# Patient Record
Sex: FEMALE | Race: BLACK OR AFRICAN AMERICAN | NOT HISPANIC OR LATINO | Employment: UNEMPLOYED | URBAN - METROPOLITAN AREA
[De-identification: names, ages, dates, MRNs, and addresses within clinical notes are randomized per-mention and may not be internally consistent; named-entity substitution may affect disease eponyms.]

---

## 2022-02-16 ENCOUNTER — TELEPHONE (OUTPATIENT)
Dept: HEMATOLOGY/ONCOLOGY | Facility: CLINIC | Age: 72
End: 2022-02-16
Payer: MEDICARE

## 2022-02-16 NOTE — TELEPHONE ENCOUNTER
Pt daughter called in requesting appt with Dr. Lott for her mother who was diagnosed with Gallbladder cancer at [facility] 2/4/22  I have requested records and will scan to media once received. Together we set appt with dr lott for 2/21/22 at the cancer center  At 1120am. Travel directions and address given. Pt daughter in agreement with appt,. They will call with any further concerns. Your fax has been successfully sent to [phone] at [phone].

## 2022-02-17 ENCOUNTER — TELEPHONE (OUTPATIENT)
Dept: HEMATOLOGY/ONCOLOGY | Facility: CLINIC | Age: 72
End: 2022-02-17
Payer: MEDICARE

## 2022-02-21 ENCOUNTER — TELEPHONE (OUTPATIENT)
Dept: HEMATOLOGY/ONCOLOGY | Facility: CLINIC | Age: 72
End: 2022-02-21

## 2022-02-21 ENCOUNTER — LAB VISIT (OUTPATIENT)
Dept: LAB | Facility: HOSPITAL | Age: 72
End: 2022-02-21
Attending: INTERNAL MEDICINE
Payer: MEDICARE

## 2022-02-21 ENCOUNTER — TELEPHONE (OUTPATIENT)
Dept: HEMATOLOGY/ONCOLOGY | Facility: CLINIC | Age: 72
End: 2022-02-21
Payer: MEDICARE

## 2022-02-21 ENCOUNTER — OFFICE VISIT (OUTPATIENT)
Dept: HEMATOLOGY/ONCOLOGY | Facility: CLINIC | Age: 72
End: 2022-02-21
Payer: MEDICARE

## 2022-02-21 VITALS
OXYGEN SATURATION: 96 % | TEMPERATURE: 98 F | SYSTOLIC BLOOD PRESSURE: 138 MMHG | WEIGHT: 164.88 LBS | HEART RATE: 97 BPM | DIASTOLIC BLOOD PRESSURE: 77 MMHG | BODY MASS INDEX: 25.88 KG/M2 | HEIGHT: 67 IN | RESPIRATION RATE: 20 BRPM

## 2022-02-21 DIAGNOSIS — C23 ADENOCARCINOMA OF GALLBLADDER: Primary | ICD-10-CM

## 2022-02-21 DIAGNOSIS — D53.9 MACROCYTIC ANEMIA: ICD-10-CM

## 2022-02-21 DIAGNOSIS — D72.829 LEUKOCYTOSIS, UNSPECIFIED TYPE: ICD-10-CM

## 2022-02-21 DIAGNOSIS — C23 ADENOCARCINOMA OF GALLBLADDER: ICD-10-CM

## 2022-02-21 LAB
ALBUMIN SERPL BCP-MCNC: 3 G/DL (ref 3.5–5.2)
ALP SERPL-CCNC: 103 U/L (ref 55–135)
ALT SERPL W/O P-5'-P-CCNC: 11 U/L (ref 10–44)
ANION GAP SERPL CALC-SCNC: 11 MMOL/L (ref 8–16)
AST SERPL-CCNC: 12 U/L (ref 10–40)
BASOPHILS # BLD AUTO: 0.04 K/UL (ref 0–0.2)
BASOPHILS NFR BLD: 0.2 % (ref 0–1.9)
BILIRUB SERPL-MCNC: 0.6 MG/DL (ref 0.1–1)
BUN SERPL-MCNC: 8 MG/DL (ref 8–23)
CALCIUM SERPL-MCNC: 8.9 MG/DL (ref 8.7–10.5)
CANCER AG19-9 SERPL-ACNC: 249.3 U/ML (ref 0–40)
CEA SERPL-MCNC: 2.3 NG/ML (ref 0–5)
CHLORIDE SERPL-SCNC: 106 MMOL/L (ref 95–110)
CO2 SERPL-SCNC: 27 MMOL/L (ref 23–29)
CREAT SERPL-MCNC: 0.7 MG/DL (ref 0.5–1.4)
DIFFERENTIAL METHOD: ABNORMAL
EOSINOPHIL # BLD AUTO: 0.1 K/UL (ref 0–0.5)
EOSINOPHIL NFR BLD: 0.4 % (ref 0–8)
ERYTHROCYTE [DISTWIDTH] IN BLOOD BY AUTOMATED COUNT: 13.5 % (ref 11.5–14.5)
EST. GFR  (AFRICAN AMERICAN): >60 ML/MIN/1.73 M^2
EST. GFR  (NON AFRICAN AMERICAN): >60 ML/MIN/1.73 M^2
FERRITIN SERPL-MCNC: 705 NG/ML (ref 20–300)
GLUCOSE SERPL-MCNC: 109 MG/DL (ref 70–110)
HCT VFR BLD AUTO: 35.1 % (ref 37–48.5)
HGB BLD-MCNC: 10.8 G/DL (ref 12–16)
IMM GRANULOCYTES # BLD AUTO: 0.15 K/UL (ref 0–0.04)
IMM GRANULOCYTES NFR BLD AUTO: 0.7 % (ref 0–0.5)
IRON SERPL-MCNC: 10 UG/DL (ref 30–160)
LYMPHOCYTES # BLD AUTO: 1.8 K/UL (ref 1–4.8)
LYMPHOCYTES NFR BLD: 8.6 % (ref 18–48)
MCH RBC QN AUTO: 32 PG (ref 27–31)
MCHC RBC AUTO-ENTMCNC: 30.8 G/DL (ref 32–36)
MCV RBC AUTO: 104 FL (ref 82–98)
MONOCYTES # BLD AUTO: 1.1 K/UL (ref 0.3–1)
MONOCYTES NFR BLD: 5.4 % (ref 4–15)
NEUTROPHILS # BLD AUTO: 17.7 K/UL (ref 1.8–7.7)
NEUTROPHILS NFR BLD: 84.7 % (ref 38–73)
NRBC BLD-RTO: 0 /100 WBC
PLATELET # BLD AUTO: 394 K/UL (ref 150–450)
PMV BLD AUTO: 9.6 FL (ref 9.2–12.9)
POTASSIUM SERPL-SCNC: 3.5 MMOL/L (ref 3.5–5.1)
PROT SERPL-MCNC: 6.5 G/DL (ref 6–8.4)
RBC # BLD AUTO: 3.37 M/UL (ref 4–5.4)
SATURATED IRON: 5 % (ref 20–50)
SODIUM SERPL-SCNC: 144 MMOL/L (ref 136–145)
TOTAL IRON BINDING CAPACITY: 189 UG/DL (ref 250–450)
TRANSFERRIN SERPL-MCNC: 128 MG/DL (ref 200–375)
WBC # BLD AUTO: 20.93 K/UL (ref 3.9–12.7)

## 2022-02-21 PROCEDURE — 36415 COLL VENOUS BLD VENIPUNCTURE: CPT | Performed by: INTERNAL MEDICINE

## 2022-02-21 PROCEDURE — 1125F AMNT PAIN NOTED PAIN PRSNT: CPT | Mod: CPTII,S$GLB,, | Performed by: INTERNAL MEDICINE

## 2022-02-21 PROCEDURE — 99999 PR PBB SHADOW E&M-EST. PATIENT-LVL V: ICD-10-PCS | Mod: PBBFAC,,, | Performed by: INTERNAL MEDICINE

## 2022-02-21 PROCEDURE — 3078F DIAST BP <80 MM HG: CPT | Mod: CPTII,S$GLB,, | Performed by: INTERNAL MEDICINE

## 2022-02-21 PROCEDURE — 99205 PR OFFICE/OUTPT VISIT, NEW, LEVL V, 60-74 MIN: ICD-10-PCS | Mod: S$GLB,,, | Performed by: INTERNAL MEDICINE

## 2022-02-21 PROCEDURE — 84466 ASSAY OF TRANSFERRIN: CPT | Performed by: INTERNAL MEDICINE

## 2022-02-21 PROCEDURE — 82728 ASSAY OF FERRITIN: CPT | Performed by: INTERNAL MEDICINE

## 2022-02-21 PROCEDURE — 1159F MED LIST DOCD IN RCRD: CPT | Mod: CPTII,S$GLB,, | Performed by: INTERNAL MEDICINE

## 2022-02-21 PROCEDURE — 3075F SYST BP GE 130 - 139MM HG: CPT | Mod: CPTII,S$GLB,, | Performed by: INTERNAL MEDICINE

## 2022-02-21 PROCEDURE — 1160F RVW MEDS BY RX/DR IN RCRD: CPT | Mod: CPTII,S$GLB,, | Performed by: INTERNAL MEDICINE

## 2022-02-21 PROCEDURE — 3078F PR MOST RECENT DIASTOLIC BLOOD PRESSURE < 80 MM HG: ICD-10-PCS | Mod: CPTII,S$GLB,, | Performed by: INTERNAL MEDICINE

## 2022-02-21 PROCEDURE — 1160F PR REVIEW ALL MEDS BY PRESCRIBER/CLIN PHARMACIST DOCUMENTED: ICD-10-PCS | Mod: CPTII,S$GLB,, | Performed by: INTERNAL MEDICINE

## 2022-02-21 PROCEDURE — 82378 CARCINOEMBRYONIC ANTIGEN: CPT | Performed by: INTERNAL MEDICINE

## 2022-02-21 PROCEDURE — 85025 COMPLETE CBC W/AUTO DIFF WBC: CPT | Performed by: INTERNAL MEDICINE

## 2022-02-21 PROCEDURE — 3008F BODY MASS INDEX DOCD: CPT | Mod: CPTII,S$GLB,, | Performed by: INTERNAL MEDICINE

## 2022-02-21 PROCEDURE — 1125F PR PAIN SEVERITY QUANTIFIED, PAIN PRESENT: ICD-10-PCS | Mod: CPTII,S$GLB,, | Performed by: INTERNAL MEDICINE

## 2022-02-21 PROCEDURE — 1159F PR MEDICATION LIST DOCUMENTED IN MEDICAL RECORD: ICD-10-PCS | Mod: CPTII,S$GLB,, | Performed by: INTERNAL MEDICINE

## 2022-02-21 PROCEDURE — 99205 OFFICE O/P NEW HI 60 MIN: CPT | Mod: S$GLB,,, | Performed by: INTERNAL MEDICINE

## 2022-02-21 PROCEDURE — 86301 IMMUNOASSAY TUMOR CA 19-9: CPT | Performed by: INTERNAL MEDICINE

## 2022-02-21 PROCEDURE — 80053 COMPREHEN METABOLIC PANEL: CPT | Performed by: INTERNAL MEDICINE

## 2022-02-21 PROCEDURE — 3008F PR BODY MASS INDEX (BMI) DOCUMENTED: ICD-10-PCS | Mod: CPTII,S$GLB,, | Performed by: INTERNAL MEDICINE

## 2022-02-21 PROCEDURE — 99999 PR PBB SHADOW E&M-EST. PATIENT-LVL V: CPT | Mod: PBBFAC,,, | Performed by: INTERNAL MEDICINE

## 2022-02-21 PROCEDURE — 3075F PR MOST RECENT SYSTOLIC BLOOD PRESS GE 130-139MM HG: ICD-10-PCS | Mod: CPTII,S$GLB,, | Performed by: INTERNAL MEDICINE

## 2022-02-21 RX ORDER — LABETALOL 300 MG/1
TABLET, FILM COATED ORAL
COMMUNITY

## 2022-02-21 RX ORDER — LISINOPRIL 20 MG/1
TABLET ORAL
Status: ON HOLD | COMMUNITY
End: 2022-03-06 | Stop reason: HOSPADM

## 2022-02-21 RX ORDER — AMLODIPINE BESYLATE 5 MG/1
TABLET ORAL
COMMUNITY

## 2022-02-21 RX ORDER — HYDROCODONE BITARTRATE AND ACETAMINOPHEN 5; 325 MG/1; MG/1
TABLET ORAL
COMMUNITY
Start: 2022-02-09 | End: 2022-03-03

## 2022-02-21 RX ORDER — CLONAZEPAM 0.5 MG/1
TABLET ORAL
COMMUNITY

## 2022-02-21 RX ORDER — MONTELUKAST SODIUM 10 MG/1
10 TABLET ORAL DAILY
COMMUNITY
Start: 2021-08-27

## 2022-02-21 NOTE — TELEPHONE ENCOUNTER
Nurse called pt per Dr Marcos to ask about ever had colonoscopy screening for colon cancer?     No pt has not.

## 2022-02-21 NOTE — PROGRESS NOTES
Subjective:      DATE OF VISIT: 2/21/22     ?  Patient ID:?Madelyn Serna is a 71 y.o. female.?? MR#: [id number]   ?   REFERRING PROVIDER: Aaareferral Self  No address on file     ? Primary Care Providers:  Primary Doctor No (General)     CHIEF COMPLAINT: ?  Newly diagnosed gallbladder adenocarcinoma???   ?   ONCOLOGIC DIAGNOSIS:  Gallbladder adenocarcinoma, stage IV, pT4 NX pM1  ?   CURRENT TREATMENT: TBD    PAST TREATMENT:  Laparoscopic cholecystectomy, 02/04/2022, [facility]  ?   ONCOLOGIC HISTORY    I the pleasure of meeting I had the pleasure meeting for the 1st time Ms. Serna a pleasant 71-year-old woman accompanied by her 2 daughters today for newly diagnosed gallbladder adenocarcinoma.    Medical history is notable for former tobacco use quit December 2021, COPD, anxiety, cataracts.      She notes 4 days of right lower quadrant abdominal pain acute onset for which she presented to [facility] emergency room on 02/04/2022.  One episode of vomiting per emergency room note.  Labs with leukocytosis WBC 18.7, hemoglobin 13, , renal function intact GFR over 60, mild alkaline phosphatase elevation 130, normal transaminases and bilirubin.  Albumin 4.5, calcium 10.4.  Urinalysis positive for E coli pansensitive.  Blood cultures negative.    Imaging reports a from outside radiology:  CT abdomen and pelvis with contrast distended gallbladder extensive cholelithiasis including 17 mm stone in the region of gallbladder neck.  Pericholecystic stranding and small adjacent fluid.  Lymph nodes noted to be unremarkable.  Emphysema lung bases.    She was taken to emergency surgery with laparoscopic cholecystectomy.  Surgical note mentions during this maneuver would appear to be a peritoneal nodule was discovered.  The peritoneal nodule was dissected from all surrounding structures with the LigaSure device.  The nodule is passed off the table and sent separately as a specimen.    Outside  "pathology:  "  Gallbladder cholecystectomy adenocarcinoma immunohistochemistry positive for CK7 and CDX2 and negative for CK 20, TTF1 and PAX8  Tumor size at least 2.2 cm.  The tumor invades the liver.  Lymphovascular invasion present.  Perineural invasion not identified.  Margins cannot be assessed.  Regional lymph nodes not applicable.    peritoneal nodule excisional biopsy positive for metastatic adenocarcinoma consistent with origin from gallbladder.      HPI    Today she is accompanied by her 2 daughters.  She notes tenderness at insertion site of drainage tube has follow-up tomorrow with her surgeon.  No fevers or chills.  She denies any weight loss prior to event but has lost a couple lb since surgery.  She notes following surgery having an episode of dark stool but denies any other melena, hematochezia, hematuria, hemoptysis, hematemesis.  No chest pain or shortness of breath.  Anxiety related to diagnosis.    Review of Systems    ?   A comprehensive 14-point review of systems was reviewed with patient and was negative other than as specified above.   ?   PAST MEDICAL HISTORY:   History reviewed. No pertinent past medical history. ?     PAST SURGICAL HISTORY:   History reviewed. No pertinent surgical history.   ?   ALLERGIES:   Allergies as of 02/21/2022 - Reviewed 02/21/2022   Allergen Reaction Noted    Aspirin  02/21/2022    Amoxicillin-pot clavulanate  02/21/2022    Ibuprofen  02/21/2022    Sulfa (sulfonamide antibiotics)  02/21/2022      ?   MEDICATIONS:?   No outpatient medications have been marked as taking for the 2/21/22 encounter (Office Visit) with Ivonne Marcos MD.      ?   SOCIAL HISTORY:?   Social History     Tobacco Use    Smoking status: Not on file    Smokeless tobacco: Not on file   Substance Use Topics    Alcohol use: Not on file      ?   Former tobacco use half pack per day times many years" unable to quantify  ?   FAMILY HISTORY:   family history is not on file.   ? "   Brother with colon cancer in 70s  Niece with breast cancer in 40s     Objective:      Physical Exam      ?   Vitals:    02/21/22 1144   BP: 138/77   Pulse: 97   Resp: 20   Temp: 97.7 °F (36.5 °C)      ?   ECOG:?1   General appearance: Generally well appearing, anxious, occasionally tearful, daughters accompanying her  Head, eyes, ears, nose, and throat:  moist mucous membranes.   Respiratory:  Normal work of breathing   Abdomen: nondistended.  Drainage tube without noted discharge  Extremities: Warm, without edema.   Neurologic: Alert and oriented.  Sitting in wheelchair  Skin: No rashes, ecchymoses or petechial lesion.   ?      ?   Laboratory:  ?   Lab Visit on 02/21/2022   Component Date Value Ref Range Status    WBC 02/21/2022 20.93 (A) 3.90 - 12.70 K/uL Final    RBC 02/21/2022 3.37 (A) 4.00 - 5.40 M/uL Final    Hemoglobin 02/21/2022 10.8 (A) 12.0 - 16.0 g/dL Final    Hematocrit 02/21/2022 35.1 (A) 37.0 - 48.5 % Final    MCV 02/21/2022 104 (A) 82 - 98 fL Final    MCH 02/21/2022 32.0 (A) 27.0 - 31.0 pg Final    MCHC 02/21/2022 30.8 (A) 32.0 - 36.0 g/dL Final    RDW 02/21/2022 13.5  11.5 - 14.5 % Final    Platelets 02/21/2022 394  150 - 450 K/uL Final    MPV 02/21/2022 9.6  9.2 - 12.9 fL Final    Immature Granulocytes 02/21/2022 0.7 (A) 0.0 - 0.5 % Final    Gran # (ANC) 02/21/2022 17.7 (A) 1.8 - 7.7 K/uL Final    Immature Grans (Abs) 02/21/2022 0.15 (A) 0.00 - 0.04 K/uL Final    Lymph # 02/21/2022 1.8  1.0 - 4.8 K/uL Final    Mono # 02/21/2022 1.1 (A) 0.3 - 1.0 K/uL Final    Eos # 02/21/2022 0.1  0.0 - 0.5 K/uL Final    Baso # 02/21/2022 0.04  0.00 - 0.20 K/uL Final    nRBC 02/21/2022 0  0 /100 WBC Final    Gran % 02/21/2022 84.7 (A) 38.0 - 73.0 % Final    Lymph % 02/21/2022 8.6 (A) 18.0 - 48.0 % Final    Mono % 02/21/2022 5.4  4.0 - 15.0 % Final    Eosinophil % 02/21/2022 0.4  0.0 - 8.0 % Final    Basophil % 02/21/2022 0.2  0.0 - 1.9 % Final    Differential Method 02/21/2022 Automated    Final    Sodium 02/21/2022 144  136 - 145 mmol/L Final    Potassium 02/21/2022 3.5  3.5 - 5.1 mmol/L Final    Chloride 02/21/2022 106  95 - 110 mmol/L Final    CO2 02/21/2022 27  23 - 29 mmol/L Final    Glucose 02/21/2022 109  70 - 110 mg/dL Final    BUN 02/21/2022 8  8 - 23 mg/dL Final    Creatinine 02/21/2022 0.7  0.5 - 1.4 mg/dL Final    Calcium 02/21/2022 8.9  8.7 - 10.5 mg/dL Final    Total Protein 02/21/2022 6.5  6.0 - 8.4 g/dL Final    Albumin 02/21/2022 3.0 (A) 3.5 - 5.2 g/dL Final    Total Bilirubin 02/21/2022 0.6  0.1 - 1.0 mg/dL Final    Alkaline Phosphatase 02/21/2022 103  55 - 135 U/L Final    AST 02/21/2022 12  10 - 40 U/L Final    ALT 02/21/2022 11  10 - 44 U/L Final    Anion Gap 02/21/2022 11  8 - 16 mmol/L Final    eGFR if African American 02/21/2022 >60  >60 mL/min/1.73 m^2 Final    eGFR if non African American 02/21/2022 >60  >60 mL/min/1.73 m^2 Final    Iron 02/21/2022 10 (A) 30 - 160 ug/dL Final    Transferrin 02/21/2022 128 (A) 200 - 375 mg/dL Final    TIBC 02/21/2022 189 (A) 250 - 450 ug/dL Final    Saturated Iron 02/21/2022 5 (A) 20 - 50 % Final    Ferritin 02/21/2022 705 (A) 20.0 - 300.0 ng/mL Final    CEA 02/21/2022 2.3  0.0 - 5.0 ng/mL Final    CA 19-9 02/21/2022 249.3 (A) 0.0 - 40.0 U/mL Final      ?   Tumor markers   ?  CEA, CA 19 9 pending  ?   Imaging:  ?  Outside see above  No results found for this or any previous visit (from the past 2160 hour(s)).  No results found for this or any previous visit (from the past 2160 hour(s)).  No results found for this or any previous visit (from the past 2160 hour(s)).      Pathology:    Outside see above     ?   Assessment/Plan:   Adenocarcinoma of gallbladder  -     CT/PET SCAN ROUTINE; Future; Expected date: 02/21/2022  -     CBC auto differential; Future; Expected date: 02/21/2022  -     Comprehensive Metabolic Panel; Future; Expected date: 02/21/2022  -     Iron and TIBC; Future; Expected date: 02/21/2022  -      Ferritin; Future; Expected date: 02/21/2022  -     Ambulatory referral/consult to Palliative Care Clinic; Future; Expected date: 02/28/2022  -     CEA; Future; Expected date: 02/21/2022  -     CA19.9; Future; Expected date: 02/21/2022    Leukocytosis, unspecified type    Macrocytic anemia       1. Adenocarcinoma of gallbladder    2. Leukocytosis, unspecified type    3. Macrocytic anemia          Plan:     Problem List Items Addressed This Visit    None     Visit Diagnoses     Adenocarcinoma of gallbladder    -  Primary    Leukocytosis, unspecified type        Macrocytic anemia            Gallbladder adenocarcinoma, stage IV, pT4 NX pM1: acute RUQ pain with emergency laparoscopic cholecystectomy, 02/04/2022, [facility] , pathology positive for T4 adenocarcinoma peritoneal nodule positive for metastatic involvement.  immunohistochemistry positive for CK7 and CDX2 and negative for CK 20, TTF1 and PAX8. Will need full staging imaging including chest via CT or PET as available. Outside emergency room performed CT abdomen pelvis and have requested CD for review.  Note:  Patient denies history of screening colonoscopy in the past for colon cancer.  Will get tumor markers, pathology review and recommend next generation sequencing/particularly to evaluate for MSI status, and tract mutation.  Discussed natural history of advanced gallbladder adenocarcinoma as an aggressive malignancy with palliative only treatment options.  I introduced the role of palliative care to assist in goals of care advance care planning and symptomatic management.    Anemia:  Since discharge developed macrocytic anemia mild.  Will assess iron indices possible iron deficiency with reticulocytosis postoperatively.  She did note postoperatively having darker stool which has cleared none prior to her admission.    Advance Care Planning   Referral to palliative care for advanced malignancy, goals of care advance care planning and symptomatic  management in particular anxiety.  Only pain related to drainage tube will be following up with surgery and recommended discussion with them for short interval pain medication as needed for this indication.       Follow-Up:   Patient Instructions   Obtain/import CT a/p imaging  CT chest or PET routine  Labs today including tumor markers , CEA  Pathology from outside please send for Guardant 360 liquid (will need come back when here for scan) and tissue from outside facility

## 2022-02-21 NOTE — TELEPHONE ENCOUNTER
Received communication from Dr. Marcos to obtain Ct a/P on disc from [facility] which I have done today by fax request  and will hand carry to [facility] for loading once received   Your fax has been successfully sent to [phone] at [phone].    I will also contact [facility] tomorrow with question about how to order just tissue testing on outside pathology as requested per DR. Marcos.   Will inform her of information once received and then obtain.

## 2022-02-21 NOTE — PATIENT INSTRUCTIONS
Obtain/import CT a/p imaging  CT chest or PET routine  Labs today including tumor markers , CEA  Pathology from outside please send for Guardant 360 liquid (will need come back when here for scan) and tissue from outside facility

## 2022-02-22 ENCOUNTER — TELEPHONE (OUTPATIENT)
Dept: HEMATOLOGY/ONCOLOGY | Facility: CLINIC | Age: 72
End: 2022-02-22
Payer: MEDICARE

## 2022-02-22 NOTE — TELEPHONE ENCOUNTER
Per dr Lott's instructions I have spoken with pt daughter Dotty regarding getting the pt back in when she is in the clinic for other appt to have Guardant 360 liquid biopsy kit drawn. Together we set up 3/3/22 at 1030 for pt to come to the cancer center prior to her pet scan to have this test drawn.  I will then send off for liquid biopsy and tissue collection from outside path dept for tissue testing as well. dR lott completing order form .   Pt and daughter good with plan

## 2022-02-23 ENCOUNTER — TELEPHONE (OUTPATIENT)
Dept: HEMATOLOGY/ONCOLOGY | Facility: CLINIC | Age: 72
End: 2022-02-23
Payer: MEDICARE

## 2022-02-24 ENCOUNTER — TELEPHONE (OUTPATIENT)
Dept: HEMATOLOGY/ONCOLOGY | Facility: CLINIC | Age: 72
End: 2022-02-24
Payer: MEDICARE

## 2022-02-24 NOTE — TELEPHONE ENCOUNTER
Received notification from Maulik Delatorre LPN that she has received the Ct scan on disc that was requested and she hand carried to radiology for loading into our system. She also states she scanned the report into media.

## 2022-02-28 ENCOUNTER — TELEPHONE (OUTPATIENT)
Dept: PALLIATIVE MEDICINE | Facility: CLINIC | Age: 72
End: 2022-02-28
Payer: MEDICARE

## 2022-02-28 NOTE — TELEPHONE ENCOUNTER
Nurse reached out to medical oncology for advise on pt needing medication prior to PET Scan, pt is on clonazepam 0.5 mg  prescribed by pcp and was instructed to  take one prior to her PET, pt verbalized understanding and her daughter as well.

## 2022-02-28 NOTE — TELEPHONE ENCOUNTER
"Nurse spoke with pt and daughter regarding her referral for palliative, pt stated, " I am scared and nervous about all this and I prayed to God to let me live" nurse comforted pt and ensure her our department can help with being scared and nervous about her new diagnosis. Her daughter stated she has been dealing with anxiety for a long time, her pcp has treated this in the past. However her daughter stated her  anxiety is worsen and has some pain, I have scheduled Ms Hamilton for a palliative visit after her PET scan on Thursday with TERI Ornelas. ms hamilton and her daughter was grateful for the time I spent with them on the phone explaining what palliative has to offer at a time when support is needed most.   "

## 2022-03-03 ENCOUNTER — OFFICE VISIT (OUTPATIENT)
Dept: PALLIATIVE MEDICINE | Facility: CLINIC | Age: 72
End: 2022-03-03
Payer: COMMERCIAL

## 2022-03-03 ENCOUNTER — HOSPITAL ENCOUNTER (OUTPATIENT)
Dept: RADIOLOGY | Facility: HOSPITAL | Age: 72
Discharge: HOME | End: 2022-03-03
Attending: INTERNAL MEDICINE
Payer: COMMERCIAL

## 2022-03-03 ENCOUNTER — INFUSION (OUTPATIENT)
Dept: INFUSION THERAPY | Facility: HOSPITAL | Age: 72
End: 2022-03-03
Attending: INTERNAL MEDICINE
Payer: MEDICARE

## 2022-03-03 ENCOUNTER — HOSPITAL ENCOUNTER (OUTPATIENT)
Facility: HOSPITAL | Age: 72
Discharge: HOME | End: 2022-03-06
Attending: EMERGENCY MEDICINE | Admitting: INTERNAL MEDICINE
Payer: COMMERCIAL

## 2022-03-03 ENCOUNTER — TELEPHONE (OUTPATIENT)
Dept: HEMATOLOGY/ONCOLOGY | Facility: CLINIC | Age: 72
End: 2022-03-03
Payer: MEDICARE

## 2022-03-03 VITALS
BODY MASS INDEX: 26.23 KG/M2 | WEIGHT: 167.13 LBS | HEART RATE: 115 BPM | SYSTOLIC BLOOD PRESSURE: 164 MMHG | DIASTOLIC BLOOD PRESSURE: 79 MMHG | HEIGHT: 67 IN | RESPIRATION RATE: 18 BRPM | TEMPERATURE: 98 F

## 2022-03-03 DIAGNOSIS — T81.42XA INFECTION FOLLOWING A PROCEDURE, DEEP INCISIONAL SURGICAL SITE, INITIAL ENCOUNTER: ICD-10-CM

## 2022-03-03 DIAGNOSIS — Z51.5 PALLIATIVE CARE ENCOUNTER: ICD-10-CM

## 2022-03-03 DIAGNOSIS — T81.43XA POSTPROCEDURAL INTRAABDOMINAL ABSCESS: Primary | ICD-10-CM

## 2022-03-03 DIAGNOSIS — C23 ADENOCARCINOMA OF GALLBLADDER: ICD-10-CM

## 2022-03-03 PROBLEM — I10 ESSENTIAL (PRIMARY) HYPERTENSION: Status: ACTIVE | Noted: 2022-03-03

## 2022-03-03 LAB
ALBUMIN SERPL BCP-MCNC: 2.5 G/DL (ref 3.5–5.2)
ALP SERPL-CCNC: 142 U/L (ref 55–135)
ALT SERPL W/O P-5'-P-CCNC: 20 U/L (ref 10–44)
ANION GAP SERPL CALC-SCNC: 13 MMOL/L (ref 8–16)
AST SERPL-CCNC: 31 U/L (ref 10–40)
BASOPHILS # BLD AUTO: 0.06 K/UL (ref 0–0.2)
BASOPHILS NFR BLD: 0.3 % (ref 0–1.9)
BILIRUB SERPL-MCNC: 0.5 MG/DL (ref 0.1–1)
BUN SERPL-MCNC: 10 MG/DL (ref 8–23)
CALCIUM SERPL-MCNC: 9 MG/DL (ref 8.7–10.5)
CHLORIDE SERPL-SCNC: 97 MMOL/L (ref 95–110)
CO2 SERPL-SCNC: 34 MMOL/L (ref 23–29)
CREAT SERPL-MCNC: 0.8 MG/DL (ref 0.5–1.4)
DIFFERENTIAL METHOD: ABNORMAL
EOSINOPHIL # BLD AUTO: 0 K/UL (ref 0–0.5)
EOSINOPHIL NFR BLD: 0.1 % (ref 0–8)
ERYTHROCYTE [DISTWIDTH] IN BLOOD BY AUTOMATED COUNT: 13.4 % (ref 11.5–14.5)
EST. GFR  (AFRICAN AMERICAN): >60 ML/MIN/1.73 M^2
EST. GFR  (NON AFRICAN AMERICAN): >60 ML/MIN/1.73 M^2
GLUCOSE SERPL-MCNC: 128 MG/DL (ref 70–110)
HCT VFR BLD AUTO: 32.6 % (ref 37–48.5)
HGB BLD-MCNC: 10.2 G/DL (ref 12–16)
IMM GRANULOCYTES # BLD AUTO: 0.16 K/UL (ref 0–0.04)
IMM GRANULOCYTES NFR BLD AUTO: 0.7 % (ref 0–0.5)
LYMPHOCYTES # BLD AUTO: 1.7 K/UL (ref 1–4.8)
LYMPHOCYTES NFR BLD: 7.1 % (ref 18–48)
MCH RBC QN AUTO: 31.4 PG (ref 27–31)
MCHC RBC AUTO-ENTMCNC: 31.3 G/DL (ref 32–36)
MCV RBC AUTO: 100 FL (ref 82–98)
MONOCYTES # BLD AUTO: 1.2 K/UL (ref 0.3–1)
MONOCYTES NFR BLD: 5 % (ref 4–15)
NEUTROPHILS # BLD AUTO: 20.1 K/UL (ref 1.8–7.7)
NEUTROPHILS NFR BLD: 86.8 % (ref 38–73)
NRBC BLD-RTO: 0 /100 WBC
PLATELET # BLD AUTO: 530 K/UL (ref 150–450)
PMV BLD AUTO: 8.8 FL (ref 9.2–12.9)
POTASSIUM SERPL-SCNC: 3.1 MMOL/L (ref 3.5–5.1)
PROT SERPL-MCNC: 7.5 G/DL (ref 6–8.4)
RBC # BLD AUTO: 3.25 M/UL (ref 4–5.4)
SARS-COV-2 RDRP RESP QL NAA+PROBE: NEGATIVE
SODIUM SERPL-SCNC: 144 MMOL/L (ref 136–145)
WBC # BLD AUTO: 23.1 K/UL (ref 3.9–12.7)

## 2022-03-03 PROCEDURE — 87040 BLOOD CULTURE FOR BACTERIA: CPT | Mod: 59 | Performed by: INTERNAL MEDICINE

## 2022-03-03 PROCEDURE — 3288F PR FALLS RISK ASSESSMENT DOCUMENTED: ICD-10-PCS | Mod: CPTII,S$GLB,, | Performed by: NURSE PRACTITIONER

## 2022-03-03 PROCEDURE — 1125F PR PAIN SEVERITY QUANTIFIED, PAIN PRESENT: ICD-10-PCS | Mod: CPTII,S$GLB,, | Performed by: NURSE PRACTITIONER

## 2022-03-03 PROCEDURE — 80053 COMPREHEN METABOLIC PANEL: CPT | Performed by: NURSE PRACTITIONER

## 2022-03-03 PROCEDURE — 87186 SC STD MICRODIL/AGAR DIL: CPT | Performed by: NURSE PRACTITIONER

## 2022-03-03 PROCEDURE — 96375 TX/PRO/DX INJ NEW DRUG ADDON: CPT

## 2022-03-03 PROCEDURE — 63600175 PHARM REV CODE 636 W HCPCS: Performed by: EMERGENCY MEDICINE

## 2022-03-03 PROCEDURE — 99999 PR PBB SHADOW E&M-EST. PATIENT-LVL IV: CPT | Mod: PBBFAC,,, | Performed by: NURSE PRACTITIONER

## 2022-03-03 PROCEDURE — 78815 PET IMAGE W/CT SKULL-THIGH: CPT | Mod: TC

## 2022-03-03 PROCEDURE — 63600175 PHARM REV CODE 636 W HCPCS: Performed by: INTERNAL MEDICINE

## 2022-03-03 PROCEDURE — 78815 PET IMAGE W/CT SKULL-THIGH: CPT | Mod: 26,PS,, | Performed by: RADIOLOGY

## 2022-03-03 PROCEDURE — 96361 HYDRATE IV INFUSION ADD-ON: CPT

## 2022-03-03 PROCEDURE — 99205 OFFICE O/P NEW HI 60 MIN: CPT | Mod: S$GLB,,, | Performed by: NURSE PRACTITIONER

## 2022-03-03 PROCEDURE — U0002 COVID-19 LAB TEST NON-CDC: HCPCS | Performed by: INTERNAL MEDICINE

## 2022-03-03 PROCEDURE — 78815 NM PET CT ROUTINE: ICD-10-PCS | Mod: 26,PS,, | Performed by: RADIOLOGY

## 2022-03-03 PROCEDURE — 96376 TX/PRO/DX INJ SAME DRUG ADON: CPT

## 2022-03-03 PROCEDURE — 99497 PR ADVNCD CARE PLAN 30 MIN: ICD-10-PCS | Mod: S$GLB,,, | Performed by: PHYSICIAN ASSISTANT

## 2022-03-03 PROCEDURE — 99205 PR OFFICE/OUTPT VISIT, NEW, LEVL V, 60-74 MIN: ICD-10-PCS | Mod: S$GLB,,, | Performed by: PHYSICIAN ASSISTANT

## 2022-03-03 PROCEDURE — 99497 ADVNCD CARE PLAN 30 MIN: CPT | Mod: S$GLB,,, | Performed by: PHYSICIAN ASSISTANT

## 2022-03-03 PROCEDURE — 96367 TX/PROPH/DG ADDL SEQ IV INF: CPT

## 2022-03-03 PROCEDURE — 25500020 PHARM REV CODE 255: Performed by: EMERGENCY MEDICINE

## 2022-03-03 PROCEDURE — 1101F PT FALLS ASSESS-DOCD LE1/YR: CPT | Mod: CPTII,S$GLB,, | Performed by: NURSE PRACTITIONER

## 2022-03-03 PROCEDURE — 3077F SYST BP >= 140 MM HG: CPT | Mod: CPTII,S$GLB,, | Performed by: NURSE PRACTITIONER

## 2022-03-03 PROCEDURE — 96365 THER/PROPH/DIAG IV INF INIT: CPT | Mod: 59

## 2022-03-03 PROCEDURE — 85025 COMPLETE CBC W/AUTO DIFF WBC: CPT | Performed by: NURSE PRACTITIONER

## 2022-03-03 PROCEDURE — 87077 CULTURE AEROBIC IDENTIFY: CPT | Performed by: NURSE PRACTITIONER

## 2022-03-03 PROCEDURE — 87070 CULTURE OTHR SPECIMN AEROBIC: CPT | Performed by: NURSE PRACTITIONER

## 2022-03-03 PROCEDURE — G0378 HOSPITAL OBSERVATION PER HR: HCPCS

## 2022-03-03 PROCEDURE — 3077F PR MOST RECENT SYSTOLIC BLOOD PRESSURE >= 140 MM HG: ICD-10-PCS | Mod: CPTII,S$GLB,, | Performed by: NURSE PRACTITIONER

## 2022-03-03 PROCEDURE — 36415 COLL VENOUS BLD VENIPUNCTURE: CPT

## 2022-03-03 PROCEDURE — 3078F PR MOST RECENT DIASTOLIC BLOOD PRESSURE < 80 MM HG: ICD-10-PCS | Mod: CPTII,S$GLB,, | Performed by: NURSE PRACTITIONER

## 2022-03-03 PROCEDURE — 99999 PR PBB SHADOW E&M-EST. PATIENT-LVL IV: ICD-10-PCS | Mod: PBBFAC,,, | Performed by: NURSE PRACTITIONER

## 2022-03-03 PROCEDURE — 3288F FALL RISK ASSESSMENT DOCD: CPT | Mod: CPTII,S$GLB,, | Performed by: NURSE PRACTITIONER

## 2022-03-03 PROCEDURE — 99285 EMERGENCY DEPT VISIT HI MDM: CPT | Mod: 25

## 2022-03-03 PROCEDURE — 3078F DIAST BP <80 MM HG: CPT | Mod: CPTII,S$GLB,, | Performed by: NURSE PRACTITIONER

## 2022-03-03 PROCEDURE — 99205 OFFICE O/P NEW HI 60 MIN: CPT | Mod: S$GLB,,, | Performed by: PHYSICIAN ASSISTANT

## 2022-03-03 PROCEDURE — 3008F BODY MASS INDEX DOCD: CPT | Mod: CPTII,S$GLB,, | Performed by: NURSE PRACTITIONER

## 2022-03-03 PROCEDURE — 3008F PR BODY MASS INDEX (BMI) DOCUMENTED: ICD-10-PCS | Mod: CPTII,S$GLB,, | Performed by: NURSE PRACTITIONER

## 2022-03-03 PROCEDURE — 1101F PR PT FALLS ASSESS DOC 0-1 FALLS W/OUT INJ PAST YR: ICD-10-PCS | Mod: CPTII,S$GLB,, | Performed by: NURSE PRACTITIONER

## 2022-03-03 PROCEDURE — 25000003 PHARM REV CODE 250: Performed by: INTERNAL MEDICINE

## 2022-03-03 PROCEDURE — 99205 PR OFFICE/OUTPT VISIT, NEW, LEVL V, 60-74 MIN: ICD-10-PCS | Mod: S$GLB,,, | Performed by: NURSE PRACTITIONER

## 2022-03-03 PROCEDURE — 96366 THER/PROPH/DIAG IV INF ADDON: CPT

## 2022-03-03 PROCEDURE — 1125F AMNT PAIN NOTED PAIN PRSNT: CPT | Mod: CPTII,S$GLB,, | Performed by: NURSE PRACTITIONER

## 2022-03-03 RX ORDER — CYPROHEPTADINE HYDROCHLORIDE 4 MG/1
4 TABLET ORAL 2 TIMES DAILY
COMMUNITY
Start: 2022-02-23

## 2022-03-03 RX ORDER — ALBUTEROL SULFATE 90 UG/1
AEROSOL, METERED RESPIRATORY (INHALATION)
COMMUNITY
Start: 2022-02-16

## 2022-03-03 RX ORDER — MONTELUKAST SODIUM 10 MG/1
10 TABLET ORAL DAILY
Status: DISCONTINUED | OUTPATIENT
Start: 2022-03-04 | End: 2022-03-06 | Stop reason: HOSPADM

## 2022-03-03 RX ORDER — IBUPROFEN 200 MG
24 TABLET ORAL
Status: DISCONTINUED | OUTPATIENT
Start: 2022-03-03 | End: 2022-03-06 | Stop reason: HOSPADM

## 2022-03-03 RX ORDER — IPRATROPIUM BROMIDE 21 UG/1
SPRAY, METERED NASAL
COMMUNITY
Start: 2021-10-20

## 2022-03-03 RX ORDER — CLONAZEPAM 0.5 MG/1
0.5 TABLET ORAL 2 TIMES DAILY
Status: DISCONTINUED | OUTPATIENT
Start: 2022-03-03 | End: 2022-03-06 | Stop reason: HOSPADM

## 2022-03-03 RX ORDER — MORPHINE SULFATE 4 MG/ML
2 INJECTION, SOLUTION INTRAMUSCULAR; INTRAVENOUS
Status: COMPLETED | OUTPATIENT
Start: 2022-03-03 | End: 2022-03-03

## 2022-03-03 RX ORDER — METRONIDAZOLE 500 MG/100ML
500 INJECTION, SOLUTION INTRAVENOUS
Status: ACTIVE | OUTPATIENT
Start: 2022-03-03 | End: 2022-03-04

## 2022-03-03 RX ORDER — DICLOFENAC SODIUM 10 MG/G
GEL TOPICAL
COMMUNITY
Start: 2022-02-24

## 2022-03-03 RX ORDER — SIMETHICONE 80 MG
1 TABLET,CHEWABLE ORAL 4 TIMES DAILY PRN
Status: DISCONTINUED | OUTPATIENT
Start: 2022-03-03 | End: 2022-03-06 | Stop reason: HOSPADM

## 2022-03-03 RX ORDER — LISINOPRIL 10 MG/1
10 TABLET ORAL DAILY
Status: DISCONTINUED | OUTPATIENT
Start: 2022-03-04 | End: 2022-03-06 | Stop reason: HOSPADM

## 2022-03-03 RX ORDER — CEFEPIME HYDROCHLORIDE 1 G/50ML
2 INJECTION, SOLUTION INTRAVENOUS
Status: DISCONTINUED | OUTPATIENT
Start: 2022-03-04 | End: 2022-03-06 | Stop reason: HOSPADM

## 2022-03-03 RX ORDER — LABETALOL 100 MG/1
300 TABLET, FILM COATED ORAL EVERY 12 HOURS
Status: DISCONTINUED | OUTPATIENT
Start: 2022-03-03 | End: 2022-03-06 | Stop reason: HOSPADM

## 2022-03-03 RX ORDER — ALBUTEROL SULFATE 0.83 MG/ML
2.5 SOLUTION RESPIRATORY (INHALATION) EVERY 4 HOURS PRN
Status: DISCONTINUED | OUTPATIENT
Start: 2022-03-04 | End: 2022-03-06 | Stop reason: HOSPADM

## 2022-03-03 RX ORDER — ONDANSETRON 8 MG/1
8 TABLET, ORALLY DISINTEGRATING ORAL EVERY 6 HOURS PRN
Status: DISCONTINUED | OUTPATIENT
Start: 2022-03-03 | End: 2022-03-06 | Stop reason: HOSPADM

## 2022-03-03 RX ORDER — MORPHINE SULFATE 4 MG/ML
4 INJECTION, SOLUTION INTRAMUSCULAR; INTRAVENOUS
Status: COMPLETED | OUTPATIENT
Start: 2022-03-03 | End: 2022-03-03

## 2022-03-03 RX ORDER — SODIUM CHLORIDE 0.9 % (FLUSH) 0.9 %
10 SYRINGE (ML) INJECTION EVERY 8 HOURS PRN
Status: DISCONTINUED | OUTPATIENT
Start: 2022-03-03 | End: 2022-03-06 | Stop reason: HOSPADM

## 2022-03-03 RX ORDER — ALBUTEROL SULFATE 90 UG/1
1 AEROSOL, METERED RESPIRATORY (INHALATION) EVERY 4 HOURS PRN
Status: DISCONTINUED | OUTPATIENT
Start: 2022-03-03 | End: 2022-03-03 | Stop reason: CLARIF

## 2022-03-03 RX ORDER — ONDANSETRON 2 MG/ML
4 INJECTION INTRAMUSCULAR; INTRAVENOUS
Status: COMPLETED | OUTPATIENT
Start: 2022-03-03 | End: 2022-03-03

## 2022-03-03 RX ORDER — HYDROCODONE BITARTRATE AND ACETAMINOPHEN 5; 325 MG/1; MG/1
1 TABLET ORAL EVERY 6 HOURS PRN
Status: DISCONTINUED | OUTPATIENT
Start: 2022-03-03 | End: 2022-03-06 | Stop reason: HOSPADM

## 2022-03-03 RX ORDER — GLUCAGON 1 MG
1 KIT INJECTION
Status: DISCONTINUED | OUTPATIENT
Start: 2022-03-03 | End: 2022-03-06 | Stop reason: HOSPADM

## 2022-03-03 RX ORDER — CYPROHEPTADINE HYDROCHLORIDE 4 MG/1
4 TABLET ORAL 2 TIMES DAILY
Status: DISCONTINUED | OUTPATIENT
Start: 2022-03-03 | End: 2022-03-06 | Stop reason: HOSPADM

## 2022-03-03 RX ORDER — TALC
6 POWDER (GRAM) TOPICAL NIGHTLY PRN
Status: DISCONTINUED | OUTPATIENT
Start: 2022-03-03 | End: 2022-03-06 | Stop reason: HOSPADM

## 2022-03-03 RX ORDER — MAG HYDROX/ALUMINUM HYD/SIMETH 200-200-20
30 SUSPENSION, ORAL (FINAL DOSE FORM) ORAL 4 TIMES DAILY PRN
Status: DISCONTINUED | OUTPATIENT
Start: 2022-03-03 | End: 2022-03-06 | Stop reason: HOSPADM

## 2022-03-03 RX ORDER — SODIUM CHLORIDE AND POTASSIUM CHLORIDE 150; 450 MG/100ML; MG/100ML
INJECTION, SOLUTION INTRAVENOUS CONTINUOUS
Status: DISCONTINUED | OUTPATIENT
Start: 2022-03-03 | End: 2022-03-06 | Stop reason: HOSPADM

## 2022-03-03 RX ORDER — NALOXONE HCL 0.4 MG/ML
0.02 VIAL (ML) INJECTION
Status: DISCONTINUED | OUTPATIENT
Start: 2022-03-03 | End: 2022-03-06 | Stop reason: HOSPADM

## 2022-03-03 RX ORDER — IBUPROFEN 200 MG
16 TABLET ORAL
Status: DISCONTINUED | OUTPATIENT
Start: 2022-03-03 | End: 2022-03-06 | Stop reason: HOSPADM

## 2022-03-03 RX ORDER — ONDANSETRON 8 MG/1
8 TABLET, ORALLY DISINTEGRATING ORAL EVERY 12 HOURS PRN
Qty: 30 TABLET | Refills: 1 | Status: SHIPPED | OUTPATIENT
Start: 2022-03-03 | End: 2022-03-11

## 2022-03-03 RX ORDER — POTASSIUM CHLORIDE 20 MEQ/1
20 TABLET, EXTENDED RELEASE ORAL 2 TIMES DAILY
Status: COMPLETED | OUTPATIENT
Start: 2022-03-03 | End: 2022-03-04

## 2022-03-03 RX ORDER — PROMETHAZINE HYDROCHLORIDE 25 MG/1
25 TABLET ORAL EVERY 6 HOURS PRN
Status: DISCONTINUED | OUTPATIENT
Start: 2022-03-03 | End: 2022-03-06 | Stop reason: HOSPADM

## 2022-03-03 RX ORDER — MORPHINE SULFATE 4 MG/ML
4 INJECTION, SOLUTION INTRAMUSCULAR; INTRAVENOUS EVERY 4 HOURS PRN
Status: DISCONTINUED | OUTPATIENT
Start: 2022-03-03 | End: 2022-03-06 | Stop reason: HOSPADM

## 2022-03-03 RX ORDER — CIPROFLOXACIN 500 MG/1
TABLET ORAL
Status: ON HOLD | COMMUNITY
Start: 2022-02-22 | End: 2022-03-06 | Stop reason: HOSPADM

## 2022-03-03 RX ORDER — ACETAMINOPHEN 325 MG/1
650 TABLET ORAL EVERY 4 HOURS PRN
Status: DISCONTINUED | OUTPATIENT
Start: 2022-03-03 | End: 2022-03-06 | Stop reason: HOSPADM

## 2022-03-03 RX ORDER — MIRTAZAPINE 30 MG/1
TABLET, FILM COATED ORAL
COMMUNITY
Start: 2021-11-29 | End: 2022-03-06

## 2022-03-03 RX ORDER — ENOXAPARIN SODIUM 100 MG/ML
40 INJECTION SUBCUTANEOUS EVERY 24 HOURS
Status: DISCONTINUED | OUTPATIENT
Start: 2022-03-04 | End: 2022-03-06 | Stop reason: HOSPADM

## 2022-03-03 RX ORDER — POLYETHYLENE GLYCOL 3350 17 G/17G
17 POWDER, FOR SOLUTION ORAL DAILY
Status: DISCONTINUED | OUTPATIENT
Start: 2022-03-04 | End: 2022-03-06 | Stop reason: HOSPADM

## 2022-03-03 RX ADMIN — CEFTRIAXONE 2 G: 2 INJECTION, SOLUTION INTRAVENOUS at 07:03

## 2022-03-03 RX ADMIN — HYDROCODONE BITARTRATE AND ACETAMINOPHEN 1 TABLET: 5; 325 TABLET ORAL at 10:03

## 2022-03-03 RX ADMIN — POTASSIUM CHLORIDE 20 MEQ: 1500 TABLET, EXTENDED RELEASE ORAL at 10:03

## 2022-03-03 RX ADMIN — ONDANSETRON 4 MG: 2 INJECTION INTRAMUSCULAR; INTRAVENOUS at 05:03

## 2022-03-03 RX ADMIN — IOHEXOL 100 ML: 350 INJECTION, SOLUTION INTRAVENOUS at 05:03

## 2022-03-03 RX ADMIN — CLONAZEPAM 0.5 MG: 0.5 TABLET ORAL at 10:03

## 2022-03-03 RX ADMIN — MORPHINE SULFATE 4 MG: 4 INJECTION INTRAVENOUS at 07:03

## 2022-03-03 RX ADMIN — POTASSIUM CHLORIDE AND SODIUM CHLORIDE: 450; 150 INJECTION, SOLUTION INTRAVENOUS at 10:03

## 2022-03-03 RX ADMIN — LABETALOL HYDROCHLORIDE 300 MG: 200 TABLET, FILM COATED ORAL at 10:03

## 2022-03-03 RX ADMIN — CYPROHEPTADINE HYDROCHLORIDE 4 MG: 4 TABLET ORAL at 10:03

## 2022-03-03 RX ADMIN — VANCOMYCIN HYDROCHLORIDE 2000 MG: 500 INJECTION, POWDER, LYOPHILIZED, FOR SOLUTION INTRAVENOUS at 10:03

## 2022-03-03 RX ADMIN — MORPHINE SULFATE 2 MG: 4 INJECTION INTRAVENOUS at 05:03

## 2022-03-03 NOTE — FIRST PROVIDER EVALUATION
Medical screening exam completed.  I have conducted a focused provider triage encounter, findings are as follows:    71 year old female sent from oncology clinic for evaluation of pus drainage from wound on right upper abdomen.  Denies fever or chills. Reports mild pain at site.

## 2022-03-03 NOTE — ED PROVIDER NOTES
SCRIBE #1 NOTE: I, Adolfo Johnson, am scribing for, and in the presence of, Fadi Valencia MD. I have scribed the entire note.       History     Chief Complaint   Patient presents with    Wound Check     Pt reports pus and pain to incision site x 1 week     Review of patient's allergies indicates:   Allergen Reactions    Aspirin      Makes her throwup    Ibuprofen      Throws up    Sulfa (sulfonamide antibiotics)      Pt can't remember reactions    Amoxicillin-pot clavulanate Itching         History of Present Illness     HPI    3/3/2022, 4:34PM  History obtained from the patient      History of Present Illness: Madelyn Serna is a 71 y.o. female patient with a PMHx of HTN and gallbladder CA who presents to the Emergency Department for evaluation of wound check due to drainage and pain at RUQ incision site. On 2/4, pt reports having a laparoscopic cholecystectomy at [facility] by Dr. Collazo due to having Gallbladder adenocarcinoma, stage IV and had a tube placed at that time; On 2/22, pt reports she had the tube removed and for the last week, the incision site has been draining pus and she's had pain with movement. Today, pt was seen by Dr. Marcos (Hem/Onc) where a PET scan was performed and by MAYANK Solis (pallative care). Pt was advised to go to the ED for wound evaluation. Symptoms are constant and moderate in severity. Associated sxs include N/V (onset yesterday, x1). Patient denies any CP, SOB, fever, chills, blood in stool, and all other sxs at this time. No prior Tx included. Pt is on at-home oxygen. No further complaints or concerns at this time.       Arrival mode: Personal vehicle      PCP: Primary Doctor No        Past Medical History:  Past Medical History:   Diagnosis Date    Cancer of gallbladder     Essential (primary) hypertension        Past Surgical History:  No past surgical history on file.      Family History:  No family history on file.    Social History:  Social  History     Tobacco Use    Smoking status: Not on file    Smokeless tobacco: Not on file   Substance and Sexual Activity    Alcohol use: Not on file    Drug use: Not on file    Sexual activity: Not on file        Review of Systems     Review of Systems   Constitutional: Negative for chills and fever.   HENT: Negative for sore throat.    Respiratory: Negative for shortness of breath.    Cardiovascular: Negative for chest pain.   Gastrointestinal: Positive for nausea and vomiting. Negative for blood in stool.        (+) pain and drainage from RUE incision site   Genitourinary: Negative for dysuria.   Musculoskeletal: Negative for back pain.   Skin: Negative for rash.   Neurological: Negative for weakness.   Hematological: Does not bruise/bleed easily.   All other systems reviewed and are negative.     Physical Exam     Initial Vitals [03/03/22 1403]   BP Pulse Resp Temp SpO2   (!) 150/63 110 20 99.3 °F (37.4 °C) (!) 83 %      MAP       --          Physical Exam  Nursing Notes and Vital Signs Reviewed.  Constitutional: Patient is in no acute distress. Well-developed and well-nourished.  Head: Atraumatic. Normocephalic.  Eyes: PERRL. EOM intact. Conjunctivae are not pale. No scleral icterus.  ENT: Mucous membranes are moist. Oropharynx is clear and symmetric.    Neck: Supple. Full ROM. No lymphadenopathy.  Cardiovascular: Regular rate. Regular rhythm. No murmurs, rubs, or gallops. Distal pulses are 2+ and symmetric.  Pulmonary/Chest: No respiratory distress. Clear to auscultation bilaterally. No wheezing or rales.  Abdominal: Soft and non-distended.  No rebound, guarding, or rigidity. Good bowel sounds. Stoma from prior drain with no surrounding erythema; there is tenderness and purulent drainage on the bandage.   Genitourinary: No CVA tenderness  Musculoskeletal: Moves all extremities. No obvious deformities. No edema. No calf tenderness.  Skin: Warm and dry.  Neurological:  Alert, awake, and appropriate.  Normal  "speech.  No acute focal neurological deficits are appreciated.  Psychiatric: Normal affect. Good eye contact. Appropriate in content.     ED Course   Procedures  ED Vital Signs:  Vitals:    03/03/22 1403 03/03/22 1410 03/03/22 1532 03/03/22 1537   BP: (!) 150/63      Pulse: 110   93   Resp: 20      Temp: 99.3 °F (37.4 °C)      TempSrc: Oral      SpO2: (!) 83% 97%     Weight:   76.5 kg (168 lb 10.4 oz)    Height: 5' 7" (1.702 m)       03/03/22 1631 03/03/22 1712 03/03/22 1731 03/03/22 1819   BP: (!) 152/74  (!) 141/71 135/71   Pulse: 90  89 89   Resp: 20 18 20 20   Temp:       TempSrc:       SpO2: 97%  97% 98%   Weight:       Height:        03/1950   BP:    Pulse:    Resp: 18   Temp:    TempSrc:    SpO2:    Weight:    Height:        Abnormal Lab Results:  Labs Reviewed   CBC W/ AUTO DIFFERENTIAL - Abnormal; Notable for the following components:       Result Value    WBC 23.10 (*)     RBC 3.25 (*)     Hemoglobin 10.2 (*)     Hematocrit 32.6 (*)      (*)     MCH 31.4 (*)     MCHC 31.3 (*)     Platelets 530 (*)     MPV 8.8 (*)     Immature Granulocytes 0.7 (*)     Gran # (ANC) 20.1 (*)     Immature Grans (Abs) 0.16 (*)     Mono # 1.2 (*)     Gran % 86.8 (*)     Lymph % 7.1 (*)     All other components within normal limits   COMPREHENSIVE METABOLIC PANEL - Abnormal; Notable for the following components:    Potassium 3.1 (*)     CO2 34 (*)     Glucose 128 (*)     Albumin 2.5 (*)     Alkaline Phosphatase 142 (*)     All other components within normal limits   CULTURE, AEROBIC  (SPECIFY SOURCE)        All Lab Results:  Results for orders placed or performed during the hospital encounter of 03/03/22   CBC auto differential   Result Value Ref Range    WBC 23.10 (H) 3.90 - 12.70 K/uL    RBC 3.25 (L) 4.00 - 5.40 M/uL    Hemoglobin 10.2 (L) 12.0 - 16.0 g/dL    Hematocrit 32.6 (L) 37.0 - 48.5 %     (H) 82 - 98 fL    MCH 31.4 (H) 27.0 - 31.0 pg    MCHC 31.3 (L) 32.0 - 36.0 g/dL    RDW 13.4 11.5 - 14.5 %    " Platelets 530 (H) 150 - 450 K/uL    MPV 8.8 (L) 9.2 - 12.9 fL    Immature Granulocytes 0.7 (H) 0.0 - 0.5 %    Gran # (ANC) 20.1 (H) 1.8 - 7.7 K/uL    Immature Grans (Abs) 0.16 (H) 0.00 - 0.04 K/uL    Lymph # 1.7 1.0 - 4.8 K/uL    Mono # 1.2 (H) 0.3 - 1.0 K/uL    Eos # 0.0 0.0 - 0.5 K/uL    Baso # 0.06 0.00 - 0.20 K/uL    nRBC 0 0 /100 WBC    Gran % 86.8 (H) 38.0 - 73.0 %    Lymph % 7.1 (L) 18.0 - 48.0 %    Mono % 5.0 4.0 - 15.0 %    Eosinophil % 0.1 0.0 - 8.0 %    Basophil % 0.3 0.0 - 1.9 %    Differential Method Automated    Comprehensive metabolic panel   Result Value Ref Range    Sodium 144 136 - 145 mmol/L    Potassium 3.1 (L) 3.5 - 5.1 mmol/L    Chloride 97 95 - 110 mmol/L    CO2 34 (H) 23 - 29 mmol/L    Glucose 128 (H) 70 - 110 mg/dL    BUN 10 8 - 23 mg/dL    Creatinine 0.8 0.5 - 1.4 mg/dL    Calcium 9.0 8.7 - 10.5 mg/dL    Total Protein 7.5 6.0 - 8.4 g/dL    Albumin 2.5 (L) 3.5 - 5.2 g/dL    Total Bilirubin 0.5 0.1 - 1.0 mg/dL    Alkaline Phosphatase 142 (H) 55 - 135 U/L    AST 31 10 - 40 U/L    ALT 20 10 - 44 U/L    Anion Gap 13 8 - 16 mmol/L    eGFR if African American >60 >60 mL/min/1.73 m^2    eGFR if non African American >60 >60 mL/min/1.73 m^2       Imaging Results:  Imaging Results          CT Abdomen Pelvis With Contrast (Final result)  Result time 03/03/22 18:11:59    Final result by Paras Melara MD (03/03/22 18:11:59)                 Impression:      Inflammatory process involving the right upper quadrant with fat stranding and abdominal wall thickening.  A tract is identified suggestive prior instrumentation.  Fat stranding adjacent to the hepatic flexure.  At least 2 peripherally enhancing fluid collection are identified adjacent to the gallstone 1 near the left lobe of the liver measuring 25 x 26 mm and 1 laterally measuring 26 x 46 mm suggestive of abscess seroma or other.  Biloma not excluded.  Recommend clinical correlation and follow-up.    All CT scans at this facility use dose modulation,  iterative reconstructions, and/or weight base dosing when appropriate to reduce radiation dose to as low as reasonably achievable      Electronically signed by: Kelton Crain  Date:    03/03/2022  Time:    18:11             Narrative:    EXAMINATION:  CT ABDOMEN PELVIS WITH CONTRAST    CLINICAL HISTORY:  Abdominal abscess/infection suspected;purulent drainage from drain removal site after gallbladder surgery;    TECHNIQUE:  Low dose axial images, sagittal and coronal reformations were obtained from the lung bases to the pubic symphysis following the IV administration of 100 mL of Omnipaque 350.    COMPARISON:  None    FINDINGS:  A gallstone is identified.  There is moderate fat stranding adjacent to the gallbladder fossa.  A tract seen in the anterior right lateral abdomen extending to the inflammatory process in the right upper quadrant.  This is suggestive of previous ostomy.  There is a multifocal peripheral enhancing fluid collection in the right upper quadrant 1 abutting the left hepatic lobe measuring 26 by 25 mm.  Another in the anterior right hepatic lobe measuring 46 x 26 mm.  This is suggestive of underlying abscess or biloma.  Fat stranding in the anterior right upper quadrant.  Atherosclerotic changes of the aorta noted.  Mild pancreatic ductal dilatation.  No fluid in the dependent portion the pelvis.  No bowel obstruction. Atherosclerotic changes. Normal corticomedullary enhancement.  Spleen is unremarkable. Emphysematous changes in the lung bases with subsegmental atelectasis.                                            The Emergency Provider reviewed the vital signs and test results, which are outlined above.     ED Discussion       7:11 PM: Discussed pt's case with Dr. Salazar (General Surgery) who recommends IV antibiotics, admission to Medicine, and for IR to place a drain tomorrow. Surgery is not needed at this time.     7:32 PM: Discussed case with Lillian Lima NP (Hospital Medicine).   Nikki agrees with current care and management of pt and accepts admission.   Admitting Service: Hospital Medicine  Admitting Physician: Dr. Jordan  Admit to: Med-surge, Obs    7:37 PM: Re-evaluated pt. I have discussed test results, shared treatment plan, and the need for admission with patient and family at bedside. Pt and family express understanding at this time and agree with all information. All questions answered. Pt and family have no further questions or concerns at this time. Pt is ready for admit.     Medical Decision Making:   Clinical Tests:   Lab Tests: Ordered and Reviewed  Radiological Study: Ordered and Reviewed               ED Medication(s):  Medications   vancomycin - pharmacy to dose (has no administration in time range)   cefTRIAXone (ROCEPHIN) 2 g/50 mL D5W IVPB (2 g Intravenous New Bag 3/1950)   metronidazole IVPB 500 mg (has no administration in time range)   vancomycin 2 g in dextrose 5 % 500 mL IVPB (has no administration in time range)   ondansetron injection 4 mg (4 mg Intravenous Given 3/3/22 1711)   morphine injection 2 mg (2 mg Intravenous Given 3/3/22 1712)   iohexoL (OMNIPAQUE 350) injection 100 mL (100 mLs Intravenous Given 3/3/22 1755)   morphine injection 4 mg (4 mg Intravenous Given 3/1950)       New Prescriptions    No medications on file               Scribe Attestation:   Scribe #1: I performed the above scribed service and the documentation accurately describes the services I performed. I attest to the accuracy of the note.     Attending:   Physician Attestation Statement for Scribe #1: I, Fadi Valencia MD, personally performed the services described in this documentation, as scribed by Adolfo Johnson, in my presence, and it is both accurate and complete.           Clinical Impression       ICD-10-CM ICD-9-CM   1. Postprocedural intraabdominal abscess  T81.43XA 998.59       Disposition:   Disposition: Placed in Observation  Condition: Johana YANES  MD Annie  03/03/22 2018

## 2022-03-03 NOTE — TELEPHONE ENCOUNTER
Met with pt and family in person today to draw Guardant liquid biopsy per process. Per dr Marcos's orders ( liquid and tissue from [facility] as requested)   PET scan tech requested that we use IV to get blood and leave in so that they could use for test and pt would only need to be stuck once.  IV 24 g jelco started to left forearm per Maria A Llanos RN in infusion room, site healthy , pt tolerated well.  Two tubes for Guardant liquid biopsy drawn and inverted per process  Two pt identifiers checked with pt and Maria A Llanos RN and myself.  Liquid biopsy kit prepared as per process and labeled as per process.  Pathology report  from [facility] collected 2/4/22 attached for tissue testing request. Will not do path 20 order or appt since not [facility] pathology for tissue testing  FED EX confirmation [id number]. Package sent today 3/3/22  Will await reports and scan to media once received.   Pt escorted to PET scan appt in wheel chair with IV in tact.

## 2022-03-03 NOTE — PROGRESS NOTES
Palliative Medicine  Consult  Consult Requested By: Dr. Ivonne Marcos  Reason for Consult: GOC/ ACP/ Pain and symptom management      SUBJECTIVE:     History of Present Illness:  Patient is a 71 y.o. year old female presenting with her 3 children Clive, Ranjith, and Dotty to establish care in the PM clinic. 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 At OSH she underwent ex lap on 2/4/22 and intraoperative findings resulted in the malignancy diagnosis. She has established with Dr. Marcos and plans to formulate a treatment plan once she has completed staging. She had just completed her PET scan prior to my visit. Her three children talked to the PM nurse and shared their fears, concerns, and shock over the diagnosis. When I entered the room all were tearful and emotional. I explained why I was consulted to participate in the patient's care. We talked about the role palliative care often plays in helping patients with advanced care planning and symptom management. I particularly expressed how important it was to clarify what type of care the patient would like to receive moving forward given the current status. Everyone is still processing the information and emotional since the initial diagnosis a month ago. Dotty says it is hard for them to see her ill because she has always been so healthy. Clive says that he does not like knowing he is not in control of what will happen. Ranjith sat rigidly in his chair with his fists tightly balled up gripping his pants and streaming tears but never spoke. Ms. Serna says that she wants to live and plans to accept any and all treatment options offered. Her biggest fear is leaving her 4 children. She has a strong dara in God and continued to cry out to him begging for healing and his guidance. It was clear that they still have more processing to do before we can have an in depth conversation about the  journey ahead. We were able to discuss that death is nonnegotiable and that eventually we will have to face that fact. In patients with advanced cancers sometimes this reality is sooner than we would like and would require shifting goals from life prolonging to achieving comfort and dignity at the end of life. All could easily admit that they would hope for a peaceful, comfortable passing with family near and I reassured them that we would be able to assist with that transition when the time comes. They acknowledge the limitation of human medicine but remain hopeful in a miracle. I share this hope and recommended that we follow up in 1 month.      I explained another facet of our care includes pain and symptom management. She endorses nausea and anorexia. She also has RUQ pain that was worse when the drain was still in place but since removal is  to the touch and prevents her from lying on that side. Recently she has noticed that any movement exacerbates the pain and is finding it hard to get comfortable. At the time the drain was removed she was noting increased drainage and was rx a 5 day course of Cipro completed on 2/27/22. Since then the drainage has persisted but is now more copious. Her bandage was soaked with sherif pus and surrounding induration. I initially recommended they go back to see their surgeon for ASAP appointment but Ms. Serna says she will never see him again. In light of the drainage I feel it is best to be evaluated in the ED and might warrant additional imaging. She was reluctant to go but her children agreed it would be best to have a thorough evaluation. They were escorted to the ED by the PM nurse and I alerted the ED MD of her case. They will reach out after the evaluation and whether I need to send something for pain and anorexia.    BOB STEARNS reviewed and summarized:        Past Medical History:   Diagnosis Date    Cancer of gallbladder     Essential (primary) hypertension       History reviewed. No pertinent surgical history.  History reviewed. No pertinent family history.  Review of patient's allergies indicates:   Allergen Reactions    Aspirin      Makes her throwup    Amoxicillin-pot clavulanate     Ibuprofen      Throws up    Sulfa (sulfonamide antibiotics)      Pt can't remember reactions       Medications:    Current Outpatient Medications:     albuterol (PROVENTIL/VENTOLIN HFA) 90 mcg/actuation inhaler, INHALE TWO PUFFS FOUR TIMES DAILY AS NEEDED FOR WHEEZING, Disp: , Rfl:     amLODIPine (NORVASC) 5 MG tablet, amlodipine Take 1 time per day No date recorded tablet 1 time per day No route recorded No set duration recorded No set duration amount recorded active 5 mg, Disp: , Rfl:     ciprofloxacin HCl (CIPRO) 500 MG tablet, TAKE ONE TABLET TWICE DAILY FOR 5 DAYS FOR INFECTION, Disp: , Rfl:     clonazePAM (KLONOPIN) 0.5 MG tablet, clonazepam Take 2 times per day No date recorded tablet 2 times per day No route recorded No set duration recorded No set duration amount recorded active 0.5 mg, Disp: , Rfl:     cyproheptadine (PERIACTIN) 4 mg tablet, Take 4 mg by mouth 2 (two) times daily., Disp: , Rfl:     diclofenac sodium (VOLTAREN) 1 % Gel, APPLY 1 GRAM TO AFFECTED AREA FOUR TIMES DAILY AS NEEDED, Disp: , Rfl:     ipratropium (ATROVENT) 21 mcg (0.03 %) nasal spray, , Disp: , Rfl:     labetaloL (NORMODYNE) 300 MG tablet, labetalol Take 2 times per day No date recorded tablet 2 times per day No route recorded No set duration recorded No set duration amount recorded suspended 300 mg, Disp: , Rfl:     lisinopriL (PRINIVIL,ZESTRIL) 20 MG tablet, lisinopril Take No date recorded No form recorded No frequency recorded No route recorded No set duration recorded No set duration amount recorded suspended No dosage strength recorded No dosage strength units of measure recorded, Disp: , Rfl:     mirtazapine (REMERON) 30 MG tablet, , Disp: , Rfl:     montelukast (SINGULAIR) 10 mg  tablet, Take 10 mg by mouth once daily., Disp: , Rfl:     sertraline HCl (ZOLOFT ORAL), Zoloft Take No date recorded No form recorded No frequency recorded No route recorded No set duration recorded No set duration amount recorded active No dosage strength recorded No dosage strength units of measure recorded, Disp: , Rfl:     ondansetron (ZOFRAN-ODT) 8 MG TbDL, Take 1 tablet (8 mg total) by mouth every 12 (twelve) hours as needed., Disp: 30 tablet, Rfl: 1    OBJECTIVE:     ROS:  Review of Systems   Constitutional: Positive for activity change and appetite change. Negative for fever.   HENT: Negative for sore throat and trouble swallowing.    Respiratory: Negative for cough and shortness of breath.    Cardiovascular: Negative for chest pain and leg swelling.   Gastrointestinal: Positive for abdominal pain and nausea. Negative for abdominal distention and vomiting.   Genitourinary: Negative for difficulty urinating and dysuria.   Musculoskeletal: Positive for back pain (RUQ) and gait problem.   Skin: Positive for wound.   Neurological: Negative for weakness and numbness.   Psychiatric/Behavioral: Positive for dysphoric mood and sleep disturbance. Negative for confusion. The patient is nervous/anxious.        Review of Symptoms    Symptom Assessment (ESAS 0-10 Scale)  Pain:  10  Dyspnea:  0  Anxiety:  10  Nausea:  9  Depression:  10  Anorexia:  0  Fatigue:  0  Insomnia:  0  Restlessness:  0  Agitation:  10     CAM / Delirium:  Negative    Pain Assessment:  Location(s): abdomen    Abdomen       Location: right        Quality: none        Quantity: 10/10 in intensity        Chronicity: Onset 1 week(s) ago, gradually worsening        Aggravating Factors: none        Alleviating Factors: none        Associated Symptoms: none    ECOG Performance Status ndGndrndanddndend:nd nd2nd Advance Care Planning   Advance Directives:   Living Will: No    LaPOST: No    Do Not Resuscitate Status: No    Medical Power of : No      Decision  Making:  Patient answered questions and Family answered questions            Physical Exam:  Vitals: Temp: 98 °F (36.7 °C) (03/03/22 1230)  Pulse: (!) 115 (03/03/22 1230)  Resp: 18 (03/03/22 1230)  BP: (!) 164/79 (03/03/22 1230)  Physical Exam  Vitals and nursing note reviewed.   Constitutional:       General: She is not in acute distress.     Appearance: Normal appearance. She is not ill-appearing.   HENT:      Head: Normocephalic and atraumatic.   Eyes:      Pupils: Pupils are equal, round, and reactive to light.   Cardiovascular:      Rate and Rhythm: Normal rate and regular rhythm.      Pulses: Normal pulses.      Heart sounds: Normal heart sounds. No murmur heard.  Pulmonary:      Effort: Pulmonary effort is normal. No respiratory distress.      Breath sounds: Normal breath sounds.   Abdominal:      General: Bowel sounds are normal. There is no distension.      Palpations: Abdomen is soft.      Tenderness: There is no abdominal tenderness.      Comments: 4x4 gauze soaked with yellow, milky drainage pooling in the occlusive dressing    Induration noted around the incision but difficult to palpate in the sitting position. Tenderness surrounding the wound but no tenderness to any other quadrants besides RUQ   Musculoskeletal:         General: Normal range of motion.      Cervical back: Normal range of motion.      Right lower leg: No edema.      Left lower leg: No edema.   Skin:     General: Skin is warm and dry.   Neurological:      Mental Status: She is alert and oriented to person, place, and time. Mental status is at baseline.      Cranial Nerves: No cranial nerve deficit.   Psychiatric:         Attention and Perception: Attention and perception normal.         Mood and Affect: Affect is tearful.         Speech: Speech normal.         Behavior: Behavior normal.         Thought Content: Thought content normal.         Cognition and Memory: Cognition and memory normal.         Judgment: Judgment normal.          Labs and radiology data reviewed    ASSESSMENT/PLAN:     Problem List Items Addressed This Visit        Oncology    Adenocarcinoma of gallbladder    Current Assessment & Plan     PET scan today and will follow up with Dr. Marcos to discuss treatment options              Other    Palliative care encounter    Overview     She and her 3 children are very emotional over recent cancer diagnosis. Her goal is life prolonging even if it means sacrificing some quality. She is eager to start cancer treatment.    We will assist with HCPOA completion at subsequent visits. Code status conversations at later visits as they seem unable to discuss this topic at today's visit.           Infection following a procedure, deep incisional surgical site, initial encounter    Overview     Romaine pus on bandage with induration to RUQ. She has already completed a 5 day course of Cipro which completed on 2/27. Recommend prompt evaluation with ED and whether or not imaging is warranted.                Understanding of illness: fair    Prognosis: poor    Goals of care: pursue cancer targeted therapy    Follow up: 1 month      86 minutes total visit  70 minutes of total time spent on the encounter, which includes face to face time and non-face to face time preparing to see the patient (eg, review of tests), Obtaining and/or reviewing separately obtained history, Documenting clinical information in the electronic or other health record, Independently interpreting results (not separately reported) and communicating results to the patient/family/caregiver, or Care coordination (not separately reported).  16 minutes spent in discussing ACP    Signature: Chayo Mensah PA-C

## 2022-03-04 ENCOUNTER — TELEPHONE (OUTPATIENT)
Dept: HEMATOLOGY/ONCOLOGY | Facility: CLINIC | Age: 72
End: 2022-03-04
Payer: MEDICARE

## 2022-03-04 LAB
ALBUMIN SERPL BCP-MCNC: 1.9 G/DL (ref 3.5–5.2)
ANION GAP SERPL CALC-SCNC: 9 MMOL/L (ref 8–16)
BASOPHILS # BLD AUTO: 0.05 K/UL (ref 0–0.2)
BASOPHILS NFR BLD: 0.2 % (ref 0–1.9)
BUN SERPL-MCNC: 6 MG/DL (ref 8–23)
CALCIUM SERPL-MCNC: 8.7 MG/DL (ref 8.7–10.5)
CHLORIDE SERPL-SCNC: 96 MMOL/L (ref 95–110)
CO2 SERPL-SCNC: 32 MMOL/L (ref 23–29)
CREAT SERPL-MCNC: 0.7 MG/DL (ref 0.5–1.4)
DIFFERENTIAL METHOD: ABNORMAL
EOSINOPHIL # BLD AUTO: 0.1 K/UL (ref 0–0.5)
EOSINOPHIL NFR BLD: 0.2 % (ref 0–8)
ERYTHROCYTE [DISTWIDTH] IN BLOOD BY AUTOMATED COUNT: 13.7 % (ref 11.5–14.5)
EST. GFR  (AFRICAN AMERICAN): >60 ML/MIN/1.73 M^2
EST. GFR  (NON AFRICAN AMERICAN): >60 ML/MIN/1.73 M^2
GLUCOSE SERPL-MCNC: 107 MG/DL (ref 70–110)
HCT VFR BLD AUTO: 26.8 % (ref 37–48.5)
HGB BLD-MCNC: 8.4 G/DL (ref 12–16)
IMM GRANULOCYTES # BLD AUTO: 0.22 K/UL (ref 0–0.04)
IMM GRANULOCYTES NFR BLD AUTO: 1 % (ref 0–0.5)
LYMPHOCYTES # BLD AUTO: 2.2 K/UL (ref 1–4.8)
LYMPHOCYTES NFR BLD: 10.4 % (ref 18–48)
MCH RBC QN AUTO: 31.5 PG (ref 27–31)
MCHC RBC AUTO-ENTMCNC: 31.3 G/DL (ref 32–36)
MCV RBC AUTO: 100 FL (ref 82–98)
MONOCYTES # BLD AUTO: 1.7 K/UL (ref 0.3–1)
MONOCYTES NFR BLD: 7.8 % (ref 4–15)
NEUTROPHILS # BLD AUTO: 17.2 K/UL (ref 1.8–7.7)
NEUTROPHILS NFR BLD: 80.4 % (ref 38–73)
NRBC BLD-RTO: 0 /100 WBC
PHOSPHATE SERPL-MCNC: 3.7 MG/DL (ref 2.7–4.5)
PLATELET # BLD AUTO: 414 K/UL (ref 150–450)
PMV BLD AUTO: 9.1 FL (ref 9.2–12.9)
POCT GLUCOSE: 115 MG/DL (ref 70–110)
POTASSIUM SERPL-SCNC: 3.1 MMOL/L (ref 3.5–5.1)
RBC # BLD AUTO: 2.67 M/UL (ref 4–5.4)
SODIUM SERPL-SCNC: 137 MMOL/L (ref 136–145)
WBC # BLD AUTO: 21.35 K/UL (ref 3.9–12.7)

## 2022-03-04 PROCEDURE — G0378 HOSPITAL OBSERVATION PER HR: HCPCS

## 2022-03-04 PROCEDURE — 96365 THER/PROPH/DIAG IV INF INIT: CPT | Mod: 59

## 2022-03-04 PROCEDURE — 27000221 HC OXYGEN, UP TO 24 HOURS

## 2022-03-04 PROCEDURE — 63600175 PHARM REV CODE 636 W HCPCS: Performed by: RADIOLOGY

## 2022-03-04 PROCEDURE — 96361 HYDRATE IV INFUSION ADD-ON: CPT | Mod: 59

## 2022-03-04 PROCEDURE — 49406 IMAGE CATH FLUID PERI/RETRO: CPT

## 2022-03-04 PROCEDURE — 96376 TX/PRO/DX INJ SAME DRUG ADON: CPT | Mod: 59

## 2022-03-04 PROCEDURE — 25000003 PHARM REV CODE 250: Performed by: NURSE PRACTITIONER

## 2022-03-04 PROCEDURE — 96372 THER/PROPH/DIAG INJ SC/IM: CPT | Performed by: INTERNAL MEDICINE

## 2022-03-04 PROCEDURE — 80069 RENAL FUNCTION PANEL: CPT | Performed by: INTERNAL MEDICINE

## 2022-03-04 PROCEDURE — 36415 COLL VENOUS BLD VENIPUNCTURE: CPT | Performed by: INTERNAL MEDICINE

## 2022-03-04 PROCEDURE — 87205 SMEAR GRAM STAIN: CPT | Performed by: RADIOLOGY

## 2022-03-04 PROCEDURE — 87070 CULTURE OTHR SPECIMN AEROBIC: CPT | Performed by: RADIOLOGY

## 2022-03-04 PROCEDURE — 94761 N-INVAS EAR/PLS OXIMETRY MLT: CPT

## 2022-03-04 PROCEDURE — 96366 THER/PROPH/DIAG IV INF ADDON: CPT

## 2022-03-04 PROCEDURE — 63600175 PHARM REV CODE 636 W HCPCS: Performed by: EMERGENCY MEDICINE

## 2022-03-04 PROCEDURE — 25000003 PHARM REV CODE 250: Performed by: INTERNAL MEDICINE

## 2022-03-04 PROCEDURE — 87077 CULTURE AEROBIC IDENTIFY: CPT | Performed by: RADIOLOGY

## 2022-03-04 PROCEDURE — 87075 CULTR BACTERIA EXCEPT BLOOD: CPT | Performed by: RADIOLOGY

## 2022-03-04 PROCEDURE — 99900035 HC TECH TIME PER 15 MIN (STAT)

## 2022-03-04 PROCEDURE — 87186 SC STD MICRODIL/AGAR DIL: CPT | Performed by: RADIOLOGY

## 2022-03-04 PROCEDURE — 25000003 PHARM REV CODE 250: Performed by: EMERGENCY MEDICINE

## 2022-03-04 PROCEDURE — 63600175 PHARM REV CODE 636 W HCPCS: Performed by: INTERNAL MEDICINE

## 2022-03-04 PROCEDURE — 85025 COMPLETE CBC W/AUTO DIFF WBC: CPT | Performed by: INTERNAL MEDICINE

## 2022-03-04 RX ORDER — POTASSIUM CHLORIDE 20 MEQ/1
40 TABLET, EXTENDED RELEASE ORAL ONCE
Status: COMPLETED | OUTPATIENT
Start: 2022-03-04 | End: 2022-03-04

## 2022-03-04 RX ORDER — MIDAZOLAM HYDROCHLORIDE 1 MG/ML
INJECTION INTRAMUSCULAR; INTRAVENOUS CODE/TRAUMA/SEDATION MEDICATION
Status: COMPLETED | OUTPATIENT
Start: 2022-03-04 | End: 2022-03-04

## 2022-03-04 RX ORDER — FENTANYL CITRATE 50 UG/ML
INJECTION, SOLUTION INTRAMUSCULAR; INTRAVENOUS CODE/TRAUMA/SEDATION MEDICATION
Status: COMPLETED | OUTPATIENT
Start: 2022-03-04 | End: 2022-03-04

## 2022-03-04 RX ADMIN — MORPHINE SULFATE 4 MG: 4 INJECTION INTRAVENOUS at 07:03

## 2022-03-04 RX ADMIN — CEFEPIME HYDROCHLORIDE 2 G: 2 INJECTION, SOLUTION INTRAVENOUS at 11:03

## 2022-03-04 RX ADMIN — MIDAZOLAM HYDROCHLORIDE 1 MG: 1 INJECTION, SOLUTION INTRAMUSCULAR; INTRAVENOUS at 03:03

## 2022-03-04 RX ADMIN — ENOXAPARIN SODIUM 40 MG: 100 INJECTION SUBCUTANEOUS at 05:03

## 2022-03-04 RX ADMIN — CLONAZEPAM 0.5 MG: 0.5 TABLET ORAL at 08:03

## 2022-03-04 RX ADMIN — FENTANYL CITRATE 50 MCG: 50 INJECTION, SOLUTION INTRAMUSCULAR; INTRAVENOUS at 03:03

## 2022-03-04 RX ADMIN — CYPROHEPTADINE HYDROCHLORIDE 4 MG: 4 TABLET ORAL at 09:03

## 2022-03-04 RX ADMIN — POTASSIUM CHLORIDE 20 MEQ: 1500 TABLET, EXTENDED RELEASE ORAL at 08:03

## 2022-03-04 RX ADMIN — CEFEPIME HYDROCHLORIDE 2 G: 2 INJECTION, SOLUTION INTRAVENOUS at 12:03

## 2022-03-04 RX ADMIN — CLONAZEPAM 0.5 MG: 0.5 TABLET ORAL at 09:03

## 2022-03-04 RX ADMIN — FENTANYL CITRATE 25 MCG: 50 INJECTION, SOLUTION INTRAMUSCULAR; INTRAVENOUS at 04:03

## 2022-03-04 RX ADMIN — HYDROCODONE BITARTRATE AND ACETAMINOPHEN 1 TABLET: 5; 325 TABLET ORAL at 05:03

## 2022-03-04 RX ADMIN — HYDROCODONE BITARTRATE AND ACETAMINOPHEN 1 TABLET: 5; 325 TABLET ORAL at 11:03

## 2022-03-04 RX ADMIN — POTASSIUM CHLORIDE AND SODIUM CHLORIDE: 450; 150 INJECTION, SOLUTION INTRAVENOUS at 04:03

## 2022-03-04 RX ADMIN — CEFEPIME HYDROCHLORIDE 2 G: 2 INJECTION, SOLUTION INTRAVENOUS at 04:03

## 2022-03-04 RX ADMIN — LISINOPRIL 10 MG: 10 TABLET ORAL at 08:03

## 2022-03-04 RX ADMIN — VANCOMYCIN HYDROCHLORIDE 2000 MG: 10 INJECTION, POWDER, LYOPHILIZED, FOR SOLUTION INTRAVENOUS at 10:03

## 2022-03-04 RX ADMIN — MONTELUKAST 10 MG: 10 TABLET, FILM COATED ORAL at 08:03

## 2022-03-04 RX ADMIN — LABETALOL HYDROCHLORIDE 300 MG: 200 TABLET, FILM COATED ORAL at 08:03

## 2022-03-04 RX ADMIN — MIDAZOLAM HYDROCHLORIDE 0.5 MG: 1 INJECTION, SOLUTION INTRAMUSCULAR; INTRAVENOUS at 04:03

## 2022-03-04 RX ADMIN — CYPROHEPTADINE HYDROCHLORIDE 4 MG: 4 TABLET ORAL at 08:03

## 2022-03-04 RX ADMIN — CEFEPIME HYDROCHLORIDE 2 G: 2 INJECTION, SOLUTION INTRAVENOUS at 08:03

## 2022-03-04 RX ADMIN — LABETALOL HYDROCHLORIDE 300 MG: 200 TABLET, FILM COATED ORAL at 09:03

## 2022-03-04 RX ADMIN — POTASSIUM CHLORIDE 40 MEQ: 1500 TABLET, EXTENDED RELEASE ORAL at 10:03

## 2022-03-04 NOTE — PROGRESS NOTES
[facility]– [place]note 2] Medicine  Progress Note    Patient Name: Madelyn Serna  MRN: [id number]  Patient Class: OP- Observation   Admission Date: 3/3/2022  Length of Stay: 0 days  Attending Physician: Michael Howard MD  Primary Care Provider: Primary Doctor No        Subjective:     Principal Problem:Infection following a procedure, deep incisional surgical site, initial encounter        HPI:  Ms. Serna came to the ED for right side abdominal pain and drainage at her surgical site.  She had an attempted cholecystectomy on 04 February.  The gallbladder was found very inflamed and irregular with a palpable mass and could not be safely resected.  The surgeon took biopsies and left a drain.  On the 22nd of February he removed the drain.  The site continued to drain pale purulent fluid and required frequent dressing changes.  She has had variable abdominal pain and loss of appetite.  No nausea or vomiting.  No change in bowel habits.  No fevers or chills.  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  This afternoon she had her first PET scan and established care with Dr. Marcos of Medical Oncology and completed a consultation with Palliative care.  She denies any chest pain or shortness of breath.  No dizziness or disorientation.  No other recent medical problems or new medications.      Overview/Hospital Course:  72 y/o female admitted for wound infection and intraabdominal abscess secondary to recent attempted cholecystectomy. Plan for percutaneous drainage today per IR. Continue vancomycin and cefepime. Follow cultures.           Review of Systems   Constitutional:  Positive for appetite change and fatigue. Negative for chills and fever.   HENT:  Negative for congestion and sore throat.    Eyes:  Negative for visual disturbance.   Respiratory:  Negative for cough, shortness of breath and wheezing.    Cardiovascular:  Negative for chest pain, palpitations and leg swelling.    Gastrointestinal:  Positive for abdominal pain. Negative for blood in stool, constipation, diarrhea and vomiting.   Genitourinary:  Negative for dysuria and hematuria.   Musculoskeletal:  Negative for arthralgias and back pain.   Skin:  Negative for rash and wound.   Neurological:  Negative for dizziness, weakness, light-headedness and numbness.   Hematological:  Negative for adenopathy.   Objective:     Vital Signs (Most Recent):  Temp: 99.4 °F (37.4 °C) (03/04/22 1112)  Pulse: 84 (03/04/22 1112)  Resp: 18 (03/04/22 1112)  BP: (!) 127/58 (03/04/22 1112)  SpO2: 98 % (03/04/22 1112)   Vital Signs (24h Range):  Temp:  [99.1 °F (37.3 °C)-100.2 °F (37.9 °C)] 99.4 °F (37.4 °C)  Pulse:  [] 84  Resp:  [14-22] 18  SpO2:  [94 %-100 %] 98 %  BP: (125-155)/(58-98) 127/58     Weight: 79.5 kg (175 lb 4.3 oz)  Body mass index is 27.45 kg/m².    Intake/Output Summary (Last 24 hours) at 3/4/2022 1507  Last data filed at 3/4/2022 0611  Gross per 24 hour   Intake 569.02 ml   Output --   Net 569.02 ml      Physical Exam  Vitals and nursing note reviewed.   Constitutional:       General: She is not in acute distress.     Appearance: She is well-developed.   HENT:      Head: Normocephalic and atraumatic.   Eyes:      Conjunctiva/sclera: Conjunctivae normal.      Pupils: Pupils are equal, round, and reactive to light.   Neck:      Thyroid: No thyromegaly.      Vascular: No JVD.   Cardiovascular:      Rate and Rhythm: Normal rate and regular rhythm.      Heart sounds: No murmur heard.    No friction rub. No gallop.   Pulmonary:      Effort: Pulmonary effort is normal.      Breath sounds: Normal breath sounds. No wheezing or rales.   Abdominal:      General: Bowel sounds are normal. There is no distension.      Palpations: Abdomen is soft.      Tenderness: There is abdominal tenderness. There is guarding. There is no rebound.      Comments:  Purulent discharge from drain site    Musculoskeletal:         General: No deformity.  Normal range of motion.      Cervical back: Neck supple.   Lymphadenopathy:      Cervical: No cervical adenopathy.   Skin:     General: Skin is warm and dry.      Findings: No rash.   Neurological:      Mental Status: She is alert and oriented to person, place, and time.      Deep Tendon Reflexes: Reflexes are normal and symmetric.   Psychiatric:         Behavior: Behavior normal.         Thought Content: Thought content normal.         Judgment: Judgment normal.       Significant Labs: All pertinent labs within the past 24 hours have been reviewed.  Blood Culture:   Recent Labs   Lab 03/03/22 2155 03/03/22 2206   LABBLOO No Growth to date No Growth to date     BMP:   Recent Labs   Lab 03/04/22  0612         K 3.1*   CL 96   CO2 32*   BUN 6*   CREATININE 0.7   CALCIUM 8.7     CBC:   Recent Labs   Lab 03/03/22 1417 03/04/22 0612   WBC 23.10* 21.35*   HGB 10.2* 8.4*   HCT 32.6* 26.8*   * 414     CMP:   Recent Labs   Lab 03/03/22 1417 03/04/22 0612    137   K 3.1* 3.1*   CL 97 96   CO2 34* 32*   * 107   BUN 10 6*   CREATININE 0.8 0.7   CALCIUM 9.0 8.7   PROT 7.5  --    ALBUMIN 2.5* 1.9*   BILITOT 0.5  --    ALKPHOS 142*  --    AST 31  --    ALT 20  --    ANIONGAP 13 9   EGFRNONAA >60 >60     Lactic Acid: No results for input(s): LACTATE in the last 48 hours.    Significant Imaging:   Imaging Results              CT Abdomen Pelvis With Contrast (Final result)  Result time 03/03/22 18:11:59      Final result by Paras Melara MD (03/03/22 18:11:59)                   Impression:      Inflammatory process involving the right upper quadrant with fat stranding and abdominal wall thickening.  A tract is identified suggestive prior instrumentation.  Fat stranding adjacent to the hepatic flexure.  At least 2 peripherally enhancing fluid collection are identified adjacent to the gallstone 1 near the left lobe of the liver measuring 25 x 26 mm and 1 laterally measuring 26 x 46 mm suggestive  of abscess seroma or other.  Biloma not excluded.  Recommend clinical correlation and follow-up.    All CT scans at this facility use dose modulation, iterative reconstructions, and/or weight base dosing when appropriate to reduce radiation dose to as low as reasonably achievable      Electronically signed by: Kelton Crain  Date:    03/03/2022  Time:    18:11               Narrative:    EXAMINATION:  CT ABDOMEN PELVIS WITH CONTRAST    CLINICAL HISTORY:  Abdominal abscess/infection suspected;purulent drainage from drain removal site after gallbladder surgery;    TECHNIQUE:  Low dose axial images, sagittal and coronal reformations were obtained from the lung bases to the pubic symphysis following the IV administration of 100 mL of Omnipaque 350.    COMPARISON:  None    FINDINGS:  A gallstone is identified.  There is moderate fat stranding adjacent to the gallbladder fossa.  A tract seen in the anterior right lateral abdomen extending to the inflammatory process in the right upper quadrant.  This is suggestive of previous ostomy.  There is a multifocal peripheral enhancing fluid collection in the right upper quadrant 1 abutting the left hepatic lobe measuring 26 by 25 mm.  Another in the anterior right hepatic lobe measuring 46 x 26 mm.  This is suggestive of underlying abscess or biloma.  Fat stranding in the anterior right upper quadrant.  Atherosclerotic changes of the aorta noted.  Mild pancreatic ductal dilatation.  No fluid in the dependent portion the pelvis.  No bowel obstruction. Atherosclerotic changes. Normal corticomedullary enhancement.  Spleen is unremarkable. Emphysematous changes in the lung bases with subsegmental atelectasis.                                         Assessment/Plan:      * Infection following a procedure, deep incisional surgical site, initial encounter  Wound cultures drawn from purulent draining site.  Creamy white pus without blood.  Elevated WBC count with predominance of  Neutrophils.  She received Ceftriaxone, Metronidazole, and Vancomycin in the ED.  Draw blood cultures and continue Vancomycin and substitute Cefepime.  Consultation to Interventional Radiology for percutaneous drainage.    WBCs 21.35K down from 23.10  Plan for percutaneous drainage today by IR   Continue vancomycin and cefepime     Essential (primary) hypertension  Continue Labetalol and Lisinopril.    Monitor     Adenocarcinoma of gallbladder  New diagnosis after attempted cholecystectomy in February.  She is established with Dr. Marcos and had a staging PET on 03 March.  Follow up for ongoing evaluation and treatment after discharge.      VTE Risk Mitigation (From admission, onward)         Ordered     enoxaparin injection 40 mg  Daily         03/03/22 2025     IP VTE HIGH RISK PATIENT  Once         03/03/22 2025     Place sequential compression device  Until discontinued         03/03/22 2025                Discharge Planning   OSVALDO:      Code Status: Full Code   Is the patient medically ready for discharge?:     Reason for patient still in hospital (select all that apply): Laboratory test, Treatment and Imaging                     Abby Aguilar NP  Department of Hospital Medicine   [facility] - Med S[facility]

## 2022-03-04 NOTE — SUBJECTIVE & OBJECTIVE
Review of Systems   Constitutional:  Positive for appetite change and fatigue. Negative for chills and fever.   HENT:  Negative for congestion and sore throat.    Eyes:  Negative for visual disturbance.   Respiratory:  Negative for cough, shortness of breath and wheezing.    Cardiovascular:  Negative for chest pain, palpitations and leg swelling.   Gastrointestinal:  Positive for abdominal pain. Negative for blood in stool, constipation, diarrhea and vomiting.   Genitourinary:  Negative for dysuria and hematuria.   Musculoskeletal:  Negative for arthralgias and back pain.   Skin:  Negative for rash and wound.   Neurological:  Negative for dizziness, weakness, light-headedness and numbness.   Hematological:  Negative for adenopathy.   Objective:     Vital Signs (Most Recent):  Temp: 99.4 °F (37.4 °C) (03/04/22 1112)  Pulse: 84 (03/04/22 1112)  Resp: 18 (03/04/22 1112)  BP: (!) 127/58 (03/04/22 1112)  SpO2: 98 % (03/04/22 1112)   Vital Signs (24h Range):  Temp:  [99.1 °F (37.3 °C)-100.2 °F (37.9 °C)] 99.4 °F (37.4 °C)  Pulse:  [] 84  Resp:  [14-22] 18  SpO2:  [94 %-100 %] 98 %  BP: (125-155)/(58-98) 127/58     Weight: 79.5 kg (175 lb 4.3 oz)  Body mass index is 27.45 kg/m².    Intake/Output Summary (Last 24 hours) at 3/4/2022 1507  Last data filed at 3/4/2022 0611  Gross per 24 hour   Intake 569.02 ml   Output --   Net 569.02 ml      Physical Exam  Vitals and nursing note reviewed.   Constitutional:       General: She is not in acute distress.     Appearance: She is well-developed.   HENT:      Head: Normocephalic and atraumatic.   Eyes:      Conjunctiva/sclera: Conjunctivae normal.      Pupils: Pupils are equal, round, and reactive to light.   Neck:      Thyroid: No thyromegaly.      Vascular: No JVD.   Cardiovascular:      Rate and Rhythm: Normal rate and regular rhythm.      Heart sounds: No murmur heard.    No friction rub. No gallop.   Pulmonary:      Effort: Pulmonary effort is normal.      Breath  sounds: Normal breath sounds. No wheezing or rales.   Abdominal:      General: Bowel sounds are normal. There is no distension.      Palpations: Abdomen is soft.      Tenderness: There is abdominal tenderness. There is guarding. There is no rebound.      Comments:  Purulent discharge from drain site    Musculoskeletal:         General: No deformity. Normal range of motion.      Cervical back: Neck supple.   Lymphadenopathy:      Cervical: No cervical adenopathy.   Skin:     General: Skin is warm and dry.      Findings: No rash.   Neurological:      Mental Status: She is alert and oriented to person, place, and time.      Deep Tendon Reflexes: Reflexes are normal and symmetric.   Psychiatric:         Behavior: Behavior normal.         Thought Content: Thought content normal.         Judgment: Judgment normal.       Significant Labs: All pertinent labs within the past 24 hours have been reviewed.  Blood Culture:   Recent Labs   Lab 03/03/22 2155 03/03/22 2206   LABBLOO No Growth to date No Growth to date     BMP:   Recent Labs   Lab 03/04/22  0612         K 3.1*   CL 96   CO2 32*   BUN 6*   CREATININE 0.7   CALCIUM 8.7     CBC:   Recent Labs   Lab 03/03/22  1417 03/04/22  0612   WBC 23.10* 21.35*   HGB 10.2* 8.4*   HCT 32.6* 26.8*   * 414     CMP:   Recent Labs   Lab 03/03/22  1417 03/04/22  0612    137   K 3.1* 3.1*   CL 97 96   CO2 34* 32*   * 107   BUN 10 6*   CREATININE 0.8 0.7   CALCIUM 9.0 8.7   PROT 7.5  --    ALBUMIN 2.5* 1.9*   BILITOT 0.5  --    ALKPHOS 142*  --    AST 31  --    ALT 20  --    ANIONGAP 13 9   EGFRNONAA >60 >60     Lactic Acid: No results for input(s): LACTATE in the last 48 hours.    Significant Imaging:   Imaging Results              CT Abdomen Pelvis With Contrast (Final result)  Result time 03/03/22 18:11:59      Final result by Paras Melara MD (03/03/22 18:11:59)                   Impression:      Inflammatory process involving the right upper quadrant  with fat stranding and abdominal wall thickening.  A tract is identified suggestive prior instrumentation.  Fat stranding adjacent to the hepatic flexure.  At least 2 peripherally enhancing fluid collection are identified adjacent to the gallstone 1 near the left lobe of the liver measuring 25 x 26 mm and 1 laterally measuring 26 x 46 mm suggestive of abscess seroma or other.  Biloma not excluded.  Recommend clinical correlation and follow-up.    All CT scans at this facility use dose modulation, iterative reconstructions, and/or weight base dosing when appropriate to reduce radiation dose to as low as reasonably achievable      Electronically signed by: Kelton Crain  Date:    03/03/2022  Time:    18:11               Narrative:    EXAMINATION:  CT ABDOMEN PELVIS WITH CONTRAST    CLINICAL HISTORY:  Abdominal abscess/infection suspected;purulent drainage from drain removal site after gallbladder surgery;    TECHNIQUE:  Low dose axial images, sagittal and coronal reformations were obtained from the lung bases to the pubic symphysis following the IV administration of 100 mL of Omnipaque 350.    COMPARISON:  None    FINDINGS:  A gallstone is identified.  There is moderate fat stranding adjacent to the gallbladder fossa.  A tract seen in the anterior right lateral abdomen extending to the inflammatory process in the right upper quadrant.  This is suggestive of previous ostomy.  There is a multifocal peripheral enhancing fluid collection in the right upper quadrant 1 abutting the left hepatic lobe measuring 26 by 25 mm.  Another in the anterior right hepatic lobe measuring 46 x 26 mm.  This is suggestive of underlying abscess or biloma.  Fat stranding in the anterior right upper quadrant.  Atherosclerotic changes of the aorta noted.  Mild pancreatic ductal dilatation.  No fluid in the dependent portion the pelvis.  No bowel obstruction. Atherosclerotic changes. Normal corticomedullary enhancement.  Spleen is unremarkable.  Emphysematous changes in the lung bases with subsegmental atelectasis.

## 2022-03-04 NOTE — PROGRESS NOTES
Pharmacokinetic Initial Assessment: IV Vancomycin    Assessment/Plan:    Initiate intravenous vancomycin with loading dose of 2000 mg once followed by a maintenance dose of vancomycin 2000 mg IV every 24 hours  Desired empiric serum trough concentration is 15 to 20 mcg/mL  Draw vancomycin trough level 60 min prior to third dose on 03/05 at approximately 2100  Pharmacy will continue to follow and monitor vancomycin.      Please contact pharmacy at extension [phone] with any questions regarding this assessment.     Thank you for the consult,   Nora Kip       Patient brief summary:  Madelyn Serna is a 71 y.o. female initiated on antimicrobial therapy with IV Vancomycin for treatment of suspected intra-abdominal infection    Drug Allergies:   Review of patient's allergies indicates:   Allergen Reactions    Aspirin      Makes her throwup    Ibuprofen      Throws up    Sulfa (sulfonamide antibiotics)      Pt can't remember reactions    Amoxicillin-pot clavulanate Itching       Actual Body Weight:   76.5 kg    Renal Function:   Estimated Creatinine Clearance: 66.8 mL/min (based on SCr of 0.8 mg/dL).,     Dialysis Method (if applicable):  N/A    CBC (last 72 hours):  Recent Labs   Lab Result Units 03/03/22  1417   WBC K/uL 23.10*   Hemoglobin g/dL 10.2*   Hematocrit % 32.6*   Platelets K/uL 530*   Gran % % 86.8*   Lymph % % 7.1*   Mono % % 5.0   Eosinophil % % 0.1   Basophil % % 0.3   Differential Method  Automated       Metabolic Panel (last 72 hours):  Recent Labs   Lab Result Units 03/03/22  1417   Sodium mmol/L 144   Potassium mmol/L 3.1*   Chloride mmol/L 97   CO2 mmol/L 34*   Glucose mg/dL 128*   BUN mg/dL 10   Creatinine mg/dL 0.8   Albumin g/dL 2.5*   Total Bilirubin mg/dL 0.5   Alkaline Phosphatase U/L 142*   AST U/L 31   ALT U/L 20       Drug levels (last 3 results):  No results for input(s): VANCOMYCINRA, VANCOMYCINPE, VANCOMYCINTR in the last 72 hours.    Microbiologic Results:  Microbiology Results (last 7  days)       Procedure Component Value Units Date/Time    Blood culture [[id number]] Collected: 03/03/22 2206    Order Status: Sent Specimen: Blood from Peripheral, Hand, Left Updated: 03/03/22 2208    Blood culture [[id number]] Collected: 03/03/22 2155    Order Status: Sent Specimen: Blood from Peripheral, Wrist, Left Updated: 03/03/22 2207    Aerobic culture (Specify Source) **CANNOT BE ORDERED AS STAT** [[id number]] Collected: 03/03/22 1556    Order Status: Sent Specimen: Wound from Abdomen Updated: 03/03/22 2041

## 2022-03-04 NOTE — INTERVAL H&P NOTE
The patient has been examined and the H&P has been reviewed:    I concur with the findings and no changes have occurred since H&P was written.    Plan for image guided abscess aspiration    Active Hospital Problems    Diagnosis  POA    *Infection following a procedure, deep incisional surgical site, initial encounter [T81.42XA]  Yes     Romaine pus on bandage with induration to RUQ. She has already completed a 5 day course of Cipro which completed on 2/27. Recommend prompt evaluation with ED and whether or not imaging is warranted.        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

## 2022-03-04 NOTE — SEDATION DOCUMENTATION
Procedure completed at this time. VSS, NADN, and pt tolerated procedure well. Band aid to right flank puncture sites C/D/I with no bleeding or drainage noted to sites.

## 2022-03-04 NOTE — SEDATION DOCUMENTATION
Pt placed supine on CT table at this time. CM in place, VSS, NADN, and pt verbalizes understanding of procedure.

## 2022-03-04 NOTE — PROGRESS NOTES
Pharmacist Renal Dose Adjustment Note    Madelyn Serna is a 71 y.o. female being treated with the medication cefepime.     Patient Data:    Vital Signs (Most Recent):  Temp: 99.3 °F (37.4 °C) (03/03/22 1403)  Pulse: 89 (03/03/22 1819)  Resp: 18 (03/1950)  BP: 135/71 (03/03/22 1819)  SpO2: 98 % (03/03/22 1819) Vital Signs (72h Range):  Temp:  [98 °F (36.7 °C)-99.3 °F (37.4 °C)]   Pulse:  []   Resp:  [18-20]   BP: (135-164)/(63-79)   SpO2:  [83 %-98 %]      Recent Labs   Lab 03/03/22  1417   CREATININE 0.8     Serum creatinine: 0.8 mg/dL 03/03/22 1417  Estimated creatinine clearance: 66.8 mL/min    Cefepime 1 g q12 h has been changed to 2 g q8 h per [facility]'s renal dose adjustment protocol.    Pharmacist's Name: Quinton Vasquez, PharmD 3/3/2022 8:22 PM    Pharmacist's Extension: [phone]

## 2022-03-04 NOTE — ASSESSMENT & PLAN NOTE
72 y/o female with wound infection and likely intraabdominal abscess secondary to recent attempted cholecystectomy.    - IR to drain intraabdominal fluid collections today.  - Will likely start on diet after this procedure.   - Cont IV abx

## 2022-03-04 NOTE — TELEPHONE ENCOUNTER
Nurse spoke with Dotty. Dotty is asking since pt is on hospital, what needs to be done with appt that is scheduled in clinic today? Nurse states that pt will let us know when they are discharged and we will r/s appt for pt. Dotty verbalizes understanding

## 2022-03-04 NOTE — SUBJECTIVE & OBJECTIVE
Past Medical History:   Diagnosis Date    Cancer of gallbladder     Essential (primary) hypertension        No past surgical history on file.    Review of patient's allergies indicates:   Allergen Reactions    Aspirin      Makes her throwup    Ibuprofen      Throws up    Sulfa (sulfonamide antibiotics)      Pt can't remember reactions    Amoxicillin-pot clavulanate Itching       No current facility-administered medications on file prior to encounter.     Current Outpatient Medications on File Prior to Encounter   Medication Sig    ciprofloxacin HCl (CIPRO) 500 MG tablet TAKE ONE TABLET TWICE DAILY FOR 5 DAYS FOR INFECTION    clonazePAM (KLONOPIN) 0.5 MG tablet clonazepam Take 2 times per day No date recorded tablet 2 times per day No route recorded No set duration recorded No set duration amount recorded active 0.5 mg    diclofenac sodium (VOLTAREN) 1 % Gel APPLY 1 GRAM TO AFFECTED AREA FOUR TIMES DAILY AS NEEDED    labetaloL (NORMODYNE) 300 MG tablet labetalol Take 2 times per day No date recorded tablet 2 times per day No route recorded No set duration recorded No set duration amount recorded suspended 300 mg    lisinopriL (PRINIVIL,ZESTRIL) 20 MG tablet lisinopril Take No date recorded No form recorded No frequency recorded No route recorded No set duration recorded No set duration amount recorded suspended No dosage strength recorded No dosage strength units of measure recorded    albuterol (PROVENTIL/VENTOLIN HFA) 90 mcg/actuation inhaler INHALE TWO PUFFS FOUR TIMES DAILY AS NEEDED FOR WHEEZING    amLODIPine (NORVASC) 5 MG tablet amlodipine Take 1 time per day No date recorded tablet 1 time per day No route recorded No set duration recorded No set duration amount recorded active 5 mg    cyproheptadine (PERIACTIN) 4 mg tablet Take 4 mg by mouth 2 (two) times daily.    ipratropium (ATROVENT) 21 mcg (0.03 %) nasal spray     mirtazapine (REMERON) 30 MG tablet     montelukast (SINGULAIR) 10 mg tablet Take 10 mg by  mouth once daily.    ondansetron (ZOFRAN-ODT) 8 MG TbDL Take 1 tablet (8 mg total) by mouth every 12 (twelve) hours as needed.    sertraline HCl (ZOLOFT ORAL) Zoloft Take No date recorded No form recorded No frequency recorded No route recorded No set duration recorded No set duration amount recorded active No dosage strength recorded No dosage strength units of measure recorded    [DISCONTINUED] HYDROcodone-acetaminophen (NORCO) 5-325 mg per tablet 1 tab PO BID 7 days As Needed for pain     Family History    None       Tobacco Use    Smoking status: Not on file    Smokeless tobacco: Not on file   Substance and Sexual Activity    Alcohol use: Not on file    Drug use: Not on file    Sexual activity: Not on file     Review of Systems   Constitutional:  Positive for appetite change and fatigue. Negative for chills and fever.   HENT:  Negative for congestion and sore throat.    Eyes:  Negative for visual disturbance.   Respiratory:  Negative for cough, shortness of breath and wheezing.    Cardiovascular:  Negative for chest pain, palpitations and leg swelling.   Gastrointestinal:  Positive for abdominal pain. Negative for blood in stool, constipation, diarrhea and vomiting.   Genitourinary:  Negative for dysuria and hematuria.   Musculoskeletal:  Negative for arthralgias and back pain.   Skin:  Negative for rash and wound.   Neurological:  Negative for dizziness, weakness, light-headedness and numbness.   Hematological:  Negative for adenopathy.   Objective:     Vital Signs (Most Recent):  Temp: 99.3 °F (37.4 °C) (03/03/22 1403)  Pulse: 89 (03/03/22 1819)  Resp: 18 (03/1950)  BP: 135/71 (03/03/22 1819)  SpO2: 98 % (03/03/22 1819) Vital Signs (24h Range):  Temp:  [98 °F (36.7 °C)-99.3 °F (37.4 °C)] 99.3 °F (37.4 °C)  Pulse:  [] 89  Resp:  [18-20] 18  SpO2:  [83 %-98 %] 98 %  BP: (135-164)/(63-79) 135/71     Weight: 76.5 kg (168 lb 10.4 oz)  Body mass index is 26.41 kg/m².    Physical Exam  Vitals and nursing  note reviewed.   Constitutional:       General: She is not in acute distress.     Appearance: She is well-developed.   HENT:      Head: Normocephalic and atraumatic.   Eyes:      Extraocular Movements: EOM normal.      Conjunctiva/sclera: Conjunctivae normal.      Pupils: Pupils are equal, round, and reactive to light.   Neck:      Thyroid: No thyromegaly.      Vascular: No JVD.   Cardiovascular:      Rate and Rhythm: Normal rate and regular rhythm.      Heart sounds: No murmur heard.    No friction rub. No gallop.   Pulmonary:      Effort: Pulmonary effort is normal.      Breath sounds: Normal breath sounds. No wheezing or rales.   Abdominal:      General: Bowel sounds are normal. There is no distension.      Palpations: Abdomen is soft.      Tenderness: There is abdominal tenderness. There is guarding. There is no rebound.      Comments: Drain site gauze is fresh and clean.  Dried pus around the edges with tenderness and guarding.   Musculoskeletal:         General: No deformity. Normal range of motion.      Cervical back: Neck supple.   Lymphadenopathy:      Cervical: No cervical adenopathy.   Skin:     General: Skin is warm and dry.      Findings: No rash.   Neurological:      Mental Status: She is alert and oriented to person, place, and time.      Deep Tendon Reflexes: Reflexes are normal and symmetric.   Psychiatric:         Behavior: Behavior normal.         Thought Content: Thought content normal.         Judgment: Judgment normal.         CRANIAL NERVES     CN III, IV, VI   Pupils are equal, round, and reactive to light.  Extraocular motions are normal.      Significant Labs: All pertinent labs within the past 24 hours have been reviewed.    Significant Imaging: I have reviewed all pertinent imaging results/findings within the past 24 hours.

## 2022-03-04 NOTE — H&P
[facility] Medicine  History & Physical    Patient Name: Madelyn Serna  MRN: [id number]  Patient Class: OP- Observation  Admission Date: 3/3/2022  Attending Physician: Kyree Jordan MD   Primary Care Provider: Primary Doctor No         Patient information was obtained from patient, relative(s), past medical records and ER records.     Subjective:     Principal Problem:Infection following a procedure, deep incisional surgical site, initial encounter    Chief Complaint:   Chief Complaint   Patient presents with    Wound Check     Pt reports pus and pain to incision site x 1 week        HPI: Ms. Serna came to the ED for right side abdominal pain and drainage at her surgical site.  She had an attempted cholecystectomy on 04 February.  The gallbladder was found very inflamed and irregular with a palpable mass and could not be safely resected.  The surgeon took biopsies and left a drain.  On the 22nd of February he removed the drain.  The site continued to drain pale purulent fluid and required frequent dressing changes.  She has had variable abdominal pain and loss of appetite.  No nausea or vomiting.  No change in bowel habits.  No fevers or chills.  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  This afternoon she had her first PET scan and established care with Dr. Marcos of Medical Oncology and completed a consultation with Palliative care.  She denies any chest pain or shortness of breath.  No dizziness or disorientation.  No other recent medical problems or new medications.      Past Medical History:   Diagnosis Date    Cancer of gallbladder     Essential (primary) hypertension        No past surgical history on file.    Review of patient's allergies indicates:   Allergen Reactions    Aspirin      Makes her throwup    Ibuprofen      Throws up    Sulfa (sulfonamide antibiotics)      Pt can't remember reactions    Amoxicillin-pot clavulanate Itching       No  current facility-administered medications on file prior to encounter.     Current Outpatient Medications on File Prior to Encounter   Medication Sig    ciprofloxacin HCl (CIPRO) 500 MG tablet TAKE ONE TABLET TWICE DAILY FOR 5 DAYS FOR INFECTION    clonazePAM (KLONOPIN) 0.5 MG tablet clonazepam Take 2 times per day No date recorded tablet 2 times per day No route recorded No set duration recorded No set duration amount recorded active 0.5 mg    diclofenac sodium (VOLTAREN) 1 % Gel APPLY 1 GRAM TO AFFECTED AREA FOUR TIMES DAILY AS NEEDED    labetaloL (NORMODYNE) 300 MG tablet labetalol Take 2 times per day No date recorded tablet 2 times per day No route recorded No set duration recorded No set duration amount recorded suspended 300 mg    lisinopriL (PRINIVIL,ZESTRIL) 20 MG tablet lisinopril Take No date recorded No form recorded No frequency recorded No route recorded No set duration recorded No set duration amount recorded suspended No dosage strength recorded No dosage strength units of measure recorded    albuterol (PROVENTIL/VENTOLIN HFA) 90 mcg/actuation inhaler INHALE TWO PUFFS FOUR TIMES DAILY AS NEEDED FOR WHEEZING    amLODIPine (NORVASC) 5 MG tablet amlodipine Take 1 time per day No date recorded tablet 1 time per day No route recorded No set duration recorded No set duration amount recorded active 5 mg    cyproheptadine (PERIACTIN) 4 mg tablet Take 4 mg by mouth 2 (two) times daily.    ipratropium (ATROVENT) 21 mcg (0.03 %) nasal spray     mirtazapine (REMERON) 30 MG tablet     montelukast (SINGULAIR) 10 mg tablet Take 10 mg by mouth once daily.    ondansetron (ZOFRAN-ODT) 8 MG TbDL Take 1 tablet (8 mg total) by mouth every 12 (twelve) hours as needed.    sertraline HCl (ZOLOFT ORAL) Zoloft Take No date recorded No form recorded No frequency recorded No route recorded No set duration recorded No set duration amount recorded active No dosage strength recorded No dosage strength units of measure  recorded    [DISCONTINUED] HYDROcodone-acetaminophen (NORCO) 5-325 mg per tablet 1 tab PO BID 7 days As Needed for pain     Family History    None       Tobacco Use    Smoking status: Not on file    Smokeless tobacco: Not on file   Substance and Sexual Activity    Alcohol use: Not on file    Drug use: Not on file    Sexual activity: Not on file     Review of Systems   Constitutional:  Positive for appetite change and fatigue. Negative for chills and fever.   HENT:  Negative for congestion and sore throat.    Eyes:  Negative for visual disturbance.   Respiratory:  Negative for cough, shortness of breath and wheezing.    Cardiovascular:  Negative for chest pain, palpitations and leg swelling.   Gastrointestinal:  Positive for abdominal pain. Negative for blood in stool, constipation, diarrhea and vomiting.   Genitourinary:  Negative for dysuria and hematuria.   Musculoskeletal:  Negative for arthralgias and back pain.   Skin:  Negative for rash and wound.   Neurological:  Negative for dizziness, weakness, light-headedness and numbness.   Hematological:  Negative for adenopathy.   Objective:     Vital Signs (Most Recent):  Temp: 99.3 °F (37.4 °C) (03/03/22 1403)  Pulse: 89 (03/03/22 1819)  Resp: 18 (03/1950)  BP: 135/71 (03/03/22 1819)  SpO2: 98 % (03/03/22 1819) Vital Signs (24h Range):  Temp:  [98 °F (36.7 °C)-99.3 °F (37.4 °C)] 99.3 °F (37.4 °C)  Pulse:  [] 89  Resp:  [18-20] 18  SpO2:  [83 %-98 %] 98 %  BP: (135-164)/(63-79) 135/71     Weight: 76.5 kg (168 lb 10.4 oz)  Body mass index is 26.41 kg/m².    Physical Exam  Vitals and nursing note reviewed.   Constitutional:       General: She is not in acute distress.     Appearance: She is well-developed.   HENT:      Head: Normocephalic and atraumatic.   Eyes:      Extraocular Movements: EOM normal.      Conjunctiva/sclera: Conjunctivae normal.      Pupils: Pupils are equal, round, and reactive to light.   Neck:      Thyroid: No thyromegaly.       Vascular: No JVD.   Cardiovascular:      Rate and Rhythm: Normal rate and regular rhythm.      Heart sounds: No murmur heard.    No friction rub. No gallop.   Pulmonary:      Effort: Pulmonary effort is normal.      Breath sounds: Normal breath sounds. No wheezing or rales.   Abdominal:      General: Bowel sounds are normal. There is no distension.      Palpations: Abdomen is soft.      Tenderness: There is abdominal tenderness. There is guarding. There is no rebound.      Comments: Drain site gauze is fresh and clean.  Dried pus around the edges with tenderness and guarding.   Musculoskeletal:         General: No deformity. Normal range of motion.      Cervical back: Neck supple.   Lymphadenopathy:      Cervical: No cervical adenopathy.   Skin:     General: Skin is warm and dry.      Findings: No rash.   Neurological:      Mental Status: She is alert and oriented to person, place, and time.      Deep Tendon Reflexes: Reflexes are normal and symmetric.   Psychiatric:         Behavior: Behavior normal.         Thought Content: Thought content normal.         Judgment: Judgment normal.         CRANIAL NERVES     CN III, IV, VI   Pupils are equal, round, and reactive to light.  Extraocular motions are normal.      Significant Labs: All pertinent labs within the past 24 hours have been reviewed.    Significant Imaging: I have reviewed all pertinent imaging results/findings within the past 24 hours.    Assessment/Plan:     * Infection following a procedure, deep incisional surgical site, initial encounter  Wound cultures drawn from purulent draining site.  Creamy white pus without blood.  Elevated WBC count with predominance of Neutrophils.  She received Ceftriaxone, Metronidazole, and Vancomycin in the ED.  Draw blood cultures and continue Vancomycin and substitute Cefepime.  Consultation to Interventional Radiology for percutaneous drainage.    Adenocarcinoma of gallbladder  New diagnosis after attempted  cholecystectomy in February.  She is established with Dr. Marcos and had a staging PET on 03 March.  Follow up for ongoing evaluation and treatment after discharge.    Essential (primary) hypertension  Continue Labetalol and Lisinopril.  Follow renal function chemistries.      VTE Risk Mitigation (From admission, onward)         Ordered     enoxaparin injection 40 mg  Daily         03/03/22 2025     IP VTE HIGH RISK PATIENT  Once         03/03/22 2025     Place sequential compression device  Until discontinued         03/03/22 2025                   Kyree Jordan MD  Department of Hospital Medicine   [facility] - Med Surg

## 2022-03-04 NOTE — SUBJECTIVE & OBJECTIVE
Interval History: Pain controlled and denies nausea and vomiting. Reports feeling feverish. Having normal bowel movements.     Medications:  Continuous Infusions:   0.45 % NaCl with KCl 20 mEq 75 mL/hr at 03/04/22 0611     Scheduled Meds:   ceFEPime (MAXIPIME) IVPB  2 g Intravenous Q8H    clonazePAM  0.5 mg Oral BID    cyproheptadine  4 mg Oral BID    enoxaparin  40 mg Subcutaneous Daily    labetaloL  300 mg Oral Q12H    lisinopriL  10 mg Oral Daily    montelukast  10 mg Oral Daily    polyethylene glycol  17 g Oral Daily    vancomycin (VANCOCIN) IVPB  2,000 mg Intravenous Q24H     PRN Meds:acetaminophen, albuterol sulfate, aluminum-magnesium hydroxide-simethicone, dextrose 10%, dextrose 10%, glucagon (human recombinant), glucose, glucose, HYDROcodone-acetaminophen, melatonin, morphine, naloxone, ondansetron, promethazine, simethicone, sodium chloride 0.9%, Pharmacy to dose Vancomycin consult **AND** vancomycin - pharmacy to dose     Review of patient's allergies indicates:   Allergen Reactions    Aspirin      Makes her throwup    Ibuprofen      Throws up    Sulfa (sulfonamide antibiotics)      Pt can't remember reactions    Amoxicillin-pot clavulanate Itching     Objective:     Vital Signs (Most Recent):  Temp: 99.4 °F (37.4 °C) (03/04/22 1112)  Pulse: 84 (03/04/22 1112)  Resp: 18 (03/04/22 1112)  BP: (!) 127/58 (03/04/22 1112)  SpO2: 98 % (03/04/22 1112)   Vital Signs (24h Range):  Temp:  [98 °F (36.7 °C)-100.2 °F (37.9 °C)] 99.4 °F (37.4 °C)  Pulse:  [] 84  Resp:  [14-22] 18  SpO2:  [83 %-100 %] 98 %  BP: (125-164)/(58-98) 127/58     Weight: 79.5 kg (175 lb 4.3 oz)  Body mass index is 27.45 kg/m².    Intake/Output - Last 3 Shifts         03/02 0700  03/03 0659 03/03 0700  03/04 0659 03/04 0700 03/05 0659    I.V. (mL/kg)  470.8 (5.9)     IV Piggyback  98.2     Total Intake(mL/kg)  569 (7.2)     Net  +569                    Physical Exam  Vitals and nursing note reviewed.   Constitutional:        Nutrition Care Plan    Nutrition Diagnosis:   Inadequate intake related to poor appetite, general malaise, and respiratory distress with BiPAP dependency secondary to covid-19 infection further impacted by inability to meet increased nutrient needs for wound healing as evidenced by patient reporting minimal oral intake for \"a few days\" prior to admit, estimated nutrient needs, wounds to buttock, and rapid desaturation while off oxygen resulting in minimal intakes since admit.    Intervention:  Modified diet:    Continue Consistent Carbohydrate Moderate, 2 Gram Sodium diet.   --Staff to encourage/assist with selection of nutritionally balanced meals as needed.  Encourage meal intakes of >50%.     Commercial beverage:    Will d/c high protein, low carbohydrate oral supplement (Ensure High Protein) as patient has not been consuming.     Monitoring and Evaluation:  Amount of food:    Goal is for patient to consistently consume >50% of most meals and supplements.   --Patient has only been able to consume applesauce and pudding over the past 3 days as she desaturates rapidly when off oxygen. Now, having issues with liquids spilling out of her mouth resulting in inability to take Ensure. Patient currently on BiPAP and not a good candidate for TF per RN. Goal not met.          General: She is not in acute distress.     Appearance: She is well-developed. She is not ill-appearing.   HENT:      Head: Normocephalic and atraumatic.      Right Ear: External ear normal.      Left Ear: External ear normal.      Mouth/Throat:      Mouth: Mucous membranes are moist.      Pharynx: Oropharynx is clear.   Eyes:      Extraocular Movements: Extraocular movements intact.      Conjunctiva/sclera: Conjunctivae normal.   Cardiovascular:      Rate and Rhythm: Normal rate.   Pulmonary:      Effort: Pulmonary effort is normal. No respiratory distress.   Abdominal:      General: There is no distension.      Palpations: Abdomen is soft.      Tenderness: There is no abdominal tenderness.      Comments: No TTP. Purulent discharge from site of previous drain.    Musculoskeletal:      Cervical back: Neck supple.   Skin:     General: Skin is warm and dry.   Neurological:      Mental Status: She is alert and oriented to person, place, and time.   Psychiatric:         Behavior: Behavior normal.       Significant Labs:  I have reviewed all pertinent lab results within the past 24 hours.  CBC:   Recent Labs   Lab 03/04/22  0612   WBC 21.35*   RBC 2.67*   HGB 8.4*   HCT 26.8*      *   MCH 31.5*   MCHC 31.3*     CMP:   Recent Labs   Lab 03/03/22  1417 03/04/22  0612   * 107   CALCIUM 9.0 8.7   ALBUMIN 2.5* 1.9*   PROT 7.5  --     137   K 3.1* 3.1*   CO2 34* 32*   CL 97 96   BUN 10 6*   CREATININE 0.8 0.7   ALKPHOS 142*  --    ALT 20  --    AST 31  --    BILITOT 0.5  --        Significant Diagnostics:  I have reviewed all pertinent imaging results/findings within the past 24 hours.  CT scan with evidence of intraabdominal abscesses.

## 2022-03-04 NOTE — ASSESSMENT & PLAN NOTE
Wound cultures drawn from purulent draining site.  Creamy white pus without blood.  Elevated WBC count with predominance of Neutrophils.  She received Ceftriaxone, Metronidazole, and Vancomycin in the ED.  Draw blood cultures and continue Vancomycin and substitute Cefepime.  Consultation to Interventional Radiology for percutaneous drainage.

## 2022-03-04 NOTE — HOSPITAL COURSE
72 y/o female admitted for wound infection and intraabdominal abscess secondary to recent attempted cholecystectomy. Plan for percutaneous drainage today per IR. Continue vancomycin and cefepime. Follow cultures. As of 3/5/22 pt feeling better. S/p drainage of gallbladder abscess; Fluid sent for analysis. Blood cx remain negative, WBCs trending down. As of 3/6/22 pt feeling better. Leukocytosis continues to improve. Aerobic cx positive for MRSA. IV abx switched to oral zyvox. Blood cx remain negative. Ok to discharge from surgery standpoint. Home meds reconciled. Patient to follow-up with PCP and general surgery outpatient. Patient seen and examined on the date of discharge and found stable for discharge.

## 2022-03-04 NOTE — HPI
70 y/o female with history of recent attempted cholecystectomy who was found to have a gallbladder mass during the procedure. A drain was left in place, which was subsequently removed. She presented with purulent drainage from this non-healing drain site as well as an elevated WBC count.

## 2022-03-04 NOTE — HPI
History and Physical    Physician requesting consult: Zehra Ulrich MD  Reason for consult: Office Visit (KORIN knee pain. )        HPI:   Elvira Vazquez is a 14 year old female who presents with chief complaint of lateral knee pain, she notes pain over the last 3 years time worse with any running jumping squatting activities, denies any prior injury history surgical or injection history the bilateral knees.  She notes pain overlying the anterior aspect of her bilateral knees, per mom she also notes worsening bowlegged deformity over the past 5-6 years time bilateral legs        PMH:     Past Medical History:   Diagnosis Date   • Chronic tonsillitis      PSH:    No past surgical history on file.  Meds:    Current Outpatient Medications   Medication Sig Dispense Refill   • ferrous sulfate 325 (65 FE) MG tablet Take 1 tablet by mouth daily (with breakfast). 90 tablet 0     No current facility-administered medications for this visit.     All:  ALLERGIES:  No Known Allergies  Fam Hx:     Family History   Problem Relation Age of Onset   • Hyperlipidemia Maternal Uncle    • Migraine Maternal Uncle    • Hypertension Maternal Grandmother    • Cancer Maternal Grandmother         breast   • Hypertension Maternal Grandfather    • Hyperlipidemia Maternal Grandfather    • Kidney disease Paternal Grandmother    • Migraine Mother    • Migraine Sister      Soc Hx:      Social History     Socioeconomic History   • Marital status: Single     Spouse name: Not on file   • Number of children: Not on file   • Years of education: Not on file   • Highest education level: Not on file   Occupational History   • Not on file   Tobacco Use   • Smoking status: Never Smoker   • Smokeless tobacco: Never Used   Substance and Sexual Activity   • Alcohol use: Not on file   • Drug use: Not on file   • Sexual activity: Not on file   Other Topics Concern   • Not on file   Social History Narrative   • Not on file     Social Determinants of Health  Ms. Serna came to the ED for right side abdominal pain and drainage at her surgical site.  She had an attempted cholecystectomy on 04 February.  The gallbladder was found very inflamed and irregular with a palpable mass and could not be safely resected.  The surgeon took biopsies and left a drain.  On the 22nd of February he removed the drain.  The site continued to drain pale purulent fluid and required frequent dressing changes.  She has had variable abdominal pain and loss of appetite.  No nausea or vomiting.  No change in bowel habits.  No fevers or chills.  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  This afternoon she had her first PET scan and established care with Dr. Marcos of Medical Oncology and completed a consultation with Palliative care.  She denies any chest pain or shortness of breath.  No dizziness or disorientation.  No other recent medical problems or new medications.       Financial Resource Strain: Not on file   Food Insecurity: Not on file   Transportation Needs: Not on file   Physical Activity: Not on file   Stress: Not on file   Social Connections: Not on file   Intimate Partner Violence: Not on file       REVIEW OF SYSTEMS  Constitutional: Negative for fever and chills.    Skin: Negative for rash and skin lesions.    HEENT: Negative for nosebleeds and sore throat. Normocephalic, atraumatic.  Sclerae are anicteric.  Conjunctivae are pink without injection, pupils are round and symmetrical, extraocular movements are intact,  Lids and lashes without lesion. Negative for blurred vision and double vision. Ears are without lesions or discharge.  Nares are patent without scarring, flaring, lesions or discharge.  Oropharynx:  Mucous membranes are moist.  Throat is without exudates or cobblestoning. Soft palate is normal appearing.   Neck: No JVD (jugular venous distention).  Trachea is midline.  No cervical adenopathy, no supraclavicular adenopathy.  Cardiovascular: Negative for chest pain and dyspnea on exertion.    Respiratory: Negative for cough.  Is not experiencing shortness of breath. Has normal respiratory effort. No audible adventitious breath sounds were heard.    Gastrointestinal: Negative for nausea and vomiting.    Musculoskeletal: Positive for generalized joint pain bilateral knee.    Neurological: Negative for tingling and headaches.    Psychiatric: Negative for hallucinations.  The patient does not have insomnia.     Lymph/Heme: Negative for bruises/bleeds easily and lymph node swelling.    Endocrine: Negative for hot flashes and sweats.  All other systems have been reviewed and are negative    PHYSICAL EXAMINATION:    Vitals: There were no vitals taken for this visit.   Constitutional:  Well-developed, well-nourished female in no acute distress, looks their stated age, and was cooperative today in the office. Normal  gait and station  Lung: Non-labored  breathing  Skin: Warm, dry, intact without rash or lesion.  Psychiatric:  Alert and oriented x3.    Musculoskeletal:                  Bilateral Knee Exam:    Antalgic gait: no  Normal station    Pain with deep knee bend: no    No skin lesions, erythema, or warmth.  Effusion:  No  Range Of Motion:  -5-140  Quad tone poor    Hyperextension Pain:    negative  Hyperflexion Pain:     negative  Drew:      negative  Medial Joint Line Tenderness:   negative  Lateral Joint Line Tenderness: negative    Anterior Drawer:   negative  Posterior Drawer:   negative  Lachman:   negative  Pivot Shift:  Guarded  Valgus Stress:   negative  Varus Stress:   negative    Patellar Apprehension:  negative  Patellar Compression Test: negative  Quadrants Medial:   1  Quadrants Lateral:   1.5    Bilateral lower extremities are neurovascularly intact with symmetric light touch sensation, normal tendon reflexes, normal gross and fine motor coordination, and distal pulses.     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  The diagnosis and treatment options were discussed with her in detail today.  She demonstrates bilateral genu varum deformity, noted medialized mechanical axis bilaterally along with poor quadriceps strength.  We did discuss potential options including formal therapy however I would like her to be evaluated by Pediatric Orthopedics to further evaluate her bilateral genu varum and discuss the role of any interventions including potential corrective osteotomies, we [facility].  She will be held out a gym class, avoid running jumping activities, school note provided today.  She denies any swelling,  mechanical symptoms, instability of her bilateral knees.  She may utilize ice elevation oral NSAIDs and Tylenol as needed.  She denies any history of vitamin-D deficiency, rickets in her past.      If there are any questions prior to this, the patient was instructed to contact the office.    The patient does not  need new x-rays upon arrival of her next appointment.    Patient voiced understanding and agreed with the plan. All questions were answered.    Thank you Zehra Ulrich MD for kindly referring and allowing me to participate in the care of this patient. We appreciate the opportunity to assist. Please do not hesitate to contact me with any questions or concerns regarding the patient's care.    Reid Yepez MD, MPH  Orthopaedic Surgery, Sports Medicine  [facility]     CC: MD Reid Jackson MD

## 2022-03-04 NOTE — ASSESSMENT & PLAN NOTE
Wound cultures drawn from purulent draining site.  Creamy white pus without blood.  Elevated WBC count with predominance of Neutrophils.  She received Ceftriaxone, Metronidazole, and Vancomycin in the ED.  Draw blood cultures and continue Vancomycin and substitute Cefepime.  Consultation to Interventional Radiology for percutaneous drainage.    WBCs 21.35K down from 23.10  Plan for percutaneous drainage today by IR   Continue vancomycin and cefepime

## 2022-03-04 NOTE — ASSESSMENT & PLAN NOTE
New diagnosis after attempted cholecystectomy in February.  She is established with Dr. Marcos and had a staging PET on 03 March.  Follow up for ongoing evaluation and treatment after discharge.

## 2022-03-04 NOTE — OP NOTE
Pre Op Diagnosis: abdominal abscess     Post Op Diagnosis: same     Procedure:  Image guided abscess aspiration     Procedure performed by: [address], [place]     Written Informed Consent Obtained: Yes     Specimen Removed:  yes     Estimated Blood Loss:  minimal     Findings: Local anesthesia     The patient tolerated the procedure well and there were no complications.      Sterile technique was performed in the RUQ, lidocaine was used as a local anesthetic.  About 10 cc purulent material aspirated from gallbladder fossa.  Patient tolerated the procedure well without immediate complications.  Please see radiologist report for details. F/u with PCP and/or ordering physician.

## 2022-03-04 NOTE — PROGRESS NOTES
[facility] Surg  General Surgery  Progress Note    Subjective:     History of Present Illness:  72 y/o female with history of recent attempted cholecystectomy who was found to have a gallbladder mass during the procedure. A drain was left in place, which was subsequently removed. She presented with purulent drainage from this non-healing drain site as well as an elevated WBC count.       Post-Op Info:  * No surgery found *         Interval History: Pain controlled and denies nausea and vomiting. Reports feeling feverish. Having normal bowel movements.     Medications:  Continuous Infusions:   0.45 % NaCl with KCl 20 mEq 75 mL/hr at 03/04/22 0611     Scheduled Meds:   ceFEPime (MAXIPIME) IVPB  2 g Intravenous Q8H    clonazePAM  0.5 mg Oral BID    cyproheptadine  4 mg Oral BID    enoxaparin  40 mg Subcutaneous Daily    labetaloL  300 mg Oral Q12H    lisinopriL  10 mg Oral Daily    montelukast  10 mg Oral Daily    polyethylene glycol  17 g Oral Daily    vancomycin (VANCOCIN) IVPB  2,000 mg Intravenous Q24H     PRN Meds:acetaminophen, albuterol sulfate, aluminum-magnesium hydroxide-simethicone, dextrose 10%, dextrose 10%, glucagon (human recombinant), glucose, glucose, HYDROcodone-acetaminophen, melatonin, morphine, naloxone, ondansetron, promethazine, simethicone, sodium chloride 0.9%, Pharmacy to dose Vancomycin consult **AND** vancomycin - pharmacy to dose     Review of patient's allergies indicates:   Allergen Reactions    Aspirin      Makes her throwup    Ibuprofen      Throws up    Sulfa (sulfonamide antibiotics)      Pt can't remember reactions    Amoxicillin-pot clavulanate Itching     Objective:     Vital Signs (Most Recent):  Temp: 99.4 °F (37.4 °C) (03/04/22 1112)  Pulse: 84 (03/04/22 1112)  Resp: 18 (03/04/22 1112)  BP: (!) 127/58 (03/04/22 1112)  SpO2: 98 % (03/04/22 1112)   Vital Signs (24h Range):  Temp:  [98 °F (36.7 °C)-100.2 °F (37.9 °C)] 99.4 °F (37.4 °C)  Pulse:  [] 84  Resp:   [14-22] 18  SpO2:  [83 %-100 %] 98 %  BP: (125-164)/(58-98) 127/58     Weight: 79.5 kg (175 lb 4.3 oz)  Body mass index is 27.45 kg/m².    Intake/Output - Last 3 Shifts         03/02 0700  03/03 0659 03/03 0700  03/04 0659 03/04 0700  03/05 0659    I.V. (mL/kg)  470.8 (5.9)     IV Piggyback  98.2     Total Intake(mL/kg)  569 (7.2)     Net  +569                    Physical Exam  Vitals and nursing note reviewed.   Constitutional:       General: She is not in acute distress.     Appearance: She is well-developed. She is not ill-appearing.   HENT:      Head: Normocephalic and atraumatic.      Right Ear: External ear normal.      Left Ear: External ear normal.      Mouth/Throat:      Mouth: Mucous membranes are moist.      Pharynx: Oropharynx is clear.   Eyes:      Extraocular Movements: Extraocular movements intact.      Conjunctiva/sclera: Conjunctivae normal.   Cardiovascular:      Rate and Rhythm: Normal rate.   Pulmonary:      Effort: Pulmonary effort is normal. No respiratory distress.   Abdominal:      General: There is no distension.      Palpations: Abdomen is soft.      Tenderness: There is no abdominal tenderness.      Comments: No TTP. Purulent discharge from site of previous drain.    Musculoskeletal:      Cervical back: Neck supple.   Skin:     General: Skin is warm and dry.   Neurological:      Mental Status: She is alert and oriented to person, place, and time.   Psychiatric:         Behavior: Behavior normal.       Significant Labs:  I have reviewed all pertinent lab results within the past 24 hours.  CBC:   Recent Labs   Lab 03/04/22  0612   WBC 21.35*   RBC 2.67*   HGB 8.4*   HCT 26.8*      *   MCH 31.5*   MCHC 31.3*     CMP:   Recent Labs   Lab 03/03/22  1417 03/04/22  0612   * 107   CALCIUM 9.0 8.7   ALBUMIN 2.5* 1.9*   PROT 7.5  --     137   K 3.1* 3.1*   CO2 34* 32*   CL 97 96   BUN 10 6*   CREATININE 0.8 0.7   ALKPHOS 142*  --    ALT 20  --    AST 31  --    BILITOT 0.5   --        Significant Diagnostics:  I have reviewed all pertinent imaging results/findings within the past 24 hours.  CT scan with evidence of intraabdominal abscesses.    Assessment/Plan:     * Infection following a procedure, deep incisional surgical site, initial encounter  72 y/o female with wound infection and likely intraabdominal abscess secondary to recent attempted cholecystectomy.    - IR to drain intraabdominal fluid collections today.  - Will likely start on diet after this procedure.     Essential (primary) hypertension  Management per primary team.    Adenocarcinoma of gallbladder  Follows with Dr. Marcos. Family would like to know results of recent PET scan.        Susan Vilchis PA-C  General Surgery  [facility]

## 2022-03-05 LAB
ANION GAP SERPL CALC-SCNC: 10 MMOL/L (ref 8–16)
BASOPHILS # BLD AUTO: 0.06 K/UL (ref 0–0.2)
BASOPHILS NFR BLD: 0.3 % (ref 0–1.9)
BUN SERPL-MCNC: 9 MG/DL (ref 8–23)
CALCIUM SERPL-MCNC: 9.3 MG/DL (ref 8.7–10.5)
CHLORIDE SERPL-SCNC: 97 MMOL/L (ref 95–110)
CO2 SERPL-SCNC: 33 MMOL/L (ref 23–29)
CREAT SERPL-MCNC: 0.8 MG/DL (ref 0.5–1.4)
DIFFERENTIAL METHOD: ABNORMAL
EOSINOPHIL # BLD AUTO: 0.1 K/UL (ref 0–0.5)
EOSINOPHIL NFR BLD: 0.3 % (ref 0–8)
ERYTHROCYTE [DISTWIDTH] IN BLOOD BY AUTOMATED COUNT: 13.7 % (ref 11.5–14.5)
EST. GFR  (AFRICAN AMERICAN): >60 ML/MIN/1.73 M^2
EST. GFR  (NON AFRICAN AMERICAN): >60 ML/MIN/1.73 M^2
GLUCOSE SERPL-MCNC: 112 MG/DL (ref 70–110)
GRAM STN SPEC: NORMAL
GRAM STN SPEC: NORMAL
HCT VFR BLD AUTO: 26.8 % (ref 37–48.5)
HGB BLD-MCNC: 8.2 G/DL (ref 12–16)
IMM GRANULOCYTES # BLD AUTO: 0.24 K/UL (ref 0–0.04)
IMM GRANULOCYTES NFR BLD AUTO: 1.3 % (ref 0–0.5)
LYMPHOCYTES # BLD AUTO: 1.7 K/UL (ref 1–4.8)
LYMPHOCYTES NFR BLD: 9.6 % (ref 18–48)
MCH RBC QN AUTO: 30.8 PG (ref 27–31)
MCHC RBC AUTO-ENTMCNC: 30.6 G/DL (ref 32–36)
MCV RBC AUTO: 101 FL (ref 82–98)
MONOCYTES # BLD AUTO: 1.4 K/UL (ref 0.3–1)
MONOCYTES NFR BLD: 7.5 % (ref 4–15)
NEUTROPHILS # BLD AUTO: 14.6 K/UL (ref 1.8–7.7)
NEUTROPHILS NFR BLD: 81 % (ref 38–73)
NRBC BLD-RTO: 0 /100 WBC
PLATELET # BLD AUTO: 449 K/UL (ref 150–450)
PMV BLD AUTO: 9.3 FL (ref 9.2–12.9)
POTASSIUM SERPL-SCNC: 3.9 MMOL/L (ref 3.5–5.1)
RBC # BLD AUTO: 2.66 M/UL (ref 4–5.4)
SODIUM SERPL-SCNC: 140 MMOL/L (ref 136–145)
VANCOMYCIN TROUGH SERPL-MCNC: 11.8 UG/ML (ref 10–22)
WBC # BLD AUTO: 18.07 K/UL (ref 3.9–12.7)

## 2022-03-05 PROCEDURE — 94761 N-INVAS EAR/PLS OXIMETRY MLT: CPT

## 2022-03-05 PROCEDURE — 96376 TX/PRO/DX INJ SAME DRUG ADON: CPT

## 2022-03-05 PROCEDURE — 80048 BASIC METABOLIC PNL TOTAL CA: CPT | Performed by: NURSE PRACTITIONER

## 2022-03-05 PROCEDURE — 63600175 PHARM REV CODE 636 W HCPCS: Performed by: INTERNAL MEDICINE

## 2022-03-05 PROCEDURE — 80202 ASSAY OF VANCOMYCIN: CPT | Performed by: EMERGENCY MEDICINE

## 2022-03-05 PROCEDURE — 96366 THER/PROPH/DIAG IV INF ADDON: CPT

## 2022-03-05 PROCEDURE — 99900035 HC TECH TIME PER 15 MIN (STAT)

## 2022-03-05 PROCEDURE — G0378 HOSPITAL OBSERVATION PER HR: HCPCS

## 2022-03-05 PROCEDURE — 99204 PR OFFICE/OUTPT VISIT, NEW, LEVL IV, 45-59 MIN: ICD-10-PCS | Mod: ,,, | Performed by: COLON & RECTAL SURGERY

## 2022-03-05 PROCEDURE — 63600175 PHARM REV CODE 636 W HCPCS: Performed by: EMERGENCY MEDICINE

## 2022-03-05 PROCEDURE — 25000003 PHARM REV CODE 250: Performed by: INTERNAL MEDICINE

## 2022-03-05 PROCEDURE — 96372 THER/PROPH/DIAG INJ SC/IM: CPT | Performed by: INTERNAL MEDICINE

## 2022-03-05 PROCEDURE — 25000003 PHARM REV CODE 250: Performed by: EMERGENCY MEDICINE

## 2022-03-05 PROCEDURE — 27000221 HC OXYGEN, UP TO 24 HOURS

## 2022-03-05 PROCEDURE — 36415 COLL VENOUS BLD VENIPUNCTURE: CPT | Performed by: EMERGENCY MEDICINE

## 2022-03-05 PROCEDURE — 36415 COLL VENOUS BLD VENIPUNCTURE: CPT | Performed by: NURSE PRACTITIONER

## 2022-03-05 PROCEDURE — 85025 COMPLETE CBC W/AUTO DIFF WBC: CPT | Performed by: NURSE PRACTITIONER

## 2022-03-05 PROCEDURE — 96361 HYDRATE IV INFUSION ADD-ON: CPT

## 2022-03-05 PROCEDURE — 99204 OFFICE O/P NEW MOD 45 MIN: CPT | Mod: ,,, | Performed by: COLON & RECTAL SURGERY

## 2022-03-05 RX ADMIN — MORPHINE SULFATE 4 MG: 4 INJECTION INTRAVENOUS at 01:03

## 2022-03-05 RX ADMIN — ENOXAPARIN SODIUM 40 MG: 100 INJECTION SUBCUTANEOUS at 05:03

## 2022-03-05 RX ADMIN — LABETALOL HYDROCHLORIDE 300 MG: 200 TABLET, FILM COATED ORAL at 08:03

## 2022-03-05 RX ADMIN — CLONAZEPAM 0.5 MG: 0.5 TABLET ORAL at 08:03

## 2022-03-05 RX ADMIN — LISINOPRIL 10 MG: 10 TABLET ORAL at 08:03

## 2022-03-05 RX ADMIN — MORPHINE SULFATE 4 MG: 4 INJECTION INTRAVENOUS at 08:03

## 2022-03-05 RX ADMIN — MORPHINE SULFATE 4 MG: 4 INJECTION INTRAVENOUS at 10:03

## 2022-03-05 RX ADMIN — POTASSIUM CHLORIDE AND SODIUM CHLORIDE: 450; 150 INJECTION, SOLUTION INTRAVENOUS at 02:03

## 2022-03-05 RX ADMIN — MORPHINE SULFATE 4 MG: 4 INJECTION INTRAVENOUS at 06:03

## 2022-03-05 RX ADMIN — HYDROCODONE BITARTRATE AND ACETAMINOPHEN 1 TABLET: 5; 325 TABLET ORAL at 06:03

## 2022-03-05 RX ADMIN — CYPROHEPTADINE HYDROCHLORIDE 4 MG: 4 TABLET ORAL at 08:03

## 2022-03-05 RX ADMIN — HYDROCODONE BITARTRATE AND ACETAMINOPHEN 1 TABLET: 5; 325 TABLET ORAL at 08:03

## 2022-03-05 RX ADMIN — VANCOMYCIN HYDROCHLORIDE 2000 MG: 10 INJECTION, POWDER, LYOPHILIZED, FOR SOLUTION INTRAVENOUS at 09:03

## 2022-03-05 RX ADMIN — CEFEPIME HYDROCHLORIDE 2 G: 2 INJECTION, SOLUTION INTRAVENOUS at 05:03

## 2022-03-05 RX ADMIN — CEFEPIME HYDROCHLORIDE 2 G: 2 INJECTION, SOLUTION INTRAVENOUS at 08:03

## 2022-03-05 RX ADMIN — MONTELUKAST 10 MG: 10 TABLET, FILM COATED ORAL at 08:03

## 2022-03-05 NOTE — PLAN OF CARE
Patient remains free of injury. AAOx4. POC reviewed, patient verbalizes understanding. Pain controlled with oral pain medication. Regular diet tolerated. Bandaids to RLQ remain CDI. IV fluids infusing. Call light and personal items within reach. Chart check complete. Will continue to monitor.

## 2022-03-05 NOTE — PLAN OF CARE
Discussed poc with pt, pt verbalized understanding    Purposeful rounding every 2hours    VS wnl  Fall precautions in place, remains injury free  Pain managed with PRN meds    IVFs  Accurate I&Os  Abx given as prescribed    Bed locked at lowest position  Call light within reach    Chart check complete  Will cont with POC

## 2022-03-05 NOTE — ASSESSMENT & PLAN NOTE
72 y/o female with wound infection and likely intraabdominal abscess secondary to recent attempted cholecystectomy.    - IR drain unsuccessful Friday. Continue treatment with IV abx  - okay for diet from surgical standpoint

## 2022-03-05 NOTE — SUBJECTIVE & OBJECTIVE
Interval History: Pain well controlled. Denies nausea or vomiting. Unsuccessful IR drain attempt yesterday.    Medications:  Continuous Infusions:   0.45 % NaCl with KCl 20 mEq 75 mL/hr at 03/05/22 1403     Scheduled Meds:   ceFEPime (MAXIPIME) IVPB  2 g Intravenous Q8H    clonazePAM  0.5 mg Oral BID    cyproheptadine  4 mg Oral BID    enoxaparin  40 mg Subcutaneous Daily    labetaloL  300 mg Oral Q12H    lisinopriL  10 mg Oral Daily    montelukast  10 mg Oral Daily    polyethylene glycol  17 g Oral Daily    vancomycin (VANCOCIN) IVPB  2,000 mg Intravenous Q24H     PRN Meds:acetaminophen, albuterol sulfate, aluminum-magnesium hydroxide-simethicone, dextrose 10%, dextrose 10%, glucagon (human recombinant), glucose, glucose, HYDROcodone-acetaminophen, melatonin, morphine, naloxone, ondansetron, promethazine, simethicone, sodium chloride 0.9%, Pharmacy to dose Vancomycin consult **AND** vancomycin - pharmacy to dose     Review of patient's allergies indicates:   Allergen Reactions    Aspirin      Makes her throwup    Ibuprofen      Throws up    Sulfa (sulfonamide antibiotics)      Pt can't remember reactions    Amoxicillin-pot clavulanate Itching     Objective:     Vital Signs (Most Recent):  Temp: 99.1 °F (37.3 °C) (03/05/22 1134)  Pulse: 80 (03/05/22 1134)  Resp: 14 (03/05/22 1358)  BP: (!) 116/58 (03/05/22 1134)  SpO2: 95 % (03/05/22 1134)   Vital Signs (24h Range):  Temp:  [98.5 °F (36.9 °C)-99.8 °F (37.7 °C)] 99.1 °F (37.3 °C)  Pulse:  [80-95] 80  Resp:  [12-19] 14  SpO2:  [92 %-100 %] 95 %  BP: (115-145)/(49-71) 116/58     Weight: 79.5 kg (175 lb 4.3 oz)  Body mass index is 27.45 kg/m².    Intake/Output - Last 3 Shifts         03/03 0700  03/04 0659 03/04 0700  03/05 0659 03/05 0700  03/06 0659    P.O.   380    I.V. (mL/kg) 470.8 (5.9) 513.2 (6.5) 915.8 (11.5)    IV Piggyback 98.2 86.8 504    Total Intake(mL/kg) 569 (7.2) 600 (7.5) 1799.8 (22.6)    Net +569 +600 +1799.8           Urine Occurrence   2 x    Stool  Occurrence   1 x            Physical Exam  Vitals and nursing note reviewed.   Constitutional:       General: She is not in acute distress.     Appearance: She is well-developed. She is not ill-appearing.   HENT:      Head: Normocephalic and atraumatic.      Right Ear: External ear normal.      Left Ear: External ear normal.      Mouth/Throat:      Mouth: Mucous membranes are moist.      Pharynx: Oropharynx is clear.   Eyes:      Extraocular Movements: Extraocular movements intact.      Conjunctiva/sclera: Conjunctivae normal.   Cardiovascular:      Rate and Rhythm: Normal rate.   Pulmonary:      Effort: Pulmonary effort is normal. No respiratory distress.   Abdominal:      General: There is no distension.      Palpations: Abdomen is soft.      Tenderness: There is no abdominal tenderness.      Comments: Well-healing sites   Musculoskeletal:      Cervical back: Neck supple.   Skin:     General: Skin is warm and dry.   Neurological:      Mental Status: She is alert and oriented to person, place, and time.   Psychiatric:         Behavior: Behavior normal.       Significant Labs:  I have reviewed all pertinent lab results within the past 24 hours.  CBC:   Recent Labs   Lab 03/05/22  0902   WBC 18.07*   RBC 2.66*   HGB 8.2*   HCT 26.8*      *   MCH 30.8   MCHC 30.6*     BMP:   Recent Labs   Lab 03/05/22  0902   *      K 3.9   CL 97   CO2 33*   BUN 9   CREATININE 0.8   CALCIUM 9.3     CMP:   Recent Labs   Lab 03/03/22  1417 03/04/22  0612 03/05/22  0902   * 107 112*   CALCIUM 9.0 8.7 9.3   ALBUMIN 2.5* 1.9*  --    PROT 7.5  --   --     137 140   K 3.1* 3.1* 3.9   CO2 34* 32* 33*   CL 97 96 97   BUN 10 6* 9   CREATININE 0.8 0.7 0.8   ALKPHOS 142*  --   --    ALT 20  --   --    AST 31  --   --    BILITOT 0.5  --   --      LFTs:   Recent Labs   Lab 03/03/22  1417 03/04/22  0612   ALT 20  --    AST 31  --    ALKPHOS 142*  --    BILITOT 0.5  --    PROT 7.5  --    ALBUMIN 2.5* 1.9*        Significant Diagnostics:  I have reviewed all pertinent imaging results/findings within the past 24 hours.

## 2022-03-05 NOTE — PROGRESS NOTES
[facility] Medicine  Progress Note    Patient Name: Madelyn Serna  MRN: [id number]  Patient Class: OP- Observation   Admission Date: 3/3/2022  Length of Stay: 0 days  Attending Physician: Michael Howard MD  Primary Care Provider: Primary Doctor No        Subjective:     Principal Problem:Infection following a procedure, deep incisional surgical site, initial encounter        HPI:  Ms. Serna came to the ED for right side abdominal pain and drainage at her surgical site.  She had an attempted cholecystectomy on 04 February.  The gallbladder was found very inflamed and irregular with a palpable mass and could not be safely resected.  The surgeon took biopsies and left a drain.  On the 22nd of February he removed the drain.  The site continued to drain pale purulent fluid and required frequent dressing changes.  She has had variable abdominal pain and loss of appetite.  No nausea or vomiting.  No change in bowel habits.  No fevers or chills.  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  This afternoon she had her first PET scan and established care with Dr. Marcos of Medical Oncology and completed a consultation with Palliative care.  She denies any chest pain or shortness of breath.  No dizziness or disorientation.  No other recent medical problems or new medications.      Overview/Hospital Course:  72 y/o female admitted for wound infection and intraabdominal abscess secondary to recent attempted cholecystectomy. Plan for percutaneous drainage today per IR. Continue vancomycin and cefepime. Follow cultures. As of 3/5/22 pt feeling better. S/p drainage of gallbladder abscess; Fluid sent for analysis. Blood cx remain negative, WBCs trending down.              Review of Systems   Constitutional:  Positive for fatigue. Negative for appetite change, chills and fever.   HENT:  Negative for congestion and sore throat.    Eyes:  Negative for visual disturbance.   Respiratory:   Negative for cough, shortness of breath and wheezing.    Cardiovascular:  Negative for chest pain, palpitations and leg swelling.   Gastrointestinal:  Positive for abdominal pain (improved). Negative for blood in stool, constipation, diarrhea and vomiting.   Genitourinary:  Negative for dysuria and hematuria.   Musculoskeletal:  Negative for arthralgias and back pain.   Skin:  Negative for rash and wound.   Neurological:  Negative for dizziness, weakness, light-headedness and numbness.   Hematological:  Negative for adenopathy.   Objective:     Vital Signs (Most Recent):  Temp: 99.1 °F (37.3 °C) (03/05/22 1134)  Pulse: 80 (03/05/22 1134)  Resp: 14 (03/05/22 1358)  BP: (!) 116/58 (03/05/22 1134)  SpO2: 95 % (03/05/22 1134)   Vital Signs (24h Range):  Temp:  [98.5 °F (36.9 °C)-99.8 °F (37.7 °C)] 99.1 °F (37.3 °C)  Pulse:  [80-95] 80  Resp:  [12-19] 14  SpO2:  [92 %-100 %] 95 %  BP: (115-145)/(49-71) 116/58     Weight: 79.5 kg (175 lb 4.3 oz)  Body mass index is 27.45 kg/m².    Intake/Output Summary (Last 24 hours) at 3/5/2022 1434  Last data filed at 3/5/2022 1000  Gross per 24 hour   Intake 2399.82 ml   Output --   Net 2399.82 ml      Physical Exam  Vitals and nursing note reviewed.   Constitutional:       General: She is not in acute distress.     Appearance: She is well-developed.   HENT:      Head: Normocephalic and atraumatic.   Eyes:      Conjunctiva/sclera: Conjunctivae normal.      Pupils: Pupils are equal, round, and reactive to light.   Neck:      Thyroid: No thyromegaly.      Vascular: No JVD.   Cardiovascular:      Rate and Rhythm: Normal rate and regular rhythm.      Heart sounds: No murmur heard.    No friction rub. No gallop.   Pulmonary:      Effort: Pulmonary effort is normal.      Breath sounds: Normal breath sounds. No wheezing or rales.   Abdominal:      General: Bowel sounds are normal. There is no distension.      Palpations: Abdomen is soft.      Tenderness: There is abdominal tenderness (drain  site). There is guarding. There is no rebound.          Comments:     Musculoskeletal:         General: No deformity. Normal range of motion.      Cervical back: Neck supple.   Lymphadenopathy:      Cervical: No cervical adenopathy.   Skin:     General: Skin is warm and dry.      Findings: No rash.   Neurological:      Mental Status: She is alert and oriented to person, place, and time.      Deep Tendon Reflexes: Reflexes are normal and symmetric.   Psychiatric:         Behavior: Behavior normal.         Thought Content: Thought content normal.         Judgment: Judgment normal.       Significant Labs: All pertinent labs within the past 24 hours have been reviewed.  Blood Culture:   Recent Labs   Lab 03/03/22 2155 03/03/22 2206   LABBLOO No Growth to date  No Growth to date No Growth to date  No Growth to date     BMP:   Recent Labs   Lab 03/05/22  0902   *      K 3.9   CL 97   CO2 33*   BUN 9   CREATININE 0.8   CALCIUM 9.3     CBC:   Recent Labs   Lab 03/04/22  0612 03/05/22  0902   WBC 21.35* 18.07*   HGB 8.4* 8.2*   HCT 26.8* 26.8*    449     CMP:   Recent Labs   Lab 03/04/22  0612 03/05/22  0902    140   K 3.1* 3.9   CL 96 97   CO2 32* 33*    112*   BUN 6* 9   CREATININE 0.7 0.8   CALCIUM 8.7 9.3   ALBUMIN 1.9*  --    ANIONGAP 9 10   EGFRNONAA >60 >60       Significant Imaging:   Imaging Results              CT Guided Abscess Drainage With Catheter (Final result)  Result time 03/04/22 16:34:38      Final result by Gilberto Monroy MD (03/04/22 16:34:38)                   Impression:      1.  Percutaneous CT and ultrasound-guided aspiration of a gallbladder fossa abscess, yielding 11 mL of thick pus.  No drainage catheter was left in place due to small size of the collection.    2.  Additional gallbladder fossa abscess that was described on CT abdomen pelvis 03/03/2022, today measures only 1.5 cm on intraprocedural imaging, too small for aspiration. Again noted is the retained  1.5 cm gallstone within the gallbladder fossa.    ______________________________________________________________________      Electronically signed by: Gilberto Monroy  Date:    03/04/2022  Time:    16:34               Narrative:    EXAMINATION:  Fluid collection aspiration    Procedural Personnel    Attending physician(s): Porfirio    Fellow physician(s): None    Resident physician(s): None    Advanced practice provider(s): Jones TALLEY assisted in this procedure.    Pre-procedure diagnosis: Gallbladder fossa abscesses    Post-procedure diagnosis: Same    Indication: Pain associated with fluid collection    Additional clinical history: None    Complications: No immediate complications.    CLINICAL HISTORY:  71-year-old female with recent acute cholecystitis underwent cholecystectomy with postoperative development of gallbladder fossa abscesses    TECHNIQUE:  - Aspiration of fluid collection under CT and ultrasound guidance    COMPARISON:  03/03/2022    FINDINGS:  Pre-procedure    Consent: Informed consent for the procedure was obtained and time-out was performed prior to the procedure.    Preparation: The site was prepared and draped using maximal sterile barrier technique including cutaneous antisepsis.    Antibiotic administered: Scheduled dose more than 1 hour from procedure start time  or more than 2 hours for vancomycin or fluoroquinolones    Anesthesia/sedation    Level of anesthesia/sedation: Moderate sedation (conscious sedation)    Anesthesia/sedation administered by: Independent trained observer under attending supervision with continuous monitoring of the patient's level of consciousness and physiologic status    Total face-to-face intra-service sedation time (minutes): 30    IV Versed 2 mg    IV fentanyl 100 mcg    Fluid collection aspiration    The patient was positioned supine. Initial imaging was performed. Local anesthesia was administered. The fluid collection was accessed using an access needle.  Position within the fluid collection was confirmed, and fluid aspiration was performed. All instruments were then removed.    - Initial imaging findings: 2.5 cm gallbladder fossa perigastric abscess, too small for catheter placement.    - Aspiration needle/catheter: 18 gauge Chiba    - Post-aspiration imaging findings: Complete resolution of the fluid collection    Additional gallbladder fossa abscess that was described on recent CT abdomen pelvis 03/03/2022, today measures only 1.5 cm on intraprocedural CT and ultrasound, too small for aspiration.  Again noted is the retained 1.5 cm gallstone within the gallbladder fossa.    Contrast    Contrast agent: None    Contrast volume (mL): 0    Radiation Dose    All CT scans at this location are performed using dose modulation techniques as appropriate to a performed exam including the following: Automated exposure control; adjustment of the mA and/or kV  according to patient size.    Additional Details    Additional description of procedure: None    Equipment details: None    Specimens removed: Aspirated fluid was sent for analysis.    Estimated blood loss (mL): Less than 10    Standardized report: [email or web address]    Attestation    Signer name:    I attest that I was present for the entire procedure. I reviewed the stored images and agree with the report as written.                                       CT Abdomen Pelvis With Contrast (Final result)  Result time 03/03/22 18:11:59      Final result by Paras Melara MD (03/03/22 18:11:59)                   Impression:      Inflammatory process involving the right upper quadrant with fat stranding and abdominal wall thickening.  A tract is identified suggestive prior instrumentation.  Fat stranding adjacent to the hepatic flexure.  At least 2 peripherally enhancing fluid collection are identified adjacent to the gallstone 1 near the left lobe of the liver measuring 25 x 26 mm and 1 laterally measuring 26 x 46 mm  suggestive of abscess seroma or other.  Biloma not excluded.  Recommend clinical correlation and follow-up.    All CT scans at this facility use dose modulation, iterative reconstructions, and/or weight base dosing when appropriate to reduce radiation dose to as low as reasonably achievable      Electronically signed by: Kelton Crain  Date:    03/03/2022  Time:    18:11               Narrative:    EXAMINATION:  CT ABDOMEN PELVIS WITH CONTRAST    CLINICAL HISTORY:  Abdominal abscess/infection suspected;purulent drainage from drain removal site after gallbladder surgery;    TECHNIQUE:  Low dose axial images, sagittal and coronal reformations were obtained from the lung bases to the pubic symphysis following the IV administration of 100 mL of Omnipaque 350.    COMPARISON:  None    FINDINGS:  A gallstone is identified.  There is moderate fat stranding adjacent to the gallbladder fossa.  A tract seen in the anterior right lateral abdomen extending to the inflammatory process in the right upper quadrant.  This is suggestive of previous ostomy.  There is a multifocal peripheral enhancing fluid collection in the right upper quadrant 1 abutting the left hepatic lobe measuring 26 by 25 mm.  Another in the anterior right hepatic lobe measuring 46 x 26 mm.  This is suggestive of underlying abscess or biloma.  Fat stranding in the anterior right upper quadrant.  Atherosclerotic changes of the aorta noted.  Mild pancreatic ductal dilatation.  No fluid in the dependent portion the pelvis.  No bowel obstruction. Atherosclerotic changes. Normal corticomedullary enhancement.  Spleen is unremarkable. Emphysematous changes in the lung bases with subsegmental atelectasis.                                         Assessment/Plan:      * Infection following a procedure, deep incisional surgical site, initial encounter  Wound cultures drawn from purulent draining site.  Creamy white pus without blood.  Elevated WBC count with predominance  of Neutrophils.  She received Ceftriaxone, Metronidazole, and Vancomycin in the ED.  Draw blood cultures and continue Vancomycin and substitute Cefepime.  Consultation to Interventional Radiology for percutaneous drainage.    WBCs 21.35K down from 23.10  Plan for percutaneous drainage today by IR   Continue vancomycin and cefepime     S/p drainage of gallbladder abscess   WBCs trending down   Blood cx remain negative  Continue IV abx         Essential (primary) hypertension  Bp stable   Continue Labetalol and Lisinopril.    Monitor     Adenocarcinoma of gallbladder  New diagnosis after attempted cholecystectomy in February.  She is established with Dr. Marcos and had a staging PET on 03 March.  Follow up for ongoing evaluation and treatment after discharge.      VTE Risk Mitigation (From admission, onward)         Ordered     enoxaparin injection 40 mg  Daily         03/03/22 2025     IP VTE HIGH RISK PATIENT  Once         03/03/22 2025     Place sequential compression device  Until discontinued         03/03/22 2025                Discharge Planning   OSVALDO:      Code Status: Full Code   Is the patient medically ready for discharge?:     Reason for patient still in hospital (select all that apply): Laboratory test and Treatment                     Abby Aguilar NP  Department of Hospital Medicine   [facility]

## 2022-03-05 NOTE — PLAN OF CARE
Problem: Adult Inpatient Plan of Care  Goal: Plan of Care Review  Outcome: Ongoing, Progressing     Problem: Adult Inpatient Plan of Care  Goal: Absence of Hospital-Acquired Illness or Injury  Outcome: Ongoing, Progressing     Problem: Adult Inpatient Plan of Care  Goal: Optimal Comfort and Wellbeing  Outcome: Ongoing, Progressing        Pt remained free of injury. Call bell and personal items within reach. VSS. Pain controlled with PRN meds. Family to remain at bedside. Wound to RLQ draining scant amount of purulent drainage. Wound cleaned with NS and dressed with a foam dressing. IV fluids/abx infusing. Chart check complete. Will continue to monitor.

## 2022-03-05 NOTE — ASSESSMENT & PLAN NOTE
Wound cultures drawn from purulent draining site.  Creamy white pus without blood.  Elevated WBC count with predominance of Neutrophils.  She received Ceftriaxone, Metronidazole, and Vancomycin in the ED.  Draw blood cultures and continue Vancomycin and substitute Cefepime.  Consultation to Interventional Radiology for percutaneous drainage.    WBCs 21.35K down from 23.10  Plan for percutaneous drainage today by IR   Continue vancomycin and cefepime     S/p drainage of gallbladder abscess   WBCs trending down   Blood cx remain negative  Continue IV abx

## 2022-03-05 NOTE — SUBJECTIVE & OBJECTIVE
Review of Systems   Constitutional:  Positive for fatigue. Negative for appetite change, chills and fever.   HENT:  Negative for congestion and sore throat.    Eyes:  Negative for visual disturbance.   Respiratory:  Negative for cough, shortness of breath and wheezing.    Cardiovascular:  Negative for chest pain, palpitations and leg swelling.   Gastrointestinal:  Positive for abdominal pain (improved). Negative for blood in stool, constipation, diarrhea and vomiting.   Genitourinary:  Negative for dysuria and hematuria.   Musculoskeletal:  Negative for arthralgias and back pain.   Skin:  Negative for rash and wound.   Neurological:  Negative for dizziness, weakness, light-headedness and numbness.   Hematological:  Negative for adenopathy.   Objective:     Vital Signs (Most Recent):  Temp: 99.1 °F (37.3 °C) (03/05/22 1134)  Pulse: 80 (03/05/22 1134)  Resp: 14 (03/05/22 1358)  BP: (!) 116/58 (03/05/22 1134)  SpO2: 95 % (03/05/22 1134)   Vital Signs (24h Range):  Temp:  [98.5 °F (36.9 °C)-99.8 °F (37.7 °C)] 99.1 °F (37.3 °C)  Pulse:  [80-95] 80  Resp:  [12-19] 14  SpO2:  [92 %-100 %] 95 %  BP: (115-145)/(49-71) 116/58     Weight: 79.5 kg (175 lb 4.3 oz)  Body mass index is 27.45 kg/m².    Intake/Output Summary (Last 24 hours) at 3/5/2022 1434  Last data filed at 3/5/2022 1000  Gross per 24 hour   Intake 2399.82 ml   Output --   Net 2399.82 ml      Physical Exam  Vitals and nursing note reviewed.   Constitutional:       General: She is not in acute distress.     Appearance: She is well-developed.   HENT:      Head: Normocephalic and atraumatic.   Eyes:      Conjunctiva/sclera: Conjunctivae normal.      Pupils: Pupils are equal, round, and reactive to light.   Neck:      Thyroid: No thyromegaly.      Vascular: No JVD.   Cardiovascular:      Rate and Rhythm: Normal rate and regular rhythm.      Heart sounds: No murmur heard.    No friction rub. No gallop.   Pulmonary:      Effort: Pulmonary effort is normal.       Breath sounds: Normal breath sounds. No wheezing or rales.   Abdominal:      General: Bowel sounds are normal. There is no distension.      Palpations: Abdomen is soft.      Tenderness: There is abdominal tenderness (drain site). There is guarding. There is no rebound.          Comments:     Musculoskeletal:         General: No deformity. Normal range of motion.      Cervical back: Neck supple.   Lymphadenopathy:      Cervical: No cervical adenopathy.   Skin:     General: Skin is warm and dry.      Findings: No rash.   Neurological:      Mental Status: She is alert and oriented to person, place, and time.      Deep Tendon Reflexes: Reflexes are normal and symmetric.   Psychiatric:         Behavior: Behavior normal.         Thought Content: Thought content normal.         Judgment: Judgment normal.       Significant Labs: All pertinent labs within the past 24 hours have been reviewed.  Blood Culture:   Recent Labs   Lab 03/03/22 2155 03/03/22 2206   LABBLOO No Growth to date  No Growth to date No Growth to date  No Growth to date     BMP:   Recent Labs   Lab 03/05/22  0902   *      K 3.9   CL 97   CO2 33*   BUN 9   CREATININE 0.8   CALCIUM 9.3     CBC:   Recent Labs   Lab 03/04/22  0612 03/05/22  0902   WBC 21.35* 18.07*   HGB 8.4* 8.2*   HCT 26.8* 26.8*    449     CMP:   Recent Labs   Lab 03/04/22  0612 03/05/22  0902    140   K 3.1* 3.9   CL 96 97   CO2 32* 33*    112*   BUN 6* 9   CREATININE 0.7 0.8   CALCIUM 8.7 9.3   ALBUMIN 1.9*  --    ANIONGAP 9 10   EGFRNONAA >60 >60       Significant Imaging:   Imaging Results              CT Guided Abscess Drainage With Catheter (Final result)  Result time 03/04/22 16:34:38      Final result by Gilberto Monroy MD (03/04/22 16:34:38)                   Impression:      1.  Percutaneous CT and ultrasound-guided aspiration of a gallbladder fossa abscess, yielding 11 mL of thick pus.  No drainage catheter was left in place due to small size  of the collection.    2.  Additional gallbladder fossa abscess that was described on CT abdomen pelvis 03/03/2022, today measures only 1.5 cm on intraprocedural imaging, too small for aspiration. Again noted is the retained 1.5 cm gallstone within the gallbladder fossa.    ______________________________________________________________________      Electronically signed by: Gilberto Monroy  Date:    03/04/2022  Time:    16:34               Narrative:    EXAMINATION:  Fluid collection aspiration    Procedural Personnel    Attending physician(s): Porfirio    Fellow physician(s): None    Resident physician(s): None    Advanced practice provider(s): Jones TALLEY assisted in this procedure.    Pre-procedure diagnosis: Gallbladder fossa abscesses    Post-procedure diagnosis: Same    Indication: Pain associated with fluid collection    Additional clinical history: None    Complications: No immediate complications.    CLINICAL HISTORY:  71-year-old female with recent acute cholecystitis underwent cholecystectomy with postoperative development of gallbladder fossa abscesses    TECHNIQUE:  - Aspiration of fluid collection under CT and ultrasound guidance    COMPARISON:  03/03/2022    FINDINGS:  Pre-procedure    Consent: Informed consent for the procedure was obtained and time-out was performed prior to the procedure.    Preparation: The site was prepared and draped using maximal sterile barrier technique including cutaneous antisepsis.    Antibiotic administered: Scheduled dose more than 1 hour from procedure start time  or more than 2 hours for vancomycin or fluoroquinolones    Anesthesia/sedation    Level of anesthesia/sedation: Moderate sedation (conscious sedation)    Anesthesia/sedation administered by: Independent trained observer under attending supervision with continuous monitoring of the patient's level of consciousness and physiologic status    Total face-to-face intra-service sedation time (minutes): 30    IV  Versed 2 mg    IV fentanyl 100 mcg    Fluid collection aspiration    The patient was positioned supine. Initial imaging was performed. Local anesthesia was administered. The fluid collection was accessed using an access needle. Position within the fluid collection was confirmed, and fluid aspiration was performed. All instruments were then removed.    - Initial imaging findings: 2.5 cm gallbladder fossa perigastric abscess, too small for catheter placement.    - Aspiration needle/catheter: 18 gauge Chiba    - Post-aspiration imaging findings: Complete resolution of the fluid collection    Additional gallbladder fossa abscess that was described on recent CT abdomen pelvis 03/03/2022, today measures only 1.5 cm on intraprocedural CT and ultrasound, too small for aspiration.  Again noted is the retained 1.5 cm gallstone within the gallbladder fossa.    Contrast    Contrast agent: None    Contrast volume (mL): 0    Radiation Dose    All CT scans at this location are performed using dose modulation techniques as appropriate to a performed exam including the following: Automated exposure control; adjustment of the mA and/or kV  according to patient size.    Additional Details    Additional description of procedure: None    Equipment details: None    Specimens removed: Aspirated fluid was sent for analysis.    Estimated blood loss (mL): Less than 10    Standardized report: [email or web address]    Attestation    Signer name:    I attest that I was present for the entire procedure. I reviewed the stored images and agree with the report as written.                                       CT Abdomen Pelvis With Contrast (Final result)  Result time 03/03/22 18:11:59      Final result by Paras Melara MD (03/03/22 18:11:59)                   Impression:      Inflammatory process involving the right upper quadrant with fat stranding and abdominal wall thickening.  A tract is identified suggestive prior instrumentation.  Fat  stranding adjacent to the hepatic flexure.  At least 2 peripherally enhancing fluid collection are identified adjacent to the gallstone 1 near the left lobe of the liver measuring 25 x 26 mm and 1 laterally measuring 26 x 46 mm suggestive of abscess seroma or other.  Biloma not excluded.  Recommend clinical correlation and follow-up.    All CT scans at this facility use dose modulation, iterative reconstructions, and/or weight base dosing when appropriate to reduce radiation dose to as low as reasonably achievable      Electronically signed by: Kelton Crain  Date:    03/03/2022  Time:    18:11               Narrative:    EXAMINATION:  CT ABDOMEN PELVIS WITH CONTRAST    CLINICAL HISTORY:  Abdominal abscess/infection suspected;purulent drainage from drain removal site after gallbladder surgery;    TECHNIQUE:  Low dose axial images, sagittal and coronal reformations were obtained from the lung bases to the pubic symphysis following the IV administration of 100 mL of Omnipaque 350.    COMPARISON:  None    FINDINGS:  A gallstone is identified.  There is moderate fat stranding adjacent to the gallbladder fossa.  A tract seen in the anterior right lateral abdomen extending to the inflammatory process in the right upper quadrant.  This is suggestive of previous ostomy.  There is a multifocal peripheral enhancing fluid collection in the right upper quadrant 1 abutting the left hepatic lobe measuring 26 by 25 mm.  Another in the anterior right hepatic lobe measuring 46 x 26 mm.  This is suggestive of underlying abscess or biloma.  Fat stranding in the anterior right upper quadrant.  Atherosclerotic changes of the aorta noted.  Mild pancreatic ductal dilatation.  No fluid in the dependent portion the pelvis.  No bowel obstruction. Atherosclerotic changes. Normal corticomedullary enhancement.  Spleen is unremarkable. Emphysematous changes in the lung bases with subsegmental atelectasis.

## 2022-03-05 NOTE — PROGRESS NOTES
[facility]  General Surgery  Progress Note    Subjective:     History of Present Illness:  70 y/o female with history of recent attempted cholecystectomy who was found to have a gallbladder mass during the procedure. A drain was left in place, which was subsequently removed. She presented with purulent drainage from this non-healing drain site as well as an elevated WBC count.       Post-Op Info:  * No surgery found *         Interval History: Pain well controlled. Denies nausea or vomiting. Unsuccessful IR drain attempt yesterday.    Medications:  Continuous Infusions:   0.45 % NaCl with KCl 20 mEq 75 mL/hr at 03/05/22 1403     Scheduled Meds:   ceFEPime (MAXIPIME) IVPB  2 g Intravenous Q8H    clonazePAM  0.5 mg Oral BID    cyproheptadine  4 mg Oral BID    enoxaparin  40 mg Subcutaneous Daily    labetaloL  300 mg Oral Q12H    lisinopriL  10 mg Oral Daily    montelukast  10 mg Oral Daily    polyethylene glycol  17 g Oral Daily    vancomycin (VANCOCIN) IVPB  2,000 mg Intravenous Q24H     PRN Meds:acetaminophen, albuterol sulfate, aluminum-magnesium hydroxide-simethicone, dextrose 10%, dextrose 10%, glucagon (human recombinant), glucose, glucose, HYDROcodone-acetaminophen, melatonin, morphine, naloxone, ondansetron, promethazine, simethicone, sodium chloride 0.9%, Pharmacy to dose Vancomycin consult **AND** vancomycin - pharmacy to dose     Review of patient's allergies indicates:   Allergen Reactions    Aspirin      Makes her throwup    Ibuprofen      Throws up    Sulfa (sulfonamide antibiotics)      Pt can't remember reactions    Amoxicillin-pot clavulanate Itching     Objective:     Vital Signs (Most Recent):  Temp: 99.1 °F (37.3 °C) (03/05/22 1134)  Pulse: 80 (03/05/22 1134)  Resp: 14 (03/05/22 1358)  BP: (!) 116/58 (03/05/22 1134)  SpO2: 95 % (03/05/22 1134)   Vital Signs (24h Range):  Temp:  [98.5 °F (36.9 °C)-99.8 °F (37.7 °C)] 99.1 °F (37.3 °C)  Pulse:  [80-95] 80  Resp:  [12-19] 14  SpO2:   [92 %-100 %] 95 %  BP: (115-145)/(49-71) 116/58     Weight: 79.5 kg (175 lb 4.3 oz)  Body mass index is 27.45 kg/m².    Intake/Output - Last 3 Shifts         03/03 0700  03/04 0659 03/04 0700 03/05 0659 03/05 0700  03/06 0659    P.O.   380    I.V. (mL/kg) 470.8 (5.9) 513.2 (6.5) 915.8 (11.5)    IV Piggyback 98.2 86.8 504    Total Intake(mL/kg) 569 (7.2) 600 (7.5) 1799.8 (22.6)    Net +569 +600 +1799.8           Urine Occurrence   2 x    Stool Occurrence   1 x            Physical Exam  Vitals and nursing note reviewed.   Constitutional:       General: She is not in acute distress.     Appearance: She is well-developed. She is not ill-appearing.   HENT:      Head: Normocephalic and atraumatic.      Right Ear: External ear normal.      Left Ear: External ear normal.      Mouth/Throat:      Mouth: Mucous membranes are moist.      Pharynx: Oropharynx is clear.   Eyes:      Extraocular Movements: Extraocular movements intact.      Conjunctiva/sclera: Conjunctivae normal.   Cardiovascular:      Rate and Rhythm: Normal rate.   Pulmonary:      Effort: Pulmonary effort is normal. No respiratory distress.   Abdominal:      General: There is no distension.      Palpations: Abdomen is soft.      Tenderness: There is no abdominal tenderness.      Comments: Well-healing sites   Musculoskeletal:      Cervical back: Neck supple.   Skin:     General: Skin is warm and dry.   Neurological:      Mental Status: She is alert and oriented to person, place, and time.   Psychiatric:         Behavior: Behavior normal.       Significant Labs:  I have reviewed all pertinent lab results within the past 24 hours.  CBC:   Recent Labs   Lab 03/05/22  0902   WBC 18.07*   RBC 2.66*   HGB 8.2*   HCT 26.8*      *   MCH 30.8   MCHC 30.6*     BMP:   Recent Labs   Lab 03/05/22  0902   *      K 3.9   CL 97   CO2 33*   BUN 9   CREATININE 0.8   CALCIUM 9.3     CMP:   Recent Labs   Lab 03/03/22  1417 03/04/22  0612 03/05/22  0902    * 107 112*   CALCIUM 9.0 8.7 9.3   ALBUMIN 2.5* 1.9*  --    PROT 7.5  --   --     137 140   K 3.1* 3.1* 3.9   CO2 34* 32* 33*   CL 97 96 97   BUN 10 6* 9   CREATININE 0.8 0.7 0.8   ALKPHOS 142*  --   --    ALT 20  --   --    AST 31  --   --    BILITOT 0.5  --   --      LFTs:   Recent Labs   Lab 03/03/22  1417 03/04/22  0612   ALT 20  --    AST 31  --    ALKPHOS 142*  --    BILITOT 0.5  --    PROT 7.5  --    ALBUMIN 2.5* 1.9*       Significant Diagnostics:  I have reviewed all pertinent imaging results/findings within the past 24 hours.    Assessment/Plan:     * Infection following a procedure, deep incisional surgical site, initial encounter  72 y/o female with wound infection and likely intraabdominal abscess secondary to recent attempted cholecystectomy.    - IR drain unsuccessful Friday. Continue treatment with IV abx  - okay for diet from surgical standpoint    Essential (primary) hypertension  Management per primary team.    Adenocarcinoma of gallbladder  Care per HemeOnc and will possibly need SurgOnc referral        Jones Mcdaniels MD  General Surgery  [facility] - [facility]

## 2022-03-06 VITALS
WEIGHT: 175.25 LBS | RESPIRATION RATE: 14 BRPM | DIASTOLIC BLOOD PRESSURE: 60 MMHG | OXYGEN SATURATION: 96 % | HEART RATE: 81 BPM | BODY MASS INDEX: 27.51 KG/M2 | TEMPERATURE: 99 F | SYSTOLIC BLOOD PRESSURE: 128 MMHG | HEIGHT: 67 IN

## 2022-03-06 LAB
BASOPHILS # BLD AUTO: 0.07 K/UL (ref 0–0.2)
BASOPHILS NFR BLD: 0.4 % (ref 0–1.9)
DIFFERENTIAL METHOD: ABNORMAL
EOSINOPHIL # BLD AUTO: 0.1 K/UL (ref 0–0.5)
EOSINOPHIL NFR BLD: 0.9 % (ref 0–8)
ERYTHROCYTE [DISTWIDTH] IN BLOOD BY AUTOMATED COUNT: 13.6 % (ref 11.5–14.5)
HCT VFR BLD AUTO: 26.4 % (ref 37–48.5)
HGB BLD-MCNC: 7.9 G/DL (ref 12–16)
IMM GRANULOCYTES # BLD AUTO: 0.19 K/UL (ref 0–0.04)
IMM GRANULOCYTES NFR BLD AUTO: 1.2 % (ref 0–0.5)
LYMPHOCYTES # BLD AUTO: 2.1 K/UL (ref 1–4.8)
LYMPHOCYTES NFR BLD: 13.5 % (ref 18–48)
MCH RBC QN AUTO: 31 PG (ref 27–31)
MCHC RBC AUTO-ENTMCNC: 29.9 G/DL (ref 32–36)
MCV RBC AUTO: 104 FL (ref 82–98)
MONOCYTES # BLD AUTO: 1.1 K/UL (ref 0.3–1)
MONOCYTES NFR BLD: 6.7 % (ref 4–15)
NEUTROPHILS # BLD AUTO: 12 K/UL (ref 1.8–7.7)
NEUTROPHILS NFR BLD: 77.3 % (ref 38–73)
NRBC BLD-RTO: 0 /100 WBC
PLATELET # BLD AUTO: 455 K/UL (ref 150–450)
PMV BLD AUTO: 9.5 FL (ref 9.2–12.9)
RBC # BLD AUTO: 2.55 M/UL (ref 4–5.4)
WBC # BLD AUTO: 15.59 K/UL (ref 3.9–12.7)

## 2022-03-06 PROCEDURE — 25000003 PHARM REV CODE 250: Performed by: INTERNAL MEDICINE

## 2022-03-06 PROCEDURE — 27000221 HC OXYGEN, UP TO 24 HOURS

## 2022-03-06 PROCEDURE — G0378 HOSPITAL OBSERVATION PER HR: HCPCS

## 2022-03-06 PROCEDURE — 36415 COLL VENOUS BLD VENIPUNCTURE: CPT | Performed by: NURSE PRACTITIONER

## 2022-03-06 PROCEDURE — 85025 COMPLETE CBC W/AUTO DIFF WBC: CPT | Performed by: NURSE PRACTITIONER

## 2022-03-06 PROCEDURE — 99214 PR OFFICE/OUTPT VISIT, EST, LEVL IV, 30-39 MIN: ICD-10-PCS | Mod: ,,, | Performed by: COLON & RECTAL SURGERY

## 2022-03-06 PROCEDURE — 96361 HYDRATE IV INFUSION ADD-ON: CPT

## 2022-03-06 PROCEDURE — 96365 THER/PROPH/DIAG IV INF INIT: CPT | Mod: 59

## 2022-03-06 PROCEDURE — 99214 OFFICE O/P EST MOD 30 MIN: CPT | Mod: ,,, | Performed by: COLON & RECTAL SURGERY

## 2022-03-06 PROCEDURE — 63600175 PHARM REV CODE 636 W HCPCS: Performed by: INTERNAL MEDICINE

## 2022-03-06 PROCEDURE — 94761 N-INVAS EAR/PLS OXIMETRY MLT: CPT

## 2022-03-06 PROCEDURE — 96376 TX/PRO/DX INJ SAME DRUG ADON: CPT

## 2022-03-06 RX ORDER — LISINOPRIL 10 MG/1
10 TABLET ORAL DAILY
Qty: 90 TABLET | Refills: 0 | Status: SHIPPED | OUTPATIENT
Start: 2022-03-07 | End: 2023-03-07

## 2022-03-06 RX ORDER — LINEZOLID 600 MG/1
600 TABLET, FILM COATED ORAL EVERY 12 HOURS
Qty: 20 TABLET | Refills: 0 | Status: CANCELLED | OUTPATIENT
Start: 2022-03-06 | End: 2022-03-16

## 2022-03-06 RX ORDER — OXYCODONE AND ACETAMINOPHEN 10; 325 MG/1; MG/1
1 TABLET ORAL EVERY 4 HOURS PRN
Qty: 15 TABLET | Refills: 0 | Status: SHIPPED | OUTPATIENT
Start: 2022-03-06 | End: 2022-03-08 | Stop reason: SDUPTHER

## 2022-03-06 RX ORDER — LINEZOLID 600 MG/1
600 TABLET, FILM COATED ORAL EVERY 12 HOURS
Qty: 20 TABLET | Refills: 0 | Status: SHIPPED | OUTPATIENT
Start: 2022-03-06 | End: 2022-03-16

## 2022-03-06 RX ADMIN — CEFEPIME HYDROCHLORIDE 2 G: 2 INJECTION, SOLUTION INTRAVENOUS at 12:03

## 2022-03-06 RX ADMIN — POLYETHYLENE GLYCOL 3350 17 G: 17 POWDER, FOR SOLUTION ORAL at 08:03

## 2022-03-06 RX ADMIN — CEFEPIME HYDROCHLORIDE 2 G: 2 INJECTION, SOLUTION INTRAVENOUS at 08:03

## 2022-03-06 RX ADMIN — CLONAZEPAM 0.5 MG: 0.5 TABLET ORAL at 08:03

## 2022-03-06 RX ADMIN — SIMETHICONE 80 MG: 80 TABLET, CHEWABLE ORAL at 08:03

## 2022-03-06 RX ADMIN — MORPHINE SULFATE 4 MG: 4 INJECTION INTRAVENOUS at 10:03

## 2022-03-06 RX ADMIN — POTASSIUM CHLORIDE AND SODIUM CHLORIDE: 450; 150 INJECTION, SOLUTION INTRAVENOUS at 05:03

## 2022-03-06 RX ADMIN — MORPHINE SULFATE 4 MG: 4 INJECTION INTRAVENOUS at 04:03

## 2022-03-06 RX ADMIN — LABETALOL HYDROCHLORIDE 300 MG: 200 TABLET, FILM COATED ORAL at 08:03

## 2022-03-06 RX ADMIN — MORPHINE SULFATE 4 MG: 4 INJECTION INTRAVENOUS at 03:03

## 2022-03-06 RX ADMIN — CYPROHEPTADINE HYDROCHLORIDE 4 MG: 4 TABLET ORAL at 08:03

## 2022-03-06 RX ADMIN — LISINOPRIL 10 MG: 10 TABLET ORAL at 08:03

## 2022-03-06 RX ADMIN — MONTELUKAST 10 MG: 10 TABLET, FILM COATED ORAL at 08:03

## 2022-03-06 NOTE — SUBJECTIVE & OBJECTIVE
Interval History:  Leukocytosis continues to improve.  Tolerating a diet.  Having bowel function.    Medications:  Continuous Infusions:   0.45 % NaCl with KCl 20 mEq 75 mL/hr at 03/06/22 0626     Scheduled Meds:   ceFEPime (MAXIPIME) IVPB  2 g Intravenous Q8H    clonazePAM  0.5 mg Oral BID    cyproheptadine  4 mg Oral BID    enoxaparin  40 mg Subcutaneous Daily    labetaloL  300 mg Oral Q12H    lisinopriL  10 mg Oral Daily    montelukast  10 mg Oral Daily    polyethylene glycol  17 g Oral Daily    vancomycin (VANCOCIN) IVPB  2,000 mg Intravenous Q24H     PRN Meds:acetaminophen, albuterol sulfate, aluminum-magnesium hydroxide-simethicone, dextrose 10%, dextrose 10%, glucagon (human recombinant), glucose, glucose, HYDROcodone-acetaminophen, melatonin, morphine, naloxone, ondansetron, promethazine, simethicone, sodium chloride 0.9%, Pharmacy to dose Vancomycin consult **AND** vancomycin - pharmacy to dose     Review of patient's allergies indicates:   Allergen Reactions    Aspirin      Makes her throwup    Ibuprofen      Throws up    Sulfa (sulfonamide antibiotics)      Pt can't remember reactions    Amoxicillin-pot clavulanate Itching     Objective:     Vital Signs (Most Recent):  Temp: 98.3 °F (36.8 °C) (03/06/22 0715)  Pulse: 83 (03/06/22 0715)  Resp: 15 (03/06/22 1031)  BP: (!) 116/59 (03/06/22 0715)  SpO2: 95 % (03/06/22 0816)   Vital Signs (24h Range):  Temp:  [98 °F (36.7 °C)-99.1 °F (37.3 °C)] 98.3 °F (36.8 °C)  Pulse:  [79-83] 83  Resp:  [14-18] 15  SpO2:  [95 %-97 %] 95 %  BP: (110-126)/(53-62) 116/59     Weight: 79.5 kg (175 lb 4.3 oz)  Body mass index is 27.45 kg/m².    Intake/Output - Last 3 Shifts         03/04 0700  03/05 0659 03/05 0700 03/06 0659 03/06 0700  03/07 0659    P.O.  1120     I.V. (mL/kg) 513.2 (6.5) 2219 (27.9)     IV Piggyback 86.8 1151     Total Intake(mL/kg) 600 (7.5) 4490 (56.5)     Net +600 +4490            Urine Occurrence  5 x     Stool Occurrence  1 x             Physical  Exam  Vitals and nursing note reviewed.   Constitutional:       General: She is not in acute distress.     Appearance: She is well-developed. She is not ill-appearing.   HENT:      Head: Normocephalic and atraumatic.      Right Ear: External ear normal.      Left Ear: External ear normal.      Mouth/Throat:      Mouth: Mucous membranes are moist.      Pharynx: Oropharynx is clear.   Eyes:      Extraocular Movements: Extraocular movements intact.      Conjunctiva/sclera: Conjunctivae normal.   Cardiovascular:      Rate and Rhythm: Normal rate.   Pulmonary:      Effort: Pulmonary effort is normal. No respiratory distress.   Abdominal:      General: There is no distension.      Palpations: Abdomen is soft.      Tenderness: There is no abdominal tenderness.      Comments: Well-healing port sites   Musculoskeletal:      Cervical back: Neck supple.   Skin:     General: Skin is warm and dry.   Neurological:      Mental Status: She is alert and oriented to person, place, and time.   Psychiatric:         Behavior: Behavior normal.       Significant Labs:  I have reviewed all pertinent lab results within the past 24 hours.  CBC:   Recent Labs   Lab 03/06/22  0911   WBC 15.59*   RBC 2.55*   HGB 7.9*   HCT 26.4*   *   *   MCH 31.0   MCHC 29.9*     BMP:   Recent Labs   Lab 03/05/22  0902   *      K 3.9   CL 97   CO2 33*   BUN 9   CREATININE 0.8   CALCIUM 9.3     CMP:   Recent Labs   Lab 03/03/22  1417 03/04/22  0612 03/05/22  0902   * 107 112*   CALCIUM 9.0 8.7 9.3   ALBUMIN 2.5* 1.9*  --    PROT 7.5  --   --     137 140   K 3.1* 3.1* 3.9   CO2 34* 32* 33*   CL 97 96 97   BUN 10 6* 9   CREATININE 0.8 0.7 0.8   ALKPHOS 142*  --   --    ALT 20  --   --    AST 31  --   --    BILITOT 0.5  --   --      LFTs:   Recent Labs   Lab 03/03/22  1417 03/04/22  0612   ALT 20  --    AST 31  --    ALKPHOS 142*  --    BILITOT 0.5  --    PROT 7.5  --    ALBUMIN 2.5* 1.9*       Significant Diagnostics:  I  have reviewed all pertinent imaging results/findings within the past 24 hours.

## 2022-03-06 NOTE — DISCHARGE SUMMARY
[facility] Medicine  Discharge Summary      Patient Name: Madelyn Serna  MRN: [id number]  Patient Class: OP- Observation  Admission Date: 3/3/2022  Hospital Length of Stay: 0 days  Discharge Date and Time:  03/06/2022 3:36 PM  Attending Physician: Michael Howard MD   Discharging Provider: Abby Aguilar NP  Primary Care Provider: Primary Doctor No      HPI:   Ms. Serna came to the ED for right side abdominal pain and drainage at her surgical site.  She had an attempted cholecystectomy on 04 February.  The gallbladder was found very inflamed and irregular with a palpable mass and could not be safely resected.  The surgeon took biopsies and left a drain.  On the 22nd of February he removed the drain.  The site continued to drain pale purulent fluid and required frequent dressing changes.  She has had variable abdominal pain and loss of appetite.  No nausea or vomiting.  No change in bowel habits.  No fevers or chills.  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  This afternoon she had her first PET scan and established care with Dr. Marcos of Medical Oncology and completed a consultation with Palliative care.  She denies any chest pain or shortness of breath.  No dizziness or disorientation.  No other recent medical problems or new medications.      * No surgery found *      Hospital Course:   72 y/o female admitted for wound infection and intraabdominal abscess secondary to recent attempted cholecystectomy. Plan for percutaneous drainage today per IR. Continue vancomycin and cefepime. Follow cultures. As of 3/5/22 pt feeling better. S/p drainage of gallbladder abscess; Fluid sent for analysis. Blood cx remain negative, WBCs trending down. As of 3/6/22 pt feeling better. Leukocytosis continues to improve. Aerobic cx positive for MRSA. IV abx switched to oral zyvox. Blood cx remain negative. Ok to discharge from surgery standpoint. Home meds reconciled. Patient to  "follow-up with PCP and general surgery outpatient. Patient seen and examined on the date of discharge and found stable for discharge.          Goals of Care Treatment Preferences:  Code Status: Full Code      Consults:   Consults (From admission, onward)        Status Ordering Provider     Pharmacy to dose Vancomycin consult  Once        Provider:  (Not yet assigned)   "And" Linked Group Details    Acknowledged HO MCKEON          No new Assessment & Plan notes have been filed under this hospital service since the last note was generated.  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           Jones Mcdaniels MD Follow up in 1 week(s).    Specialties: Colon and Rectal Surgery, General Surgery  Why: for hospital follow-up  Contact information:  [id number] [place] [address] [address] [place] [place] [address] [phone]             Ivonne Marcos MD Follow up on 3/10/2022.    Specialty: Hematology and Oncology  Why: for hospital follow-up  Contact information:  [address]  [place] [address] [phone]                       Patient Instructions:      Notify your health care provider if you experience any of the following:  temperature >100.4     Notify your health care provider if you experience any of the following:  severe uncontrolled pain     Notify your health care provider if you experience any of the following:  redness, tenderness, or signs of infection (pain, swelling, redness, odor or green/yellow discharge around incision site) "     Notify your health care provider if you experience any of the following:  difficulty breathing or increased cough     Notify your health care provider if you experience any of the following:  persistent dizziness, light-headedness, or visual disturbances     Notify your health care provider if you experience any of the following:  increased confusion or weakness     Activity as tolerated       Significant Diagnostic Studies: Labs:   BMP:   Recent Labs   Lab 03/05/22 0902   *      K 3.9   CL 97   CO2 33*   BUN 9   CREATININE 0.8   CALCIUM 9.3   , CMP   Recent Labs   Lab 03/05/22 0902      K 3.9   CL 97   CO2 33*   *   BUN 9   CREATININE 0.8   CALCIUM 9.3   ANIONGAP 10   ESTGFRAFRICA >60   EGFRNONAA >60    and CBC   Recent Labs   Lab 03/05/22 0902 03/06/22  0911   WBC 18.07* 15.59*   HGB 8.2* 7.9*   HCT 26.8* 26.4*    455*     Microbiology:   Blood Culture   Lab Results   Component Value Date    LABBLOO No Growth to date 03/03/2022    LABBLOO No Growth to date 03/03/2022    LABBLOO No Growth to date 03/03/2022    and Wound Culture: positive for MRSA     Pending Diagnostic Studies:     None         Medications:  Reconciled Home Medications:      Medication List      START taking these medications    linezolid 600 mg Tab  Commonly known as: ZYVOX  Take 1 tablet (600 mg total) by mouth every 12 (twelve) hours. for 10 days     oxyCODONE-acetaminophen  mg per tablet  Commonly known as: PERCOCET  Take 1 tablet by mouth every 4 (four) hours as needed for Pain.        CHANGE how you take these medications    lisinopriL 10 MG tablet  Take 1 tablet (10 mg total) by mouth once daily.  Start taking on: March 7, 2022  What changed:   · medication strength  · See the new instructions.        CONTINUE taking these medications    albuterol 90 mcg/actuation inhaler  Commonly known as: PROVENTIL/VENTOLIN HFA  INHALE TWO PUFFS FOUR TIMES DAILY AS NEEDED FOR WHEEZING     amLODIPine 5 MG  tablet  Commonly known as: NORVASC  amlodipine Take 1 time per day No date recorded tablet 1 time per day No route recorded No set duration recorded No set duration amount recorded active 5 mg     clonazePAM 0.5 MG tablet  Commonly known as: KlonoPIN  clonazepam Take 2 times per day No date recorded tablet 2 times per day No route recorded No set duration recorded No set duration amount recorded active 0.5 mg     cyproheptadine 4 mg tablet  Commonly known as: PERIACTIN  Take 4 mg by mouth 2 (two) times daily.     diclofenac sodium 1 % Gel  Commonly known as: VOLTAREN  APPLY 1 GRAM TO AFFECTED AREA FOUR TIMES DAILY AS NEEDED     ipratropium 21 mcg (0.03 %) nasal spray  Commonly known as: ATROVENT     labetaloL 300 MG tablet  Commonly known as: NORMODYNE  labetalol Take 2 times per day No date recorded tablet 2 times per day No route recorded No set duration recorded No set duration amount recorded suspended 300 mg     montelukast 10 mg tablet  Commonly known as: SINGULAIR  Take 10 mg by mouth once daily.     ondansetron 8 MG Tbdl  Commonly known as: ZOFRAN-ODT  Take 1 tablet (8 mg total) by mouth every 12 (twelve) hours as needed.        STOP taking these medications    ciprofloxacin HCl 500 MG tablet  Commonly known as: CIPRO     HYDROcodone-acetaminophen 5-325 mg per tablet  Commonly known as: NORCO     mirtazapine 30 MG tablet  Commonly known as: REMERON     ZOLOFT ORAL            Indwelling Lines/Drains at time of discharge:   Lines/Drains/Airways     None                 Time spent on the discharge of patient: 35 minutes         Abby Aguilar NP  Department of Hospital Medicine  [facility]

## 2022-03-06 NOTE — ASSESSMENT & PLAN NOTE
72 y/o female with wound infection and likely intraabdominal abscess secondary to recent subtotal cholecystectomy at OSH.    - IR aspiration Friday,no drain able to be left in place  - cont abx upon discharge  - okay for diet from surgical standpoint

## 2022-03-06 NOTE — PROGRESS NOTES
[facility] Surg  General Surgery  Progress Note    Subjective:     History of Present Illness:  72 y/o female with history of recent attempted cholecystectomy who was found to have a gallbladder mass during the procedure. A drain was left in place, which was subsequently removed. She presented with purulent drainage from this non-healing drain site as well as an elevated WBC count.       Post-Op Info:  * No surgery found *         Interval History:  Leukocytosis continues to improve.  Tolerating a diet.  Having bowel function.    Medications:  Continuous Infusions:   0.45 % NaCl with KCl 20 mEq 75 mL/hr at 03/06/22 0626     Scheduled Meds:   ceFEPime (MAXIPIME) IVPB  2 g Intravenous Q8H    clonazePAM  0.5 mg Oral BID    cyproheptadine  4 mg Oral BID    enoxaparin  40 mg Subcutaneous Daily    labetaloL  300 mg Oral Q12H    lisinopriL  10 mg Oral Daily    montelukast  10 mg Oral Daily    polyethylene glycol  17 g Oral Daily    vancomycin (VANCOCIN) IVPB  2,000 mg Intravenous Q24H     PRN Meds:acetaminophen, albuterol sulfate, aluminum-magnesium hydroxide-simethicone, dextrose 10%, dextrose 10%, glucagon (human recombinant), glucose, glucose, HYDROcodone-acetaminophen, melatonin, morphine, naloxone, ondansetron, promethazine, simethicone, sodium chloride 0.9%, Pharmacy to dose Vancomycin consult **AND** vancomycin - pharmacy to dose     Review of patient's allergies indicates:   Allergen Reactions    Aspirin      Makes her throwup    Ibuprofen      Throws up    Sulfa (sulfonamide antibiotics)      Pt can't remember reactions    Amoxicillin-pot clavulanate Itching     Objective:     Vital Signs (Most Recent):  Temp: 98.3 °F (36.8 °C) (03/06/22 0715)  Pulse: 83 (03/06/22 0715)  Resp: 15 (03/06/22 1031)  BP: (!) 116/59 (03/06/22 0715)  SpO2: 95 % (03/06/22 0816)   Vital Signs (24h Range):  Temp:  [98 °F (36.7 °C)-99.1 °F (37.3 °C)] 98.3 °F (36.8 °C)  Pulse:  [79-83] 83  Resp:  [14-18] 15  SpO2:  [95 %-97  %] 95 %  BP: (110-126)/(53-62) 116/59     Weight: 79.5 kg (175 lb 4.3 oz)  Body mass index is 27.45 kg/m².    Intake/Output - Last 3 Shifts         03/04 0700  03/05 0659 03/05 0700  03/06 0659 03/06 0700  03/07 0659    P.O.  1120     I.V. (mL/kg) 513.2 (6.5) 2219 (27.9)     IV Piggyback 86.8 1151     Total Intake(mL/kg) 600 (7.5) 4490 (56.5)     Net +600 +4490            Urine Occurrence  5 x     Stool Occurrence  1 x             Physical Exam  Vitals and nursing note reviewed.   Constitutional:       General: She is not in acute distress.     Appearance: She is well-developed. She is not ill-appearing.   HENT:      Head: Normocephalic and atraumatic.      Right Ear: External ear normal.      Left Ear: External ear normal.      Mouth/Throat:      Mouth: Mucous membranes are moist.      Pharynx: Oropharynx is clear.   Eyes:      Extraocular Movements: Extraocular movements intact.      Conjunctiva/sclera: Conjunctivae normal.   Cardiovascular:      Rate and Rhythm: Normal rate.   Pulmonary:      Effort: Pulmonary effort is normal. No respiratory distress.   Abdominal:      General: There is no distension.      Palpations: Abdomen is soft.      Tenderness: There is no abdominal tenderness.      Comments: Well-healing port sites   Musculoskeletal:      Cervical back: Neck supple.   Skin:     General: Skin is warm and dry.   Neurological:      Mental Status: She is alert and oriented to person, place, and time.   Psychiatric:         Behavior: Behavior normal.       Significant Labs:  I have reviewed all pertinent lab results within the past 24 hours.  CBC:   Recent Labs   Lab 03/06/22  0911   WBC 15.59*   RBC 2.55*   HGB 7.9*   HCT 26.4*   *   *   MCH 31.0   MCHC 29.9*     BMP:   Recent Labs   Lab 03/05/22  0902   *      K 3.9   CL 97   CO2 33*   BUN 9   CREATININE 0.8   CALCIUM 9.3     CMP:   Recent Labs   Lab 03/03/22  1417 03/04/22  0612 03/05/22  0902   * 107 112*   CALCIUM 9.0  8.7 9.3   ALBUMIN 2.5* 1.9*  --    PROT 7.5  --   --     137 140   K 3.1* 3.1* 3.9   CO2 34* 32* 33*   CL 97 96 97   BUN 10 6* 9   CREATININE 0.8 0.7 0.8   ALKPHOS 142*  --   --    ALT 20  --   --    AST 31  --   --    BILITOT 0.5  --   --      LFTs:   Recent Labs   Lab 03/03/22  1417 03/04/22  0612   ALT 20  --    AST 31  --    ALKPHOS 142*  --    BILITOT 0.5  --    PROT 7.5  --    ALBUMIN 2.5* 1.9*       Significant Diagnostics:  I have reviewed all pertinent imaging results/findings within the past 24 hours.    Assessment/Plan:     * Infection following a procedure, deep incisional surgical site, initial encounter  70 y/o female with wound infection and likely intraabdominal abscess secondary to recent subtotal cholecystectomy at OSH.    - IR aspiration Friday,no drain able to be left in place  - cont abx upon discharge  - okay for diet from surgical standpoint    Essential (primary) hypertension  Management per primary team.    Adenocarcinoma of gallbladder  Care per HemeOnc and will possibly need SurgOnc referral        Jones Mcdaniels MD  General Surgery  [facility]

## 2022-03-06 NOTE — PROGRESS NOTES
Pharmacokinetic Assessment Follow Up: IV Vancomycin    Vancomycin serum concentration assessment(s):    The trough level was drawn correctly and can be used to guide therapy at this time. The measurement is within the desired definitive target range of 10 to 20 mcg/mL. Because this level was obtain prior to the 3rd dose, patient will likely continue to accumulate.     Vancomycin Regimen Plan:    Continue regimen to Vancomycin 2000 mg IV every 24 hours with next serum trough concentration measured at 2100 prior to 5th total dose on 03/07    Drug levels (last 3 results):  Recent Labs   Lab Result Units 03/05/22  2112   Vancomycin-Trough ug/mL 11.8       Pharmacy will continue to follow and monitor vancomycin.    Please contact pharmacy at extension [phone] for questions regarding this assessment.    Thank you for the consult,   Nora Shin       Patient brief summary:  Madelyn Serna is a 71 y.o. female initiated on antimicrobial therapy with IV Vancomycin for treatment of  deep incisional surgical site infection s/p drainage of gallbladder abscess    The patient's current regimen is vancomycin 2000 mg IV every 24 hours.     Drug Allergies:   Review of patient's allergies indicates:   Allergen Reactions    Aspirin      Makes her throwup    Ibuprofen      Throws up    Sulfa (sulfonamide antibiotics)      Pt can't remember reactions    Amoxicillin-pot clavulanate Itching       Actual Body Weight:   79.5 kg    Renal Function:   Estimated Creatinine Clearance: 68 mL/min (based on SCr of 0.8 mg/dL).,     Dialysis Method (if applicable):  N/A    CBC (last 72 hours):  Recent Labs   Lab Result Units 03/03/22  1417 03/04/22  0612 03/05/22  0902   WBC K/uL 23.10* 21.35* 18.07*   Hemoglobin g/dL 10.2* 8.4* 8.2*   Hematocrit % 32.6* 26.8* 26.8*   Platelets K/uL 530* 414 449   Gran % % 86.8* 80.4* 81.0*   Lymph % % 7.1* 10.4* 9.6*   Mono % % 5.0 7.8 7.5   Eosinophil % % 0.1 0.2 0.3   Basophil % % 0.3 0.2 0.3   Differential Method   Automated Automated Automated       Metabolic Panel (last 72 hours):  Recent Labs   Lab Result Units 03/03/22  1417 03/04/22  0612 03/05/22  0902   Sodium mmol/L 144 137 140   Potassium mmol/L 3.1* 3.1* 3.9   Chloride mmol/L 97 96 97   CO2 mmol/L 34* 32* 33*   Glucose mg/dL 128* 107 112*   BUN mg/dL 10 6* 9   Creatinine mg/dL 0.8 0.7 0.8   Albumin g/dL 2.5* 1.9*  --    Total Bilirubin mg/dL 0.5  --   --    Alkaline Phosphatase U/L 142*  --   --    AST U/L 31  --   --    ALT U/L 20  --   --    Phosphorus mg/dL  --  3.7  --        Vancomycin Administrations:  vancomycin given in the last 96 hours                     vancomycin 2 g in dextrose 5 % 500 mL IVPB (mg) 2,000 mg New Bag 03/05/22 2122      Restarted 03/04/22 2317      Restarted  2234     2,000 mg New Bag  2230    vancomycin 2 g in dextrose 5 % 500 mL IVPB ()  Restarted 03/03/22 2218     2,000 mg New Bag  2204                    Microbiologic Results:  Microbiology Results (last 7 days)       Procedure Component Value Units Date/Time    Aerobic culture (Specify Source) **CANNOT BE ORDERED AS STAT** [[id number]]  (Abnormal) Collected: 03/03/22 1556    Order Status: Completed Specimen: Wound from Abdomen Updated: 03/05/22 1018     Aerobic Bacterial Culture STAPHYLOCOCCUS AUREUS  Few  Susceptibility pending      Blood culture [[id number]] Collected: 03/03/22 2155    Order Status: Completed Specimen: Blood from Peripheral, Wrist, Left Updated: 03/05/22 0613     Blood Culture, Routine No Growth to date      No Growth to date    Blood culture [[id number]] Collected: 03/03/22 2206    Order Status: Completed Specimen: Blood from Peripheral, Hand, Left Updated: 03/05/22 0613     Blood Culture, Routine No Growth to date      No Growth to date    Gram stain [[id number]] Collected: 03/04/22 1626    Order Status: Completed Specimen: Abscess from Gallbladder Updated: 03/05/22 0116     Gram Stain Result Few WBC's      Rare Gram positive cocci    Narrative:      Gallbladder fossa     Aerobic culture [[id number]] Collected: 03/04/22 1626    Order Status: Sent Specimen: Abscess from Gallbladder Updated: 03/04/22 2217    Culture, Anaerobe [[id number]] Collected: 03/04/22 1626    Order Status: Sent Specimen: Abscess from Gallbladder Updated: 03/04/22 2217

## 2022-03-06 NOTE — NURSING
Pt being discharged home in stable condition. IV removed, catheter intact. Discharge instructions given to pt, pt verbalized understanding. Follow up appointments set and printed prescriptions given to pt daughter at bedside.

## 2022-03-07 ENCOUNTER — TELEPHONE (OUTPATIENT)
Dept: SURGERY | Facility: CLINIC | Age: 72
End: 2022-03-07
Payer: MEDICARE

## 2022-03-07 LAB
BACTERIA SPEC AEROBE CULT: ABNORMAL
BACTERIA SPEC AEROBE CULT: ABNORMAL

## 2022-03-07 NOTE — PLAN OF CARE
[facility]  Discharge Final Note    Primary Care Provider: Primary Doctor No    Expected Discharge Date: 3/6/2022    Final Discharge Note (most recent)     Final Note - 03/07/22 0918        Final Note    Assessment Type Final Discharge Note     Anticipated Discharge Disposition Home or Self Care     Hospital Resources/Appts/Education Provided Provided patient/caregiver with written discharge plan information;Appointments scheduled and added to AVS        Post-Acute Status    Discharge Delays None known at this time                 Important Message from Medicare             Contact Info     No, Primary Doctor   Relationship: PCP - General        Next Steps: Schedule an appointment as soon as possible for a visit in 3 day(s)    Instructions: for hospital follow-up    Jones Mcdaniels MD   Specialty: Colon and Rectal Surgery, General Surgery    [address] [place] [address] [address] [place] [address]   Phone: [phone]       Next Steps: Follow up in 1 week(s)    Instructions: for hospital follow-up    Ivonne Marcos MD   Specialty: Hematology and Oncology    [address] [place] [address]  [place] [address]   Phone: [phone]       Next Steps: Follow up on 3/10/2022    Instructions: for hospital follow-up

## 2022-03-07 NOTE — TELEPHONE ENCOUNTER
Left voicemail message advising pt per Dr Mcdaniels she is to make a Post Op appt with her surgeon - Left my name and call back number [phone]      ----- Message from Salvatore Parra RN sent at 3/6/2022  3:18 PM CST -----  Patient needs a post op 1 week. Please call patient with appt       Taltz Counseling: I discussed with the patient the risks of ixekizumab including but not limited to immunosuppression, serious infections, worsening of inflammatory bowel disease and drug reactions.  The patient understands that monitoring is required including a PPD at baseline and must alert us or the primary physician if symptoms of infection or other concerning signs are noted.

## 2022-03-07 NOTE — TELEPHONE ENCOUNTER
Left voicemail message with my name and call back number [phone] -     Per Dr Mcdaniels, pt does not need to be seen by him but by her original surgeon       ----- Message from Staci Glass sent at 3/7/2022 12:53 PM CST -----  Contact: Nati/Daughter  Daughter would like a call back at  in regards to t he patient's missed call.    Thanks

## 2022-03-07 NOTE — PLAN OF CARE
[facility]  Discharge Assessment    Primary Care Provider: Primary Doctor No     Discharge Assessment (most recent)     BRIEF DISCHARGE ASSESSMENT - 03/07/22 0919        Discharge Planning    Resource/Environmental Concerns none     Support Systems Family members     Equipment Currently Used at Home none     Current Living Arrangements home/apartment/condo     Patient/Family Anticipates Transition to home     Patient/Family Anticipated Services at Transition none     DME Needed Upon Discharge  none     Discharge Plan A Home     Discharge Plan B Home

## 2022-03-08 ENCOUNTER — PATIENT MESSAGE (OUTPATIENT)
Dept: PALLIATIVE MEDICINE | Facility: CLINIC | Age: 72
End: 2022-03-08
Payer: MEDICARE

## 2022-03-08 DIAGNOSIS — G89.3 NEOPLASM RELATED PAIN: Primary | ICD-10-CM

## 2022-03-08 RX ORDER — NALOXONE HYDROCHLORIDE 4 MG/.1ML
1 SPRAY NASAL ONCE
Qty: 1 EACH | Refills: 0 | Status: SHIPPED | OUTPATIENT
Start: 2022-03-08 | End: 2022-03-08

## 2022-03-08 RX ORDER — OXYCODONE AND ACETAMINOPHEN 10; 325 MG/1; MG/1
TABLET ORAL
Qty: 30 TABLET | Refills: 0 | Status: SHIPPED | OUTPATIENT
Start: 2022-03-08 | End: 2022-03-23 | Stop reason: ALTCHOICE

## 2022-03-08 NOTE — PROGRESS NOTES
Spoke to daughter Dotty by phone. I erroneously sent Zofran for q12hr dosing. In light of her worsening nausea, anxiety and pain I recommended the following: Increase Klonopin to 1mg BID (previously taking 0.5mg TID), schedule Zofran 8mg q6hr, and start oxycodone 5mg to take 5mg for moderate pain and 10mg for severe pain q4hr as needed (max 4 doses/ day). Also provided Narcan rx and educated on use.   They have been unable to fill the Percocet 10mg that was rx at discharge as the [facility] claims this medication can not be given every 4hrs. In light of this misinformation, we will use another pharmacy for opioids going forward.    They would like a new referral for our surgery department as they do not want to return to the previous surgeon. I will reach out to our surgeons then let her know the answer.

## 2022-03-09 ENCOUNTER — TELEPHONE (OUTPATIENT)
Dept: HEMATOLOGY/ONCOLOGY | Facility: CLINIC | Age: 72
End: 2022-03-09
Payer: MEDICARE

## 2022-03-09 LAB
BACTERIA BLD CULT: NORMAL
BACTERIA BLD CULT: NORMAL
BACTERIA SPEC ANAEROBE CULT: NORMAL

## 2022-03-09 NOTE — TELEPHONE ENCOUNTER
Received communication from  to reschedule tomorrow's follow up appt to Friday following tumor Board presentation.   appt reset to 3/11/22 at 840 at the cancer center location  Spoke with pt daughter over the phone and confirmed the appt schedule change. Pt and daughter agreeable to appt. And will attend.

## 2022-03-11 ENCOUNTER — PATIENT MESSAGE (OUTPATIENT)
Dept: PALLIATIVE MEDICINE | Facility: CLINIC | Age: 72
End: 2022-03-11
Payer: MEDICARE

## 2022-03-11 ENCOUNTER — OFFICE VISIT (OUTPATIENT)
Dept: HEMATOLOGY/ONCOLOGY | Facility: CLINIC | Age: 72
End: 2022-03-11
Payer: MEDICARE

## 2022-03-11 ENCOUNTER — TUMOR BOARD CONFERENCE (OUTPATIENT)
Dept: HEMATOLOGY/ONCOLOGY | Facility: CLINIC | Age: 72
End: 2022-03-11
Payer: MEDICARE

## 2022-03-11 VITALS
BODY MASS INDEX: 25.96 KG/M2 | SYSTOLIC BLOOD PRESSURE: 184 MMHG | TEMPERATURE: 98 F | OXYGEN SATURATION: 96 % | RESPIRATION RATE: 18 BRPM | DIASTOLIC BLOOD PRESSURE: 79 MMHG | HEART RATE: 92 BPM | WEIGHT: 165.38 LBS | HEIGHT: 67 IN

## 2022-03-11 DIAGNOSIS — C23 ADENOCARCINOMA OF GALLBLADDER: ICD-10-CM

## 2022-03-11 DIAGNOSIS — R11.0 NAUSEA: Primary | ICD-10-CM

## 2022-03-11 PROCEDURE — 1126F AMNT PAIN NOTED NONE PRSNT: CPT | Mod: CPTII,S$GLB,, | Performed by: INTERNAL MEDICINE

## 2022-03-11 PROCEDURE — 1126F PR PAIN SEVERITY QUANTIFIED, NO PAIN PRESENT: ICD-10-PCS | Mod: CPTII,S$GLB,, | Performed by: INTERNAL MEDICINE

## 2022-03-11 PROCEDURE — 1159F MED LIST DOCD IN RCRD: CPT | Mod: CPTII,S$GLB,, | Performed by: INTERNAL MEDICINE

## 2022-03-11 PROCEDURE — 99215 PR OFFICE/OUTPT VISIT, EST, LEVL V, 40-54 MIN: ICD-10-PCS | Mod: S$GLB,,, | Performed by: INTERNAL MEDICINE

## 2022-03-11 PROCEDURE — 1160F RVW MEDS BY RX/DR IN RCRD: CPT | Mod: CPTII,S$GLB,, | Performed by: INTERNAL MEDICINE

## 2022-03-11 PROCEDURE — 3078F PR MOST RECENT DIASTOLIC BLOOD PRESSURE < 80 MM HG: ICD-10-PCS | Mod: CPTII,S$GLB,, | Performed by: INTERNAL MEDICINE

## 2022-03-11 PROCEDURE — 99215 OFFICE O/P EST HI 40 MIN: CPT | Mod: S$GLB,,, | Performed by: INTERNAL MEDICINE

## 2022-03-11 PROCEDURE — 3077F PR MOST RECENT SYSTOLIC BLOOD PRESSURE >= 140 MM HG: ICD-10-PCS | Mod: CPTII,S$GLB,, | Performed by: INTERNAL MEDICINE

## 2022-03-11 PROCEDURE — 99999 PR PBB SHADOW E&M-EST. PATIENT-LVL IV: CPT | Mod: PBBFAC,,, | Performed by: INTERNAL MEDICINE

## 2022-03-11 PROCEDURE — 3008F BODY MASS INDEX DOCD: CPT | Mod: CPTII,S$GLB,, | Performed by: INTERNAL MEDICINE

## 2022-03-11 PROCEDURE — 3078F DIAST BP <80 MM HG: CPT | Mod: CPTII,S$GLB,, | Performed by: INTERNAL MEDICINE

## 2022-03-11 PROCEDURE — 3077F SYST BP >= 140 MM HG: CPT | Mod: CPTII,S$GLB,, | Performed by: INTERNAL MEDICINE

## 2022-03-11 PROCEDURE — 99999 PR PBB SHADOW E&M-EST. PATIENT-LVL IV: ICD-10-PCS | Mod: PBBFAC,,, | Performed by: INTERNAL MEDICINE

## 2022-03-11 PROCEDURE — 4010F ACE/ARB THERAPY RXD/TAKEN: CPT | Mod: CPTII,S$GLB,, | Performed by: INTERNAL MEDICINE

## 2022-03-11 PROCEDURE — 3008F PR BODY MASS INDEX (BMI) DOCUMENTED: ICD-10-PCS | Mod: CPTII,S$GLB,, | Performed by: INTERNAL MEDICINE

## 2022-03-11 PROCEDURE — 1159F PR MEDICATION LIST DOCUMENTED IN MEDICAL RECORD: ICD-10-PCS | Mod: CPTII,S$GLB,, | Performed by: INTERNAL MEDICINE

## 2022-03-11 PROCEDURE — 4010F PR ACE/ARB THEARPY RXD/TAKEN: ICD-10-PCS | Mod: CPTII,S$GLB,, | Performed by: INTERNAL MEDICINE

## 2022-03-11 PROCEDURE — 1160F PR REVIEW ALL MEDS BY PRESCRIBER/CLIN PHARMACIST DOCUMENTED: ICD-10-PCS | Mod: CPTII,S$GLB,, | Performed by: INTERNAL MEDICINE

## 2022-03-11 RX ORDER — ONDANSETRON 8 MG/1
8 TABLET, ORALLY DISINTEGRATING ORAL EVERY 8 HOURS PRN
Qty: 60 TABLET | Refills: 5 | Status: CANCELLED | OUTPATIENT
Start: 2022-03-24

## 2022-03-11 RX ORDER — ONDANSETRON 8 MG/1
8 TABLET, ORALLY DISINTEGRATING ORAL EVERY 6 HOURS PRN
Qty: 30 TABLET | Refills: 1
Start: 2022-03-11 | End: 2023-03-11

## 2022-03-11 RX ORDER — PROCHLORPERAZINE MALEATE 5 MG
5 TABLET ORAL EVERY 6 HOURS PRN
Qty: 60 TABLET | Refills: 0 | Status: SHIPPED | OUTPATIENT
Start: 2022-03-11

## 2022-03-11 RX ORDER — DEXAMETHASONE 4 MG/1
8 TABLET ORAL DAILY
Qty: 24 TABLET | Refills: 3 | Status: CANCELLED | OUTPATIENT
Start: 2022-03-25

## 2022-03-11 NOTE — PROGRESS NOTES
Tumor Board Documentation      Madelyn Serna was presented by Ivonne Marcos MD at our Tumor Board on 3/11/2022, which included representatives from (P) Medical Oncology, Navigation, Hematology, Research, Radiation Oncology, Surgical Oncology, Pathology, Plastic Surgery, Genetics, Gastrointestinal, Pulmonology, Urology, Radiology.    Madelyn currently presents as (P) a current patient with (P) Other, with history of the following treatments: (P) None.    Additionally, we reviewed previous medical and familial history, history of present illness, and recent lab results along with all available histopathologic and imaging studies. The tumor board considered available treatment options and made the following recommendations:      No surgical therapy, wait for clearance of infection, and follow-up with Oncology to discuss palliative chemo options.    The following procedures/referrals were also placed: No orders of the defined types were placed in this encounter.      Clinical Trial Status: (P) Not discussed     National site-specific guidelines were discussed with respect to the case.    Tumor board is a meeting of clinicians from various specialty areas who evaluate and discuss patients for whom a multidisciplinary approach is being considered. Final determinations in the plan of care are those of the provider(s). The responsibility for follow up of recommendations given during tumor board is that of the provider.     Ivonne Marcos MD

## 2022-03-11 NOTE — PATIENT INSTRUCTIONS
RV 2 wks with cbc, cmp, reassessment for possible chemotherapy  Prior to this audiogram and teaching/consent for cisplatin and gemcitabine

## 2022-03-11 NOTE — PLAN OF CARE
START OFF PATHWAY REGIMEN - Other            Gemcitabine (Gemzar)       Cisplatin (Platinol)     **Always confirm dose/schedule in your pharmacy ordering system**    Patient Characteristics:  Intent of Therapy:  Non-Curative / Palliative Intent, Discussed with Patient

## 2022-03-11 NOTE — PROGRESS NOTES
Subjective:      DATE OF VISIT: 3/11/22     ?  Patient ID:?Madelyn Serna is a 71 y.o. female.?? MR#: [id number]   ?   PRIMARY ONCOLOGIST: Dr. Marcos    ? Primary Care Providers:  Primary Doctor No (General)     CHIEF COMPLAINT:  Follow-up after hospitalization for intra-abdominal abscess  ?   ONCOLOGIC DIAGNOSIS:  Gallbladder adenocarcinoma, stage IV, pT4 NX pM1  ?   CURRENT TREATMENT: TBD    PAST TREATMENT:  Laparoscopic cholecystectomy, 02/04/2022, [facility]  ?   ONCOLOGIC HISTORY    I the pleasure of meeting I had the pleasure meeting for the 1st time Ms. Serna a pleasant 71-year-old woman accompanied by her 2 daughters today for newly diagnosed gallbladder adenocarcinoma.    Medical history is notable for former tobacco use quit December 2021, COPD, anxiety, cataracts.      She notes 4 days of right lower quadrant abdominal pain acute onset for which she presented to [facility] emergency room on 02/04/2022.  One episode of vomiting per emergency room note.  Labs with leukocytosis WBC 18.7, hemoglobin 13, , renal function intact GFR over 60, mild alkaline phosphatase elevation 130, normal transaminases and bilirubin.  Albumin 4.5, calcium 10.4.  Urinalysis positive for E coli pansensitive.  Blood cultures negative.    Imaging reports a from outside radiology:  CT abdomen and pelvis with contrast distended gallbladder extensive cholelithiasis including 17 mm stone in the region of gallbladder neck.  Pericholecystic stranding and small adjacent fluid.  Lymph nodes noted to be unremarkable.  Emphysema lung bases.    She was taken to emergency surgery with laparoscopic cholecystectomy.  Surgical note mentions during this maneuver would appear to be a peritoneal nodule was discovered.  The peritoneal nodule was dissected from all surrounding structures with the LigaSure device.  The nodule is passed off the table and sent separately as a specimen.    Outside  "pathology:  "  Gallbladder cholecystectomy adenocarcinoma immunohistochemistry positive for CK7 and CDX2 and negative for CK 20, TTF1 and PAX8  Tumor size at least 2.2 cm.  The tumor invades the liver.  Lymphovascular invasion present.  Perineural invasion not identified.  Margins cannot be assessed.  Regional lymph nodes not applicable.    peritoneal nodule excisional biopsy positive for metastatic adenocarcinoma consistent with origin from gallbladder.      HPI    Today she is accompanied by her 2 daughters.  Improvement in right-sided tenderness, denies erythema exudate fever chills.  She continues to be quite anxious.  She is accompanied by her daughters today again.    Review of Systems    ?   A comprehensive 14-point review of systems was reviewed with patient and was negative other than as specified above.   ?   PAST MEDICAL HISTORY:   Past Medical History:   Diagnosis Date    Cancer of gallbladder     Essential (primary) hypertension     ?     PAST SURGICAL HISTORY:   History reviewed. No pertinent surgical history.   ?   ALLERGIES:   Allergies as of 03/11/2022 - Reviewed 03/11/2022   Allergen Reaction Noted    Aspirin  02/21/2022    Ibuprofen  02/21/2022    Sulfa (sulfonamide antibiotics)  02/21/2022    Amoxicillin-pot clavulanate Itching 02/21/2022      ?   MEDICATIONS:?   Outpatient Medications Marked as Taking for the 3/11/22 encounter (Office Visit) with vIonne Marcos MD   Medication Sig Dispense Refill    albuterol (PROVENTIL/VENTOLIN HFA) 90 mcg/actuation inhaler INHALE TWO PUFFS FOUR TIMES DAILY AS NEEDED FOR WHEEZING      amLODIPine (NORVASC) 5 MG tablet amlodipine Take 1 time per day No date recorded tablet 1 time per day No route recorded No set duration recorded No set duration amount recorded active 5 mg      clonazePAM (KLONOPIN) 0.5 MG tablet clonazepam Take 2 times per day No date recorded tablet 2 times per day No route recorded No set duration recorded No set duration amount " "recorded active 0.5 mg      cyproheptadine (PERIACTIN) 4 mg tablet Take 4 mg by mouth 2 (two) times daily.      diclofenac sodium (VOLTAREN) 1 % Gel APPLY 1 GRAM TO AFFECTED AREA FOUR TIMES DAILY AS NEEDED      ipratropium (ATROVENT) 21 mcg (0.03 %) nasal spray       labetaloL (NORMODYNE) 300 MG tablet labetalol Take 2 times per day No date recorded tablet 2 times per day No route recorded No set duration recorded No set duration amount recorded suspended 300 mg      linezolid (ZYVOX) 600 mg Tab Take 1 tablet (600 mg total) by mouth every 12 (twelve) hours. for 10 days 20 tablet 0    lisinopriL 10 MG tablet Take 1 tablet (10 mg total) by mouth once daily. 90 tablet 0    montelukast (SINGULAIR) 10 mg tablet Take 10 mg by mouth once daily.      oxyCODONE-acetaminophen (PERCOCET)  mg per tablet Take 5mg for moderate pain and 10mg for severe pain every 4hr as needed (max 4 doses/ day) 30 tablet 0    [DISCONTINUED] ondansetron (ZOFRAN-ODT) 8 MG TbDL Take 1 tablet (8 mg total) by mouth every 12 (twelve) hours as needed. 30 tablet 1      ?   SOCIAL HISTORY:?   Social History     Tobacco Use    Smoking status: Former Smoker    Smokeless tobacco: Former User   Substance Use Topics    Alcohol use: Not on file      ?   Former tobacco use half pack per day times many years" unable to quantify  ?   FAMILY HISTORY:   family history is not on file.   ?   Brother with colon cancer in 70s  Niece with breast cancer in 40s     Objective:      Physical Exam      ?   Vitals:    03/11/22 0912   BP: (!) 184/79   Pulse: 92   Resp: 18   Temp: 97.6 °F (36.4 °C)      ?   ECOG:?1   General appearance: Generally well appearing, anxious, occasionally tearful, daughters accompanying her  Head, eyes, ears, nose, and throat:  moist mucous membranes.   Respiratory:  Normal work of breathing   Abdomen: nondistended.  Drainage tube without noted discharge  Extremities: Warm, without edema.   Neurologic: Alert and oriented.  Sitting " in wheelchair  Skin: No rashes, ecchymoses or petechial lesion.   ?      ?   Laboratory:  ?   No visits with results within 1 Day(s) from this visit.   Latest known visit with results is:   Admission on 03/03/2022, Discharged on 03/06/2022   Component Date Value Ref Range Status    WBC 03/03/2022 23.10 (A) 3.90 - 12.70 K/uL Final    RBC 03/03/2022 3.25 (A) 4.00 - 5.40 M/uL Final    Hemoglobin 03/03/2022 10.2 (A) 12.0 - 16.0 g/dL Final    Hematocrit 03/03/2022 32.6 (A) 37.0 - 48.5 % Final    MCV 03/03/2022 100 (A) 82 - 98 fL Final    MCH 03/03/2022 31.4 (A) 27.0 - 31.0 pg Final    MCHC 03/03/2022 31.3 (A) 32.0 - 36.0 g/dL Final    RDW 03/03/2022 13.4  11.5 - 14.5 % Final    Platelets 03/03/2022 530 (A) 150 - 450 K/uL Final    MPV 03/03/2022 8.8 (A) 9.2 - 12.9 fL Final    Immature Granulocytes 03/03/2022 0.7 (A) 0.0 - 0.5 % Final    Gran # (ANC) 03/03/2022 20.1 (A) 1.8 - 7.7 K/uL Final    Immature Grans (Abs) 03/03/2022 0.16 (A) 0.00 - 0.04 K/uL Final    Lymph # 03/03/2022 1.7  1.0 - 4.8 K/uL Final    Mono # 03/03/2022 1.2 (A) 0.3 - 1.0 K/uL Final    Eos # 03/03/2022 0.0  0.0 - 0.5 K/uL Final    Baso # 03/03/2022 0.06  0.00 - 0.20 K/uL Final    nRBC 03/03/2022 0  0 /100 WBC Final    Gran % 03/03/2022 86.8 (A) 38.0 - 73.0 % Final    Lymph % 03/03/2022 7.1 (A) 18.0 - 48.0 % Final    Mono % 03/03/2022 5.0  4.0 - 15.0 % Final    Eosinophil % 03/03/2022 0.1  0.0 - 8.0 % Final    Basophil % 03/03/2022 0.3  0.0 - 1.9 % Final    Differential Method 03/03/2022 Automated   Final    Sodium 03/03/2022 144  136 - 145 mmol/L Final    Potassium 03/03/2022 3.1 (A) 3.5 - 5.1 mmol/L Final    Chloride 03/03/2022 97  95 - 110 mmol/L Final    CO2 03/03/2022 34 (A) 23 - 29 mmol/L Final    Glucose 03/03/2022 128 (A) 70 - 110 mg/dL Final    BUN 03/03/2022 10  8 - 23 mg/dL Final    Creatinine 03/03/2022 0.8  0.5 - 1.4 mg/dL Final    Calcium 03/03/2022 9.0  8.7 - 10.5 mg/dL Final    Total Protein 03/03/2022 7.5   6.0 - 8.4 g/dL Final    Albumin 03/03/2022 2.5 (A) 3.5 - 5.2 g/dL Final    Total Bilirubin 03/03/2022 0.5  0.1 - 1.0 mg/dL Final    Alkaline Phosphatase 03/03/2022 142 (A) 55 - 135 U/L Final    AST 03/03/2022 31  10 - 40 U/L Final    ALT 03/03/2022 20  10 - 44 U/L Final    Anion Gap 03/03/2022 13  8 - 16 mmol/L Final    eGFR if African American 03/03/2022 >60  >60 mL/min/1.73 m^2 Final    eGFR if non African American 03/03/2022 >60  >60 mL/min/1.73 m^2 Final    Aerobic Bacterial Culture 03/03/2022  (A)  Final                    Value:METHICILLIN RESISTANT STAPHYLOCOCCUS AUREUS  Few      SARS-CoV-2 RNA, Amplification, Qual 03/03/2022 Negative  Negative Final    Blood Culture, Routine 03/03/2022 No growth after 5 days.   Final    Blood Culture, Routine 03/03/2022 No growth after 5 days.   Final    Glucose 03/04/2022 107  70 - 110 mg/dL Final    Sodium 03/04/2022 137  136 - 145 mmol/L Final    Potassium 03/04/2022 3.1 (A) 3.5 - 5.1 mmol/L Final    Chloride 03/04/2022 96  95 - 110 mmol/L Final    CO2 03/04/2022 32 (A) 23 - 29 mmol/L Final    BUN 03/04/2022 6 (A) 8 - 23 mg/dL Final    Calcium 03/04/2022 8.7  8.7 - 10.5 mg/dL Final    Creatinine 03/04/2022 0.7  0.5 - 1.4 mg/dL Final    Albumin 03/04/2022 1.9 (A) 3.5 - 5.2 g/dL Final    Phosphorus 03/04/2022 3.7  2.7 - 4.5 mg/dL Final    eGFR if African American 03/04/2022 >60  >60 mL/min/1.73 m^2 Final    eGFR if non African American 03/04/2022 >60  >60 mL/min/1.73 m^2 Final    Anion Gap 03/04/2022 9  8 - 16 mmol/L Final    WBC 03/04/2022 21.35 (A) 3.90 - 12.70 K/uL Final    RBC 03/04/2022 2.67 (A) 4.00 - 5.40 M/uL Final    Hemoglobin 03/04/2022 8.4 (A) 12.0 - 16.0 g/dL Final    Hematocrit 03/04/2022 26.8 (A) 37.0 - 48.5 % Final    MCV 03/04/2022 100 (A) 82 - 98 fL Final    MCH 03/04/2022 31.5 (A) 27.0 - 31.0 pg Final    MCHC 03/04/2022 31.3 (A) 32.0 - 36.0 g/dL Final    RDW 03/04/2022 13.7  11.5 - 14.5 % Final    Platelets 03/04/2022  414  150 - 450 K/uL Final    MPV 03/04/2022 9.1 (A) 9.2 - 12.9 fL Final    Immature Granulocytes 03/04/2022 1.0 (A) 0.0 - 0.5 % Final    Gran # (ANC) 03/04/2022 17.2 (A) 1.8 - 7.7 K/uL Final    Immature Grans (Abs) 03/04/2022 0.22 (A) 0.00 - 0.04 K/uL Final    Lymph # 03/04/2022 2.2  1.0 - 4.8 K/uL Final    Mono # 03/04/2022 1.7 (A) 0.3 - 1.0 K/uL Final    Eos # 03/04/2022 0.1  0.0 - 0.5 K/uL Final    Baso # 03/04/2022 0.05  0.00 - 0.20 K/uL Final    nRBC 03/04/2022 0  0 /100 WBC Final    Gran % 03/04/2022 80.4 (A) 38.0 - 73.0 % Final    Lymph % 03/04/2022 10.4 (A) 18.0 - 48.0 % Final    Mono % 03/04/2022 7.8  4.0 - 15.0 % Final    Eosinophil % 03/04/2022 0.2  0.0 - 8.0 % Final    Basophil % 03/04/2022 0.2  0.0 - 1.9 % Final    Differential Method 03/04/2022 Automated   Final    POCT Glucose 03/04/2022 115 (A) 70 - 110 mg/dL Final    Aerobic Bacterial Culture 03/04/2022  (A)  Final                    Value:METHICILLIN RESISTANT STAPHYLOCOCCUS AUREUS  Many      Anaerobic Culture 03/04/2022 No anaerobes isolated   Final    Gram Stain Result 03/04/2022 Few WBC's   Final    Gram Stain Result 03/04/2022 Rare Gram positive cocci   Final    WBC 03/05/2022 18.07 (A) 3.90 - 12.70 K/uL Final    RBC 03/05/2022 2.66 (A) 4.00 - 5.40 M/uL Final    Hemoglobin 03/05/2022 8.2 (A) 12.0 - 16.0 g/dL Final    Hematocrit 03/05/2022 26.8 (A) 37.0 - 48.5 % Final    MCV 03/05/2022 101 (A) 82 - 98 fL Final    MCH 03/05/2022 30.8  27.0 - 31.0 pg Final    MCHC 03/05/2022 30.6 (A) 32.0 - 36.0 g/dL Final    RDW 03/05/2022 13.7  11.5 - 14.5 % Final    Platelets 03/05/2022 449  150 - 450 K/uL Final    MPV 03/05/2022 9.3  9.2 - 12.9 fL Final    Immature Granulocytes 03/05/2022 1.3 (A) 0.0 - 0.5 % Final    Gran # (ANC) 03/05/2022 14.6 (A) 1.8 - 7.7 K/uL Final    Immature Grans (Abs) 03/05/2022 0.24 (A) 0.00 - 0.04 K/uL Final    Lymph # 03/05/2022 1.7  1.0 - 4.8 K/uL Final    Mono # 03/05/2022 1.4 (A) 0.3 - 1.0 K/uL  Final    Eos # 03/05/2022 0.1  0.0 - 0.5 K/uL Final    Baso # 03/05/2022 0.06  0.00 - 0.20 K/uL Final    nRBC 03/05/2022 0  0 /100 WBC Final    Gran % 03/05/2022 81.0 (A) 38.0 - 73.0 % Final    Lymph % 03/05/2022 9.6 (A) 18.0 - 48.0 % Final    Mono % 03/05/2022 7.5  4.0 - 15.0 % Final    Eosinophil % 03/05/2022 0.3  0.0 - 8.0 % Final    Basophil % 03/05/2022 0.3  0.0 - 1.9 % Final    Differential Method 03/05/2022 Automated   Final    Sodium 03/05/2022 140  136 - 145 mmol/L Final    Potassium 03/05/2022 3.9  3.5 - 5.1 mmol/L Final    Chloride 03/05/2022 97  95 - 110 mmol/L Final    CO2 03/05/2022 33 (A) 23 - 29 mmol/L Final    Glucose 03/05/2022 112 (A) 70 - 110 mg/dL Final    BUN 03/05/2022 9  8 - 23 mg/dL Final    Creatinine 03/05/2022 0.8  0.5 - 1.4 mg/dL Final    Calcium 03/05/2022 9.3  8.7 - 10.5 mg/dL Final    Anion Gap 03/05/2022 10  8 - 16 mmol/L Final    eGFR if African American 03/05/2022 >60  >60 mL/min/1.73 m^2 Final    eGFR if non African American 03/05/2022 >60  >60 mL/min/1.73 m^2 Final    Vancomycin-Trough 03/05/2022 11.8  10.0 - 22.0 ug/mL Final    WBC 03/06/2022 15.59 (A) 3.90 - 12.70 K/uL Final    RBC 03/06/2022 2.55 (A) 4.00 - 5.40 M/uL Final    Hemoglobin 03/06/2022 7.9 (A) 12.0 - 16.0 g/dL Final    Hematocrit 03/06/2022 26.4 (A) 37.0 - 48.5 % Final    MCV 03/06/2022 104 (A) 82 - 98 fL Final    MCH 03/06/2022 31.0  27.0 - 31.0 pg Final    MCHC 03/06/2022 29.9 (A) 32.0 - 36.0 g/dL Final    RDW 03/06/2022 13.6  11.5 - 14.5 % Final    Platelets 03/06/2022 455 (A) 150 - 450 K/uL Final    MPV 03/06/2022 9.5  9.2 - 12.9 fL Final    Immature Granulocytes 03/06/2022 1.2 (A) 0.0 - 0.5 % Final    Gran # (ANC) 03/06/2022 12.0 (A) 1.8 - 7.7 K/uL Final    Immature Grans (Abs) 03/06/2022 0.19 (A) 0.00 - 0.04 K/uL Final    Lymph # 03/06/2022 2.1  1.0 - 4.8 K/uL Final    Mono # 03/06/2022 1.1 (A) 0.3 - 1.0 K/uL Final    Eos # 03/06/2022 0.1  0.0 - 0.5 K/uL Final    Baso #  03/06/2022 0.07  0.00 - 0.20 K/uL Final    nRBC 03/06/2022 0  0 /100 WBC Final    Gran % 03/06/2022 77.3 (A) 38.0 - 73.0 % Final    Lymph % 03/06/2022 13.5 (A) 18.0 - 48.0 % Final    Mono % 03/06/2022 6.7  4.0 - 15.0 % Final    Eosinophil % 03/06/2022 0.9  0.0 - 8.0 % Final    Basophil % 03/06/2022 0.4  0.0 - 1.9 % Final    Differential Method 03/06/2022 Automated   Final      ?   Tumor markers   ?  CEA, CA 19 9 pending  ?   Imaging:  ?  Outside see above  Results for orders placed or performed during the hospital encounter of 03/03/22 (from the past 2160 hour(s))   CT Abdomen Pelvis With Contrast    Impression    Inflammatory process involving the right upper quadrant with fat stranding and abdominal wall thickening.  A tract is identified suggestive prior instrumentation.  Fat stranding adjacent to the hepatic flexure.  At least 2 peripherally enhancing fluid collection are identified adjacent to the gallstone 1 near the left lobe of the liver measuring 25 x 26 mm and 1 laterally measuring 26 x 46 mm suggestive of abscess seroma or other.  Biloma not excluded.  Recommend clinical correlation and follow-up.    All CT scans at this facility use dose modulation, iterative reconstructions, and/or weight base dosing when appropriate to reduce radiation dose to as low as reasonably achievable      Electronically signed by: Kelton Crain  Date:    03/03/2022  Time:    18:11   CT Guided Abscess Drainage With Catheter    Impression    1.  Percutaneous CT and ultrasound-guided aspiration of a gallbladder fossa abscess, yielding 11 mL of thick pus.  No drainage catheter was left in place due to small size of the collection.    2.  Additional gallbladder fossa abscess that was described on CT abdomen pelvis 03/03/2022, today measures only 1.5 cm on intraprocedural imaging, too small for aspiration. Again noted is the retained 1.5 cm gallstone within the gallbladder  fossa.    ______________________________________________________________________      Electronically signed by: Gilberto Monroy  Date:    03/04/2022  Time:    16:34     No results found for this or any previous visit (from the past 2160 hour(s)).  Results for orders placed or performed during the hospital encounter of 03/03/22 (from the past 2160 hour(s))   CT/PET SCAN ROUTINE    Impression    1. Extensive uptake seen in the region of the right upper quadrant along the inferior margin of the liver at the site of prior cholecystectomy.  Postoperative fluid collection noted in this region.  There is also uptake seen along the anterolateral right abdominal wall along the site of a presumed recently removed drainage catheter.  Findings are likely a representation postoperative changes and tumor deposits.  Peritoneal implants in the region the eleanor hepatis not excluded.  2. Findings suspicious for metastatic lymph nodes along the right internal mammary chain.  There is also a single enlarged right cardiophrenic lymph node.  Single right axillary lymph node demonstrating low level uptake as well.  This is nonspecific.  3. Remaining findings as discussed above.      Electronically signed by: Roger Marquez DO  Date:    03/03/2022  Time:    15:22             Pathology:    Outside see above     ?   Assessment/Plan:   Adenocarcinoma of gallbladder       1. Adenocarcinoma of gallbladder          Plan:     Problem List Items Addressed This Visit        Oncology    Adenocarcinoma of gallbladder        Gallbladder adenocarcinoma, stage IV, pT4 NX pM1: acute RUQ pain with emergency laparoscopic cholecystectomy, 02/04/2022, [facility] , pathology positive for T4 adenocarcinoma peritoneal nodule positive for metastatic involvement.  immunohistochemistry positive for CK7 and CDX2 and negative for CK 20, TTF1 and PAX8.     03/03/2022 PET scan notable for uptake at right upper quadrant along liver margin site of prior  cholecystectomy with postoperative fluid and peritoneal implants eleanor hepatis region potential/likely postoperative changes, right internal mammary chain and cardiophrenic lymph nodes.  Case reviewed at multidisciplinary tumor board including PET scan and pathology felt consistent with metastatic gallbladder adenocarcinoma; no surgical option and palliative chemotherapy recommended.  Today we discussed again her diagnosis, incurable nature of her disease however option for palliative therapy.  I did discuss concern for recent intra-abdominal abscess with ongoing antibiotics which they will plan to complete next week with surgical follow-up.  We can re-evaluate in a couple weeks and if stable without active infectious concerns can consider repeat labs and initiation of chemotherapy palliative.  We did discuss options of his chemotherapy regimens if lab work and functional status amenable would prefer cisplatin and gemcitabine regimen.  Recommend audiogram and repeat lab work and a couple weeks after completion of antibiotics for final determination however tentative treatment plan for cisplatin gemcitabine placed.  Can get chemotherapy teaching/consent in interim.  Recommend follow-up with palliative care.    NSG liquid with PTEN reflex to tissue:  Guardant PDL1 CPS 2 NATY TP53 [id number]    Follow-Up:   Patient Instructions   RV 2 wks with cbc, cmp, reassessment for possible chemotherapy  Prior to this audiogram and teaching/consent for cisplatin and gemcitabine      60 minutes of total time spent on the encounter, which includes face to face time and non-face to face time preparing to see the patient (eg, review of tests), Obtaining and/or reviewing separately obtained history, Documenting clinical information in the electronic or other health record, Independently interpreting results (not separately reported) and communicating results to the patient/family/caregiver, or Care coordination (not separately reported).

## 2022-03-15 ENCOUNTER — TELEPHONE (OUTPATIENT)
Dept: HEMATOLOGY/ONCOLOGY | Facility: CLINIC | Age: 72
End: 2022-03-15
Payer: MEDICARE

## 2022-03-15 ENCOUNTER — TELEPHONE (OUTPATIENT)
Dept: PALLIATIVE MEDICINE | Facility: CLINIC | Age: 72
End: 2022-03-15
Payer: MEDICARE

## 2022-03-15 DIAGNOSIS — C23 ADENOCARCINOMA OF GALLBLADDER: Primary | ICD-10-CM

## 2022-03-15 NOTE — TELEPHONE ENCOUNTER
Spoke with pt daughter over the phone today. Pt was doing ok , just having a little nausea today.  We reviewed my role as nurse navigator and she has my direct contact information.  We agreed to talk again tomorrow after pt surgery post of visit to establish infection status.  Daughter asked if it would be ok to wait until after Easter to begin chemo pending infection clearance. Message sent to dr Marcos . Awaiting response.     Will set up chemo education and audiogram during the week next week as daughter stated this will be good timing for her to bring pt .

## 2022-03-16 ENCOUNTER — OFFICE VISIT (OUTPATIENT)
Dept: SURGERY | Facility: CLINIC | Age: 72
End: 2022-03-16
Payer: COMMERCIAL

## 2022-03-16 ENCOUNTER — TELEPHONE (OUTPATIENT)
Dept: HEMATOLOGY/ONCOLOGY | Facility: CLINIC | Age: 72
End: 2022-03-16
Payer: MEDICARE

## 2022-03-16 VITALS
DIASTOLIC BLOOD PRESSURE: 88 MMHG | WEIGHT: 165.38 LBS | BODY MASS INDEX: 25.9 KG/M2 | TEMPERATURE: 97 F | HEART RATE: 88 BPM | SYSTOLIC BLOOD PRESSURE: 165 MMHG

## 2022-03-16 DIAGNOSIS — T81.43XD INFECTION FOLLOWING A PROCEDURE, ORGAN AND SPACE SURGICAL SITE, SUBSEQUENT ENCOUNTER: Primary | ICD-10-CM

## 2022-03-16 PROBLEM — T81.42XA INFECTION FOLLOWING A PROCEDURE, DEEP INCISIONAL SURGICAL SITE, INITIAL ENCOUNTER: Status: RESOLVED | Noted: 2022-03-03 | Resolved: 2022-03-16

## 2022-03-16 PROCEDURE — 99024 PR POST-OP FOLLOW-UP VISIT: ICD-10-PCS | Mod: S$GLB,,,

## 2022-03-16 PROCEDURE — 4010F ACE/ARB THERAPY RXD/TAKEN: CPT | Mod: CPTII,S$GLB,,

## 2022-03-16 PROCEDURE — 3008F BODY MASS INDEX DOCD: CPT | Mod: CPTII,S$GLB,,

## 2022-03-16 PROCEDURE — 1126F PR PAIN SEVERITY QUANTIFIED, NO PAIN PRESENT: ICD-10-PCS | Mod: CPTII,S$GLB,,

## 2022-03-16 PROCEDURE — 1159F PR MEDICATION LIST DOCUMENTED IN MEDICAL RECORD: ICD-10-PCS | Mod: CPTII,S$GLB,,

## 2022-03-16 PROCEDURE — 3077F PR MOST RECENT SYSTOLIC BLOOD PRESSURE >= 140 MM HG: ICD-10-PCS | Mod: CPTII,S$GLB,,

## 2022-03-16 PROCEDURE — 99024 POSTOP FOLLOW-UP VISIT: CPT | Mod: S$GLB,,,

## 2022-03-16 PROCEDURE — 99999 PR PBB SHADOW E&M-EST. PATIENT-LVL III: CPT | Mod: PBBFAC,,,

## 2022-03-16 PROCEDURE — 99999 PR PBB SHADOW E&M-EST. PATIENT-LVL III: ICD-10-PCS | Mod: PBBFAC,,,

## 2022-03-16 PROCEDURE — 3077F SYST BP >= 140 MM HG: CPT | Mod: CPTII,S$GLB,,

## 2022-03-16 PROCEDURE — 3008F PR BODY MASS INDEX (BMI) DOCUMENTED: ICD-10-PCS | Mod: CPTII,S$GLB,,

## 2022-03-16 PROCEDURE — 1126F AMNT PAIN NOTED NONE PRSNT: CPT | Mod: CPTII,S$GLB,,

## 2022-03-16 PROCEDURE — 3079F PR MOST RECENT DIASTOLIC BLOOD PRESSURE 80-89 MM HG: ICD-10-PCS | Mod: CPTII,S$GLB,,

## 2022-03-16 PROCEDURE — 1160F RVW MEDS BY RX/DR IN RCRD: CPT | Mod: CPTII,S$GLB,,

## 2022-03-16 PROCEDURE — 4010F PR ACE/ARB THEARPY RXD/TAKEN: ICD-10-PCS | Mod: CPTII,S$GLB,,

## 2022-03-16 PROCEDURE — 3079F DIAST BP 80-89 MM HG: CPT | Mod: CPTII,S$GLB,,

## 2022-03-16 PROCEDURE — 1160F PR REVIEW ALL MEDS BY PRESCRIBER/CLIN PHARMACIST DOCUMENTED: ICD-10-PCS | Mod: CPTII,S$GLB,,

## 2022-03-16 PROCEDURE — 1159F MED LIST DOCD IN RCRD: CPT | Mod: CPTII,S$GLB,,

## 2022-03-16 NOTE — PROGRESS NOTES
Hospital Follow-up    SUBJECTIVE:     History of Present Illness:  Patient is a 71 y.o. female presents for hospital follow-up. She was recently hospitalized and treated for intraabdominal abscesses secondary to a recent attempted cholecystectomy at [facility] in [place]. 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 A PRADIP drain was left in place and subsequently pulled, and when she presented to [facility] this site seemed to be the source of her infection. She is doing well today. She has occasional nausea but no emesis. She denies any abdominal pain and fevers. She is no longer having drainage from the site of the previous PRADIP drain.  She completed her course of antibiotics this morning.     Review of patient's allergies indicates:   Allergen Reactions    Aspirin      Makes her throwup    Ibuprofen      Throws up    Sulfa (sulfonamide antibiotics)      Pt can't remember reactions    Amoxicillin-pot clavulanate Itching       Current Outpatient Medications   Medication Sig Dispense Refill    albuterol (PROVENTIL/VENTOLIN HFA) 90 mcg/actuation inhaler INHALE TWO PUFFS FOUR TIMES DAILY AS NEEDED FOR WHEEZING      amLODIPine (NORVASC) 5 MG tablet amlodipine Take 1 time per day No date recorded tablet 1 time per day No route recorded No set duration recorded No set duration amount recorded active 5 mg      clonazePAM (KLONOPIN) 0.5 MG tablet clonazepam Take 2 times per day No date recorded tablet 2 times per day No route recorded No set duration recorded No set duration amount recorded active 0.5 mg      diclofenac sodium (VOLTAREN) 1 % Gel APPLY 1 GRAM TO AFFECTED AREA FOUR TIMES DAILY AS NEEDED      labetaloL (NORMODYNE) 300 MG tablet labetalol Take 2 times per day No date recorded tablet 2 times per day No route recorded No set duration recorded No set duration amount recorded suspended 300 mg       lisinopriL 10 MG tablet Take 1 tablet (10 mg total) by mouth once daily. 90 tablet 0    montelukast (SINGULAIR) 10 mg tablet Take 10 mg by mouth once daily.      ondansetron (ZOFRAN-ODT) 8 MG TbDL Take 1 tablet (8 mg total) by mouth every 6 (six) hours as needed. 30 tablet 1    oxyCODONE-acetaminophen (PERCOCET)  mg per tablet Take 5mg for moderate pain and 10mg for severe pain every 4hr as needed (max 4 doses/ day) 30 tablet 0    prochlorperazine (COMPAZINE) 5 MG tablet Take 1 tablet (5 mg total) by mouth every 6 (six) hours as needed for Nausea. May take every 6 hours scheduled for prevention of nausea. 60 tablet 0    cyproheptadine (PERIACTIN) 4 mg tablet Take 4 mg by mouth 2 (two) times daily.      ipratropium (ATROVENT) 21 mcg (0.03 %) nasal spray       linezolid (ZYVOX) 600 mg Tab Take 1 tablet (600 mg total) by mouth every 12 (twelve) hours. for 10 days (Patient not taking: Reported on 3/16/2022) 20 tablet 0     No current facility-administered medications for this visit.       Past Medical History:   Diagnosis Date    Cancer of gallbladder     Essential (primary) hypertension      No past surgical history on file.  No family history on file.  Social History     Tobacco Use    Smoking status: Former Smoker    Smokeless tobacco: Former User        Review of Systems:  Review of Systems   Constitutional: Negative for chills, fatigue, fever and unexpected weight change.   Respiratory: Negative for cough, shortness of breath, wheezing and stridor.    Cardiovascular: Negative for chest pain, palpitations and leg swelling.   Gastrointestinal: Positive for nausea. Negative for abdominal distention, abdominal pain, constipation, diarrhea and vomiting.   Genitourinary: Negative for difficulty urinating, dysuria, frequency, hematuria and urgency.   Skin: Negative for color change, pallor, rash and wound.   Hematological: Does not bruise/bleed easily.       OBJECTIVE:     Vital Signs (Most Recent)  Temp:  97.3 °F (36.3 °C) (03/16/22 1308)  Pulse: 88 (03/16/22 1308)  BP: (!) 165/88 (03/16/22 1308)     75 kg (165 lb 5.5 oz)     Physical Exam:  Physical Exam  Vitals reviewed.   Constitutional:       General: She is not in acute distress.     Appearance: Normal appearance. She is well-developed. She is not ill-appearing.      Comments: Presents in wheelchair but able to ambulate   HENT:      Head: Normocephalic and atraumatic.      Right Ear: External ear normal.      Left Ear: External ear normal.   Eyes:      Extraocular Movements: Extraocular movements intact.      Conjunctiva/sclera: Conjunctivae normal.   Cardiovascular:      Rate and Rhythm: Normal rate.   Pulmonary:      Effort: Pulmonary effort is normal. No respiratory distress.   Abdominal:      General: There is no distension.      Palpations: Abdomen is soft.      Tenderness: There is no abdominal tenderness.      Comments: No TTP, including in RUQ. Previous surgical sites and PRADIP drain site clean and dry without signs of infection. No drainage visualized on exam.    Musculoskeletal:      Cervical back: Neck supple.   Skin:     General: Skin is warm and dry.   Neurological:      Mental Status: She is alert and oriented to person, place, and time.   Psychiatric:         Behavior: Behavior normal.         ASSESSMENT/PLAN:     72 y/o female with gallbladder adenocarcinoma who presented for hospital follow-up of intraabdominal abscesses. She is doing well.     - Keep follow-up with Dr. Marcos.  - Advised that should she become a surgical candidate for resection of this malignancy, this would be handled by surgical oncology in [place].   - Okay to bathe and shower at this point.  - RTC braxton Vilchis PA-C  [facility]

## 2022-03-16 NOTE — TELEPHONE ENCOUNTER
LM for pt/daughter to call me back regarding audiogram and chemo education appts set for 3/24/22 at the [place] location. Also left message that Dr. Marcos has agreed that pt could wait until after Easter to start chemo. Direct number left in message.

## 2022-03-18 ENCOUNTER — TELEPHONE (OUTPATIENT)
Dept: HEMATOLOGY/ONCOLOGY | Facility: CLINIC | Age: 72
End: 2022-03-18
Payer: MEDICARE

## 2022-03-21 ENCOUNTER — TELEPHONE (OUTPATIENT)
Dept: HEMATOLOGY/ONCOLOGY | Facility: CLINIC | Age: 72
End: 2022-03-21
Payer: MEDICARE

## 2022-03-21 ENCOUNTER — DOCUMENTATION ONLY (OUTPATIENT)
Dept: HEMATOLOGY/ONCOLOGY | Facility: CLINIC | Age: 72
End: 2022-03-21
Payer: MEDICARE

## 2022-03-21 DIAGNOSIS — C23 ADENOCARCINOMA OF GALLBLADDER: Primary | ICD-10-CM

## 2022-03-21 NOTE — NURSING
Spoke with pt daughter over the phone today regarding setting up lab, md and first chemo infusion.  Dr. Marcos ok with pt starting after [place] as requested.   Pt will have chemo education and audiogram 3/24/22 and they are aware of those appts and will attend  Together with pt daughter we set up lab and MD for 22 at the cancer center and then 22 at the cancer center for first infusion. Per their requests for dates and location.  They know to contact me with any further needs meanwhile. Insurance approval received.   Pt will not need covid screening as she is vaccinated. I will inform social work team and  of new pt start. Pt and daughter aware.   I will follow pt   Oncology Navigation   Intake  Cancer Type: GI (Gallbladder cancer)  Initial Nurse Navigator Contact: 2022  Date Worked: 3/21/2022  Multiple appointments: Yes  Start of Treatment: 2022 (pt request to start treatment after  ok with dr Marcos)  Reason for Treatment Delay: Other     Treatment  Current Status: Active  Date Presented to Tumor Board: 3/11/2022       Medical Oncologist: Hemant  Chemotherapy: Planned  Chemotherapy Regimen: Cisplatin Gemzar       Procedures: Other  Other Schedule Date: 3/24/2022 (audiogram)    General Referrals: Social work  Social Work Referral Date: 3/21/2022          Support Systems: Children  Concerns: pt wanted to wait until afte Easter to start treatment , ok with Dr. Marcos     Acuity  Systemic Treatment - predicted or initiated: Chemotherapy regimen with multiple drugs (+1)  Treatment Tolerability: Has not started treatment yet/treatment fully completed and side effects resolved  ECO-3 (+1)  Comorbidities in Medical History: 0-2 (+0)   Needed: No  Support: Patient reports adequate support system  Transportation: Adequate transportation for treatment  Verbalizes the need for more education: Yes  Navigation Acuity: 4     Follow Up  No follow-ups on file.

## 2022-03-23 ENCOUNTER — PATIENT MESSAGE (OUTPATIENT)
Dept: PALLIATIVE MEDICINE | Facility: CLINIC | Age: 72
End: 2022-03-23

## 2022-03-23 ENCOUNTER — OFFICE VISIT (OUTPATIENT)
Dept: PALLIATIVE MEDICINE | Facility: CLINIC | Age: 72
End: 2022-03-23
Payer: COMMERCIAL

## 2022-03-23 VITALS
SYSTOLIC BLOOD PRESSURE: 147 MMHG | BODY MASS INDEX: 25.67 KG/M2 | DIASTOLIC BLOOD PRESSURE: 84 MMHG | WEIGHT: 163.56 LBS | HEART RATE: 98 BPM | OXYGEN SATURATION: 96 % | TEMPERATURE: 98 F | RESPIRATION RATE: 16 BRPM | HEIGHT: 67 IN

## 2022-03-23 DIAGNOSIS — F32.9 REACTIVE DEPRESSION: ICD-10-CM

## 2022-03-23 DIAGNOSIS — C23 ADENOCARCINOMA OF GALLBLADDER: ICD-10-CM

## 2022-03-23 DIAGNOSIS — G89.3 NEOPLASM RELATED PAIN: Primary | ICD-10-CM

## 2022-03-23 DIAGNOSIS — Z51.5 PALLIATIVE CARE ENCOUNTER: ICD-10-CM

## 2022-03-23 PROCEDURE — 99215 OFFICE O/P EST HI 40 MIN: CPT | Mod: S$GLB,,, | Performed by: PHYSICIAN ASSISTANT

## 2022-03-23 PROCEDURE — 1159F PR MEDICATION LIST DOCUMENTED IN MEDICAL RECORD: ICD-10-PCS | Mod: CPTII,S$GLB,, | Performed by: PHYSICIAN ASSISTANT

## 2022-03-23 PROCEDURE — 1125F AMNT PAIN NOTED PAIN PRSNT: CPT | Mod: CPTII,S$GLB,, | Performed by: PHYSICIAN ASSISTANT

## 2022-03-23 PROCEDURE — 1123F ACP DISCUSS/DSCN MKR DOCD: CPT | Mod: CPTII,S$GLB,, | Performed by: PHYSICIAN ASSISTANT

## 2022-03-23 PROCEDURE — 1125F PR PAIN SEVERITY QUANTIFIED, PAIN PRESENT: ICD-10-PCS | Mod: CPTII,S$GLB,, | Performed by: PHYSICIAN ASSISTANT

## 2022-03-23 PROCEDURE — 99497 ADVNCD CARE PLAN 30 MIN: CPT | Mod: S$GLB,,, | Performed by: PHYSICIAN ASSISTANT

## 2022-03-23 PROCEDURE — 1160F PR REVIEW ALL MEDS BY PRESCRIBER/CLIN PHARMACIST DOCUMENTED: ICD-10-PCS | Mod: CPTII,S$GLB,, | Performed by: PHYSICIAN ASSISTANT

## 2022-03-23 PROCEDURE — 1160F RVW MEDS BY RX/DR IN RCRD: CPT | Mod: CPTII,S$GLB,, | Performed by: PHYSICIAN ASSISTANT

## 2022-03-23 PROCEDURE — 3008F BODY MASS INDEX DOCD: CPT | Mod: CPTII,S$GLB,, | Performed by: PHYSICIAN ASSISTANT

## 2022-03-23 PROCEDURE — 1101F PT FALLS ASSESS-DOCD LE1/YR: CPT | Mod: CPTII,S$GLB,, | Performed by: PHYSICIAN ASSISTANT

## 2022-03-23 PROCEDURE — 3008F PR BODY MASS INDEX (BMI) DOCUMENTED: ICD-10-PCS | Mod: CPTII,S$GLB,, | Performed by: PHYSICIAN ASSISTANT

## 2022-03-23 PROCEDURE — 3077F PR MOST RECENT SYSTOLIC BLOOD PRESSURE >= 140 MM HG: ICD-10-PCS | Mod: CPTII,S$GLB,, | Performed by: PHYSICIAN ASSISTANT

## 2022-03-23 PROCEDURE — 99417 PROLNG OP E/M EACH 15 MIN: CPT | Mod: S$GLB,,, | Performed by: PHYSICIAN ASSISTANT

## 2022-03-23 PROCEDURE — 4010F PR ACE/ARB THEARPY RXD/TAKEN: ICD-10-PCS | Mod: CPTII,S$GLB,, | Performed by: PHYSICIAN ASSISTANT

## 2022-03-23 PROCEDURE — 1123F PR ADV CARE PLAN DISCUSSED, PLAN OR SURROGATE DOCUMENTED: ICD-10-PCS | Mod: CPTII,S$GLB,, | Performed by: PHYSICIAN ASSISTANT

## 2022-03-23 PROCEDURE — 99417 PR PROLONGED SVC, OUTPT, W/WO DIRECT PT CONTACT,  EA ADDTL 15 MIN: ICD-10-PCS | Mod: S$GLB,,, | Performed by: PHYSICIAN ASSISTANT

## 2022-03-23 PROCEDURE — 3288F FALL RISK ASSESSMENT DOCD: CPT | Mod: CPTII,S$GLB,, | Performed by: PHYSICIAN ASSISTANT

## 2022-03-23 PROCEDURE — 4010F ACE/ARB THERAPY RXD/TAKEN: CPT | Mod: CPTII,S$GLB,, | Performed by: PHYSICIAN ASSISTANT

## 2022-03-23 PROCEDURE — 3077F SYST BP >= 140 MM HG: CPT | Mod: CPTII,S$GLB,, | Performed by: PHYSICIAN ASSISTANT

## 2022-03-23 PROCEDURE — 99215 PR OFFICE/OUTPT VISIT, EST, LEVL V, 40-54 MIN: ICD-10-PCS | Mod: S$GLB,,, | Performed by: PHYSICIAN ASSISTANT

## 2022-03-23 PROCEDURE — 3079F DIAST BP 80-89 MM HG: CPT | Mod: CPTII,S$GLB,, | Performed by: PHYSICIAN ASSISTANT

## 2022-03-23 PROCEDURE — 99497 PR ADVNCD CARE PLAN 30 MIN: ICD-10-PCS | Mod: S$GLB,,, | Performed by: PHYSICIAN ASSISTANT

## 2022-03-23 PROCEDURE — 1159F MED LIST DOCD IN RCRD: CPT | Mod: CPTII,S$GLB,, | Performed by: PHYSICIAN ASSISTANT

## 2022-03-23 PROCEDURE — 3079F PR MOST RECENT DIASTOLIC BLOOD PRESSURE 80-89 MM HG: ICD-10-PCS | Mod: CPTII,S$GLB,, | Performed by: PHYSICIAN ASSISTANT

## 2022-03-23 PROCEDURE — 3288F PR FALLS RISK ASSESSMENT DOCUMENTED: ICD-10-PCS | Mod: CPTII,S$GLB,, | Performed by: PHYSICIAN ASSISTANT

## 2022-03-23 PROCEDURE — 99999 PR PBB SHADOW E&M-EST. PATIENT-LVL V: ICD-10-PCS | Mod: PBBFAC,,, | Performed by: PHYSICIAN ASSISTANT

## 2022-03-23 PROCEDURE — 99999 PR PBB SHADOW E&M-EST. PATIENT-LVL V: CPT | Mod: PBBFAC,,, | Performed by: PHYSICIAN ASSISTANT

## 2022-03-23 PROCEDURE — 1101F PR PT FALLS ASSESS DOC 0-1 FALLS W/OUT INJ PAST YR: ICD-10-PCS | Mod: CPTII,S$GLB,, | Performed by: PHYSICIAN ASSISTANT

## 2022-03-23 RX ORDER — VENLAFAXINE HYDROCHLORIDE 75 MG/1
75 CAPSULE, EXTENDED RELEASE ORAL DAILY
Qty: 30 CAPSULE | Refills: 11 | Status: SHIPPED | OUTPATIENT
Start: 2022-03-23 | End: 2023-03-23

## 2022-03-23 RX ORDER — NALOXONE HYDROCHLORIDE 4 MG/.1ML
SPRAY NASAL
COMMUNITY
Start: 2022-03-08

## 2022-03-23 RX ORDER — OXYCODONE HYDROCHLORIDE 5 MG/1
5 TABLET ORAL EVERY 4 HOURS PRN
Qty: 90 TABLET | Refills: 0 | Status: SHIPPED | OUTPATIENT
Start: 2022-03-23 | End: 2022-04-19 | Stop reason: SDUPTHER

## 2022-03-23 NOTE — PATIENT INSTRUCTIONS
Follow up in 6 weeks.    Start taking venlafaxine 75mg once daily for depression.    Call me with mirtazapine dose so we can determine how much to take for improving appetite.

## 2022-03-23 NOTE — ASSESSMENT & PLAN NOTE
Well controlled on oxycodone/ APAP 5mg TID PRN    Rotate to oxycodone monotherapy in light of upcoming treatment and wish to minimize APAP

## 2022-03-23 NOTE — PROGRESS NOTES
Palliative Medicine         Follow up    SUBJECTIVE:   Chief complaint: pain follow up    03/23/2022   She is accompanied to clinic with her daughter Dotty and son Clive. Since our last visit she was admitted for intraabdominal abscesses and is convalescing well. She was tearful during our entire meeting and withdrawn. She admits to excessive sleeping, poor appetite, depression, and anxiety. She asks for something to calm her nerves in addition to the Klonopin she is taking. She says that she lies in bed worrying about her children and . Her pain is better controlled with the antibiotics and is taking oxycodone 5mg once sometimes twice daily with great response to RUQ and flank pain. She wants to eat but says still has no appetite despite good control of nausea with PRN Compazine. We discussed appetite stimulants and would recommend Remeron but they think she has some at home so will call when she gets home with the dosage. She is planning to start chemotherapy after Easter as Dotty is going on a vacation and they all want her here when she starts treatment. Her children were a bit perplexed with her constant crying today as they say that she does not cry at home however they also say that she does not leave her bedroom. I ask if it is that in clinic she has to face the cancer diagnosis which is distressing but at home she is able to internalize her fears but also avoids by sleeping most of the day. She admits that coming in for visits is emotionally taxing on her as she has to talk about her fears and acknowledge the malignancy. We discussed her symptoms and I feel that most are attributed to severe depression related to her diagnosis which should improve with improved mood. She denies SI and is open to starting an antidepressant. She completed HCPOA today but given her mental state I elected to postpone code status conversation for another visit.     Past visits:  3/3/22: Patient is a 71 y.o. year old  female presenting with her 3 children Clive, Ranjith, and Dotty to establish care in the PM clinic. 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 At OSH she underwent ex lap on 2/4/22 and intraoperative findings resulted in the malignancy diagnosis. She has established with Dr. Marcso and plans to formulate a treatment plan once she has completed staging. She had just completed her PET scan prior to my visit. Her three children talked to the PM nurse and shared their fears, concerns, and shock over the diagnosis. When I entered the room all were tearful and emotional. I explained why I was consulted to participate in the patient's care. We talked about the role palliative care often plays in helping patients with advanced care planning and symptom management. I particularly expressed how important it was to clarify what type of care the patient would like to receive moving forward given the current status. Everyone is still processing the information and emotional since the initial diagnosis a month ago. Dotty says it is hard for them to see her ill because she has always been so healthy. Clive says that he does not like knowing he is not in control of what will happen. Ranjith sat rigidly in his chair with his fists tightly balled up gripping his pants and streaming tears but never spoke. Ms. Serna says that she wants to live and plans to accept any and all treatment options offered. Her biggest fear is leaving her 4 children. She has a strong dara in God and continued to cry out to him begging for healing and his guidance. It was clear that they still have more processing to do before we can have an in depth conversation about the journey ahead. We were able to discuss that death is nonnegotiable and that eventually we will have to face that fact. In patients with advanced cancers sometimes this reality is sooner than we would like and would  require shifting goals from life prolonging to achieving comfort and dignity at the end of life. All could easily admit that they would hope for a peaceful, comfortable passing with family near and I reassured them that we would be able to assist with that transition when the time comes. They acknowledge the limitation of human medicine but remain hopeful in a miracle. I share this hope and recommended that we follow up in 1 month.       I explained another facet of our care includes pain and symptom management. She endorses nausea and anorexia. She also has RUQ pain that was worse when the drain was still in place but since removal is  to the touch and prevents her from lying on that side. Recently she has noticed that any movement exacerbates the pain and is finding it hard to get comfortable. At the time the drain was removed she was noting increased drainage and was rx a 5 day course of Cipro completed on 2/27/22. Since then the drainage has persisted but is now more copious. Her bandage was soaked with sherif pus and surrounding induration. I initially recommended they go back to see their surgeon for ASAP appointment but Ms. Serna says she will never see him again. In light of the drainage I feel it is best to be evaluated in the ED and might warrant additional imaging. She was reluctant to go but her children agreed it would be best to have a thorough evaluation. They were escorted to the ED by the PM nurse and I alerted the ED MD of her case. They will reach out after the evaluation and whether I need to send something for pain and anorexia.    BOB STEARNS reviewed and summarized:        Past Medical History:   Diagnosis Date    Cancer of gallbladder     Essential (primary) hypertension     Infection following a procedure, deep incisional surgical site, initial encounter 3/3/2022    Sherif pus on bandage with induration to RUQ. She has already completed a 5 day course of Cipro which completed on  2/27. Recommend prompt evaluation with ED and whether or not imaging is warranted.     Review of patient's allergies indicates:   Allergen Reactions    Aspirin      Makes her throwup    Ibuprofen      Throws up    Sulfa (sulfonamide antibiotics)      Pt can't remember reactions    Amoxicillin-pot clavulanate Itching       Medications:    Current Outpatient Medications:     albuterol (PROVENTIL/VENTOLIN HFA) 90 mcg/actuation inhaler, INHALE TWO PUFFS FOUR TIMES DAILY AS NEEDED FOR WHEEZING, Disp: , Rfl:     amLODIPine (NORVASC) 5 MG tablet, amlodipine Take 1 time per day No date recorded tablet 1 time per day No route recorded No set duration recorded No set duration amount recorded active 5 mg, Disp: , Rfl:     clonazePAM (KLONOPIN) 0.5 MG tablet, clonazepam Take 2 times per day No date recorded tablet 2 times per day No route recorded No set duration recorded No set duration amount recorded active 0.5 mg, Disp: , Rfl:     cyproheptadine (PERIACTIN) 4 mg tablet, Take 4 mg by mouth 2 (two) times daily., Disp: , Rfl:     diclofenac sodium (VOLTAREN) 1 % Gel, APPLY 1 GRAM TO AFFECTED AREA FOUR TIMES DAILY AS NEEDED, Disp: , Rfl:     ipratropium (ATROVENT) 21 mcg (0.03 %) nasal spray, , Disp: , Rfl:     labetaloL (NORMODYNE) 300 MG tablet, labetalol Take 2 times per day No date recorded tablet 2 times per day No route recorded No set duration recorded No set duration amount recorded suspended 300 mg, Disp: , Rfl:     lisinopriL 10 MG tablet, Take 1 tablet (10 mg total) by mouth once daily., Disp: 90 tablet, Rfl: 0    montelukast (SINGULAIR) 10 mg tablet, Take 10 mg by mouth once daily., Disp: , Rfl:     naloxone (NARCAN) 4 mg/actuation Spry, USE ONE SPRAY IN ONE NOSTRIL AS DIRECTED UPON SIGNS OF OPIOID OVERDOSE CALL 911, Disp: , Rfl:     ondansetron (ZOFRAN-ODT) 8 MG TbDL, Take 1 tablet (8 mg total) by mouth every 6 (six) hours as needed., Disp: 30 tablet, Rfl: 1    prochlorperazine (COMPAZINE) 5 MG  tablet, Take 1 tablet (5 mg total) by mouth every 6 (six) hours as needed for Nausea. May take every 6 hours scheduled for prevention of nausea., Disp: 60 tablet, Rfl: 0    oxyCODONE (ROXICODONE) 5 MG immediate release tablet, Take 1 tablet (5 mg total) by mouth every 4 (four) hours as needed for Pain. Max 4 doses/ day, Disp: 90 tablet, Rfl: 0    venlafaxine (EFFEXOR-XR) 75 MG 24 hr capsule, Take 1 capsule (75 mg total) by mouth once daily., Disp: 30 capsule, Rfl: 11    OBJECTIVE:     ROS:  Review of Systems   Constitutional: Positive for activity change, appetite change and fatigue. Negative for fever.   HENT: Negative for sinus pressure and trouble swallowing.    Eyes: Negative for visual disturbance.   Respiratory: Negative for cough and shortness of breath.    Cardiovascular: Negative for chest pain and leg swelling.   Gastrointestinal: Positive for abdominal pain. Negative for diarrhea, nausea and vomiting.   Endocrine: Negative for polydipsia, polyphagia and polyuria.   Genitourinary: Negative for difficulty urinating and dysuria.   Musculoskeletal: Positive for back pain. Negative for gait problem.   Skin: Negative for wound.   Neurological: Positive for numbness. Negative for weakness.   Psychiatric/Behavioral: Positive for dysphoric mood and sleep disturbance. Negative for confusion, self-injury and suicidal ideas. The patient is nervous/anxious. The patient is not hyperactive.        Review of Symptoms    Symptom Assessment (ESAS 0-10 Scale)  Pain:  10  Dyspnea:  0  Anxiety:  9  Nausea:  0  Depression:  9  Anorexia:  5  Fatigue:  0  Insomnia:  10  Restlessness:  0  Agitation:  0     CAM / Delirium:  Negative  Constipation:  Negative  Diarrhea:  Negative    Pain Assessment:  Location(s): abdomen    Abdomen       Location: right        Quantity: 10/10 in intensity, stable        Aggravating Factors: none        Alleviating Factors: opiates        Associated Symptoms: nausea    ECOG Performance Status  ndGndrndanddndend:nd nd2nd Living Arrangements:  Lives with spouse      Advance Care Planning   Advance Directives:   [place]: No    Do Not Resuscitate Status: No    Medical Power of : Yes    Agent's Name:  1: daughter Dotty Serna [phone], 2: son Ranjith Serna [phone], 3: son Clive Zimmerman [phone]    Decision Making:  Patient answered questions and Family answered questions          Physical Exam:  Vitals: Temp: 97.5 °F (36.4 °C) (03/23/22 1059)  Pulse: 98 (03/23/22 1059)  Resp: 16 (03/23/22 1059)  BP: (!) 147/84 (03/23/22 1059)  SpO2: 96 % (03/23/22 1059)  Physical Exam  Vitals and nursing note reviewed.   Constitutional:       General: She is not in acute distress.     Appearance: Normal appearance. She is not ill-appearing.   HENT:      Head: Normocephalic and atraumatic.   Eyes:      Pupils: Pupils are equal, round, and reactive to light.   Cardiovascular:      Rate and Rhythm: Normal rate and regular rhythm.      Pulses: Normal pulses.      Heart sounds: Normal heart sounds. No murmur heard.  Pulmonary:      Effort: Pulmonary effort is normal. No respiratory distress.      Breath sounds: Normal breath sounds.   Abdominal:      General: Bowel sounds are normal. There is no distension.      Palpations: Abdomen is soft.      Tenderness: There is no abdominal tenderness.   Musculoskeletal:         General: Normal range of motion.      Cervical back: Normal range of motion.      Right lower leg: No edema.      Left lower leg: No edema.   Skin:     General: Skin is warm and dry.   Neurological:      Mental Status: She is alert and oriented to person, place, and time. Mental status is at baseline.      Cranial Nerves: No cranial nerve deficit.   Psychiatric:         Attention and Perception: Attention and perception normal.         Mood and Affect: Mood is depressed. Affect is tearful.         Speech: Speech normal.         Behavior: Behavior is withdrawn.         Thought Content: Thought content normal.          Cognition and Memory: Cognition and memory normal.         Judgment: Judgment normal.         Radiology and laboratory data reviewed    ASSESSMENT/PLAN:     Problem List Items Addressed This Visit        Psychiatric    Reactive depression    Overview     Start Effexor 75mg daily    Referral to oncology psychologist              Oncology    Adenocarcinoma of gallbladder    Current Assessment & Plan     Plans to start treatment after Easter holiday    Under the care of Dr. Marcos           Neoplasm related pain - Primary    Current Assessment & Plan     Well controlled on oxycodone/ APAP 5mg TID PRN    Rotate to oxycodone monotherapy in light of upcoming treatment and wish to minimize APAP              Other    Palliative care encounter    Overview     HCPOA completed today    HCPOA: 1: daughter Dotty Serna [phone], 2: son Ranjith Serna [phone], 3: janeth Zimmerman [phone]    Defer code status conversations for subsequent visits                     Follow up: 6 weeks    80 minutes total visit time    60 minutes of total time spent on the encounter, which includes face to face time and non-face to face time preparing to see the patient (eg, review of tests), Obtaining and/or reviewing separately obtained history, Documenting clinical information in the electronic or other health record, Independently interpreting results (not separately reported) and communicating results to the patient/family/caregiver, or Care coordination (not separately reported).    20 minutes spent in discussing ACP    Signature: Chayo Mensah PA-C

## 2022-03-24 ENCOUNTER — CLINICAL SUPPORT (OUTPATIENT)
Dept: AUDIOLOGY | Facility: CLINIC | Age: 72
End: 2022-03-24
Payer: COMMERCIAL

## 2022-03-24 ENCOUNTER — CLINICAL SUPPORT (OUTPATIENT)
Dept: HEMATOLOGY/ONCOLOGY | Facility: CLINIC | Age: 72
End: 2022-03-24
Payer: MEDICARE

## 2022-03-24 DIAGNOSIS — C23 ADENOCARCINOMA OF GALLBLADDER: ICD-10-CM

## 2022-03-24 DIAGNOSIS — C23 ADENOCARCINOMA OF GALLBLADDER: Primary | ICD-10-CM

## 2022-03-24 DIAGNOSIS — H90.3 SENSORINEURAL HEARING LOSS, BILATERAL: Primary | ICD-10-CM

## 2022-03-24 PROCEDURE — 92567 PR TYMPA2METRY: ICD-10-PCS | Mod: S$GLB,,, | Performed by: AUDIOLOGIST-HEARING AID FITTER

## 2022-03-24 PROCEDURE — 92557 PR COMPREHENSIVE HEARING TEST: ICD-10-PCS | Mod: S$GLB,,, | Performed by: AUDIOLOGIST-HEARING AID FITTER

## 2022-03-24 PROCEDURE — 92557 COMPREHENSIVE HEARING TEST: CPT | Mod: S$GLB,,, | Performed by: AUDIOLOGIST-HEARING AID FITTER

## 2022-03-24 PROCEDURE — 92567 TYMPANOMETRY: CPT | Mod: S$GLB,,, | Performed by: AUDIOLOGIST-HEARING AID FITTER

## 2022-03-24 RX ORDER — DEXAMETHASONE 4 MG/1
TABLET ORAL
Qty: 24 TABLET | Refills: 3 | Status: SHIPPED | OUTPATIENT
Start: 2022-03-24

## 2022-03-24 NOTE — PROGRESS NOTES
Met with patient and family in person____) to discuss upcoming systemic therapy initiation. Discussed patient diagnosis including staging information. Also discussed that therapy regimen prescribed involves the following drugs: __Cisplatin and Gemzar__, and timeline of therapy administration. Went over what to expect on first day of treatment, including what to bring with you, visitor policy, and infusion suite guidelines. Also discussed supportive and shared services available to patient, including social work, financial counseling, oncology nutrition, and oncology psychology.      Covered with patient potential side effects and symptom management. Reviewed supportive medications that will be given before, during, and after treatment. Also stressed that other side effects are possible outside of those listed. Spent additional time on signs of infection, infection prevention, and proper nutrition/hydration.    Education provided to patient via  chemotherapy education binder___). Also provided contact information for clinic and information related to [facility] communication. Discussed communication process for after-hours needs and some common scenarios in which patient should call provider for guidance vs. immediately report to the emergency room.     Finally, patient was given my contact information. Encouraged patient to call with any questions, concerns, or needs. Patient verbalized understanding of all above information.

## 2022-03-30 NOTE — PROGRESS NOTES
Referring provider: Dr. Hemant Serna was seen 03/24/22 for an audiological evaluation.  Patient is here for baseline audiogram prior to Cisplatin therapy; diagnosis of Adenocarcinoma of gallbladder.  She denies hearing loss, tinnitus or dizziness.  No family history of hearing loss; no history of excessive noise exposure.       Results reveal a mild-to-moderate sensorineural hearing loss 250-8000 Hz for the right ear, and  mild-to-moderate sensorineural hearing loss 250-8000 Hz for the left ear.   Speech Reception Thresholds were  25 dBHL for the right ear and 25 dBHL for the left ear.   Word recognition scores were excellent for the right ear and excellent for the left ear.    Tympanograms were Type Ad, hypermobile for the right ear and Type A, normal for the left ear.     Patient was counseled on the above findings and recommendations.  Advised to notify her healthcare providers of any perceived changes in hearing.      Recommendations:  1. Audiologic monitoring per ototoxicity protocol, during and following completion of therapy.    2. Loud noise precaution, including avoidance when possible and/or hearing protection during chemotherapy administration and following; discussed with patient loud noise exposure can exacerbate the ototoxicity of Cisplatin.

## 2022-04-13 ENCOUNTER — TELEPHONE (OUTPATIENT)
Dept: PALLIATIVE MEDICINE | Facility: CLINIC | Age: 72
End: 2022-04-13
Payer: MEDICARE

## 2022-04-13 NOTE — TELEPHONE ENCOUNTER
Nurse spoke with pt's daughter who stated this apt should be r/s because they were seen by tatum on 3-23-22 for an urgent visit, there after Ms Joshi will follow up in 6wks the apt was r/s for May 18 at 10:30

## 2022-04-19 ENCOUNTER — LAB VISIT (OUTPATIENT)
Dept: LAB | Facility: HOSPITAL | Age: 72
End: 2022-04-19
Attending: INTERNAL MEDICINE
Payer: MEDICARE

## 2022-04-19 ENCOUNTER — OFFICE VISIT (OUTPATIENT)
Dept: HEMATOLOGY/ONCOLOGY | Facility: CLINIC | Age: 72
End: 2022-04-19
Payer: MEDICARE

## 2022-04-19 ENCOUNTER — TELEPHONE (OUTPATIENT)
Dept: HEMATOLOGY/ONCOLOGY | Facility: CLINIC | Age: 72
End: 2022-04-19

## 2022-04-19 VITALS
RESPIRATION RATE: 20 BRPM | BODY MASS INDEX: 24.7 KG/M2 | TEMPERATURE: 98 F | HEART RATE: 110 BPM | OXYGEN SATURATION: 97 % | HEIGHT: 68 IN | SYSTOLIC BLOOD PRESSURE: 143 MMHG | WEIGHT: 162.94 LBS | DIASTOLIC BLOOD PRESSURE: 79 MMHG

## 2022-04-19 DIAGNOSIS — Z79.899 IMMUNODEFICIENCY DUE TO CHEMOTHERAPY: ICD-10-CM

## 2022-04-19 DIAGNOSIS — D84.821 IMMUNODEFICIENCY DUE TO CHEMOTHERAPY: ICD-10-CM

## 2022-04-19 DIAGNOSIS — C23 ADENOCARCINOMA OF GALLBLADDER: ICD-10-CM

## 2022-04-19 DIAGNOSIS — G89.3 NEOPLASM RELATED PAIN: ICD-10-CM

## 2022-04-19 DIAGNOSIS — C23 ADENOCARCINOMA OF GALLBLADDER: Primary | ICD-10-CM

## 2022-04-19 DIAGNOSIS — T45.1X5A IMMUNODEFICIENCY DUE TO CHEMOTHERAPY: ICD-10-CM

## 2022-04-19 LAB
ALBUMIN SERPL BCP-MCNC: 3.3 G/DL (ref 3.5–5.2)
ALP SERPL-CCNC: 123 U/L (ref 55–135)
ALT SERPL W/O P-5'-P-CCNC: 11 U/L (ref 10–44)
ANION GAP SERPL CALC-SCNC: 11 MMOL/L (ref 8–16)
AST SERPL-CCNC: 13 U/L (ref 10–40)
BASOPHILS # BLD AUTO: 0.09 K/UL (ref 0–0.2)
BASOPHILS NFR BLD: 0.9 % (ref 0–1.9)
BILIRUB SERPL-MCNC: 0.3 MG/DL (ref 0.1–1)
BUN SERPL-MCNC: 11 MG/DL (ref 8–23)
CALCIUM SERPL-MCNC: 9.4 MG/DL (ref 8.7–10.5)
CHLORIDE SERPL-SCNC: 103 MMOL/L (ref 95–110)
CO2 SERPL-SCNC: 29 MMOL/L (ref 23–29)
CREAT SERPL-MCNC: 0.8 MG/DL (ref 0.5–1.4)
DIFFERENTIAL METHOD: ABNORMAL
EOSINOPHIL # BLD AUTO: 0.2 K/UL (ref 0–0.5)
EOSINOPHIL NFR BLD: 1.8 % (ref 0–8)
ERYTHROCYTE [DISTWIDTH] IN BLOOD BY AUTOMATED COUNT: 15.1 % (ref 11.5–14.5)
EST. GFR  (AFRICAN AMERICAN): >60 ML/MIN/1.73 M^2
EST. GFR  (NON AFRICAN AMERICAN): >60 ML/MIN/1.73 M^2
GLUCOSE SERPL-MCNC: 124 MG/DL (ref 70–110)
HCT VFR BLD AUTO: 30 % (ref 37–48.5)
HGB BLD-MCNC: 9.2 G/DL (ref 12–16)
IMM GRANULOCYTES # BLD AUTO: 0.03 K/UL (ref 0–0.04)
IMM GRANULOCYTES NFR BLD AUTO: 0.3 % (ref 0–0.5)
LYMPHOCYTES # BLD AUTO: 2.7 K/UL (ref 1–4.8)
LYMPHOCYTES NFR BLD: 27.2 % (ref 18–48)
MCH RBC QN AUTO: 31.2 PG (ref 27–31)
MCHC RBC AUTO-ENTMCNC: 30.7 G/DL (ref 32–36)
MCV RBC AUTO: 102 FL (ref 82–98)
MONOCYTES # BLD AUTO: 0.5 K/UL (ref 0.3–1)
MONOCYTES NFR BLD: 4.8 % (ref 4–15)
NEUTROPHILS # BLD AUTO: 6.5 K/UL (ref 1.8–7.7)
NEUTROPHILS NFR BLD: 65 % (ref 38–73)
NRBC BLD-RTO: 0 /100 WBC
PLATELET # BLD AUTO: 384 K/UL (ref 150–450)
PMV BLD AUTO: 8.7 FL (ref 9.2–12.9)
POTASSIUM SERPL-SCNC: 4.1 MMOL/L (ref 3.5–5.1)
PROT SERPL-MCNC: 7.5 G/DL (ref 6–8.4)
RBC # BLD AUTO: 2.95 M/UL (ref 4–5.4)
SODIUM SERPL-SCNC: 143 MMOL/L (ref 136–145)
WBC # BLD AUTO: 9.99 K/UL (ref 3.9–12.7)

## 2022-04-19 PROCEDURE — 3288F PR FALLS RISK ASSESSMENT DOCUMENTED: ICD-10-PCS | Mod: CPTII,S$GLB,, | Performed by: INTERNAL MEDICINE

## 2022-04-19 PROCEDURE — 1101F PR PT FALLS ASSESS DOC 0-1 FALLS W/OUT INJ PAST YR: ICD-10-PCS | Mod: CPTII,S$GLB,, | Performed by: INTERNAL MEDICINE

## 2022-04-19 PROCEDURE — 1159F PR MEDICATION LIST DOCUMENTED IN MEDICAL RECORD: ICD-10-PCS | Mod: CPTII,S$GLB,, | Performed by: INTERNAL MEDICINE

## 2022-04-19 PROCEDURE — 3077F PR MOST RECENT SYSTOLIC BLOOD PRESSURE >= 140 MM HG: ICD-10-PCS | Mod: CPTII,S$GLB,, | Performed by: INTERNAL MEDICINE

## 2022-04-19 PROCEDURE — 4010F ACE/ARB THERAPY RXD/TAKEN: CPT | Mod: CPTII,S$GLB,, | Performed by: INTERNAL MEDICINE

## 2022-04-19 PROCEDURE — 3008F PR BODY MASS INDEX (BMI) DOCUMENTED: ICD-10-PCS | Mod: CPTII,S$GLB,, | Performed by: INTERNAL MEDICINE

## 2022-04-19 PROCEDURE — 85025 COMPLETE CBC W/AUTO DIFF WBC: CPT | Performed by: INTERNAL MEDICINE

## 2022-04-19 PROCEDURE — 1157F PR ADVANCE CARE PLAN OR EQUIV PRESENT IN MEDICAL RECORD: ICD-10-PCS | Mod: CPTII,S$GLB,, | Performed by: INTERNAL MEDICINE

## 2022-04-19 PROCEDURE — 80053 COMPREHEN METABOLIC PANEL: CPT | Performed by: INTERNAL MEDICINE

## 2022-04-19 PROCEDURE — 3077F SYST BP >= 140 MM HG: CPT | Mod: CPTII,S$GLB,, | Performed by: INTERNAL MEDICINE

## 2022-04-19 PROCEDURE — 1160F RVW MEDS BY RX/DR IN RCRD: CPT | Mod: CPTII,S$GLB,, | Performed by: INTERNAL MEDICINE

## 2022-04-19 PROCEDURE — 36415 COLL VENOUS BLD VENIPUNCTURE: CPT | Performed by: INTERNAL MEDICINE

## 2022-04-19 PROCEDURE — 1126F AMNT PAIN NOTED NONE PRSNT: CPT | Mod: CPTII,S$GLB,, | Performed by: INTERNAL MEDICINE

## 2022-04-19 PROCEDURE — 3078F PR MOST RECENT DIASTOLIC BLOOD PRESSURE < 80 MM HG: ICD-10-PCS | Mod: CPTII,S$GLB,, | Performed by: INTERNAL MEDICINE

## 2022-04-19 PROCEDURE — 4010F PR ACE/ARB THEARPY RXD/TAKEN: ICD-10-PCS | Mod: CPTII,S$GLB,, | Performed by: INTERNAL MEDICINE

## 2022-04-19 PROCEDURE — 3288F FALL RISK ASSESSMENT DOCD: CPT | Mod: CPTII,S$GLB,, | Performed by: INTERNAL MEDICINE

## 2022-04-19 PROCEDURE — 3008F BODY MASS INDEX DOCD: CPT | Mod: CPTII,S$GLB,, | Performed by: INTERNAL MEDICINE

## 2022-04-19 PROCEDURE — 1126F PR PAIN SEVERITY QUANTIFIED, NO PAIN PRESENT: ICD-10-PCS | Mod: CPTII,S$GLB,, | Performed by: INTERNAL MEDICINE

## 2022-04-19 PROCEDURE — 99214 OFFICE O/P EST MOD 30 MIN: CPT | Mod: S$GLB,,, | Performed by: INTERNAL MEDICINE

## 2022-04-19 PROCEDURE — 1159F MED LIST DOCD IN RCRD: CPT | Mod: CPTII,S$GLB,, | Performed by: INTERNAL MEDICINE

## 2022-04-19 PROCEDURE — 3078F DIAST BP <80 MM HG: CPT | Mod: CPTII,S$GLB,, | Performed by: INTERNAL MEDICINE

## 2022-04-19 PROCEDURE — 1157F ADVNC CARE PLAN IN RCRD: CPT | Mod: CPTII,S$GLB,, | Performed by: INTERNAL MEDICINE

## 2022-04-19 PROCEDURE — 99999 PR PBB SHADOW E&M-EST. PATIENT-LVL V: CPT | Mod: PBBFAC,,, | Performed by: INTERNAL MEDICINE

## 2022-04-19 PROCEDURE — 1160F PR REVIEW ALL MEDS BY PRESCRIBER/CLIN PHARMACIST DOCUMENTED: ICD-10-PCS | Mod: CPTII,S$GLB,, | Performed by: INTERNAL MEDICINE

## 2022-04-19 PROCEDURE — 1101F PT FALLS ASSESS-DOCD LE1/YR: CPT | Mod: CPTII,S$GLB,, | Performed by: INTERNAL MEDICINE

## 2022-04-19 PROCEDURE — 99214 PR OFFICE/OUTPT VISIT, EST, LEVL IV, 30-39 MIN: ICD-10-PCS | Mod: S$GLB,,, | Performed by: INTERNAL MEDICINE

## 2022-04-19 PROCEDURE — 99999 PR PBB SHADOW E&M-EST. PATIENT-LVL V: ICD-10-PCS | Mod: PBBFAC,,, | Performed by: INTERNAL MEDICINE

## 2022-04-19 RX ORDER — OXYCODONE HYDROCHLORIDE 5 MG/1
5 TABLET ORAL EVERY 4 HOURS PRN
Qty: 90 TABLET | Refills: 0 | Status: SHIPPED | OUTPATIENT
Start: 2022-04-19 | End: 2022-05-09

## 2022-04-19 RX ORDER — ONDANSETRON 8 MG/1
8 TABLET, ORALLY DISINTEGRATING ORAL EVERY 8 HOURS PRN
Qty: 60 TABLET | Refills: 5 | Status: SHIPPED | OUTPATIENT
Start: 2022-04-19

## 2022-04-19 RX ORDER — SODIUM CHLORIDE 0.9 % (FLUSH) 0.9 %
10 SYRINGE (ML) INJECTION
Status: CANCELLED | OUTPATIENT
Start: 2022-04-25

## 2022-04-19 RX ORDER — HEPARIN 100 UNIT/ML
500 SYRINGE INTRAVENOUS
Status: CANCELLED | OUTPATIENT
Start: 2022-05-02

## 2022-04-19 RX ORDER — DEXAMETHASONE 4 MG/1
8 TABLET ORAL DAILY
Qty: 24 TABLET | Refills: 3 | Status: SHIPPED | OUTPATIENT
Start: 2022-04-20

## 2022-04-19 RX ORDER — HEPARIN 100 UNIT/ML
500 SYRINGE INTRAVENOUS
Status: CANCELLED | OUTPATIENT
Start: 2022-04-25

## 2022-04-19 RX ORDER — SODIUM CHLORIDE 0.9 % (FLUSH) 0.9 %
10 SYRINGE (ML) INJECTION
Status: CANCELLED | OUTPATIENT
Start: 2022-05-02

## 2022-04-19 NOTE — PROGRESS NOTES
Subjective:       Patient ID: Madelyn Serna is a 71 y.o. female.    Chief Complaint: Results, Cancer, and Chemotherapy    HPI 71-year-old female history of metastatic adenocarcinoma the gallbladder.  The patient had intra hepatic abscess with recent drainage.  Patient's last imaging was 03/03/2022.  Patient presents now for cycle 1 day 1 of cisplatin gemcitabine for metastatic disease ECOG status 1    Past Medical History:   Diagnosis Date    Cancer of gallbladder     Essential (primary) hypertension     Infection following a procedure, deep incisional surgical site, initial encounter 3/3/2022    Romaine pus on bandage with induration to RUQ. She has already completed a 5 day course of Cipro which completed on 2/27. Recommend prompt evaluation with ED and whether or not imaging is warranted.     History reviewed. No pertinent family history.  Social History     Socioeconomic History    Marital status:    Tobacco Use    Smoking status: Former Smoker    Smokeless tobacco: Former User     History reviewed. No pertinent surgical history.    Labs:  Lab Results   Component Value Date    WBC 9.99 04/19/2022    HGB 9.2 (L) 04/19/2022    HCT 30.0 (L) 04/19/2022     (H) 04/19/2022     04/19/2022     BMP  Lab Results   Component Value Date     04/19/2022    K 4.1 04/19/2022     04/19/2022    CO2 29 04/19/2022    BUN 11 04/19/2022    CREATININE 0.8 04/19/2022    CALCIUM 9.4 04/19/2022    ANIONGAP 11 04/19/2022    ESTGFRAFRICA >60 04/19/2022    EGFRNONAA >60 04/19/2022     Lab Results   Component Value Date    ALT 11 04/19/2022    AST 13 04/19/2022    ALKPHOS 123 04/19/2022    BILITOT 0.3 04/19/2022       Lab Results   Component Value Date    IRON 10 (L) 02/21/2022    TIBC 189 (L) 02/21/2022    FERRITIN 705 (H) 02/21/2022     No results found for: [id number]  No results found for: FOLATE  No results found for: TSH      Review of Systems   Constitutional: Positive for fatigue. Negative for  activity change, appetite change, chills, diaphoresis, fever and unexpected weight change.   HENT: Negative for congestion, dental problem, drooling, ear discharge, ear pain, facial swelling, hearing loss, mouth sores, nosebleeds, postnasal drip, rhinorrhea, sinus pressure, sneezing, sore throat, tinnitus, trouble swallowing and voice change.    Eyes: Negative for photophobia, pain, discharge, redness, itching and visual disturbance.   Respiratory: Negative for cough, choking, chest tightness, shortness of breath, wheezing and stridor.    Cardiovascular: Negative for chest pain, palpitations and leg swelling.   Gastrointestinal: Negative for abdominal distention, abdominal pain, anal bleeding, blood in stool, constipation, diarrhea, nausea, rectal pain and vomiting.   Endocrine: Negative for cold intolerance, heat intolerance, polydipsia, polyphagia and polyuria.   Genitourinary: Negative for decreased urine volume, difficulty urinating, dyspareunia, dysuria, enuresis, flank pain, frequency, genital sores, hematuria, menstrual problem, pelvic pain, urgency, vaginal bleeding, vaginal discharge and vaginal pain.   Musculoskeletal: Negative for arthralgias, back pain, gait problem, joint swelling, myalgias, neck pain and neck stiffness.   Skin: Negative for color change, pallor and rash.   Allergic/Immunologic: Negative for environmental allergies, food allergies and immunocompromised state.   Neurological: Positive for weakness. Negative for dizziness, tremors, seizures, syncope, facial asymmetry, speech difficulty, light-headedness, numbness and headaches.   Hematological: Negative for adenopathy. Does not bruise/bleed easily.   Psychiatric/Behavioral: Positive for dysphoric mood. Negative for agitation, behavioral problems, confusion, decreased concentration, hallucinations, self-injury, sleep disturbance and suicidal ideas. The patient is nervous/anxious. The patient is not hyperactive.        Objective:       Physical Exam  Vitals reviewed.   Constitutional:       General: She is not in acute distress.     Appearance: She is well-developed. She is ill-appearing. She is not diaphoretic.   HENT:      Head: Normocephalic and atraumatic.      Right Ear: External ear normal.      Left Ear: External ear normal.      Nose: Nose normal.      Right Sinus: No maxillary sinus tenderness or frontal sinus tenderness.      Left Sinus: No maxillary sinus tenderness or frontal sinus tenderness.      Mouth/Throat:      Pharynx: No oropharyngeal exudate.   Eyes:      General: Lids are normal. No scleral icterus.        Right eye: No discharge.         Left eye: No discharge.      Conjunctiva/sclera: Conjunctivae normal.      Right eye: Right conjunctiva is not injected. No hemorrhage.     Left eye: Left conjunctiva is not injected. No hemorrhage.     Pupils: Pupils are equal, round, and reactive to light.   Neck:      Thyroid: No thyromegaly.      Vascular: No JVD.      Trachea: No tracheal deviation.   Cardiovascular:      Rate and Rhythm: Normal rate.   Pulmonary:      Effort: Pulmonary effort is normal. No respiratory distress.      Breath sounds: No stridor.   Chest:      Chest wall: No tenderness.   Breasts:      Right: No supraclavicular adenopathy.      Left: No supraclavicular adenopathy.       Abdominal:      General: Bowel sounds are normal. There is no distension.      Palpations: Abdomen is soft. There is no hepatomegaly, splenomegaly or mass.      Tenderness: There is no abdominal tenderness. There is no rebound.   Musculoskeletal:         General: No tenderness. Normal range of motion.      Cervical back: Normal range of motion and neck supple.   Lymphadenopathy:      Cervical: No cervical adenopathy.      Upper Body:      Right upper body: No supraclavicular adenopathy.      Left upper body: No supraclavicular adenopathy.   Skin:     General: Skin is dry.      Findings: No erythema or rash.   Neurological:      Mental  Status: She is alert and oriented to person, place, and time.      Cranial Nerves: No cranial nerve deficit.      Motor: Weakness present.      Coordination: Coordination normal.   Psychiatric:         Behavior: Behavior normal.         Thought Content: Thought content normal.         Judgment: Judgment normal.             Assessment:      1. Adenocarcinoma of gallbladder    2. Neoplasm related pain    3. Immunodeficiency due to chemotherapy           Plan:   History of presumptive diagnosis metastatic adenocarcinoma the gallbladder.  Last imaging prior to drainage procedures would like to proceed with CT chest abdomen pelvis.  Return for review before initiation of treatment with recent abscess drainage I have placed an order for PICC line as opposed to a MediPort to prevent recurrent episodes of infection nurse navigation aware I explained this to the patient and her family present consent for cisplatin gemcitabine has been signed          Bo Davies Jr, MD FACP

## 2022-04-19 NOTE — TELEPHONE ENCOUNTER
Per dr Davies request and instructions, I have met with pt and her family in person today during her office visit with dR Davies  The appt for chemotherapy C1D1 has been cancelled for tomorrow and together with pt we have set up CT scan for 4/22/22 at [facility] radiology dept.   She will then have follow up with dr Davies 4/25/22 at 1000am at the [place] location to review Ct and discuss treatment plan going forward  I have messaged IR via secure chat to set up PICC line insertion.  Pt is aware of above plan and in agreement  Once treatment plan is decided upon, I will make sure PICC line is inserted and set up chemo infusion appts. Pt and family are in agreement with plan and know to contact me directly with any further concerns.

## 2022-04-20 ENCOUNTER — TELEPHONE (OUTPATIENT)
Dept: HEMATOLOGY/ONCOLOGY | Facility: CLINIC | Age: 72
End: 2022-04-20
Payer: MEDICARE

## 2022-04-20 NOTE — TELEPHONE ENCOUNTER
SW intern placed call to pt on today to discuss distress score of 10 from appt on 4/19/2022. Sw intern did not receive an answer. Sw intern left vm and will remain available to assist with pt's needs.

## 2022-04-22 ENCOUNTER — PATIENT MESSAGE (OUTPATIENT)
Dept: HEMATOLOGY/ONCOLOGY | Facility: CLINIC | Age: 72
End: 2022-04-22
Payer: MEDICARE

## 2022-04-22 ENCOUNTER — HOSPITAL ENCOUNTER (OUTPATIENT)
Dept: RADIOLOGY | Facility: HOSPITAL | Age: 72
Discharge: HOME | End: 2022-04-22
Attending: INTERNAL MEDICINE
Payer: COMMERCIAL

## 2022-04-22 DIAGNOSIS — C23 ADENOCARCINOMA OF GALLBLADDER: ICD-10-CM

## 2022-04-22 PROCEDURE — 25500020 PHARM REV CODE 255: Performed by: INTERNAL MEDICINE

## 2022-04-22 PROCEDURE — 71260 CT THORAX DX C+: CPT | Mod: TC

## 2022-04-22 PROCEDURE — A9698 NON-RAD CONTRAST MATERIALNOC: HCPCS | Performed by: INTERNAL MEDICINE

## 2022-04-22 PROCEDURE — 74177 CT ABD & PELVIS W/CONTRAST: CPT | Mod: TC

## 2022-04-22 RX ADMIN — IOHEXOL 100 ML: 350 INJECTION, SOLUTION INTRAVENOUS at 01:04

## 2022-04-22 RX ADMIN — IOHEXOL 1000 ML: 9 SOLUTION ORAL at 01:04

## 2022-04-25 ENCOUNTER — HOSPITAL ENCOUNTER (OUTPATIENT)
Dept: RADIOLOGY | Facility: HOSPITAL | Age: 72
Discharge: HOME | End: 2022-04-25
Attending: INTERNAL MEDICINE
Payer: MEDICARE

## 2022-04-25 ENCOUNTER — OFFICE VISIT (OUTPATIENT)
Dept: HEMATOLOGY/ONCOLOGY | Facility: CLINIC | Age: 72
End: 2022-04-25
Payer: COMMERCIAL

## 2022-04-25 ENCOUNTER — TELEPHONE (OUTPATIENT)
Dept: INFUSION THERAPY | Facility: HOSPITAL | Age: 72
End: 2022-04-25
Payer: MEDICARE

## 2022-04-25 ENCOUNTER — PATIENT MESSAGE (OUTPATIENT)
Dept: ADMINISTRATIVE | Facility: OTHER | Age: 72
End: 2022-04-25
Payer: MEDICARE

## 2022-04-25 VITALS
DIASTOLIC BLOOD PRESSURE: 93 MMHG | WEIGHT: 159.63 LBS | HEART RATE: 94 BPM | HEIGHT: 67 IN | BODY MASS INDEX: 25.06 KG/M2 | OXYGEN SATURATION: 96 % | RESPIRATION RATE: 16 BRPM | SYSTOLIC BLOOD PRESSURE: 166 MMHG | TEMPERATURE: 97 F

## 2022-04-25 DIAGNOSIS — Z79.899 IMMUNODEFICIENCY DUE TO CHEMOTHERAPY: ICD-10-CM

## 2022-04-25 DIAGNOSIS — C23 ADENOCARCINOMA OF GALLBLADDER: ICD-10-CM

## 2022-04-25 DIAGNOSIS — C23 ADENOCARCINOMA OF GALLBLADDER: Primary | ICD-10-CM

## 2022-04-25 DIAGNOSIS — G89.3 NEOPLASM RELATED PAIN: ICD-10-CM

## 2022-04-25 DIAGNOSIS — D84.821 IMMUNODEFICIENCY DUE TO CHEMOTHERAPY: ICD-10-CM

## 2022-04-25 DIAGNOSIS — I10 ESSENTIAL (PRIMARY) HYPERTENSION: ICD-10-CM

## 2022-04-25 DIAGNOSIS — Z51.5 PALLIATIVE CARE ENCOUNTER: ICD-10-CM

## 2022-04-25 DIAGNOSIS — T45.1X5A IMMUNODEFICIENCY DUE TO CHEMOTHERAPY: ICD-10-CM

## 2022-04-25 PROCEDURE — 4010F PR ACE/ARB THEARPY RXD/TAKEN: ICD-10-PCS | Mod: CPTII,S$GLB,, | Performed by: INTERNAL MEDICINE

## 2022-04-25 PROCEDURE — 4010F ACE/ARB THERAPY RXD/TAKEN: CPT | Mod: CPTII,S$GLB,, | Performed by: INTERNAL MEDICINE

## 2022-04-25 PROCEDURE — 99999 PR PBB SHADOW E&M-EST. PATIENT-LVL V: ICD-10-PCS | Mod: PBBFAC,,, | Performed by: INTERNAL MEDICINE

## 2022-04-25 PROCEDURE — 1159F PR MEDICATION LIST DOCUMENTED IN MEDICAL RECORD: ICD-10-PCS | Mod: CPTII,S$GLB,, | Performed by: INTERNAL MEDICINE

## 2022-04-25 PROCEDURE — 1159F MED LIST DOCD IN RCRD: CPT | Mod: CPTII,S$GLB,, | Performed by: INTERNAL MEDICINE

## 2022-04-25 PROCEDURE — 1157F ADVNC CARE PLAN IN RCRD: CPT | Mod: CPTII,S$GLB,, | Performed by: INTERNAL MEDICINE

## 2022-04-25 PROCEDURE — 1101F PR PT FALLS ASSESS DOC 0-1 FALLS W/OUT INJ PAST YR: ICD-10-PCS | Mod: CPTII,S$GLB,, | Performed by: INTERNAL MEDICINE

## 2022-04-25 PROCEDURE — 99999 PR PBB SHADOW E&M-EST. PATIENT-LVL V: CPT | Mod: PBBFAC,,, | Performed by: INTERNAL MEDICINE

## 2022-04-25 PROCEDURE — 1126F AMNT PAIN NOTED NONE PRSNT: CPT | Mod: CPTII,S$GLB,, | Performed by: INTERNAL MEDICINE

## 2022-04-25 PROCEDURE — 1101F PT FALLS ASSESS-DOCD LE1/YR: CPT | Mod: CPTII,S$GLB,, | Performed by: INTERNAL MEDICINE

## 2022-04-25 PROCEDURE — 1157F PR ADVANCE CARE PLAN OR EQUIV PRESENT IN MEDICAL RECORD: ICD-10-PCS | Mod: CPTII,S$GLB,, | Performed by: INTERNAL MEDICINE

## 2022-04-25 PROCEDURE — 3288F FALL RISK ASSESSMENT DOCD: CPT | Mod: CPTII,S$GLB,, | Performed by: INTERNAL MEDICINE

## 2022-04-25 PROCEDURE — 99215 PR OFFICE/OUTPT VISIT, EST, LEVL V, 40-54 MIN: ICD-10-PCS | Mod: 25,S$GLB,, | Performed by: INTERNAL MEDICINE

## 2022-04-25 PROCEDURE — 3077F PR MOST RECENT SYSTOLIC BLOOD PRESSURE >= 140 MM HG: ICD-10-PCS | Mod: CPTII,S$GLB,, | Performed by: INTERNAL MEDICINE

## 2022-04-25 PROCEDURE — 1126F PR PAIN SEVERITY QUANTIFIED, NO PAIN PRESENT: ICD-10-PCS | Mod: CPTII,S$GLB,, | Performed by: INTERNAL MEDICINE

## 2022-04-25 PROCEDURE — 3008F BODY MASS INDEX DOCD: CPT | Mod: CPTII,S$GLB,, | Performed by: INTERNAL MEDICINE

## 2022-04-25 PROCEDURE — 3077F SYST BP >= 140 MM HG: CPT | Mod: CPTII,S$GLB,, | Performed by: INTERNAL MEDICINE

## 2022-04-25 PROCEDURE — 3008F PR BODY MASS INDEX (BMI) DOCUMENTED: ICD-10-PCS | Mod: CPTII,S$GLB,, | Performed by: INTERNAL MEDICINE

## 2022-04-25 PROCEDURE — 3080F PR MOST RECENT DIASTOLIC BLOOD PRESSURE >= 90 MM HG: ICD-10-PCS | Mod: CPTII,S$GLB,, | Performed by: INTERNAL MEDICINE

## 2022-04-25 PROCEDURE — 3288F PR FALLS RISK ASSESSMENT DOCUMENTED: ICD-10-PCS | Mod: CPTII,S$GLB,, | Performed by: INTERNAL MEDICINE

## 2022-04-25 PROCEDURE — 1160F RVW MEDS BY RX/DR IN RCRD: CPT | Mod: CPTII,S$GLB,, | Performed by: INTERNAL MEDICINE

## 2022-04-25 PROCEDURE — 36572 INSJ PICC RS&I <5 YR: CPT

## 2022-04-25 PROCEDURE — 99215 OFFICE O/P EST HI 40 MIN: CPT | Mod: 25,S$GLB,, | Performed by: INTERNAL MEDICINE

## 2022-04-25 PROCEDURE — 3080F DIAST BP >= 90 MM HG: CPT | Mod: CPTII,S$GLB,, | Performed by: INTERNAL MEDICINE

## 2022-04-25 PROCEDURE — 1160F PR REVIEW ALL MEDS BY PRESCRIBER/CLIN PHARMACIST DOCUMENTED: ICD-10-PCS | Mod: CPTII,S$GLB,, | Performed by: INTERNAL MEDICINE

## 2022-04-25 NOTE — DISCHARGE SUMMARY
Pre Op Diagnosis: vascular access     Post Op Diagnosis: same     Procedure:  RUE PICC     Procedure performed by: Umer STRE[address], Viridiana CLARK     Written Informed Consent Obtained: Yes     Specimen Removed:  no     Estimated Blood Loss:  minimal     Findings: Local anesthesia.     The patient tolerated the procedure well and there were no complications.      The RUE was sterilely prepped and draped.  Lidocaine was used as a local anesthetic.  Using u/s guidance a 5 Czech 39 cm picc was placed into the basilic vein into the SVC/right atrium junction.  Placement was verified using X Ray.  PICC was secured in place with attachment device and is ready to use.  Pt tolerated proc well without immediate complications.

## 2022-04-25 NOTE — PROGRESS NOTES
Subjective:       Patient ID: Madelyn Serna is a 71 y.o. female.    Chief Complaint: Results, Chemotherapy, and Cancer    HPI    71-year-old female history of metastatic gallbladder cancer patient initiating treatment with cycle 1 day 1 of cisplatin gemcitabine patient had delay because of intra abdominal abscess.  Recent CT chest abdomen pelvis demonstrated no clear evidence of recurrent abscess patient is scheduled to have PICC line inserted this afternoon and a for initiation of treatment ECOG status 1  Past Medical History:   Diagnosis Date    Cancer of gallbladder     Essential (primary) hypertension     Infection following a procedure, deep incisional surgical site, initial encounter 3/3/2022    Romaine pus on bandage with induration to RUQ. She has already completed a 5 day course of Cipro which completed on 2/27. Recommend prompt evaluation with ED and whether or not imaging is warranted.     History reviewed. No pertinent family history.  Social History     Socioeconomic History    Marital status:    Tobacco Use    Smoking status: Former Smoker    Smokeless tobacco: Former User     History reviewed. No pertinent surgical history.    Labs:  Lab Results   Component Value Date    WBC 9.99 04/19/2022    HGB 9.2 (L) 04/19/2022    HCT 30.0 (L) 04/19/2022     (H) 04/19/2022     04/19/2022     BMP  Lab Results   Component Value Date     04/19/2022    K 4.1 04/19/2022     04/19/2022    CO2 29 04/19/2022    BUN 11 04/19/2022    CREATININE 0.8 04/19/2022    CALCIUM 9.4 04/19/2022    ANIONGAP 11 04/19/2022    ESTGFRAFRICA >60 04/19/2022    EGFRNONAA >60 04/19/2022     Lab Results   Component Value Date    ALT 11 04/19/2022    AST 13 04/19/2022    ALKPHOS 123 04/19/2022    BILITOT 0.3 04/19/2022       Lab Results   Component Value Date    IRON 10 (L) 02/21/2022    TIBC 189 (L) 02/21/2022    FERRITIN 705 (H) 02/21/2022     No results found for: [id number]  No results found for:  FOLATE  No results found for: TSH      Review of Systems   Constitutional: Positive for fatigue. Negative for activity change, appetite change, chills, diaphoresis, fever and unexpected weight change.   HENT: Negative for congestion, dental problem, drooling, ear discharge, ear pain, facial swelling, hearing loss, mouth sores, nosebleeds, postnasal drip, rhinorrhea, sinus pressure, sneezing, sore throat, tinnitus, trouble swallowing and voice change.    Eyes: Negative for photophobia, pain, discharge, redness, itching and visual disturbance.   Respiratory: Negative for cough, choking, chest tightness, shortness of breath, wheezing and stridor.    Cardiovascular: Negative for chest pain, palpitations and leg swelling.   Gastrointestinal: Negative for abdominal distention, abdominal pain, anal bleeding, blood in stool, constipation, diarrhea, nausea, rectal pain and vomiting.   Endocrine: Negative for cold intolerance, heat intolerance, polydipsia, polyphagia and polyuria.   Genitourinary: Negative for decreased urine volume, difficulty urinating, dyspareunia, dysuria, enuresis, flank pain, frequency, genital sores, hematuria, menstrual problem, pelvic pain, urgency, vaginal bleeding, vaginal discharge and vaginal pain.   Musculoskeletal: Negative for arthralgias, back pain, gait problem, joint swelling, myalgias, neck pain and neck stiffness.   Skin: Negative for color change, pallor and rash.   Allergic/Immunologic: Negative for environmental allergies, food allergies and immunocompromised state.   Neurological: Positive for weakness. Negative for dizziness, tremors, seizures, syncope, facial asymmetry, speech difficulty, light-headedness, numbness and headaches.   Hematological: Negative for adenopathy. Does not bruise/bleed easily.   Psychiatric/Behavioral: Positive for dysphoric mood. Negative for agitation, behavioral problems, confusion, decreased concentration, hallucinations, self-injury, sleep disturbance  and suicidal ideas. The patient is nervous/anxious. The patient is not hyperactive.        Objective:      Physical Exam  Vitals reviewed.   Constitutional:       General: She is not in acute distress.     Appearance: She is well-developed. She is not diaphoretic.   HENT:      Head: Normocephalic and atraumatic.      Right Ear: External ear normal.      Left Ear: External ear normal.      Nose: Nose normal.      Right Sinus: No maxillary sinus tenderness or frontal sinus tenderness.      Left Sinus: No maxillary sinus tenderness or frontal sinus tenderness.      Mouth/Throat:      Pharynx: No oropharyngeal exudate.   Eyes:      General: Lids are normal. No scleral icterus.        Right eye: No discharge.         Left eye: No discharge.      Conjunctiva/sclera: Conjunctivae normal.      Right eye: Right conjunctiva is not injected. No hemorrhage.     Left eye: Left conjunctiva is not injected. No hemorrhage.     Pupils: Pupils are equal, round, and reactive to light.   Neck:      Thyroid: No thyromegaly.      Vascular: No JVD.      Trachea: No tracheal deviation.   Cardiovascular:      Rate and Rhythm: Normal rate.   Pulmonary:      Effort: Pulmonary effort is normal. No respiratory distress.      Breath sounds: No stridor.   Chest:      Chest wall: No tenderness.   Breasts:      Right: No supraclavicular adenopathy.      Left: No supraclavicular adenopathy.       Abdominal:      General: Bowel sounds are normal. There is no distension.      Palpations: Abdomen is soft. There is no hepatomegaly, splenomegaly or mass.      Tenderness: There is no abdominal tenderness. There is no rebound.   Musculoskeletal:         General: No tenderness. Normal range of motion.      Cervical back: Normal range of motion and neck supple.   Lymphadenopathy:      Cervical: No cervical adenopathy.      Upper Body:      Right upper body: No supraclavicular adenopathy.      Left upper body: No supraclavicular adenopathy.   Skin:      General: Skin is dry.      Findings: No erythema or rash.   Neurological:      Mental Status: She is alert and oriented to person, place, and time.      Cranial Nerves: No cranial nerve deficit.      Motor: Weakness present.      Coordination: Coordination normal.   Psychiatric:         Behavior: Behavior normal.         Thought Content: Thought content normal.         Judgment: Judgment normal.             Assessment:      1. Adenocarcinoma of gallbladder    2. Neoplasm related pain    3. Immunodeficiency due to chemotherapy    4. Essential (primary) hypertension    5. Palliative care encounter           Plan:      PICC line insertion today will have home health consult.  Orders written consent signed patient to initiate cycle 1 day 1 of cisplatin gemcitabine standing orders placed patient can be seen in nurse practitioner for day 8 in for myself cycle 2 day 1 of therapy.  Baseline CT chest abdomen pelvis completed orders written and reviewed        Bo Davies Jr, MD FACP

## 2022-04-25 NOTE — TELEPHONE ENCOUNTER
Left message on mobile and home number that [facility] [address] tomorrow at 8am.

## 2022-04-25 NOTE — H&P (VIEW-ONLY)
Subjective:       Patient ID: Madelyn Serna is a 71 y.o. female.    Chief Complaint: Results, Chemotherapy, and Cancer    HPI    71-year-old female history of metastatic gallbladder cancer patient initiating treatment with cycle 1 day 1 of cisplatin gemcitabine patient had delay because of intra abdominal abscess.  Recent CT chest abdomen pelvis demonstrated no clear evidence of recurrent abscess patient is scheduled to have PICC line inserted this afternoon and a for initiation of treatment ECOG status 1  Past Medical History:   Diagnosis Date    Cancer of gallbladder     Essential (primary) hypertension     Infection following a procedure, deep incisional surgical site, initial encounter 3/3/2022    Romaine pus on bandage with induration to RUQ. She has already completed a 5 day course of Cipro which completed on 2/27. Recommend prompt evaluation with ED and whether or not imaging is warranted.     History reviewed. No pertinent family history.  Social History     Socioeconomic History    Marital status:    Tobacco Use    Smoking status: Former Smoker    Smokeless tobacco: Former User     History reviewed. No pertinent surgical history.    Labs:  Lab Results   Component Value Date    WBC 9.99 04/19/2022    HGB 9.2 (L) 04/19/2022    HCT 30.0 (L) 04/19/2022     (H) 04/19/2022     04/19/2022     BMP  Lab Results   Component Value Date     04/19/2022    K 4.1 04/19/2022     04/19/2022    CO2 29 04/19/2022    BUN 11 04/19/2022    CREATININE 0.8 04/19/2022    CALCIUM 9.4 04/19/2022    ANIONGAP 11 04/19/2022    ESTGFRAFRICA >60 04/19/2022    EGFRNONAA >60 04/19/2022     Lab Results   Component Value Date    ALT 11 04/19/2022    AST 13 04/19/2022    ALKPHOS 123 04/19/2022    BILITOT 0.3 04/19/2022       Lab Results   Component Value Date    IRON 10 (L) 02/21/2022    TIBC 189 (L) 02/21/2022    FERRITIN 705 (H) 02/21/2022     No results found for: [id number]  No results found for:  FOLATE  No results found for: TSH      Review of Systems   Constitutional: Positive for fatigue. Negative for activity change, appetite change, chills, diaphoresis, fever and unexpected weight change.   HENT: Negative for congestion, dental problem, drooling, ear discharge, ear pain, facial swelling, hearing loss, mouth sores, nosebleeds, postnasal drip, rhinorrhea, sinus pressure, sneezing, sore throat, tinnitus, trouble swallowing and voice change.    Eyes: Negative for photophobia, pain, discharge, redness, itching and visual disturbance.   Respiratory: Negative for cough, choking, chest tightness, shortness of breath, wheezing and stridor.    Cardiovascular: Negative for chest pain, palpitations and leg swelling.   Gastrointestinal: Negative for abdominal distention, abdominal pain, anal bleeding, blood in stool, constipation, diarrhea, nausea, rectal pain and vomiting.   Endocrine: Negative for cold intolerance, heat intolerance, polydipsia, polyphagia and polyuria.   Genitourinary: Negative for decreased urine volume, difficulty urinating, dyspareunia, dysuria, enuresis, flank pain, frequency, genital sores, hematuria, menstrual problem, pelvic pain, urgency, vaginal bleeding, vaginal discharge and vaginal pain.   Musculoskeletal: Negative for arthralgias, back pain, gait problem, joint swelling, myalgias, neck pain and neck stiffness.   Skin: Negative for color change, pallor and rash.   Allergic/Immunologic: Negative for environmental allergies, food allergies and immunocompromised state.   Neurological: Positive for weakness. Negative for dizziness, tremors, seizures, syncope, facial asymmetry, speech difficulty, light-headedness, numbness and headaches.   Hematological: Negative for adenopathy. Does not bruise/bleed easily.   Psychiatric/Behavioral: Positive for dysphoric mood. Negative for agitation, behavioral problems, confusion, decreased concentration, hallucinations, self-injury, sleep disturbance  and suicidal ideas. The patient is nervous/anxious. The patient is not hyperactive.        Objective:      Physical Exam  Vitals reviewed.   Constitutional:       General: She is not in acute distress.     Appearance: She is well-developed. She is not diaphoretic.   HENT:      Head: Normocephalic and atraumatic.      Right Ear: External ear normal.      Left Ear: External ear normal.      Nose: Nose normal.      Right Sinus: No maxillary sinus tenderness or frontal sinus tenderness.      Left Sinus: No maxillary sinus tenderness or frontal sinus tenderness.      Mouth/Throat:      Pharynx: No oropharyngeal exudate.   Eyes:      General: Lids are normal. No scleral icterus.        Right eye: No discharge.         Left eye: No discharge.      Conjunctiva/sclera: Conjunctivae normal.      Right eye: Right conjunctiva is not injected. No hemorrhage.     Left eye: Left conjunctiva is not injected. No hemorrhage.     Pupils: Pupils are equal, round, and reactive to light.   Neck:      Thyroid: No thyromegaly.      Vascular: No JVD.      Trachea: No tracheal deviation.   Cardiovascular:      Rate and Rhythm: Normal rate.   Pulmonary:      Effort: Pulmonary effort is normal. No respiratory distress.      Breath sounds: No stridor.   Chest:      Chest wall: No tenderness.   Breasts:      Right: No supraclavicular adenopathy.      Left: No supraclavicular adenopathy.       Abdominal:      General: Bowel sounds are normal. There is no distension.      Palpations: Abdomen is soft. There is no hepatomegaly, splenomegaly or mass.      Tenderness: There is no abdominal tenderness. There is no rebound.   Musculoskeletal:         General: No tenderness. Normal range of motion.      Cervical back: Normal range of motion and neck supple.   Lymphadenopathy:      Cervical: No cervical adenopathy.      Upper Body:      Right upper body: No supraclavicular adenopathy.      Left upper body: No supraclavicular adenopathy.   Skin:      General: Skin is dry.      Findings: No erythema or rash.   Neurological:      Mental Status: She is alert and oriented to person, place, and time.      Cranial Nerves: No cranial nerve deficit.      Motor: Weakness present.      Coordination: Coordination normal.   Psychiatric:         Behavior: Behavior normal.         Thought Content: Thought content normal.         Judgment: Judgment normal.             Assessment:      1. Adenocarcinoma of gallbladder    2. Neoplasm related pain    3. Immunodeficiency due to chemotherapy    4. Essential (primary) hypertension    5. Palliative care encounter           Plan:      PICC line insertion today will have home health consult.  Orders written consent signed patient to initiate cycle 1 day 1 of cisplatin gemcitabine standing orders placed patient can be seen in nurse practitioner for day 8 in for myself cycle 2 day 1 of therapy.  Baseline CT chest abdomen pelvis completed orders written and reviewed        Bo Davies Jr, MD FACP

## 2022-04-26 ENCOUNTER — INFUSION (OUTPATIENT)
Dept: INFUSION THERAPY | Facility: HOSPITAL | Age: 72
End: 2022-04-26
Attending: INTERNAL MEDICINE
Payer: MEDICARE

## 2022-04-26 VITALS
DIASTOLIC BLOOD PRESSURE: 74 MMHG | SYSTOLIC BLOOD PRESSURE: 129 MMHG | HEART RATE: 89 BPM | OXYGEN SATURATION: 94 % | TEMPERATURE: 98 F | RESPIRATION RATE: 18 BRPM

## 2022-04-26 DIAGNOSIS — C23 ADENOCARCINOMA OF GALLBLADDER: Primary | ICD-10-CM

## 2022-04-26 PROCEDURE — 96413 CHEMO IV INFUSION 1 HR: CPT

## 2022-04-26 PROCEDURE — 96366 THER/PROPH/DIAG IV INF ADDON: CPT

## 2022-04-26 PROCEDURE — 96417 CHEMO IV INFUS EACH ADDL SEQ: CPT

## 2022-04-26 PROCEDURE — 63600175 PHARM REV CODE 636 W HCPCS: Performed by: INTERNAL MEDICINE

## 2022-04-26 PROCEDURE — 96367 TX/PROPH/DG ADDL SEQ IV INF: CPT

## 2022-04-26 PROCEDURE — 96361 HYDRATE IV INFUSION ADD-ON: CPT

## 2022-04-26 PROCEDURE — 25000003 PHARM REV CODE 250: Performed by: INTERNAL MEDICINE

## 2022-04-26 PROCEDURE — 96375 TX/PRO/DX INJ NEW DRUG ADDON: CPT

## 2022-04-26 RX ORDER — SODIUM CHLORIDE 9 MG/ML
500 INJECTION, SOLUTION INTRAVENOUS
Status: COMPLETED | OUTPATIENT
Start: 2022-04-26 | End: 2022-04-26

## 2022-04-26 RX ORDER — HEPARIN 100 UNIT/ML
500 SYRINGE INTRAVENOUS
Status: DISCONTINUED | OUTPATIENT
Start: 2022-04-26 | End: 2022-04-26 | Stop reason: HOSPADM

## 2022-04-26 RX ADMIN — GEMCITABINE 1880 MG: 38 INJECTION, SOLUTION INTRAVENOUS at 11:04

## 2022-04-26 RX ADMIN — Medication 500 UNITS: at 02:04

## 2022-04-26 RX ADMIN — CISPLATIN 47 MG: 1 INJECTION INTRAVENOUS at 11:04

## 2022-04-26 RX ADMIN — MAGNESIUM SULFATE HEPTAHYDRATE 500 ML/HR: 500 INJECTION, SOLUTION INTRAMUSCULAR; INTRAVENOUS at 08:04

## 2022-04-26 RX ADMIN — SODIUM CHLORIDE 500 ML: 0.9 INJECTION, SOLUTION INTRAVENOUS at 12:04

## 2022-04-26 RX ADMIN — APREPITANT 130 MG: 130 INJECTION, EMULSION INTRAVENOUS at 10:04

## 2022-04-26 RX ADMIN — PALONOSETRON HYDROCHLORIDE: 0.25 INJECTION, SOLUTION INTRAVENOUS at 10:04

## 2022-04-26 NOTE — NURSING
Per interventional radiology, patient is to have PICC line dressing change 24 hours after line placement. Adequate blood return noted. Red lumen flushed with 10ml NS and 5ml Heparin solution after chemotherapy infusion.  Dressing changed via sterile technique.  Site without redness, swelling or drainage noted.

## 2022-04-26 NOTE — PLAN OF CARE
Problem: Adult Inpatient Plan of Care  Goal: Plan of Care Review  Outcome: Ongoing, Progressing  Flowsheets (Taken 4/26/2022 0856)  Plan of Care Reviewed With:   patient   family  Goal: Patient-Specific Goal (Individualized)  Outcome: Ongoing, Progressing  Flowsheets (Taken 4/26/2022 0856)  Anxieties, Fears or Concerns: Patient is nervous this morning about first chemo infusion  Individualized Care Needs: Legs elevated and reclined, warm blankets, juice, family at chairside  Patient-Specific Goals (Include Timeframe): Tolerate first chemtherapy today  Goal: Optimal Comfort and Wellbeing  Outcome: Ongoing, Progressing  Intervention: Provide Person-Centered Care  Flowsheets (Taken 4/26/2022 0856)  Trust Relationship/Rapport:   reassurance provided   care explained   choices provided   thoughts/feelings acknowledged   emotional support provided   questions encouraged   empathic listening provided   questions answered     Problem: Neutropenia (Chemotherapy Effects)  Goal: Absence of Infection  Outcome: Ongoing, Progressing  Intervention: Prevent Infection and Maximize Resistance  Flowsheets (Taken 4/26/2022 0856)  Infection Prevention:   hand hygiene promoted   equipment surfaces disinfected   personal protective equipment utilized   rest/sleep promoted

## 2022-04-27 PROCEDURE — G0180 MD CERTIFICATION HHA PATIENT: HCPCS | Mod: ,,, | Performed by: INTERNAL MEDICINE

## 2022-04-27 PROCEDURE — G0180 PR HOME HEALTH MD CERTIFICATION: ICD-10-PCS | Mod: ,,, | Performed by: INTERNAL MEDICINE

## 2022-05-02 ENCOUNTER — OFFICE VISIT (OUTPATIENT)
Dept: HEMATOLOGY/ONCOLOGY | Facility: CLINIC | Age: 72
End: 2022-05-02
Payer: MEDICARE

## 2022-05-02 ENCOUNTER — LAB VISIT (OUTPATIENT)
Dept: LAB | Facility: HOSPITAL | Age: 72
End: 2022-05-02
Attending: INTERNAL MEDICINE
Payer: MEDICARE

## 2022-05-02 ENCOUNTER — INFUSION (OUTPATIENT)
Dept: INFUSION THERAPY | Facility: HOSPITAL | Age: 72
End: 2022-05-02
Attending: INTERNAL MEDICINE
Payer: MEDICARE

## 2022-05-02 VITALS
TEMPERATURE: 98 F | HEART RATE: 95 BPM | DIASTOLIC BLOOD PRESSURE: 73 MMHG | SYSTOLIC BLOOD PRESSURE: 116 MMHG | RESPIRATION RATE: 18 BRPM | OXYGEN SATURATION: 96 %

## 2022-05-02 VITALS
BODY MASS INDEX: 25.08 KG/M2 | SYSTOLIC BLOOD PRESSURE: 104 MMHG | WEIGHT: 159.81 LBS | HEART RATE: 100 BPM | DIASTOLIC BLOOD PRESSURE: 66 MMHG | HEIGHT: 67 IN | OXYGEN SATURATION: 95 % | TEMPERATURE: 97 F

## 2022-05-02 DIAGNOSIS — C23 ADENOCARCINOMA OF GALLBLADDER: Primary | ICD-10-CM

## 2022-05-02 DIAGNOSIS — C23 ADENOCARCINOMA OF GALLBLADDER: ICD-10-CM

## 2022-05-02 LAB
ALBUMIN SERPL BCP-MCNC: 3 G/DL (ref 3.5–5.2)
ALP SERPL-CCNC: 104 U/L (ref 55–135)
ALT SERPL W/O P-5'-P-CCNC: 14 U/L (ref 10–44)
ANION GAP SERPL CALC-SCNC: 14 MMOL/L (ref 8–16)
AST SERPL-CCNC: 12 U/L (ref 10–40)
BASOPHILS # BLD AUTO: 0.02 K/UL (ref 0–0.2)
BASOPHILS NFR BLD: 0.3 % (ref 0–1.9)
BILIRUB SERPL-MCNC: 0.3 MG/DL (ref 0.1–1)
BUN SERPL-MCNC: 14 MG/DL (ref 8–23)
CALCIUM SERPL-MCNC: 9.2 MG/DL (ref 8.7–10.5)
CHLORIDE SERPL-SCNC: 99 MMOL/L (ref 95–110)
CO2 SERPL-SCNC: 29 MMOL/L (ref 23–29)
CREAT SERPL-MCNC: 0.7 MG/DL (ref 0.5–1.4)
DIFFERENTIAL METHOD: ABNORMAL
EOSINOPHIL # BLD AUTO: 0.1 K/UL (ref 0–0.5)
EOSINOPHIL NFR BLD: 1.2 % (ref 0–8)
ERYTHROCYTE [DISTWIDTH] IN BLOOD BY AUTOMATED COUNT: 14.3 % (ref 11.5–14.5)
EST. GFR  (AFRICAN AMERICAN): >60 ML/MIN/1.73 M^2
EST. GFR  (NON AFRICAN AMERICAN): >60 ML/MIN/1.73 M^2
GLUCOSE SERPL-MCNC: 99 MG/DL (ref 70–110)
HCT VFR BLD AUTO: 30.5 % (ref 37–48.5)
HGB BLD-MCNC: 9.7 G/DL (ref 12–16)
IMM GRANULOCYTES # BLD AUTO: 0.02 K/UL (ref 0–0.04)
IMM GRANULOCYTES NFR BLD AUTO: 0.3 % (ref 0–0.5)
LYMPHOCYTES # BLD AUTO: 2.4 K/UL (ref 1–4.8)
LYMPHOCYTES NFR BLD: 35.9 % (ref 18–48)
MAGNESIUM SERPL-MCNC: 1.9 MG/DL (ref 1.6–2.6)
MCH RBC QN AUTO: 31.2 PG (ref 27–31)
MCHC RBC AUTO-ENTMCNC: 31.8 G/DL (ref 32–36)
MCV RBC AUTO: 98 FL (ref 82–98)
MONOCYTES # BLD AUTO: 0.1 K/UL (ref 0.3–1)
MONOCYTES NFR BLD: 2.1 % (ref 4–15)
NEUTROPHILS # BLD AUTO: 4.1 K/UL (ref 1.8–7.7)
NEUTROPHILS NFR BLD: 60.2 % (ref 38–73)
NRBC BLD-RTO: 0 /100 WBC
PLATELET # BLD AUTO: 201 K/UL (ref 150–450)
PMV BLD AUTO: 8.3 FL (ref 9.2–12.9)
POTASSIUM SERPL-SCNC: 3.3 MMOL/L (ref 3.5–5.1)
PROT SERPL-MCNC: 6.8 G/DL (ref 6–8.4)
RBC # BLD AUTO: 3.11 M/UL (ref 4–5.4)
SODIUM SERPL-SCNC: 142 MMOL/L (ref 136–145)
WBC # BLD AUTO: 6.74 K/UL (ref 3.9–12.7)

## 2022-05-02 PROCEDURE — 1160F RVW MEDS BY RX/DR IN RCRD: CPT | Mod: CPTII,S$GLB,, | Performed by: NURSE PRACTITIONER

## 2022-05-02 PROCEDURE — 1157F PR ADVANCE CARE PLAN OR EQUIV PRESENT IN MEDICAL RECORD: ICD-10-PCS | Mod: CPTII,S$GLB,, | Performed by: NURSE PRACTITIONER

## 2022-05-02 PROCEDURE — 3288F FALL RISK ASSESSMENT DOCD: CPT | Mod: CPTII,S$GLB,, | Performed by: NURSE PRACTITIONER

## 2022-05-02 PROCEDURE — 1159F MED LIST DOCD IN RCRD: CPT | Mod: CPTII,S$GLB,, | Performed by: NURSE PRACTITIONER

## 2022-05-02 PROCEDURE — 3074F PR MOST RECENT SYSTOLIC BLOOD PRESSURE < 130 MM HG: ICD-10-PCS | Mod: CPTII,S$GLB,, | Performed by: NURSE PRACTITIONER

## 2022-05-02 PROCEDURE — 3008F BODY MASS INDEX DOCD: CPT | Mod: CPTII,S$GLB,, | Performed by: NURSE PRACTITIONER

## 2022-05-02 PROCEDURE — 1101F PR PT FALLS ASSESS DOC 0-1 FALLS W/OUT INJ PAST YR: ICD-10-PCS | Mod: CPTII,S$GLB,, | Performed by: NURSE PRACTITIONER

## 2022-05-02 PROCEDURE — 99214 OFFICE O/P EST MOD 30 MIN: CPT | Mod: PBBFAC | Performed by: NURSE PRACTITIONER

## 2022-05-02 PROCEDURE — 1126F PR PAIN SEVERITY QUANTIFIED, NO PAIN PRESENT: ICD-10-PCS | Mod: CPTII,S$GLB,, | Performed by: NURSE PRACTITIONER

## 2022-05-02 PROCEDURE — 3074F SYST BP LT 130 MM HG: CPT | Mod: CPTII,S$GLB,, | Performed by: NURSE PRACTITIONER

## 2022-05-02 PROCEDURE — 99999 PR PBB SHADOW E&M-EST. PATIENT-LVL IV: ICD-10-PCS | Mod: PBBFAC,,, | Performed by: NURSE PRACTITIONER

## 2022-05-02 PROCEDURE — 1157F ADVNC CARE PLAN IN RCRD: CPT | Mod: CPTII,S$GLB,, | Performed by: NURSE PRACTITIONER

## 2022-05-02 PROCEDURE — 1126F AMNT PAIN NOTED NONE PRSNT: CPT | Mod: CPTII,S$GLB,, | Performed by: NURSE PRACTITIONER

## 2022-05-02 PROCEDURE — 83735 ASSAY OF MAGNESIUM: CPT | Performed by: INTERNAL MEDICINE

## 2022-05-02 PROCEDURE — 99214 PR OFFICE/OUTPT VISIT, EST, LEVL IV, 30-39 MIN: ICD-10-PCS | Mod: S$GLB,,, | Performed by: NURSE PRACTITIONER

## 2022-05-02 PROCEDURE — 1101F PT FALLS ASSESS-DOCD LE1/YR: CPT | Mod: CPTII,S$GLB,, | Performed by: NURSE PRACTITIONER

## 2022-05-02 PROCEDURE — 99214 OFFICE O/P EST MOD 30 MIN: CPT | Mod: S$GLB,,, | Performed by: NURSE PRACTITIONER

## 2022-05-02 PROCEDURE — 1160F PR REVIEW ALL MEDS BY PRESCRIBER/CLIN PHARMACIST DOCUMENTED: ICD-10-PCS | Mod: CPTII,S$GLB,, | Performed by: NURSE PRACTITIONER

## 2022-05-02 PROCEDURE — 4010F ACE/ARB THERAPY RXD/TAKEN: CPT | Mod: CPTII,S$GLB,, | Performed by: NURSE PRACTITIONER

## 2022-05-02 PROCEDURE — 4010F PR ACE/ARB THEARPY RXD/TAKEN: ICD-10-PCS | Mod: CPTII,S$GLB,, | Performed by: NURSE PRACTITIONER

## 2022-05-02 PROCEDURE — 3008F PR BODY MASS INDEX (BMI) DOCUMENTED: ICD-10-PCS | Mod: CPTII,S$GLB,, | Performed by: NURSE PRACTITIONER

## 2022-05-02 PROCEDURE — 80053 COMPREHEN METABOLIC PANEL: CPT | Performed by: INTERNAL MEDICINE

## 2022-05-02 PROCEDURE — 3078F DIAST BP <80 MM HG: CPT | Mod: CPTII,S$GLB,, | Performed by: NURSE PRACTITIONER

## 2022-05-02 PROCEDURE — 63600175 PHARM REV CODE 636 W HCPCS: Performed by: NURSE PRACTITIONER

## 2022-05-02 PROCEDURE — 3078F PR MOST RECENT DIASTOLIC BLOOD PRESSURE < 80 MM HG: ICD-10-PCS | Mod: CPTII,S$GLB,, | Performed by: NURSE PRACTITIONER

## 2022-05-02 PROCEDURE — 36415 COLL VENOUS BLD VENIPUNCTURE: CPT | Performed by: INTERNAL MEDICINE

## 2022-05-02 PROCEDURE — 1159F PR MEDICATION LIST DOCUMENTED IN MEDICAL RECORD: ICD-10-PCS | Mod: CPTII,S$GLB,, | Performed by: NURSE PRACTITIONER

## 2022-05-02 PROCEDURE — 3288F PR FALLS RISK ASSESSMENT DOCUMENTED: ICD-10-PCS | Mod: CPTII,S$GLB,, | Performed by: NURSE PRACTITIONER

## 2022-05-02 PROCEDURE — 85025 COMPLETE CBC W/AUTO DIFF WBC: CPT | Performed by: INTERNAL MEDICINE

## 2022-05-02 PROCEDURE — 99999 PR PBB SHADOW E&M-EST. PATIENT-LVL IV: CPT | Mod: PBBFAC,,, | Performed by: NURSE PRACTITIONER

## 2022-05-02 PROCEDURE — 36592 COLLECT BLOOD FROM PICC: CPT

## 2022-05-02 RX ORDER — SODIUM CHLORIDE 0.9 % (FLUSH) 0.9 %
10 SYRINGE (ML) INJECTION
Status: CANCELLED | OUTPATIENT
Start: 2022-05-02

## 2022-05-02 RX ORDER — HEPARIN 100 UNIT/ML
500 SYRINGE INTRAVENOUS
Status: COMPLETED | OUTPATIENT
Start: 2022-05-02 | End: 2022-05-02

## 2022-05-02 RX ORDER — SODIUM CHLORIDE 0.9 % (FLUSH) 0.9 %
10 SYRINGE (ML) INJECTION
Status: DISCONTINUED | OUTPATIENT
Start: 2022-05-02 | End: 2022-05-02 | Stop reason: HOSPADM

## 2022-05-02 RX ORDER — HEPARIN 100 UNIT/ML
500 SYRINGE INTRAVENOUS
Status: CANCELLED | OUTPATIENT
Start: 2022-05-02

## 2022-05-02 RX ADMIN — Medication 500 UNITS: at 11:05

## 2022-05-02 NOTE — NURSING
Lab obtained from Right upper arm PICC line, 2 patient identifiers obtained, labeled and sent to lab.  Adequate blood return noted.  2 lumen(s) flushed with 10ml NS and 5ml Heparin solution.  Dressing changed via sterile technique.  Site without redness, swelling or drainage noted.

## 2022-05-02 NOTE — DISCHARGE INSTRUCTIONS
.[facility]  [address] [address]  [address]  [phone] phone     [phone] fax  Hours of Operation: Monday- Friday 8:00am- 5:00pm  After hours phone  [phone]  Hematology / Oncology Physicians on call    Dr. Samson Hutchins      Nurse Practitioners:    Gracie Spangler, TERI Song, TERI Solomon, TERI Arellano, TERI Andrea, PA      Please don't hesitate to call if you have any concerns.    .FALL PREVENTION   Falls often occur due to slipping, tripping or losing your balance. Here are ways to reduce your risk of falling again.   Was there anything that caused your fall that can be fixed, removed or replaced?   Make your home safe by keeping walkways clear of objects you may trip over.   Use non-slip pads under rugs.   Do not walk in poorly lit areas.   Do not stand on chairs or wobbly ladders.   Use caution when reaching overhead or looking upward. This position can cause a loss of balance.   Be sure your shoes fit properly, have non-slip bottoms and are in good condition.   Be cautious when going up and down stairs, curbs, and when walking on uneven sidewalks.   If your balance is poor, consider using a cane or walker.   If your fall was related to alcohol use, stop or limit alcohol intake.   If your fall was related to use of sleeping medicines, talk to your doctor about this. You may need to reduce your dosage at bedtime if you awaken during the night to go to the bathroom.   To reduce the need for nighttime bathroom trips:   Avoid drinking fluids for several hours before going to bed   Empty your bladder before going to bed   Men can keep a urinal at the bedside   © 8785-8820 [facility], [address] [place] [address], [place] [address]. All rights reserved. This information is not intended as a substitute for professional medical care. Always follow your healthcare professional's instructions.  .WAYS  TO HELP PREVENT INFECTION        WASH YOUR HANDS OFTEN DURING THE DAY, ESPECIALLY BEFORE YOU EAT, AFTER USING THE BATHROOM, AND AFTER TOUCHING ANIMALS    STAY AWAY FROM PEOPLE WHO HAVE ILLNESSES YOU CAN CATCH; SUCH AS COLDS, FLU, CHICKEN POX    TRY TO AVOID CROWDS    STAY AWAY FROM CHILDREN WHO RECENTLY HAVE RECEIVED LIVE VIRUS VACCINES    MAINTAIN GOOD MOUTH CARE    DO NOT SQUEEZE OR SCRATCH PIMPLES    CLEAN CUTS & SCRAPES RIGHT AWAY AND DAILY UNTIL HEALED WITH WARM WATER, SOAP & AN ANTISEPTIC    AVOID CONTACT WITH LITTER BOXES, BIRD CAGES, & FISH TANKS    AVOID STANDING WATER, IE., BIRD BATHS, FLOWER POTS/VASES, OR HUMIDIFIERS    WEAR GLOVES WHEN GARDENING OR CLEANING UP AFTER OTHERS, ESPECIALLY BABIES & SMALL CHILDREN    DO NOT EAT RAW FISH, SEAFOOD, MEAT, OR EGGS

## 2022-05-03 ENCOUNTER — INFUSION (OUTPATIENT)
Dept: INFUSION THERAPY | Facility: HOSPITAL | Age: 72
End: 2022-05-03
Attending: INTERNAL MEDICINE
Payer: MEDICARE

## 2022-05-03 VITALS
SYSTOLIC BLOOD PRESSURE: 126 MMHG | TEMPERATURE: 97 F | OXYGEN SATURATION: 94 % | DIASTOLIC BLOOD PRESSURE: 78 MMHG | RESPIRATION RATE: 18 BRPM | HEART RATE: 85 BPM

## 2022-05-03 DIAGNOSIS — C23 ADENOCARCINOMA OF GALLBLADDER: Primary | ICD-10-CM

## 2022-05-03 PROCEDURE — 96361 HYDRATE IV INFUSION ADD-ON: CPT

## 2022-05-03 PROCEDURE — 96360 HYDRATION IV INFUSION INIT: CPT

## 2022-05-03 PROCEDURE — 63600175 PHARM REV CODE 636 W HCPCS: Performed by: INTERNAL MEDICINE

## 2022-05-03 PROCEDURE — 96375 TX/PRO/DX INJ NEW DRUG ADDON: CPT

## 2022-05-03 PROCEDURE — 96368 THER/DIAG CONCURRENT INF: CPT

## 2022-05-03 PROCEDURE — 96417 CHEMO IV INFUS EACH ADDL SEQ: CPT

## 2022-05-03 PROCEDURE — 25000003 PHARM REV CODE 250: Performed by: INTERNAL MEDICINE

## 2022-05-03 PROCEDURE — 96413 CHEMO IV INFUSION 1 HR: CPT

## 2022-05-03 PROCEDURE — 96367 TX/PROPH/DG ADDL SEQ IV INF: CPT

## 2022-05-03 RX ORDER — SODIUM CHLORIDE 9 MG/ML
500 INJECTION, SOLUTION INTRAVENOUS
Status: COMPLETED | OUTPATIENT
Start: 2022-05-03 | End: 2022-05-03

## 2022-05-03 RX ORDER — SODIUM CHLORIDE 0.9 % (FLUSH) 0.9 %
10 SYRINGE (ML) INJECTION
Status: DISCONTINUED | OUTPATIENT
Start: 2022-05-03 | End: 2022-05-03 | Stop reason: HOSPADM

## 2022-05-03 RX ORDER — HEPARIN 100 UNIT/ML
500 SYRINGE INTRAVENOUS
Status: DISCONTINUED | OUTPATIENT
Start: 2022-05-03 | End: 2022-05-03 | Stop reason: HOSPADM

## 2022-05-03 RX ADMIN — MAGNESIUM SULFATE HEPTAHYDRATE 500 ML/HR: 500 INJECTION, SOLUTION INTRAMUSCULAR; INTRAVENOUS at 09:05

## 2022-05-03 RX ADMIN — PALONOSETRON HYDROCHLORIDE: 0.25 INJECTION, SOLUTION INTRAVENOUS at 09:05

## 2022-05-03 RX ADMIN — HEPARIN 500 UNITS: 100 SYRINGE at 03:05

## 2022-05-03 RX ADMIN — HEPARIN 500 UNITS: 100 SYRINGE at 01:05

## 2022-05-03 RX ADMIN — GEMCITABINE 1880 MG: 38 INJECTION, SOLUTION INTRAVENOUS at 10:05

## 2022-05-03 RX ADMIN — CISPLATIN 47 MG: 1 INJECTION INTRAVENOUS at 11:05

## 2022-05-03 RX ADMIN — SODIUM CHLORIDE 500 ML: 0.9 INJECTION, SOLUTION INTRAVENOUS at 01:05

## 2022-05-03 RX ADMIN — APREPITANT 130 MG: 130 INJECTION, EMULSION INTRAVENOUS at 09:05

## 2022-05-03 RX ADMIN — SODIUM CHLORIDE: 0.9 INJECTION, SOLUTION INTRAVENOUS at 09:05

## 2022-05-03 NOTE — PLAN OF CARE
Problem: Adult Inpatient Plan of Care  Goal: Plan of Care Review  Outcome: Ongoing, Progressing  Flowsheets (Taken 5/3/2022 1029)  Plan of Care Reviewed With:   patient   daughter  Goal: Optimal Comfort and Wellbeing  Outcome: Ongoing, Progressing  Intervention: Provide Person-Centered Care  Flowsheets (Taken 5/3/2022 1029)  Trust Relationship/Rapport:   care explained   questions encouraged   choices provided   reassurance provided   emotional support provided   thoughts/feelings acknowledged   empathic listening provided   questions answered     Problem: Neutropenia (Chemotherapy Effects)  Goal: Absence of Infection  Outcome: Ongoing, Progressing  Intervention: Prevent Infection and Maximize Resistance  Flowsheets (Taken 5/3/2022 1029)  Infection Prevention:   environmental surveillance performed   equipment surfaces disinfected   hand hygiene promoted   personal protective equipment utilized     Problem: Fall Injury Risk  Goal: Absence of Fall and Fall-Related Injury  Outcome: Ongoing, Progressing  Intervention: Identify and Manage Contributors  Flowsheets (Taken 5/3/2022 1029)  Self-Care Promotion:   independence encouraged   BADL personal objects within reach   BADL personal routines maintained   safe use of adaptive equipment encouraged  Medication Review/Management: medications reviewed  Intervention: Promote Injury-Free Environment  Flowsheets (Taken 5/3/2022 1029)  Safety Promotion/Fall Prevention:   assistive device/personal item within reach   Fall Risk reviewed with patient/family   in recliner, wheels locked   medications reviewed   family to remain at bedside   instructed to call staff for mobility

## 2022-05-13 NOTE — PROGRESS NOTES
"Subjective:       Patient ID: Madelyn Serna is a 71 y.o. female.    Chief Complaint:   1. Adenocarcinoma of gallbladder  Stage IV, pT4 NX pM1     Current Treatment:  OP GEMCITABINE CISPLATIN Q3W    Treatment History:  S/p Laparoscopic cholecystectomy, 02/04/2022, [facility]    HPI: This is a 71 year old woman currently on treatment for Stage IV adenocarcinoma of the gallbladder. She presented to [facility] on 2/4/2022 after 4 days of right lower quadrant abdominal pain; she experienced an episode of vomiting per the ER notes. Labs revealed leukocytosis WBC 18.7, hemoglobin 13, , renal function intact GFR over 60, mild alkaline phosphatase elevation 130, normal transaminases and bilirubin. Albumin 4.5, calcium 10.4.  Urinalysis positive for E coli pansensitive.  Blood cultures negative.    CT A/P  with contrast showed distended gallbladder extensive cholelithiasis including 17 mm stone in the region of gallbladder neck.  Pericholecystic stranding and small adjacent fluid.  Lymph nodes noted to be unremarkable.  Emphysema lung bases. She underwent emergency surgery with laparoscopic cholecystectomy.  Surgical note mentions during this maneuver would appear to be a peritoneal nodule was discovered.  The peritoneal nodule was dissected from all surrounding structures with the LigaSure device.  The nodule is passed off the table and sent separately as a specimen.    Pathology demonstrated "gallbladder cholecystectomy adenocarcinoma IHC positive for CK7 & CDX2 and negative for CK 20, TTF1 and PAX8. Tumor size at least 2.2 cm. The tumor invades the liver.  Lymphovascular invasion present. Perineural invasion not identified.  Margins cannot be assessed. Regional lymph nodes not applicable. Peritoneal nodule excisional biopsy positive for metastatic adenocarcinoma consistent with origin from gallbladder.     Staging PET performed on 3/2/2022 indicated uptake at right upper quadrant along " liver margin site of prior cholecystectomy with postoperative fluid and peritoneal implants eleanor hepatis region potential/likely postoperative changes, right internal mammary chain and cardiophrenic lymph nodes. Her case was discussed at Tumor Board where it was decided to offer her palliative chemotherapy. She was started on Cisplatin/Gemzar every 3 weeks on 3/24/2022.    Her primary oncologist is Dr. Marcos.    Interval History: Patient presents for follow up on Cisplatin/Gemzar; she is scheduled to receive C2D1 tomorrow. She presents with her daughter who states she has a good appetite. She has no complaints today. H&H 8.5 & 27.2; plts elevated at 539k. WBC & ANC WNL. Mg low at 1.5; K low at 3.4. Remaining CMP stable. Discussed increasing dietary intake of foods rich in potassium and Mg.     Social History     Socioeconomic History    Marital status:    Tobacco Use    Smoking status: Former Smoker    Smokeless tobacco: Former User       Past Medical History:   Diagnosis Date    Cancer of gallbladder     Essential (primary) hypertension     Infection following a procedure, deep incisional surgical site, initial encounter 3/3/2022    Romaine pus on bandage with induration to RUQ. She has already completed a 5 day course of Cipro which completed on 2/27. Recommend prompt evaluation with ED and whether or not imaging is warranted.       History reviewed. No pertinent family history.    History reviewed. No pertinent surgical history.    Review of Systems   Constitutional: Negative.  Negative for appetite change and fatigue.   HENT: Negative.    Eyes: Negative.    Respiratory: Negative.    Cardiovascular: Negative.    Gastrointestinal: Negative for abdominal pain, constipation, diarrhea, nausea and vomiting.   Endocrine: Negative.    Genitourinary: Negative.    Musculoskeletal: Negative.    Skin: Negative.    Allergic/Immunologic: Negative.    Neurological: Negative.  Negative for weakness.    Hematological: Negative.    Psychiatric/Behavioral: Negative.          Medication List with Changes/Refills   Current Medications    ALBUTEROL (PROVENTIL/VENTOLIN HFA) 90 MCG/ACTUATION INHALER    INHALE TWO PUFFS FOUR TIMES DAILY AS NEEDED FOR WHEEZING    AMLODIPINE (NORVASC) 5 MG TABLET    amlodipine Take 1 time per day No date recorded tablet 1 time per day No route recorded No set duration recorded No set duration amount recorded active 5 mg    CLONAZEPAM (KLONOPIN) 0.5 MG TABLET    clonazepam Take 2 times per day No date recorded tablet 2 times per day No route recorded No set duration recorded No set duration amount recorded active 0.5 mg    CYPROHEPTADINE (PERIACTIN) 4 MG TABLET    Take 4 mg by mouth 2 (two) times daily.    DEXAMETHASONE (DECADRON) 4 MG TAB    Take 2 tablets (8mg total) by mouth once daily. Take as directed on days 2, 3, 4 and days 9, 10, 11 of your chemotherapy cycle.    DEXAMETHASONE (DECADRON) 4 MG TAB    Take 2 tablets (8 mg total) by mouth once daily. Take as directed on days 2, 3, 4 and days 9, 10, 11 of your chemotherapy cycle.    DICLOFENAC SODIUM (VOLTAREN) 1 % GEL    APPLY 1 GRAM TO AFFECTED AREA FOUR TIMES DAILY AS NEEDED    IPRATROPIUM (ATROVENT) 21 MCG (0.03 %) NASAL SPRAY        LABETALOL (NORMODYNE) 300 MG TABLET    labetalol Take 2 times per day No date recorded tablet 2 times per day No route recorded No set duration recorded No set duration amount recorded suspended 300 mg    LISINOPRIL 10 MG TABLET    Take 1 tablet (10 mg total) by mouth once daily.    MONTELUKAST (SINGULAIR) 10 MG TABLET    Take 10 mg by mouth once daily.    NALOXONE (NARCAN) 4 MG/ACTUATION SPRY    USE ONE SPRAY IN ONE NOSTRIL AS DIRECTED UPON SIGNS OF OPIOID OVERDOSE CALL 911    ONDANSETRON (ZOFRAN-ODT) 8 MG TBDL    Take 1 tablet (8 mg total) by mouth every 6 (six) hours as needed.    ONDANSETRON (ZOFRAN-ODT) 8 MG TBDL    Take 1 tablet (8 mg total) by mouth every 8 (eight) hours as needed  (nausea/vomiting).    OXYCODONE (ROXICODONE) 5 MG IMMEDIATE RELEASE TABLET    TAKE ONE TABLET EVERY 4 HOURS AS NEEDED FOR PAIN - MAX FOUR DOSES A DAY    PROCHLORPERAZINE (COMPAZINE) 5 MG TABLET    Take 1 tablet (5 mg total) by mouth every 6 (six) hours as needed for Nausea. May take every 6 hours scheduled for prevention of nausea.    VENLAFAXINE (EFFEXOR-XR) 75 MG 24 HR CAPSULE    Take 1 capsule (75 mg total) by mouth once daily.     Objective:     Vitals:    05/16/22 1317   BP: 128/75   Pulse: 100   Temp: 97.3 °F (36.3 °C)     Lab Results   Component Value Date    WBC 5.78 05/16/2022    HGB 8.5 (L) 05/16/2022    HCT 27.2 (L) 05/16/2022    MCV 97 05/16/2022     (H) 05/16/2022     CMP  Sodium   Date Value Ref Range Status   05/16/2022 141 136 - 145 mmol/L Final     Potassium   Date Value Ref Range Status   05/16/2022 3.4 (L) 3.5 - 5.1 mmol/L Final     Chloride   Date Value Ref Range Status   05/16/2022 100 95 - 110 mmol/L Final     CO2   Date Value Ref Range Status   05/16/2022 31 (H) 23 - 29 mmol/L Final     Glucose   Date Value Ref Range Status   05/16/2022 128 (H) 70 - 110 mg/dL Final     BUN   Date Value Ref Range Status   05/16/2022 7 (L) 8 - 23 mg/dL Final     Creatinine   Date Value Ref Range Status   05/16/2022 0.7 0.5 - 1.4 mg/dL Final     Calcium   Date Value Ref Range Status   05/16/2022 9.0 8.7 - 10.5 mg/dL Final     Total Protein   Date Value Ref Range Status   05/16/2022 6.5 6.0 - 8.4 g/dL Final     Albumin   Date Value Ref Range Status   05/16/2022 2.7 (L) 3.5 - 5.2 g/dL Final     Total Bilirubin   Date Value Ref Range Status   05/16/2022 0.2 0.1 - 1.0 mg/dL Final     Comment:     For infants and newborns, interpretation of results should be based  on gestational age, weight and in agreement with clinical  observations.    Premature Infant recommended reference ranges:  Up to 24 hours.............<8.0 mg/dL  Up to 48 hours............<12.0 mg/dL  3-5 days..................<15.0 mg/dL  6-29  days.................<15.0 mg/dL       Alkaline Phosphatase   Date Value Ref Range Status   05/16/2022 115 55 - 135 U/L Final     AST   Date Value Ref Range Status   05/16/2022 13 10 - 40 U/L Final     ALT   Date Value Ref Range Status   05/16/2022 17 10 - 44 U/L Final     Anion Gap   Date Value Ref Range Status   05/16/2022 10 8 - 16 mmol/L Final     eGFR if    Date Value Ref Range Status   05/16/2022 >60 >60 mL/min/1.73 m^2 Final     eGFR if non    Date Value Ref Range Status   05/16/2022 >60 >60 mL/min/1.73 m^2 Final     Comment:     Calculation used to obtain the estimated glomerular filtration  rate (eGFR) is the CKD-EPI equation.        Physical Exam  Vitals reviewed.   Constitutional:       Appearance: Normal appearance.   HENT:      Head: Normocephalic.   Eyes:      Pupils: Pupils are equal, round, and reactive to light.   Cardiovascular:      Rate and Rhythm: Normal rate and regular rhythm.      Heart sounds: Normal heart sounds.   Pulmonary:      Effort: Pulmonary effort is normal.      Breath sounds: Normal breath sounds.   Abdominal:      General: Bowel sounds are normal.      Palpations: Abdomen is soft.   Genitourinary:     Comments: deferred  Musculoskeletal:      Cervical back: Normal range of motion.      Comments: Presents in wheelchair   Skin:     General: Skin is warm and dry.   Neurological:      Mental Status: She is alert and oriented to person, place, and time.   Psychiatric:         Behavior: Behavior normal.         Thought Content: Thought content normal.          (2) Ambulatory and capable of self care, unable to carry out work activity, up and about > 50% or waking hours  Assessment:     Problem List Items Addressed This Visit        Oncology    Adenocarcinoma of gallbladder - Primary        Plan:     Adenocarcinoma of gallbladder    Labs reviewed.  Ok to proceed with C2D1 of Cisplatin/Gemzar tomorrow.   Follow up in 1 week with Mg, CBC and Comprehensive  Metabolic Panel prior to C2D8.    I will review assessment/plan with collaborating physician Dr. Martinez.    MARIA LUISA Brown

## 2022-05-16 ENCOUNTER — INFUSION (OUTPATIENT)
Dept: INFUSION THERAPY | Facility: HOSPITAL | Age: 72
End: 2022-05-16
Attending: INTERNAL MEDICINE
Payer: MEDICARE

## 2022-05-16 ENCOUNTER — OFFICE VISIT (OUTPATIENT)
Dept: HEMATOLOGY/ONCOLOGY | Facility: CLINIC | Age: 72
End: 2022-05-16
Payer: MEDICARE

## 2022-05-16 ENCOUNTER — PATIENT MESSAGE (OUTPATIENT)
Dept: PALLIATIVE MEDICINE | Facility: CLINIC | Age: 72
End: 2022-05-16
Payer: MEDICARE

## 2022-05-16 VITALS
DIASTOLIC BLOOD PRESSURE: 74 MMHG | OXYGEN SATURATION: 97 % | TEMPERATURE: 98 F | RESPIRATION RATE: 18 BRPM | HEART RATE: 94 BPM | SYSTOLIC BLOOD PRESSURE: 125 MMHG

## 2022-05-16 VITALS
HEART RATE: 100 BPM | DIASTOLIC BLOOD PRESSURE: 75 MMHG | WEIGHT: 161.81 LBS | OXYGEN SATURATION: 95 % | TEMPERATURE: 97 F | BODY MASS INDEX: 25.4 KG/M2 | SYSTOLIC BLOOD PRESSURE: 128 MMHG | HEIGHT: 67 IN

## 2022-05-16 DIAGNOSIS — C23 ADENOCARCINOMA OF GALLBLADDER: Primary | ICD-10-CM

## 2022-05-16 PROCEDURE — 25000003 PHARM REV CODE 250: Performed by: NURSE PRACTITIONER

## 2022-05-16 PROCEDURE — 63600175 PHARM REV CODE 636 W HCPCS: Performed by: NURSE PRACTITIONER

## 2022-05-16 PROCEDURE — 1101F PR PT FALLS ASSESS DOC 0-1 FALLS W/OUT INJ PAST YR: ICD-10-PCS | Mod: CPTII,S$GLB,, | Performed by: NURSE PRACTITIONER

## 2022-05-16 PROCEDURE — 36592 COLLECT BLOOD FROM PICC: CPT

## 2022-05-16 PROCEDURE — 1157F ADVNC CARE PLAN IN RCRD: CPT | Mod: CPTII,S$GLB,, | Performed by: NURSE PRACTITIONER

## 2022-05-16 PROCEDURE — A4216 STERILE WATER/SALINE, 10 ML: HCPCS | Performed by: NURSE PRACTITIONER

## 2022-05-16 PROCEDURE — 3078F PR MOST RECENT DIASTOLIC BLOOD PRESSURE < 80 MM HG: ICD-10-PCS | Mod: CPTII,S$GLB,, | Performed by: NURSE PRACTITIONER

## 2022-05-16 PROCEDURE — 3078F DIAST BP <80 MM HG: CPT | Mod: CPTII,S$GLB,, | Performed by: NURSE PRACTITIONER

## 2022-05-16 PROCEDURE — 4010F PR ACE/ARB THEARPY RXD/TAKEN: ICD-10-PCS | Mod: CPTII,S$GLB,, | Performed by: NURSE PRACTITIONER

## 2022-05-16 PROCEDURE — 99214 OFFICE O/P EST MOD 30 MIN: CPT | Mod: S$GLB,,, | Performed by: NURSE PRACTITIONER

## 2022-05-16 PROCEDURE — 3008F PR BODY MASS INDEX (BMI) DOCUMENTED: ICD-10-PCS | Mod: CPTII,S$GLB,, | Performed by: NURSE PRACTITIONER

## 2022-05-16 PROCEDURE — 1157F PR ADVANCE CARE PLAN OR EQUIV PRESENT IN MEDICAL RECORD: ICD-10-PCS | Mod: CPTII,S$GLB,, | Performed by: NURSE PRACTITIONER

## 2022-05-16 PROCEDURE — 1126F PR PAIN SEVERITY QUANTIFIED, NO PAIN PRESENT: ICD-10-PCS | Mod: CPTII,S$GLB,, | Performed by: NURSE PRACTITIONER

## 2022-05-16 PROCEDURE — 3288F PR FALLS RISK ASSESSMENT DOCUMENTED: ICD-10-PCS | Mod: CPTII,S$GLB,, | Performed by: NURSE PRACTITIONER

## 2022-05-16 PROCEDURE — 4010F ACE/ARB THERAPY RXD/TAKEN: CPT | Mod: CPTII,S$GLB,, | Performed by: NURSE PRACTITIONER

## 2022-05-16 PROCEDURE — 99214 PR OFFICE/OUTPT VISIT, EST, LEVL IV, 30-39 MIN: ICD-10-PCS | Mod: S$GLB,,, | Performed by: NURSE PRACTITIONER

## 2022-05-16 PROCEDURE — 1126F AMNT PAIN NOTED NONE PRSNT: CPT | Mod: CPTII,S$GLB,, | Performed by: NURSE PRACTITIONER

## 2022-05-16 PROCEDURE — 99999 PR PBB SHADOW E&M-EST. PATIENT-LVL IV: ICD-10-PCS | Mod: PBBFAC,,, | Performed by: NURSE PRACTITIONER

## 2022-05-16 PROCEDURE — 3074F SYST BP LT 130 MM HG: CPT | Mod: CPTII,S$GLB,, | Performed by: NURSE PRACTITIONER

## 2022-05-16 PROCEDURE — 1101F PT FALLS ASSESS-DOCD LE1/YR: CPT | Mod: CPTII,S$GLB,, | Performed by: NURSE PRACTITIONER

## 2022-05-16 PROCEDURE — 3288F FALL RISK ASSESSMENT DOCD: CPT | Mod: CPTII,S$GLB,, | Performed by: NURSE PRACTITIONER

## 2022-05-16 PROCEDURE — 3008F BODY MASS INDEX DOCD: CPT | Mod: CPTII,S$GLB,, | Performed by: NURSE PRACTITIONER

## 2022-05-16 PROCEDURE — 1159F PR MEDICATION LIST DOCUMENTED IN MEDICAL RECORD: ICD-10-PCS | Mod: CPTII,S$GLB,, | Performed by: NURSE PRACTITIONER

## 2022-05-16 PROCEDURE — 3074F PR MOST RECENT SYSTOLIC BLOOD PRESSURE < 130 MM HG: ICD-10-PCS | Mod: CPTII,S$GLB,, | Performed by: NURSE PRACTITIONER

## 2022-05-16 PROCEDURE — 99214 OFFICE O/P EST MOD 30 MIN: CPT | Mod: PBBFAC | Performed by: NURSE PRACTITIONER

## 2022-05-16 PROCEDURE — 1159F MED LIST DOCD IN RCRD: CPT | Mod: CPTII,S$GLB,, | Performed by: NURSE PRACTITIONER

## 2022-05-16 PROCEDURE — 99999 PR PBB SHADOW E&M-EST. PATIENT-LVL IV: CPT | Mod: PBBFAC,,, | Performed by: NURSE PRACTITIONER

## 2022-05-16 RX ORDER — SODIUM CHLORIDE 0.9 % (FLUSH) 0.9 %
10 SYRINGE (ML) INJECTION
Status: CANCELLED | OUTPATIENT
Start: 2022-05-16

## 2022-05-16 RX ORDER — SODIUM CHLORIDE 0.9 % (FLUSH) 0.9 %
10 SYRINGE (ML) INJECTION
Status: COMPLETED | OUTPATIENT
Start: 2022-05-16 | End: 2022-05-16

## 2022-05-16 RX ORDER — HEPARIN 100 UNIT/ML
500 SYRINGE INTRAVENOUS
Status: CANCELLED | OUTPATIENT
Start: 2022-05-16

## 2022-05-16 RX ORDER — HEPARIN 100 UNIT/ML
500 SYRINGE INTRAVENOUS
Status: COMPLETED | OUTPATIENT
Start: 2022-05-16 | End: 2022-05-16

## 2022-05-16 RX ORDER — HEPARIN 100 UNIT/ML
500 SYRINGE INTRAVENOUS
Status: CANCELLED | OUTPATIENT
Start: 2022-05-23

## 2022-05-16 RX ORDER — SODIUM CHLORIDE 0.9 % (FLUSH) 0.9 %
10 SYRINGE (ML) INJECTION
Status: CANCELLED | OUTPATIENT
Start: 2022-05-23

## 2022-05-16 RX ADMIN — Medication 10 ML: at 01:05

## 2022-05-16 RX ADMIN — HEPARIN 500 UNITS: 100 SYRINGE at 01:05

## 2022-05-16 NOTE — NURSING
Patient here to have labs drawn from PICC line. Positive blood return noted from both lumens. Labs drawn and sent to laboratory for processing. Both lumens flushed with saline and heparin as documented in MAR.

## 2022-05-17 ENCOUNTER — OFFICE VISIT (OUTPATIENT)
Dept: PALLIATIVE MEDICINE | Facility: CLINIC | Age: 72
End: 2022-05-17
Payer: MEDICARE

## 2022-05-17 ENCOUNTER — INFUSION (OUTPATIENT)
Dept: INFUSION THERAPY | Facility: HOSPITAL | Age: 72
End: 2022-05-17
Attending: INTERNAL MEDICINE
Payer: MEDICARE

## 2022-05-17 VITALS
WEIGHT: 161.81 LBS | HEIGHT: 67 IN | DIASTOLIC BLOOD PRESSURE: 87 MMHG | SYSTOLIC BLOOD PRESSURE: 150 MMHG | OXYGEN SATURATION: 98 % | TEMPERATURE: 98 F | HEART RATE: 95 BPM | RESPIRATION RATE: 18 BRPM | BODY MASS INDEX: 25.4 KG/M2

## 2022-05-17 VITALS
SYSTOLIC BLOOD PRESSURE: 153 MMHG | RESPIRATION RATE: 18 BRPM | TEMPERATURE: 98 F | HEIGHT: 67 IN | HEART RATE: 88 BPM | WEIGHT: 161.81 LBS | OXYGEN SATURATION: 97 % | DIASTOLIC BLOOD PRESSURE: 84 MMHG | BODY MASS INDEX: 25.4 KG/M2

## 2022-05-17 DIAGNOSIS — C23 ADENOCARCINOMA OF GALLBLADDER: Primary | ICD-10-CM

## 2022-05-17 DIAGNOSIS — F32.9 REACTIVE DEPRESSION: Primary | ICD-10-CM

## 2022-05-17 DIAGNOSIS — G89.3 NEOPLASM RELATED PAIN: ICD-10-CM

## 2022-05-17 DIAGNOSIS — C23 ADENOCARCINOMA OF GALLBLADDER: ICD-10-CM

## 2022-05-17 PROCEDURE — 4010F PR ACE/ARB THEARPY RXD/TAKEN: ICD-10-PCS | Mod: CPTII,S$GLB,, | Performed by: PHYSICIAN ASSISTANT

## 2022-05-17 PROCEDURE — 99215 OFFICE O/P EST HI 40 MIN: CPT | Mod: PBBFAC | Performed by: PHYSICIAN ASSISTANT

## 2022-05-17 PROCEDURE — 96368 THER/DIAG CONCURRENT INF: CPT

## 2022-05-17 PROCEDURE — 1126F AMNT PAIN NOTED NONE PRSNT: CPT | Mod: CPTII,S$GLB,, | Performed by: PHYSICIAN ASSISTANT

## 2022-05-17 PROCEDURE — 1159F PR MEDICATION LIST DOCUMENTED IN MEDICAL RECORD: ICD-10-PCS | Mod: CPTII,S$GLB,, | Performed by: PHYSICIAN ASSISTANT

## 2022-05-17 PROCEDURE — 1126F PR PAIN SEVERITY QUANTIFIED, NO PAIN PRESENT: ICD-10-PCS | Mod: CPTII,S$GLB,, | Performed by: PHYSICIAN ASSISTANT

## 2022-05-17 PROCEDURE — 96417 CHEMO IV INFUS EACH ADDL SEQ: CPT

## 2022-05-17 PROCEDURE — 99214 PR OFFICE/OUTPT VISIT, EST, LEVL IV, 30-39 MIN: ICD-10-PCS | Mod: S$GLB,,, | Performed by: PHYSICIAN ASSISTANT

## 2022-05-17 PROCEDURE — 3008F PR BODY MASS INDEX (BMI) DOCUMENTED: ICD-10-PCS | Mod: CPTII,S$GLB,, | Performed by: PHYSICIAN ASSISTANT

## 2022-05-17 PROCEDURE — 3077F SYST BP >= 140 MM HG: CPT | Mod: CPTII,S$GLB,, | Performed by: PHYSICIAN ASSISTANT

## 2022-05-17 PROCEDURE — 63600175 PHARM REV CODE 636 W HCPCS: Performed by: INTERNAL MEDICINE

## 2022-05-17 PROCEDURE — 96366 THER/PROPH/DIAG IV INF ADDON: CPT

## 2022-05-17 PROCEDURE — 99999 PR PBB SHADOW E&M-EST. PATIENT-LVL V: ICD-10-PCS | Mod: PBBFAC,,, | Performed by: PHYSICIAN ASSISTANT

## 2022-05-17 PROCEDURE — 3077F PR MOST RECENT SYSTOLIC BLOOD PRESSURE >= 140 MM HG: ICD-10-PCS | Mod: CPTII,S$GLB,, | Performed by: PHYSICIAN ASSISTANT

## 2022-05-17 PROCEDURE — 4010F ACE/ARB THERAPY RXD/TAKEN: CPT | Mod: CPTII,S$GLB,, | Performed by: PHYSICIAN ASSISTANT

## 2022-05-17 PROCEDURE — 96375 TX/PRO/DX INJ NEW DRUG ADDON: CPT

## 2022-05-17 PROCEDURE — 99999 PR PBB SHADOW E&M-EST. PATIENT-LVL V: CPT | Mod: PBBFAC,,, | Performed by: PHYSICIAN ASSISTANT

## 2022-05-17 PROCEDURE — 96367 TX/PROPH/DG ADDL SEQ IV INF: CPT

## 2022-05-17 PROCEDURE — 1160F RVW MEDS BY RX/DR IN RCRD: CPT | Mod: CPTII,S$GLB,, | Performed by: PHYSICIAN ASSISTANT

## 2022-05-17 PROCEDURE — 96413 CHEMO IV INFUSION 1 HR: CPT

## 2022-05-17 PROCEDURE — 1123F ACP DISCUSS/DSCN MKR DOCD: CPT | Mod: CPTII,S$GLB,, | Performed by: PHYSICIAN ASSISTANT

## 2022-05-17 PROCEDURE — 96361 HYDRATE IV INFUSION ADD-ON: CPT

## 2022-05-17 PROCEDURE — 3008F BODY MASS INDEX DOCD: CPT | Mod: CPTII,S$GLB,, | Performed by: PHYSICIAN ASSISTANT

## 2022-05-17 PROCEDURE — 25000003 PHARM REV CODE 250: Performed by: INTERNAL MEDICINE

## 2022-05-17 PROCEDURE — 3079F DIAST BP 80-89 MM HG: CPT | Mod: CPTII,S$GLB,, | Performed by: PHYSICIAN ASSISTANT

## 2022-05-17 PROCEDURE — 3079F PR MOST RECENT DIASTOLIC BLOOD PRESSURE 80-89 MM HG: ICD-10-PCS | Mod: CPTII,S$GLB,, | Performed by: PHYSICIAN ASSISTANT

## 2022-05-17 PROCEDURE — 1101F PT FALLS ASSESS-DOCD LE1/YR: CPT | Mod: CPTII,S$GLB,, | Performed by: PHYSICIAN ASSISTANT

## 2022-05-17 PROCEDURE — 1101F PR PT FALLS ASSESS DOC 0-1 FALLS W/OUT INJ PAST YR: ICD-10-PCS | Mod: CPTII,S$GLB,, | Performed by: PHYSICIAN ASSISTANT

## 2022-05-17 PROCEDURE — 3288F PR FALLS RISK ASSESSMENT DOCUMENTED: ICD-10-PCS | Mod: CPTII,S$GLB,, | Performed by: PHYSICIAN ASSISTANT

## 2022-05-17 PROCEDURE — 1123F PR ADV CARE PLAN DISCUSSED, PLAN OR SURROGATE DOCUMENTED: ICD-10-PCS | Mod: CPTII,S$GLB,, | Performed by: PHYSICIAN ASSISTANT

## 2022-05-17 PROCEDURE — 99214 OFFICE O/P EST MOD 30 MIN: CPT | Mod: S$GLB,,, | Performed by: PHYSICIAN ASSISTANT

## 2022-05-17 PROCEDURE — 3288F FALL RISK ASSESSMENT DOCD: CPT | Mod: CPTII,S$GLB,, | Performed by: PHYSICIAN ASSISTANT

## 2022-05-17 PROCEDURE — 1159F MED LIST DOCD IN RCRD: CPT | Mod: CPTII,S$GLB,, | Performed by: PHYSICIAN ASSISTANT

## 2022-05-17 PROCEDURE — 1160F PR REVIEW ALL MEDS BY PRESCRIBER/CLIN PHARMACIST DOCUMENTED: ICD-10-PCS | Mod: CPTII,S$GLB,, | Performed by: PHYSICIAN ASSISTANT

## 2022-05-17 RX ORDER — HEPARIN 100 UNIT/ML
500 SYRINGE INTRAVENOUS
Status: DISCONTINUED | OUTPATIENT
Start: 2022-05-17 | End: 2022-05-17 | Stop reason: HOSPADM

## 2022-05-17 RX ORDER — SODIUM CHLORIDE 9 MG/ML
500 INJECTION, SOLUTION INTRAVENOUS
Status: COMPLETED | OUTPATIENT
Start: 2022-05-17 | End: 2022-05-17

## 2022-05-17 RX ADMIN — SODIUM CHLORIDE: 0.9 INJECTION, SOLUTION INTRAVENOUS at 11:05

## 2022-05-17 RX ADMIN — SODIUM CHLORIDE 500 ML: 0.9 INJECTION, SOLUTION INTRAVENOUS at 02:05

## 2022-05-17 RX ADMIN — PALONOSETRON HYDROCHLORIDE: 0.25 INJECTION, SOLUTION INTRAVENOUS at 11:05

## 2022-05-17 RX ADMIN — HEPARIN 500 UNITS: 100 SYRINGE at 04:05

## 2022-05-17 RX ADMIN — APREPITANT 130 MG: 130 INJECTION, EMULSION INTRAVENOUS at 11:05

## 2022-05-17 RX ADMIN — GEMCITABINE 1880 MG: 38 INJECTION, POWDER, LYOPHILIZED, FOR SOLUTION INTRAVENOUS at 12:05

## 2022-05-17 RX ADMIN — CISPLATIN 47 MG: 1 INJECTION INTRAVENOUS at 12:05

## 2022-05-17 RX ADMIN — MAGNESIUM SULFATE HEPTAHYDRATE 500 ML/HR: 500 INJECTION, SOLUTION INTRAMUSCULAR; INTRAVENOUS at 09:05

## 2022-05-17 NOTE — PLAN OF CARE
Pt voiced she was doing great this morning.   Problem: Fall Injury Risk  Goal: Absence of Fall and Fall-Related Injury  Outcome: Ongoing, Progressing     Problem: Adult Inpatient Plan of Care  Goal: Plan of Care Review  Outcome: Ongoing, Progressing  Goal: Patient-Specific Goal (Individualized)  Outcome: Ongoing, Progressing  Flowsheets (Taken 5/17/2022 1038)  Anxieties, Fears or Concerns: Pt denies any.  Individualized Care Needs: Pt provided a pillow, three warm blankets and legs elevated in reclined position. Snack and drink offerred.  Patient-Specific Goals (Include Timeframe): Tolerate treatment as scheudled.     Problem: Anemia (Chemotherapy Effects)  Goal: Anemia Symptom Improvement  Outcome: Ongoing, Progressing     Problem: Urinary Bleeding Risk or Actual (Chemotherapy Effects)  Goal: Absence of Hematuria  Outcome: Ongoing, Progressing     Problem: Nausea and Vomiting (Chemotherapy Effects)  Goal: Fluid and Electrolyte Balance  Outcome: Ongoing, Progressing     Problem: Neurotoxicity (Chemotherapy Effects)  Goal: Neurotoxicity Symptom Control  Outcome: Ongoing, Progressing     Problem: Neutropenia (Chemotherapy Effects)  Goal: Absence of Infection  Outcome: Ongoing, Progressing     Problem: Oral Mucositis (Chemotherapy Effects)  Goal: Improved Oral Mucous Membrane Integrity  Outcome: Ongoing, Progressing     Problem: Thrombocytopenia Bleeding Risk (Chemotherapy Effects)  Goal: Absence of Bleeding  Outcome: Ongoing, Progressing

## 2022-05-17 NOTE — PROGRESS NOTES
Palliative Medicine         Follow up    SUBJECTIVE:   Chief complaint: pain follow up    05/17/2022   Seen and examined in [facility] with sister Alina at bedside. She was sleeping in the chair but easily woke up when I approached. She reports feeling well and has no complaints. She does not think the antidepressant has helped but her sister interjects and says her mood is much improved. She says she is sleepy as she does not sleep at night and wants something to take. She is no longer taking Remeron as her appetite improved. Her sister adds that she naps all day long with multiple 2-3hr naps then is awake all night. It seems her day night cycle is flipped and before I add another medicine would like her to skip naps during the day to see if this helps with nighttime sleeping. Ms. Serna said she has nothing else to do but watch TV and sleep. Her sister agrees and plans to help her stay motivated and active during the day. She is open to continuing Effexor even though she does not feel it helped but appears much brighter and in a better mood today. Her sister says the family can tell it helped and will help her fill the refill on the way home (11 refills sent when originally rx in 03/22). She has no abdominal pain and is not requring oxycodone however it looks like her oncologist has been providing monthly refills. Otherwise no complaint and is tolerating chemotherapy without issue. I plan to see her back in 3 months.     Past visits:  3/23/22: She is accompanied to clinic with her daughter Dotty and son Clive. Since our last visit she was admitted for intraabdominal abscesses and is convalescing well. She was tearful during our entire meeting and withdrawn. She admits to excessive sleeping, poor appetite, depression, and anxiety. She asks for something to calm her nerves in addition to the Klonopin she is taking. She says that she lies in bed worrying about her children and . Her pain is better  controlled with the antibiotics and is taking oxycodone 5mg once sometimes twice daily with great response to RUQ and flank pain. She wants to eat but says still has no appetite despite good control of nausea with PRN Compazine. We discussed appetite stimulants and would recommend Remeron but they think she has some at home so will call when she gets home with the dosage. She is planning to start chemotherapy after Easter as Dotty is going on a vacation and they all want her here when she starts treatment. Her children were a bit perplexed with her constant crying today as they say that she does not cry at home however they also say that she does not leave her bedroom. I ask if it is that in clinic she has to face the cancer diagnosis which is distressing but at home she is able to internalize her fears but also avoids by sleeping most of the day. She admits that coming in for visits is emotionally taxing on her as she has to talk about her fears and acknowledge the malignancy. We discussed her symptoms and I feel that most are attributed to severe depression related to her diagnosis which should improve with improved mood. She denies SI and is open to starting an antidepressant. She completed HCPOA today but given her mental state I elected to postpone code status conversation for another visit.     3/3/22: Patient is a 71 y.o. year old female presenting with her 3 children Clive, Ranjith, and Dotty to establish care in the PM clinic. 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 At OSH she underwent ex lap on 2/4/22 and intraoperative findings resulted in the malignancy diagnosis. She has established with Dr. Marcos and plans to formulate a treatment plan once she has completed staging. She had just completed her PET scan prior to my visit. Her three children talked to the PM nurse and shared their fears, concerns, and shock over the  diagnosis. When I entered the room all were tearful and emotional. I explained why I was consulted to participate in the patient's care. We talked about the role palliative care often plays in helping patients with advanced care planning and symptom management. I particularly expressed how important it was to clarify what type of care the patient would like to receive moving forward given the current status. Everyone is still processing the information and emotional since the initial diagnosis a month ago. Dotty says it is hard for them to see her ill because she has always been so healthy. Clive says that he does not like knowing he is not in control of what will happen. Ranjith sat rigidly in his chair with his fists tightly balled up gripping his pants and streaming tears but never spoke. Ms. Serna says that she wants to live and plans to accept any and all treatment options offered. Her biggest fear is leaving her 4 children. She has a strong dara in God and continued to cry out to him begging for healing and his guidance. It was clear that they still have more processing to do before we can have an in depth conversation about the journey ahead. We were able to discuss that death is nonnegotiable and that eventually we will have to face that fact. In patients with advanced cancers sometimes this reality is sooner than we would like and would require shifting goals from life prolonging to achieving comfort and dignity at the end of life. All could easily admit that they would hope for a peaceful, comfortable passing with family near and I reassured them that we would be able to assist with that transition when the time comes. They acknowledge the limitation of human medicine but remain hopeful in a miracle. I share this hope and recommended that we follow up in 1 month.       I explained another facet of our care includes pain and symptom management. She endorses nausea and anorexia. She also has RUQ pain that  was worse when the drain was still in place but since removal is  to the touch and prevents her from lying on that side. Recently she has noticed that any movement exacerbates the pain and is finding it hard to get comfortable. At the time the drain was removed she was noting increased drainage and was rx a 5 day course of Cipro completed on 2/27/22. Since then the drainage has persisted but is now more copious. Her bandage was soaked with sherif pus and surrounding induration. I initially recommended they go back to see their surgeon for ASAP appointment but Ms. Serna says she will never see him again. In light of the drainage I feel it is best to be evaluated in the ED and might warrant additional imaging. She was reluctant to go but her children agreed it would be best to have a thorough evaluation. They were escorted to the ED by the PM nurse and I alerted the ED MD of her case. They will reach out after the evaluation and whether I need to send something for pain and anorexia.    LA  reviewed and summarized:        Past Medical History:   Diagnosis Date    Cancer of gallbladder     Essential (primary) hypertension     Infection following a procedure, deep incisional surgical site, initial encounter 3/3/2022    Sherif pus on bandage with induration to RUQ. She has already completed a 5 day course of Cipro which completed on 2/27. Recommend prompt evaluation with ED and whether or not imaging is warranted.     Review of patient's allergies indicates:   Allergen Reactions    Aspirin      Makes her throwup    Ibuprofen      Throws up    Sulfa (sulfonamide antibiotics)      Pt can't remember reactions    Amoxicillin-pot clavulanate Itching       Medications:    Current Outpatient Medications:     albuterol (PROVENTIL/VENTOLIN HFA) 90 mcg/actuation inhaler, INHALE TWO PUFFS FOUR TIMES DAILY AS NEEDED FOR WHEEZING, Disp: , Rfl:     amLODIPine (NORVASC) 5 MG tablet, amlodipine Take 1 time per  day No date recorded tablet 1 time per day No route recorded No set duration recorded No set duration amount recorded active 5 mg, Disp: , Rfl:     clonazePAM (KLONOPIN) 0.5 MG tablet, clonazepam Take 2 times per day No date recorded tablet 2 times per day No route recorded No set duration recorded No set duration amount recorded active 0.5 mg, Disp: , Rfl:     cyproheptadine (PERIACTIN) 4 mg tablet, Take 4 mg by mouth 2 (two) times daily., Disp: , Rfl:     dexAMETHasone (DECADRON) 4 MG Tab, Take 2 tablets (8mg total) by mouth once daily. Take as directed on days 2, 3, 4 and days 9, 10, 11 of your chemotherapy cycle., Disp: 24 tablet, Rfl: 3    dexAMETHasone (DECADRON) 4 MG Tab, Take 2 tablets (8 mg total) by mouth once daily. Take as directed on days 2, 3, 4 and days 9, 10, 11 of your chemotherapy cycle., Disp: 24 tablet, Rfl: 3    diclofenac sodium (VOLTAREN) 1 % Gel, APPLY 1 GRAM TO AFFECTED AREA FOUR TIMES DAILY AS NEEDED, Disp: , Rfl:     ipratropium (ATROVENT) 21 mcg (0.03 %) nasal spray, , Disp: , Rfl:     labetaloL (NORMODYNE) 300 MG tablet, labetalol Take 2 times per day No date recorded tablet 2 times per day No route recorded No set duration recorded No set duration amount recorded suspended 300 mg, Disp: , Rfl:     lisinopriL 10 MG tablet, Take 1 tablet (10 mg total) by mouth once daily., Disp: 90 tablet, Rfl: 0    montelukast (SINGULAIR) 10 mg tablet, Take 10 mg by mouth once daily., Disp: , Rfl:     naloxone (NARCAN) 4 mg/actuation Spry, USE ONE SPRAY IN ONE NOSTRIL AS DIRECTED UPON SIGNS OF OPIOID OVERDOSE CALL 911, Disp: , Rfl:     ondansetron (ZOFRAN-ODT) 8 MG TbDL, Take 1 tablet (8 mg total) by mouth every 6 (six) hours as needed., Disp: 30 tablet, Rfl: 1    ondansetron (ZOFRAN-ODT) 8 MG TbDL, Take 1 tablet (8 mg total) by mouth every 8 (eight) hours as needed (nausea/vomiting)., Disp: 60 tablet, Rfl: 5    oxyCODONE (ROXICODONE) 5 MG immediate release tablet, TAKE ONE TABLET EVERY 4  HOURS AS NEEDED FOR PAIN - MAX FOUR DOSES A DAY, Disp: 90 tablet, Rfl: 0    prochlorperazine (COMPAZINE) 5 MG tablet, Take 1 tablet (5 mg total) by mouth every 6 (six) hours as needed for Nausea. May take every 6 hours scheduled for prevention of nausea., Disp: 60 tablet, Rfl: 0    venlafaxine (EFFEXOR-XR) 75 MG 24 hr capsule, Take 1 capsule (75 mg total) by mouth once daily., Disp: 30 capsule, Rfl: 11  No current facility-administered medications for this visit.    Facility-Administered Medications Ordered in Other Visits:     heparin, porcine (PF) 100 unit/mL injection flush 500 Units, 500 Units, Intravenous, PRN, Bo Davies MD, 500 Units at 05/17/22 1600    OBJECTIVE:     ROS:  Review of Systems   Constitutional: Positive for fatigue. Negative for activity change, appetite change and fever.   HENT: Negative for sinus pressure and trouble swallowing.    Eyes: Negative for visual disturbance.   Respiratory: Negative for cough and shortness of breath.    Cardiovascular: Negative for chest pain and leg swelling.   Gastrointestinal: Negative for abdominal pain, diarrhea, nausea and vomiting.   Endocrine: Negative for polydipsia, polyphagia and polyuria.   Genitourinary: Negative for difficulty urinating and dysuria.   Musculoskeletal: Negative for back pain and gait problem.   Skin: Negative for wound.   Neurological: Negative for weakness and numbness.   Psychiatric/Behavioral: Positive for sleep disturbance. Negative for confusion, dysphoric mood, self-injury and suicidal ideas. The patient is nervous/anxious. The patient is not hyperactive.        Review of Symptoms    Symptom Assessment (ESAS 0-10 Scale)  Pain:  0  Dyspnea:  0  Anxiety:  3  Nausea:  0  Depression:  0  Anorexia:  0  Fatigue:  0  Insomnia:  9  Restlessness:  0  Agitation:  0     CAM / Delirium:  Negative  Constipation:  Negative  Diarrhea:  Negative    ECOG Performance Status ndGndrndanddndend:nd nd2nd Living Arrangements:  Lives with spouse      Advance  Care Planning   Advance Directives:   [place]POST: No    Do Not Resuscitate Status: No    Medical Power of : Yes    Agent's Name:  1: daughter Dotty Serna [phone], 2: son Ranjith Serna [phone], 3: son Clive Zimmerman [phone]    Decision Making:  Patient answered questions and Family answered questions          Physical Exam:  Vitals: Temp: 97.8 °F (36.6 °C) (05/17/22 1437)  Pulse: 88 (05/17/22 1437)  Resp: 18 (05/17/22 1437)  BP: (!) 153/84 (05/17/22 1437)  SpO2: 97 % (05/17/22 1437)  Physical Exam  Vitals and nursing note reviewed.   Constitutional:       General: She is not in acute distress.     Appearance: Normal appearance. She is not ill-appearing.   HENT:      Head: Normocephalic and atraumatic.   Eyes:      Pupils: Pupils are equal, round, and reactive to light.   Cardiovascular:      Rate and Rhythm: Normal rate and regular rhythm.      Pulses: Normal pulses.      Heart sounds: Normal heart sounds. No murmur heard.  Pulmonary:      Effort: Pulmonary effort is normal. No respiratory distress.      Breath sounds: Normal breath sounds.   Abdominal:      General: Bowel sounds are normal. There is no distension.      Palpations: Abdomen is soft.      Tenderness: There is no abdominal tenderness.   Musculoskeletal:         General: Normal range of motion.      Cervical back: Normal range of motion.      Right lower leg: No edema.      Left lower leg: No edema.   Skin:     General: Skin is warm and dry.   Neurological:      Mental Status: She is alert and oriented to person, place, and time. Mental status is at baseline.      Cranial Nerves: No cranial nerve deficit.   Psychiatric:         Attention and Perception: Attention and perception normal.         Mood and Affect: Mood and affect normal.         Speech: Speech normal.         Thought Content: Thought content normal.         Cognition and Memory: Cognition and memory normal.         Judgment: Judgment normal.         Radiology and  laboratory data reviewed    ASSESSMENT/PLAN:     Problem List Items Addressed This Visit        Psychiatric    Reactive depression - Primary    Overview     Much improved, continue Effexor 75mg daily    Referral to oncology psychologist- not scheduled yet.              Oncology    Adenocarcinoma of gallbladder    Current Assessment & Plan     Under the care of Dr. Marcos, so far tolerating treatment with minimal side effects.           Neoplasm related pain    Overview     Reports minimal usage. Refills have been provided by oncologist.     I will defer to them for the time being but happy to assume if asked and agreed that we would be the sole prescribers.                     Follow up: 3 months    30 minutes of total time spent on the encounter, which includes face to face time and non-face to face time preparing to see the patient (eg, review of tests), Obtaining and/or reviewing separately obtained history, Documenting clinical information in the electronic or other health record, Independently interpreting results (not separately reported) and communicating results to the patient/family/caregiver, or Care coordination (not separately reported).    Signature: Chayo Mensah PA-C

## 2022-05-17 NOTE — DISCHARGE INSTRUCTIONS
[facility]  [address] [facility]  [address]  [phone] phone     [phone] fax  Hours of Operation: Monday- Friday 8:00am- 5:00pm  After hours phone  [phone]  Hematology / Oncology Physicians on call      ED[address]ARD[address] Dr., TERI Spangler, TERI Arellano, MARIA LUISA Griffith    Please call with any concerns regarding your appointment today. FALL PREVENTION   Falls often occur due to slipping, tripping or losing your balance. Here are ways to reduce your risk of falling again.   Was there anything that caused your fall that can be fixed, removed or replaced?   Make your home safe by keeping walkways clear of objects you may trip over.   Use non-slip pads under rugs.   Do not walk in poorly lit areas.   Do not stand on chairs or wobbly ladders.   Use caution when reaching overhead or looking upward. This position can cause a loss of balance.   Be sure your shoes fit properly, have non-slip bottoms and are in good condition.   Be cautious when going up and down stairs, curbs, and when walking on uneven sidewalks.   If your balance is poor, consider using a cane or walker.   If your fall was related to alcohol use, stop or limit alcohol intake.   If your fall was related to use of sleeping medicines, talk to your doctor about this. You may need to reduce your dosage at bedtime if you awaken during the night to go to the bathroom.   To reduce the need for nighttime bathroom trips:   Avoid drinking fluids for several hours before going to bed   Empty your bladder before going to bed   Men can keep a urinal at the bedside   © 6536-1051 [employer] [facility], [address] [place] [address], [place] [address]. All rights reserved. This information is not intended as a substitute for professional medical care. Always follow your healthcare professional's instructions. HOME CARE AFTER CHEMOTHERAPY   Meals    Many patients feel sick and lose their appetites during treatment. Eat small meals several times a day. Choose bland foods with little taste or smell if you have problems with nausea. Be sure to cook all food thoroughly. This kills bacteria and helps you avoid intestinal infection. Soft foods are easier to swallow and digest.   Activity   Exercise keeps you strong and keeps your heart and lungs active. Talk to your doctor about an appropriate exercise program for you.   Skin Care   To prevent a skin infection, bathe or shower once a day. Use a moisturizing soap and wash with warm water. Avoid very hot or cold water. Chemotherapy can make your skin dry . Apply moisturizing lotion to help relieve dry skin. Some drugs used in high doses can cause slight burns to appear (like sunburn). Ask for a special cream to help relieve the burn and protect your skin.   Prevent Mouth Sores   During chemotherapy, many people get mouth sores. Do the following to help prevent mouth sores or to ease discomfort.   Brush your teeth with a soft-bristle toothbrush after every meal.  Don't use dental floss if your platelet count is below 50,000. Your doctor or nurse will tell you if this is the case.  Use an oral swab or special soft toothbrush if your gums bleed during regular brushing.  Use mouthwash as directed. If you can't tolerate commercial mouthwash, use salt and baking soda to clean your mouth. Mix 1 teaspoon of salt and 1 teaspoon of baking soda into a glass of water. Swish and spit.  Call your doctor or return to this facility if you develop any of the following:   Sore throat   White patches in the mouth or throat   Fever of 100.4ºF (38ºC) or higher, or as directed by your healthcare provider  © 7319-7904 [employer], [address] [place] [address], [place] [address]. All rights reserved. This information is not intended as a substitute for professional medical care. Always follow your healthcare professional's  Support  "Groups/Classes    Support groups and classes are being offered at the   [facility] and [facility]!!    "Cooking with Cancer" (Nutrition Class):  Second Wednesday of each month   at noon at the Cancer Center.  Metastatic Support Group:  Third Tuesday of each month   at noon at the Cancer Center.  Next Steps Class/Group: Second and fourth Thursday of each month at noon at the Cancer Center.  [facility] (Breast Cancer Support Group): First Tuesday of each month   at 5:30pm at the [facility] location.  [facility]: [facility]: Second and third Tuesday of each month from 7:30am - 2pm.  [facility]: First and fourth Tuesday of each month from 7:30am - 2pm    If you are interested in attending or would like more information please ask our social workers or your nurse!   "

## 2022-05-23 ENCOUNTER — INFUSION (OUTPATIENT)
Dept: INFUSION THERAPY | Facility: HOSPITAL | Age: 72
End: 2022-05-23
Payer: MEDICARE

## 2022-05-23 ENCOUNTER — OFFICE VISIT (OUTPATIENT)
Dept: HEMATOLOGY/ONCOLOGY | Facility: CLINIC | Age: 72
End: 2022-05-23
Payer: MEDICARE

## 2022-05-23 VITALS
TEMPERATURE: 99 F | HEART RATE: 133 BPM | BODY MASS INDEX: 25.44 KG/M2 | OXYGEN SATURATION: 95 % | DIASTOLIC BLOOD PRESSURE: 71 MMHG | SYSTOLIC BLOOD PRESSURE: 159 MMHG | RESPIRATION RATE: 20 BRPM | WEIGHT: 162.06 LBS | HEIGHT: 67 IN

## 2022-05-23 DIAGNOSIS — C23 ADENOCARCINOMA OF GALLBLADDER: Primary | ICD-10-CM

## 2022-05-23 PROCEDURE — 25000003 PHARM REV CODE 250: Performed by: NURSE PRACTITIONER

## 2022-05-23 PROCEDURE — 99215 OFFICE O/P EST HI 40 MIN: CPT | Mod: S$GLB,,,

## 2022-05-23 PROCEDURE — 36591 DRAW BLOOD OFF VENOUS DEVICE: CPT

## 2022-05-23 PROCEDURE — 1126F PR PAIN SEVERITY QUANTIFIED, NO PAIN PRESENT: ICD-10-PCS | Mod: CPTII,S$GLB,,

## 2022-05-23 PROCEDURE — 99999 PR PBB SHADOW E&M-EST. PATIENT-LVL IV: CPT | Mod: PBBFAC,,,

## 2022-05-23 PROCEDURE — 99999 PR PBB SHADOW E&M-EST. PATIENT-LVL IV: ICD-10-PCS | Mod: PBBFAC,,,

## 2022-05-23 PROCEDURE — 1126F AMNT PAIN NOTED NONE PRSNT: CPT | Mod: CPTII,S$GLB,,

## 2022-05-23 PROCEDURE — 1157F PR ADVANCE CARE PLAN OR EQUIV PRESENT IN MEDICAL RECORD: ICD-10-PCS | Mod: CPTII,S$GLB,,

## 2022-05-23 PROCEDURE — 3008F PR BODY MASS INDEX (BMI) DOCUMENTED: ICD-10-PCS | Mod: CPTII,S$GLB,,

## 2022-05-23 PROCEDURE — 3078F DIAST BP <80 MM HG: CPT | Mod: CPTII,S$GLB,,

## 2022-05-23 PROCEDURE — 3288F FALL RISK ASSESSMENT DOCD: CPT | Mod: CPTII,S$GLB,,

## 2022-05-23 PROCEDURE — 36592 COLLECT BLOOD FROM PICC: CPT

## 2022-05-23 PROCEDURE — 4010F PR ACE/ARB THEARPY RXD/TAKEN: ICD-10-PCS | Mod: CPTII,S$GLB,,

## 2022-05-23 PROCEDURE — A4216 STERILE WATER/SALINE, 10 ML: HCPCS | Performed by: NURSE PRACTITIONER

## 2022-05-23 PROCEDURE — 3288F PR FALLS RISK ASSESSMENT DOCUMENTED: ICD-10-PCS | Mod: CPTII,S$GLB,,

## 2022-05-23 PROCEDURE — 1157F ADVNC CARE PLAN IN RCRD: CPT | Mod: CPTII,S$GLB,,

## 2022-05-23 PROCEDURE — 4010F ACE/ARB THERAPY RXD/TAKEN: CPT | Mod: CPTII,S$GLB,,

## 2022-05-23 PROCEDURE — 3077F SYST BP >= 140 MM HG: CPT | Mod: CPTII,S$GLB,,

## 2022-05-23 PROCEDURE — 1101F PT FALLS ASSESS-DOCD LE1/YR: CPT | Mod: CPTII,S$GLB,,

## 2022-05-23 PROCEDURE — 99215 PR OFFICE/OUTPT VISIT, EST, LEVL V, 40-54 MIN: ICD-10-PCS | Mod: S$GLB,,,

## 2022-05-23 PROCEDURE — 3077F PR MOST RECENT SYSTOLIC BLOOD PRESSURE >= 140 MM HG: ICD-10-PCS | Mod: CPTII,S$GLB,,

## 2022-05-23 PROCEDURE — 63600175 PHARM REV CODE 636 W HCPCS: Performed by: NURSE PRACTITIONER

## 2022-05-23 PROCEDURE — 3008F BODY MASS INDEX DOCD: CPT | Mod: CPTII,S$GLB,,

## 2022-05-23 PROCEDURE — 3078F PR MOST RECENT DIASTOLIC BLOOD PRESSURE < 80 MM HG: ICD-10-PCS | Mod: CPTII,S$GLB,,

## 2022-05-23 PROCEDURE — 1101F PR PT FALLS ASSESS DOC 0-1 FALLS W/OUT INJ PAST YR: ICD-10-PCS | Mod: CPTII,S$GLB,,

## 2022-05-23 RX ORDER — HEPARIN 100 UNIT/ML
500 SYRINGE INTRAVENOUS
Status: COMPLETED | OUTPATIENT
Start: 2022-05-23 | End: 2022-05-23

## 2022-05-23 RX ORDER — HEPARIN 100 UNIT/ML
500 SYRINGE INTRAVENOUS
Status: CANCELLED | OUTPATIENT
Start: 2022-05-23

## 2022-05-23 RX ORDER — SODIUM CHLORIDE 0.9 % (FLUSH) 0.9 %
10 SYRINGE (ML) INJECTION
Status: CANCELLED | OUTPATIENT
Start: 2022-05-23

## 2022-05-23 RX ORDER — SODIUM CHLORIDE 0.9 % (FLUSH) 0.9 %
10 SYRINGE (ML) INJECTION
Status: COMPLETED | OUTPATIENT
Start: 2022-05-23 | End: 2022-05-23

## 2022-05-23 RX ADMIN — Medication 500 UNITS: at 08:05

## 2022-05-23 RX ADMIN — Medication 10 ML: at 08:05

## 2022-05-23 NOTE — NURSING
Labs drawn via purple port per orders.  Obtained blood return from both ports by lifting patient's arm up.  Flushes well.  Pt tolerated it well.  NAD noted upon discharge to NP office for f/u appt.

## 2022-05-23 NOTE — DISCHARGE INSTRUCTIONS
[facility]  [address] [facility]  [address]  [phone] phone     [phone] fax  Hours of Operation: Monday- Friday 8:00am- 5:00pm  After hours phone  [phone]  Hematology / Oncology Physicians on call:    Dr. Samson Martinez      Nurse Practitioners:    Gracie Spangler, TERI Farley PA Phaon Dunbar, TERI      Please don't hesitate to call if you have any concerns.                                                                                                                                 Discharge Instructions for Chemotherapy     Your doctor prescribed a type of medication therapy for you called chemotherapy. Doctors prescribe chemotherapy for many different types of illnesses, including cancer. There are many types of chemotherapy. This sheet provides general guidelines on how you can take care of yourself after your chemotherapy.   Mouth Care   Dont be discouraged if you get mouth sores, even if you are following all your doctors instructions. Many people get mouth sores as a side effect of chemotherapy. Heres what you can do to prevent mouth sores:   Keep your mouth clean. Brush your teeth with a soft-bristle toothbrush after every meal.   Use an oral swab or special soft toothbrush if your gums bleed during regular brushing.   Dont use dental floss if your platelet count is below 50,000. Your doctor or nurse will tell you if this is the case.   Use any mouthwashes given to you as directed.   If you cant tolerate regular methods, use salt and baking soda to clean your mouth. Mix 1 teaspoon of salt and 1 teaspoon of baking soda into an 8-ounce glass of warm water. Swish and spit.   Watch your mouth and tongue for white patches. This may be a sign of fungal infection, a common side effect of chemotherapy. Be sure to tell your doctor about these patches. Medication can be prescribed to help you fight the  fungal infection.  Other Home Care   Try to exercise. Exercise keeps you strong and keeps your heart and lungs active. Walk as much as you can without becoming dizzy or weak.   Keep clean. During chemotherapy, your body cant fight infection very well. Take short baths or showers.   Use moisturizing soap. Chemotherapy can make your skin dry.   Apply moisturizing lotion several times a day to help relieve dry skin.   Dont take very hot or very cold showers or baths.  Dont be surprised if your chemotherapy causes slight burns to your skin -- usually on the hands and feet. Some drugs used in high doses cause this to happen. Ask for a special cream to help relieve the burn and protect your skin.   Let your doctor know if your throat is sore. You may have an infection that needs treatment.   Remember, many patients feel sick and lose their appetites during treatment. Eat small meals several times a day to keep your strength up.   Choose bland foods with little taste or smell if you are reacting strongly to food.   Be sure to cook all food thoroughly. This kills bacteria and helps you avoid infection.   Eat foods that are soft. Soft foods are less likely to cause stomach irritation.  When to Call Your Doctor   Call your doctor right away if you have any of the following:   Unexplained bleeding   Trouble concentrating   Ongoing fatigue   Shortness of breath, wheezing, or trouble breathing   Rapid, irregular heartbeat; chest pain   Dizziness, lightheadedness   Constant feeling of being cold   Hives or a cut or rash that swells, turns red, feels hot or painful, or begins to ooze   Fever of 100.4°F (38°C) or higher, or chills            WAYS TO HELP PREVENT INFECTION      WASH YOUR HANDS OFTEN DURING THE DAY, ESPECIALLY BEFORE YOU EAT, AFTER USING THE BATHROOM, AND AFTER TOUCHING ANIMALS    STAY AWAY FROM PEOPLE WHO HAVE ILLNESSES YOU CAN CATCH; SUCH AS COLDS, FLU, CHICKEN POX    TRY TO AVOID CROWDS    STAY AWAY FROM  CHILDREN WHO RECENTLY HAVE RECEIVED LIVE VIRUS VACCINES    MAINTAIN GOOD MOUTH CARE    DO NOT SQUEEZE OR SCRATCH PIMPLES    CLEAN CUTS & SCRAPES RIGHT AWAY AND DAILY UNTIL HEALED WITH WARM WATER, SOAP & AN ANTISEPTIC    AVOID CONTACT WITH LITTER BOXES, BIRD CAGES, & FISH TANKS    AVOID STANDING WATER, IE., BIRD BATHS, FLOWER POTS/VASES, OR HUMIDIFIERS    WEAR GLOVES WHEN GARDENING OR CLEANING UP AFTER OTHERS, ESPECIALLY BABIES & SMALL CHILDREN    DO NOT EAT RAW FISH, SEAFOOD, MEAT, OR EGGS                                                                                                                                                     FALL PREVENTION     Falls often occur due to slipping, tripping or losing your balance. Here are ways to reduce your risk of falling again.   Was there anything that caused your fall that can be fixed, removed or replaced?   Make your home safe by keeping walkways clear of objects you may trip over.   Use non-slip pads under rugs.   Do not walk in poorly lit areas.   Do not stand on chairs or wobbly ladders.   Use caution when reaching overhead or looking upward. This position can cause a loss of balance.   Be sure your shoes fit properly, have non-slip bottoms and are in good condition.   Be cautious when going up and down stairs, curbs, and when walking on uneven sidewalks.   If your balance is poor, consider using a cane or walker.   If your fall was related to alcohol use, stop or limit alcohol intake.   If your fall was related to use of sleeping medicines, talk to your doctor about this. You may need to reduce your dosage at bedtime if you awaken during the night to go to the bathroom.   To reduce the need for nighttime bathroom trips:   Avoid drinking fluids for several hours before going to bed   Empty your bladder before going to bed   Men can keep a urinal at the bedside         Booster Info:    Moderna and Pfizer vaccine booster shots are approved for adults at increased  risk at least six months after their initial immunization and Jeff & Jeff (J&J) COVID-19 vaccine booster shots are approved for all adults at least two months after their initial immunization.    Booster doses for all three COVID-19 vaccines, Pfizer, Moderna and Jeff & Jeff, are now available to the public and are being administered at [facility] vaccination locations.     If you received an mRNA vaccine (Pfizer or Moderna) it is recommended that you receive the same booster dose as your original vaccine series. However, all patients eligible for a booster dose may decide to mix and match your booster dose.     Below are the current mix and match options [facility] currently offers:    Pfizer Vaccine Recipients:  Booster Dose 6 months following 2nd dose can be, in order of recommendation:  Pfizer Booster Dose,  Moderna Booster Dose, or  Jeff & Jeff Booster Dose    Jeff & Jeff Vaccine Recipients:   Booster Dose 2 months following initial dose can be, in order of recommendation:  Pfizer Booster Dose,  Moderna Booster Dose, or  Jeff & Jeff Booster Dose    Both Pfizer and Jeff & Jeff boosters have the same dosage as the original vaccine regimen.    Moderna Vaccine Recipients:   Booster Dose 6 months following 2nd dose can be, in order of recommendation:  Moderna Booster Dose,  Pfizer Booster Dose, or  Jeff & Jeff Booster Dose    The Moderna booster is half the dosage of the original two-dose Moderna series, and [facility] will be following this guidance as outlined by the FDA and CDC.    International patients who have received a non-[place] approved COVID-19 vaccine are eligible for either a Pfizer booster dose or a Jeff & Jeff booster dose 28 days following their original vaccine dose.     Please schedule your appointment via [facility] or by calling [phone]. Walk-ins will also be accepted as supply allows.    To learn more about COVID-19 vaccination and  community vaccine locations, please visit [email or web address].

## 2022-05-24 ENCOUNTER — DOCUMENTATION ONLY (OUTPATIENT)
Dept: HEMATOLOGY/ONCOLOGY | Facility: CLINIC | Age: 72
End: 2022-05-24
Payer: MEDICARE

## 2022-05-24 ENCOUNTER — INFUSION (OUTPATIENT)
Dept: INFUSION THERAPY | Facility: HOSPITAL | Age: 72
End: 2022-05-24
Attending: INTERNAL MEDICINE
Payer: MEDICARE

## 2022-05-24 VITALS
OXYGEN SATURATION: 92 % | RESPIRATION RATE: 18 BRPM | TEMPERATURE: 98 F | DIASTOLIC BLOOD PRESSURE: 68 MMHG | HEART RATE: 92 BPM | SYSTOLIC BLOOD PRESSURE: 131 MMHG

## 2022-05-24 DIAGNOSIS — C23 ADENOCARCINOMA OF GALLBLADDER: Primary | ICD-10-CM

## 2022-05-24 PROCEDURE — 96366 THER/PROPH/DIAG IV INF ADDON: CPT

## 2022-05-24 PROCEDURE — 36593 DECLOT VASCULAR DEVICE: CPT

## 2022-05-24 PROCEDURE — 96367 TX/PROPH/DG ADDL SEQ IV INF: CPT

## 2022-05-24 PROCEDURE — 96415 CHEMO IV INFUSION ADDL HR: CPT

## 2022-05-24 PROCEDURE — 96375 TX/PRO/DX INJ NEW DRUG ADDON: CPT

## 2022-05-24 PROCEDURE — 96417 CHEMO IV INFUS EACH ADDL SEQ: CPT

## 2022-05-24 PROCEDURE — 96413 CHEMO IV INFUSION 1 HR: CPT

## 2022-05-24 PROCEDURE — 63600175 PHARM REV CODE 636 W HCPCS: Performed by: INTERNAL MEDICINE

## 2022-05-24 PROCEDURE — 96361 HYDRATE IV INFUSION ADD-ON: CPT

## 2022-05-24 PROCEDURE — 25000003 PHARM REV CODE 250: Performed by: INTERNAL MEDICINE

## 2022-05-24 RX ORDER — HEPARIN 100 UNIT/ML
500 SYRINGE INTRAVENOUS
Status: DISCONTINUED | OUTPATIENT
Start: 2022-05-24 | End: 2022-05-24 | Stop reason: HOSPADM

## 2022-05-24 RX ORDER — SODIUM CHLORIDE 9 MG/ML
500 INJECTION, SOLUTION INTRAVENOUS
Status: COMPLETED | OUTPATIENT
Start: 2022-05-24 | End: 2022-05-24

## 2022-05-24 RX ADMIN — ALTEPLASE 2 MG: 2.2 INJECTION, POWDER, LYOPHILIZED, FOR SOLUTION INTRAVENOUS at 09:05

## 2022-05-24 RX ADMIN — HEPARIN 500 UNITS: 100 SYRINGE at 04:05

## 2022-05-24 RX ADMIN — SODIUM CHLORIDE 500 ML: 9 INJECTION, SOLUTION INTRAVENOUS at 02:05

## 2022-05-24 RX ADMIN — PALONOSETRON HYDROCHLORIDE: 0.25 INJECTION, SOLUTION INTRAVENOUS at 11:05

## 2022-05-24 RX ADMIN — MAGNESIUM SULFATE HEPTAHYDRATE 500 ML/HR: 500 INJECTION, SOLUTION INTRAMUSCULAR; INTRAVENOUS at 09:05

## 2022-05-24 RX ADMIN — CISPLATIN 47 MG: 1 INJECTION INTRAVENOUS at 12:05

## 2022-05-24 RX ADMIN — GEMCITABINE 1880 MG: 38 INJECTION, SOLUTION INTRAVENOUS at 12:05

## 2022-05-24 RX ADMIN — APREPITANT 130 MG: 130 INJECTION, EMULSION INTRAVENOUS at 11:05

## 2022-05-24 NOTE — PROGRESS NOTES
Jimr was consulted to assist pt. with BOOST. Jimr provided pt. with a case of Vanilla BOOST during today's infusion appointment time. Jimr also reviewed sw checklist and community organizations with pt.'s daughter who was also present during today's visit while pt. rested during treatment. Jimr provided sw contact card in the event any needs may arise. Swer will remain available.

## 2022-05-24 NOTE — PLAN OF CARE
Problem: Adult Inpatient Plan of Care  Goal: Plan of Care Review  Outcome: Ongoing, Progressing  Flowsheets (Taken 5/24/2022 0918)  Plan of Care Reviewed With:   patient   daughter  Goal: Patient-Specific Goal (Individualized)  Outcome: Ongoing, Progressing  Flowsheets (Taken 5/24/2022 0918)  Anxieties, Fears or Concerns: Patient is concerned bc her PICC line will not give any blood return  Individualized Care Needs: Pillow, blankets, daughter at chairside  Patient-Specific Goals (Include Timeframe): Tolerate treatment today  Goal: Optimal Comfort and Wellbeing  Outcome: Ongoing, Progressing  Intervention: Provide Person-Centered Care  Flowsheets (Taken 5/24/2022 0918)  Trust Relationship/Rapport:   care explained   reassurance provided   choices provided   thoughts/feelings acknowledged   emotional support provided   empathic listening provided   questions answered   questions encouraged     Problem: Neutropenia (Chemotherapy Effects)  Goal: Absence of Infection  Outcome: Ongoing, Progressing  Intervention: Prevent Infection and Maximize Resistance  Flowsheets (Taken 5/24/2022 0918)  Infection Prevention:   hand hygiene promoted   equipment surfaces disinfected   personal protective equipment utilized   rest/sleep promoted

## 2022-05-24 NOTE — NURSING
1215 [facility] to [facility]. O2 saturation was noted in the lower 80s, patient asymptomatic. PRN O2 used at home per patient. Phaon, NP notified and 2L nasal canula administered. Patient O2 increased to 96%. Phaon suggested patient to go to ER for further evaluation and patient refuses. Patient remains asymptomatic at this time and is in NAD. Patient instructed to monitor O2 at home with pulse ox and reports to ER for decreased in O2.   Patient's mom called to talk to Dr. Jacinto/nurse regarding getting a new walking boot. The one she has broke. Please call.    Dede Giraldo on 5/2/2022 at 9:37 AM

## 2022-05-27 NOTE — ASSESSMENT & PLAN NOTE
02/04/22 Acute RUQ pain with emergency laparoscopic cholecystectomy at 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 --Immunohistochemistry positive for CK7 and CDX2 and negative for CK 20, TTF1 and PAX8.      03/03/2022 PET scan notable for uptake at right upper quadrant along liver margin site of prior cholecystectomy with postoperative fluid and peritoneal implants eleanor hepatis region potential/likely postoperative changes, right internal mammary chain and cardiophrenic lymph nodes.      04/22/22 CT CAP there is a mixed attenuating lesion at the fundus of the gallbladder with associated gallstones suspected.  Overall smaller than on prior exam.  Exact measurement is difficult.  Findings could reflect sequela of prior gallbladder disease.  Infiltrating lesion suspected given the patient's history however small abscesses not excluded.  8 mm and 10 mm hypodensities within segment 2 of the left hepatic lobe also not fully characterized.  Recommend continued surveillance.  Question 2 x 1.5 cm lesion within the uncinate or adjacent uncinate which could reflect necrotic lesion or lymph node.  Findings could be further evaluated with endoscopic ultrasound  Moderate to severe constipation throughout the large bowel.    Labs reviewed, no concerning cytopenia  -ANC 3.7; Plts 443; tbili 0.3; CrCl 60.9  -Case discussed with oncologist  -Ok to proceed with C2D8 of Gemzar/Cisplatin today    F/u in 3 weeks with cbc, cmp, and mag with MD for C3D1 Gemzar/Cisplatin

## 2022-05-27 NOTE — PROGRESS NOTES
Subjective:      DATE OF VISIT: 5/23/2022   ?   ?   Patient ID:?Madelyn Serna is a 71 y.o. female.?? MR#: [id number]   ?   PRIMARY PROVIDER: Hemant  ?   CHIEF COMPLAINT: lab review/assessment for chemo?????   ?   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   CURRENT TREATMENT: OP GEMCITABINE CISPLATIN Q3W     PAST TREATMENT:  Laparoscopic cholecystectomy, 02/04/2022    HPI  Ms. Serna is a pleasant 71-year old female with Stage IV adenocarcinoma of gallbladder. She initially presented to outside ED 02/04/2022 with c/o of RLQ abdominal pain x 4 days with episodes of vomiting. ED workup revealed leukocytosis WBC 18.7, hemoglobin 13, , renal function intact GFR over 60, mild alkaline phosphatase elevation 130, normal transaminases and bilirubin. Albumin 4.5, calcium 10.4.  Urinalysis positive for E coli pansensitive.  Blood cultures negative.     Imaging reports a from outside radiology:  CT abdomen and pelvis with contrast distended gallbladder extensive cholelithiasis including 17 mm stone in the region of gallbladder neck.  Pericholecystic stranding and small adjacent fluid.  Lymph nodes noted to be unremarkable.  Emphysema lung bases.     She was taken to emergency surgery with laparoscopic cholecystectomy.  Surgical note mentions during this maneuver would appear to be a peritoneal nodule was discovered.  The peritoneal nodule was dissected from all surrounding structures with the LigaSure device.  The nodule is passed off the table and sent separately as a specimen.     Outside pathology:  Gallbladder cholecystectomy adenocarcinoma immunohistochemistry positive for CK7 and CDX2 and negative for CK 20, TTF1 and PAX8  Tumor size at least 2.2 cm.  The tumor invades the liver.  Lymphovascular invasion present.  Perineural invasion not identified.  Margins cannot be assessed.  Regional lymph nodes not applicable.   peritoneal nodule excisional biopsy positive for  metastatic adenocarcinoma consistent with origin from gallbladder.     Staging PET performed on 3/2/2022 indicated uptake at right upper quadrant along liver margin site of prior cholecystectomy with postoperative fluid and peritoneal implants eleanor hepatis region potential/likely postoperative changes, right internal mammary chain and cardiophrenic lymph nodes. Her case was discussed at Tumor Board where it was decided to initiate palliative chemotherapy. She was started on Cisplatin/Gemzar every 3 weeks on 3/24/2022.    Interval History: Pt presents today with daughter for consideration of C2D8 Gemzar/Cisplatin scheduled for tomorrow, 05/24/22. She c/o mild fatigue and intermittent nausea. Encouraged to take antiemetics as prescribed prn. Appetite wanes and waxes encouraged supplementation with Boost to maintain adequate nutrition (Boost provided). Denies v/d/c, sob, cp, abdominal pain, fever,chills, numbness, tingling.      Oncology History   Adenocarcinoma of gallbladder   3/3/2022 Initial Diagnosis    Adenocarcinoma of gallbladder      Genetic Testing    Patient has genetic testing done for [employer].                                              Results revealed patient has the following mutation(s):  No actionable mutations found     4/25/2022 Cancer Staged    Staging form: Gallbladder, AJCC 8th Edition  - Clinical stage from 4/25/2022: Stage IVB (cT2a, cN1, cM1)     4/26/2022 -  Chemotherapy    Treatment Summary   Plan Name: OP GEMCITABINE CISPLATIN Q3W  Treatment Goal: Palliative  Status: Active  Start Date: 4/26/2022  End Date: 9/26/2022 (Planned)  Provider: Ivonne Marcos MD  Chemotherapy: dexAMETHasone (DECADRON) 4 MG Tab, 8 mg, Oral, Daily, 1 of 1 cycle, Start date: 4/20/2022, End date: --  CISplatin (PLATINOL) 25 mg/m2 = 47 mg in sodium chloride 0.9% 547 mL chemo infusion, 25 mg/m2 = 47 mg, Intravenous, Clinic/HOD 1 time, 2 of 8 cycles  Administration: 47 mg (4/26/2022), 47 mg (5/3/2022), 47 mg  (5/17/2022), 47 mg (5/24/2022)  gemcitabine 1,880 mg in sodium chloride 0.9% 250 mL chemo infusion, 1,000 mg/m2 = 1,880 mg, Intravenous, Clinic/HOD 1 time, 2 of 8 cycles  Administration: 1,880 mg (4/26/2022), 1,880 mg (5/3/2022), 1,880 mg (5/17/2022), 1,880 mg (5/24/2022)        Social History     Socioeconomic History    Marital status:    Tobacco Use    Smoking status: Former Smoker    Smokeless tobacco: Former User      History reviewed. No pertinent family history.   History reviewed. No pertinent surgical history.     Review of Systems   Constitutional: Positive for fatigue. Negative for activity change, appetite change, chills, diaphoresis, fever and unexpected weight change.   HENT: Negative for congestion, dental problem, drooling, ear discharge, ear pain, facial swelling, hearing loss, mouth sores, nosebleeds, postnasal drip, rhinorrhea, sinus pressure, sneezing, sore throat, tinnitus, trouble swallowing and voice change.    Eyes: Negative for photophobia, pain, discharge, redness, itching and visual disturbance.   Respiratory: Negative for cough, choking, chest tightness, shortness of breath, wheezing and stridor.    Cardiovascular: Negative for chest pain, palpitations and leg swelling.   Gastrointestinal: Negative for abdominal distention, abdominal pain, anal bleeding, blood in stool, constipation, diarrhea, nausea, rectal pain and vomiting.   Endocrine: Negative for cold intolerance, heat intolerance, polydipsia, polyphagia and polyuria.   Genitourinary: Negative for decreased urine volume, difficulty urinating, dyspareunia, dysuria, enuresis, flank pain, frequency, genital sores, hematuria, menstrual problem, pelvic pain, urgency, vaginal bleeding, vaginal discharge and vaginal pain.   Musculoskeletal: Negative for arthralgias, back pain, gait problem, joint swelling, myalgias, neck pain and neck stiffness.   Skin: Negative for color change, pallor and rash.   Allergic/Immunologic: Negative  for environmental allergies, food allergies and immunocompromised state.   Neurological: Positive for weakness. Negative for dizziness, tremors, seizures, syncope, facial asymmetry, speech difficulty, light-headedness, numbness and headaches.   Hematological: Negative for adenopathy. Does not bruise/bleed easily.   Psychiatric/Behavioral: Negative for agitation, behavioral problems, confusion, decreased concentration, dysphoric mood, hallucinations, self-injury, sleep disturbance and suicidal ideas. The patient is not nervous/anxious and is not hyperactive.        ?   A comprehensive 14-point review of systems was reviewed with patient and was negative other than as specified above.   ?     Objective:      Physical Exam  Vitals reviewed.   Constitutional:       General: She is not in acute distress.     Appearance: She is well-developed. She is not diaphoretic.   HENT:      Head: Normocephalic and atraumatic.      Right Ear: External ear normal.      Left Ear: External ear normal.      Nose: Nose normal.      Right Sinus: No maxillary sinus tenderness or frontal sinus tenderness.      Left Sinus: No maxillary sinus tenderness or frontal sinus tenderness.      Mouth/Throat:      Pharynx: No oropharyngeal exudate.   Eyes:      General: Lids are normal. No scleral icterus.        Right eye: No discharge.         Left eye: No discharge.      Conjunctiva/sclera: Conjunctivae normal.      Right eye: Right conjunctiva is not injected. No hemorrhage.     Left eye: Left conjunctiva is not injected. No hemorrhage.     Pupils: Pupils are equal, round, and reactive to light.   Neck:      Thyroid: No thyromegaly.      Vascular: No JVD.      Trachea: No tracheal deviation.   Cardiovascular:      Rate and Rhythm: Normal rate.   Pulmonary:      Effort: Pulmonary effort is normal. No respiratory distress.      Breath sounds: No stridor.   Chest:      Chest wall: No tenderness.   Breasts:      Right: No supraclavicular adenopathy.       Left: No supraclavicular adenopathy.       Abdominal:      General: Bowel sounds are normal. There is no distension.      Palpations: Abdomen is soft. There is no hepatomegaly, splenomegaly or mass.      Tenderness: There is no abdominal tenderness. There is no rebound.   Musculoskeletal:         General: No tenderness. Normal range of motion.      Cervical back: Normal range of motion and neck supple.   Lymphadenopathy:      Cervical: No cervical adenopathy.      Upper Body:      Right upper body: No supraclavicular adenopathy.      Left upper body: No supraclavicular adenopathy.   Skin:     General: Skin is dry.      Findings: No erythema or rash.   Neurological:      Mental Status: She is alert and oriented to person, place, and time.      Cranial Nerves: No cranial nerve deficit.      Motor: Weakness present.      Coordination: Coordination normal.   Psychiatric:         Behavior: Behavior normal.         Thought Content: Thought content normal.         Judgment: Judgment normal.           ?   Vitals:    05/23/22 0849   BP: (!) 159/71   Pulse: (!) 133   Resp: 20   Temp: 98.7 °F (37.1 °C)      ?   ECOG:?2     ?   Laboratory:  ?   Lab Visit on 05/23/2022   Component Date Value Ref Range Status    Sodium 05/23/2022 140  136 - 145 mmol/L Final    Potassium 05/23/2022 3.5  3.5 - 5.1 mmol/L Final    Chloride 05/23/2022 96  95 - 110 mmol/L Final    CO2 05/23/2022 34 (A) 23 - 29 mmol/L Final    Glucose 05/23/2022 148 (A) 70 - 110 mg/dL Final    BUN 05/23/2022 11  8 - 23 mg/dL Final    Creatinine 05/23/2022 0.8  0.5 - 1.4 mg/dL Final    Calcium 05/23/2022 9.1  8.7 - 10.5 mg/dL Final    Total Protein 05/23/2022 6.7  6.0 - 8.4 g/dL Final    Albumin 05/23/2022 2.9 (A) 3.5 - 5.2 g/dL Final    Total Bilirubin 05/23/2022 0.3  0.1 - 1.0 mg/dL Final    Alkaline Phosphatase 05/23/2022 125  55 - 135 U/L Final    AST 05/23/2022 11  10 - 40 U/L Final    ALT 05/23/2022 16  10 - 44 U/L Final    Anion Gap 05/23/2022 10   8 - 16 mmol/L Final    eGFR if African American 05/23/2022 >60  >60 mL/min/1.73 m^2 Final    eGFR if non African American 05/23/2022 >60  >60 mL/min/1.73 m^2 Final    WBC 05/23/2022 7.47  3.90 - 12.70 K/uL Final    RBC 05/23/2022 3.04 (A) 4.00 - 5.40 M/uL Final    Hemoglobin 05/23/2022 9.5 (A) 12.0 - 16.0 g/dL Final    Hematocrit 05/23/2022 29.6 (A) 37.0 - 48.5 % Final    MCV 05/23/2022 97  82 - 98 fL Final    MCH 05/23/2022 31.3 (A) 27.0 - 31.0 pg Final    MCHC 05/23/2022 32.1  32.0 - 36.0 g/dL Final    RDW 05/23/2022 13.7  11.5 - 14.5 % Final    Platelets 05/23/2022 443  150 - 450 K/uL Final    MPV 05/23/2022 8.2 (A) 9.2 - 12.9 fL Final    Immature Granulocytes 05/23/2022 3.7 (A) 0.0 - 0.5 % Final    Gran # (ANC) 05/23/2022 3.7  1.8 - 7.7 K/uL Final    Immature Grans (Abs) 05/23/2022 0.28 (A) 0.00 - 0.04 K/uL Final    Lymph # 05/23/2022 3.1  1.0 - 4.8 K/uL Final    Mono # 05/23/2022 0.3  0.3 - 1.0 K/uL Final    Eos # 05/23/2022 0.0  0.0 - 0.5 K/uL Final    Baso # 05/23/2022 0.06  0.00 - 0.20 K/uL Final    nRBC 05/23/2022 0  0 /100 WBC Final    Gran % 05/23/2022 50.0  38.0 - 73.0 % Final    Lymph % 05/23/2022 41.6  18.0 - 48.0 % Final    Mono % 05/23/2022 3.5 (A) 4.0 - 15.0 % Final    Eosinophil % 05/23/2022 0.4  0.0 - 8.0 % Final    Basophil % 05/23/2022 0.8  0.0 - 1.9 % Final    Platelet Estimate 05/23/2022 Appears normal   Final    Aniso 05/23/2022 Slight   Final    Poik 05/23/2022 Slight   Final    Poly 05/23/2022 Occasional   Final    Differential Method 05/23/2022 Automated   Final    Magnesium 05/23/2022 1.6  1.6 - 2.6 mg/dL Final      ?   Imaging:    Results for orders placed or performed during the hospital encounter of 03/03/22 (from the past 2160 hour(s))   CT/PET SCAN ROUTINE    Impression    1. Extensive uptake seen in the region of the right upper quadrant along the inferior margin of the liver at the site of prior cholecystectomy.  Postoperative fluid collection noted  in this region.  There is also uptake seen along the anterolateral right abdominal wall along the site of a presumed recently removed drainage catheter.  Findings are likely a representation postoperative changes and tumor deposits.  Peritoneal implants in the region the eleanor hepatis not excluded.  2. Findings suspicious for metastatic lymph nodes along the right internal mammary chain.  There is also a single enlarged right cardiophrenic lymph node.  Single right axillary lymph node demonstrating low level uptake as well.  This is nonspecific.  3. Remaining findings as discussed above.      Electronically signed by: Roger Marquez DO  Date:    03/03/2022  Time:    15:22        Results for orders placed or performed during the hospital encounter of 04/22/22 (from the past 2160 hour(s))   CT Chest Abdomen Pelvis With Contrast    Impression    Again there is a mixed attenuating lesion at the fundus of the gallbladder with associated gallstones suspected.  Overall smaller than on prior exam.  Exact measurement is difficult.  Findings could reflect sequela of prior gallbladder disease.  Infiltrating lesion suspected given the patient's history however small abscesses not excluded.  8 mm and 10 mm hypodensities within segment 2 of the left hepatic lobe also not fully characterized.  Recommend continued surveillance.    Question 2 x 1.5 cm lesion within the uncinate or adjacent uncinate which could reflect necrotic lesion or lymph node.  Findings could be further evaluated with endoscopic ultrasound    Moderate to severe constipation throughout the large bowel.    All CT scans at this facility use dose modulation, iterative reconstruction and/or weight based dosing when appropriate to reduce radiation dose to as low as reasonably achievable.      Electronically signed by: Fadi Owusu MD  Date:    04/22/2022  Time:    13:45   Results for orders placed or performed during the hospital encounter of 03/03/22 (from the past  2160 hour(s))   CT Abdomen Pelvis With Contrast    Impression    Inflammatory process involving the right upper quadrant with fat stranding and abdominal wall thickening.  A tract is identified suggestive prior instrumentation.  Fat stranding adjacent to the hepatic flexure.  At least 2 peripherally enhancing fluid collection are identified adjacent to the gallstone 1 near the left lobe of the liver measuring 25 x 26 mm and 1 laterally measuring 26 x 46 mm suggestive of abscess seroma or other.  Biloma not excluded.  Recommend clinical correlation and follow-up.    All CT scans at this facility use dose modulation, iterative reconstructions, and/or weight base dosing when appropriate to reduce radiation dose to as low as reasonably achievable      Electronically signed by: Kelton Crain  Date:    03/03/2022  Time:    18:11   CT Guided Abscess Drainage With Catheter    Impression    1.  Percutaneous CT and ultrasound-guided aspiration of a gallbladder fossa abscess, yielding 11 mL of thick pus.  No drainage catheter was left in place due to small size of the collection.    2.  Additional gallbladder fossa abscess that was described on CT abdomen pelvis 03/03/2022, today measures only 1.5 cm on intraprocedural imaging, too small for aspiration. Again noted is the retained 1.5 cm gallstone within the gallbladder fossa.    ______________________________________________________________________      Electronically signed by: Gilberto Monroy  Date:    03/04/2022  Time:    16:34        ?   Assessment/Plan:     Problem List Items Addressed This Visit        Oncology    Adenocarcinoma of gallbladder - Primary     02/04/22 Acute RUQ pain with emergency laparoscopic cholecystectomy at [facility] --pathology positive for T4 adenocarcinoma peritoneal nodule positive for metastatic involvement. --Immunohistochemistry positive for CK7 and CDX2 and negative for CK 20, TTF1 and PAX8.      03/03/2022 PET scan notable for  uptake at right upper quadrant along liver margin site of prior cholecystectomy with postoperative fluid and peritoneal implants eleanor hepatis region potential/likely postoperative changes, right internal mammary chain and cardiophrenic lymph nodes.      04/22/22 CT CAP there is a mixed attenuating lesion at the fundus of the gallbladder with associated gallstones suspected.  Overall smaller than on prior exam.  Exact measurement is difficult.  Findings could reflect sequela of prior gallbladder disease.  Infiltrating lesion suspected given the patient's history however small abscesses not excluded.  8 mm and 10 mm hypodensities within segment 2 of the left hepatic lobe also not fully characterized.  Recommend continued surveillance.  Question 2 x 1.5 cm lesion within the uncinate or adjacent uncinate which could reflect necrotic lesion or lymph node.  Findings could be further evaluated with endoscopic ultrasound  Moderate to severe constipation throughout the large bowel.    Labs reviewed, no concerning cytopenia  -ANC 3.7; Plts 443; tbili 0.3; CrCl 60.9  -Case discussed with oncologist  -Ok to proceed with C2D8 of Gemzar/Cisplatin today    F/u in 3 weeks with cbc, cmp, and mag with MD for C3D1 Gemzar/Cisplatin           Relevant Orders    CBC Auto Differential    Comprehensive Metabolic Panel    Magnesium           GLEN Arellano FNP-C  Hematology/Oncology

## 2022-05-29 ENCOUNTER — NURSE TRIAGE (OUTPATIENT)
Dept: ADMINISTRATIVE | Facility: CLINIC | Age: 72
End: 2022-05-29
Payer: MEDICARE

## 2022-05-29 NOTE — TELEPHONE ENCOUNTER
Spoke with Dotty (VANNA) states she is not currently with the patient.  She states patient has visible shakiness in her hands. Spoke with directly she states she has anxiety related to the recent news about multiple children being killed.  Patient states the shakiness in her hands come and go.  She denies having visible tremors at this time.  Patient is home with her son who takes care of her daily.  Patient passed the jermaine to her son during the call however, she remained present.  Patient was calm during the call.  She denies being combative or hysterical.  Patient advised to be seen in 24 hours.  Also, advised patient to call back with worsening symptoms.  Patient and son verbalized understanding.     Reason for Disposition   Symptoms interfere with work or school    Additional Information   Negative: SEVERE difficulty breathing (e.g., struggling for each breath, speaks in single words)   Negative: Bluish (or gray) lips or face now   Negative: Difficult to awaken or acting confused (e.g., disoriented, slurred speech)   Negative: Hysterical or combative behavior   Negative: Sounds like a life-threatening emergency to the triager   Negative: [1] Difficulty breathing AND [2] persists > 10 minutes AND [3] not relieved by reassurance provided by triager   Negative: [1] Lightheadedness or dizziness AND [2] persists > 10 minutes AND [3] not relieved by reassurance provided by triager   Negative: [1] Alcohol or drug use, known or suspected AND [2] feeling very shaky (i.e., visible tremors of hands)   Negative: Patient sounds very sick or weak to the triager    Protocols used: ANXIETY AND PANIC ATTACK-A-AH

## 2022-05-30 ENCOUNTER — TELEPHONE (OUTPATIENT)
Dept: HEMATOLOGY/ONCOLOGY | Facility: CLINIC | Age: 72
End: 2022-05-30
Payer: MEDICARE

## 2022-05-30 NOTE — TELEPHONE ENCOUNTER
Nurse called pt after seeing triage note about pt calling with anxiety yesterday.   Pt states she feels better today and didn't need anything from us. Nurse verbalized understanding.

## 2022-06-06 ENCOUNTER — OFFICE VISIT (OUTPATIENT)
Dept: HEMATOLOGY/ONCOLOGY | Facility: CLINIC | Age: 72
End: 2022-06-06
Payer: MEDICARE

## 2022-06-06 ENCOUNTER — INFUSION (OUTPATIENT)
Dept: INFUSION THERAPY | Facility: HOSPITAL | Age: 72
End: 2022-06-06
Attending: INTERNAL MEDICINE
Payer: MEDICARE

## 2022-06-06 VITALS
RESPIRATION RATE: 18 BRPM | HEART RATE: 110 BPM | DIASTOLIC BLOOD PRESSURE: 74 MMHG | SYSTOLIC BLOOD PRESSURE: 138 MMHG | TEMPERATURE: 98 F | OXYGEN SATURATION: 94 %

## 2022-06-06 DIAGNOSIS — C23 ADENOCARCINOMA OF GALLBLADDER: Primary | ICD-10-CM

## 2022-06-06 DIAGNOSIS — D64.81 ANEMIA ASSOCIATED WITH CHEMOTHERAPY: ICD-10-CM

## 2022-06-06 DIAGNOSIS — T45.1X5A ANEMIA ASSOCIATED WITH CHEMOTHERAPY: ICD-10-CM

## 2022-06-06 DIAGNOSIS — D84.821 IMMUNODEFICIENCY DUE TO CHEMOTHERAPY: ICD-10-CM

## 2022-06-06 DIAGNOSIS — Z79.899 IMMUNODEFICIENCY DUE TO CHEMOTHERAPY: ICD-10-CM

## 2022-06-06 DIAGNOSIS — G89.3 NEOPLASM RELATED PAIN: ICD-10-CM

## 2022-06-06 DIAGNOSIS — T45.1X5A IMMUNODEFICIENCY DUE TO CHEMOTHERAPY: ICD-10-CM

## 2022-06-06 LAB
ALBUMIN SERPL BCP-MCNC: 2.7 G/DL (ref 3.5–5.2)
ALP SERPL-CCNC: 111 U/L (ref 55–135)
ALT SERPL W/O P-5'-P-CCNC: 16 U/L (ref 10–44)
ANION GAP SERPL CALC-SCNC: 10 MMOL/L (ref 8–16)
AST SERPL-CCNC: 16 U/L (ref 10–40)
BASOPHILS # BLD AUTO: 0.03 K/UL (ref 0–0.2)
BASOPHILS NFR BLD: 0.4 % (ref 0–1.9)
BILIRUB SERPL-MCNC: 0.3 MG/DL (ref 0.1–1)
BUN SERPL-MCNC: 10 MG/DL (ref 8–23)
CALCIUM SERPL-MCNC: 9.3 MG/DL (ref 8.7–10.5)
CHLORIDE SERPL-SCNC: 104 MMOL/L (ref 95–110)
CO2 SERPL-SCNC: 27 MMOL/L (ref 23–29)
CREAT SERPL-MCNC: 0.8 MG/DL (ref 0.5–1.4)
DIFFERENTIAL METHOD: ABNORMAL
EOSINOPHIL # BLD AUTO: 0.1 K/UL (ref 0–0.5)
EOSINOPHIL NFR BLD: 0.8 % (ref 0–8)
ERYTHROCYTE [DISTWIDTH] IN BLOOD BY AUTOMATED COUNT: 14.6 % (ref 11.5–14.5)
EST. GFR  (AFRICAN AMERICAN): >60 ML/MIN/1.73 M^2
EST. GFR  (NON AFRICAN AMERICAN): >60 ML/MIN/1.73 M^2
GLUCOSE SERPL-MCNC: 106 MG/DL (ref 70–110)
HCT VFR BLD AUTO: 25.1 % (ref 37–48.5)
HGB BLD-MCNC: 7.9 G/DL (ref 12–16)
IMM GRANULOCYTES # BLD AUTO: 0.04 K/UL (ref 0–0.04)
IMM GRANULOCYTES NFR BLD AUTO: 0.5 % (ref 0–0.5)
LYMPHOCYTES # BLD AUTO: 1.8 K/UL (ref 1–4.8)
LYMPHOCYTES NFR BLD: 21.3 % (ref 18–48)
MAGNESIUM SERPL-MCNC: 1.6 MG/DL (ref 1.6–2.6)
MCH RBC QN AUTO: 30.4 PG (ref 27–31)
MCHC RBC AUTO-ENTMCNC: 31.5 G/DL (ref 32–36)
MCV RBC AUTO: 97 FL (ref 82–98)
MONOCYTES # BLD AUTO: 0.7 K/UL (ref 0.3–1)
MONOCYTES NFR BLD: 8.3 % (ref 4–15)
NEUTROPHILS # BLD AUTO: 5.9 K/UL (ref 1.8–7.7)
NEUTROPHILS NFR BLD: 68.7 % (ref 38–73)
NRBC BLD-RTO: 0 /100 WBC
PLATELET # BLD AUTO: 395 K/UL (ref 150–450)
PMV BLD AUTO: 9.4 FL (ref 9.2–12.9)
POTASSIUM SERPL-SCNC: 4 MMOL/L (ref 3.5–5.1)
PROT SERPL-MCNC: 6.9 G/DL (ref 6–8.4)
RBC # BLD AUTO: 2.6 M/UL (ref 4–5.4)
SODIUM SERPL-SCNC: 141 MMOL/L (ref 136–145)
WBC # BLD AUTO: 8.53 K/UL (ref 3.9–12.7)

## 2022-06-06 PROCEDURE — 63600175 PHARM REV CODE 636 W HCPCS: Performed by: NURSE PRACTITIONER

## 2022-06-06 PROCEDURE — 4010F PR ACE/ARB THEARPY RXD/TAKEN: ICD-10-PCS | Mod: CPTII,95,, | Performed by: INTERNAL MEDICINE

## 2022-06-06 PROCEDURE — 83735 ASSAY OF MAGNESIUM: CPT

## 2022-06-06 PROCEDURE — 1157F ADVNC CARE PLAN IN RCRD: CPT | Mod: CPTII,95,, | Performed by: INTERNAL MEDICINE

## 2022-06-06 PROCEDURE — 80053 COMPREHEN METABOLIC PANEL: CPT

## 2022-06-06 PROCEDURE — 1157F PR ADVANCE CARE PLAN OR EQUIV PRESENT IN MEDICAL RECORD: ICD-10-PCS | Mod: CPTII,95,, | Performed by: INTERNAL MEDICINE

## 2022-06-06 PROCEDURE — 36592 COLLECT BLOOD FROM PICC: CPT

## 2022-06-06 PROCEDURE — 85025 COMPLETE CBC W/AUTO DIFF WBC: CPT

## 2022-06-06 PROCEDURE — 99214 PR OFFICE/OUTPT VISIT, EST, LEVL IV, 30-39 MIN: ICD-10-PCS | Mod: 95,,, | Performed by: INTERNAL MEDICINE

## 2022-06-06 PROCEDURE — 99214 OFFICE O/P EST MOD 30 MIN: CPT | Mod: 95,,, | Performed by: INTERNAL MEDICINE

## 2022-06-06 PROCEDURE — 4010F ACE/ARB THERAPY RXD/TAKEN: CPT | Mod: CPTII,95,, | Performed by: INTERNAL MEDICINE

## 2022-06-06 RX ORDER — HEPARIN 100 UNIT/ML
500 SYRINGE INTRAVENOUS
Status: CANCELLED | OUTPATIENT
Start: 2022-06-06

## 2022-06-06 RX ORDER — OXYCODONE HYDROCHLORIDE 5 MG/1
TABLET ORAL
Qty: 90 TABLET | Refills: 0 | Status: SHIPPED | OUTPATIENT
Start: 2022-06-06 | End: 2022-07-07 | Stop reason: SDUPTHER

## 2022-06-06 RX ORDER — SODIUM CHLORIDE 0.9 % (FLUSH) 0.9 %
10 SYRINGE (ML) INJECTION
Status: CANCELLED | OUTPATIENT
Start: 2022-06-13

## 2022-06-06 RX ORDER — SODIUM CHLORIDE 0.9 % (FLUSH) 0.9 %
10 SYRINGE (ML) INJECTION
Status: CANCELLED | OUTPATIENT
Start: 2022-06-06

## 2022-06-06 RX ORDER — HEPARIN 100 UNIT/ML
500 SYRINGE INTRAVENOUS
Status: CANCELLED | OUTPATIENT
Start: 2022-06-13

## 2022-06-06 RX ORDER — SODIUM CHLORIDE 0.9 % (FLUSH) 0.9 %
10 SYRINGE (ML) INJECTION
Status: DISCONTINUED | OUTPATIENT
Start: 2022-06-06 | End: 2022-06-07 | Stop reason: HOSPADM

## 2022-06-06 RX ORDER — HEPARIN 100 UNIT/ML
500 SYRINGE INTRAVENOUS
Status: COMPLETED | OUTPATIENT
Start: 2022-06-06 | End: 2022-06-06

## 2022-06-06 RX ADMIN — HEPARIN 500 UNITS: 100 SYRINGE at 11:06

## 2022-06-06 NOTE — NURSING
Labs obtained from  Right upper arm PICC line, 2 patient identifiers obtained, labeled and sent to lab.  Adequate blood return noted.  Purple lumen flushed with 10ml NS and 5ml Heparin solution. Site without redness, swelling or drainage noted.

## 2022-06-06 NOTE — PROGRESS NOTES
Subjective:       Patient ID: Madelyn Serna is a 71 y.o. female.    Chief Complaint: Results, Chemotherapy, and Cancer    HPI  71-year-old female history of locally advanced gallbladder cancer patient presents for cycle  3 day 1 of cisplatin and gemcitabine therapy.  Tolerating therapy well patient denies nausea vomiting fevers chills night sweats.  ECOG status 1 seen in video visit    Past Medical History:   Diagnosis Date    Cancer of gallbladder     Essential (primary) hypertension     Infection following a procedure, deep incisional surgical site, initial encounter 3/3/2022    Romaine pus on bandage with induration to RUQ. She has already completed a 5 day course of Cipro which completed on 2/27. Recommend prompt evaluation with ED and whether or not imaging is warranted.     No family history on file.  Social History     Socioeconomic History    Marital status:    Tobacco Use    Smoking status: Former Smoker    Smokeless tobacco: Former User     No past surgical history on file.    Labs:  Lab Results   Component Value Date    WBC 8.53 06/06/2022    HGB 7.9 (L) 06/06/2022    HCT 25.1 (L) 06/06/2022    MCV 97 06/06/2022     06/06/2022     BMP  Lab Results   Component Value Date     05/23/2022    K 3.5 05/23/2022    CL 96 05/23/2022    CO2 34 (H) 05/23/2022    BUN 11 05/23/2022    CREATININE 0.8 05/23/2022    CALCIUM 9.1 05/23/2022    ANIONGAP 10 05/23/2022    ESTGFRAFRICA >60 05/23/2022    EGFRNONAA >60 05/23/2022     Lab Results   Component Value Date    ALT 16 05/23/2022    AST 11 05/23/2022    ALKPHOS 125 05/23/2022    BILITOT 0.3 05/23/2022       Lab Results   Component Value Date    IRON 10 (L) 02/21/2022    TIBC 189 (L) 02/21/2022    FERRITIN 705 (H) 02/21/2022     No results found for: [id number]  No results found for: FOLATE  No results found for: TSH      Review of Systems   Constitutional: Positive for fatigue.   Neurological: Positive for weakness.   Psychiatric/Behavioral:  Positive for dysphoric mood. The patient is nervous/anxious.        Objective:      Physical Exam  Constitutional:       Appearance: She is obese. She is ill-appearing.             Assessment:      1. Adenocarcinoma of gallbladder    2. Neoplasm related pain    3. Immunodeficiency due to chemotherapy    4. Anemia associated with chemotherapy           Plan:       The patient location is:  home  The chief complaint leading to consultation is:  Gallbladder cancer  Visit type:  Synchronous audio video    Face to Face time with patient: 25 minutes of total time spent on the encounter, which includes face to face time and non-face to face time preparing to see the patient (eg, review of tests), Obtaining and/or reviewing separately obtained history, Documenting clinical information in the electronic or other health record, Independently interpreting results (not separately reported) and communicating results to the patient/family/caregiver, or Care coordination (not separately reported).         Each patient to whom he or she provides medical services by telemedicine is:  (1) informed of the relationship between the physician and patient and the respective role of any other health care provider with respect to management of the patient; and (2) notified that he or she may decline to receive medical services by telemedicine and may withdraw from such care at any time.    Notes:    reviewed information with patient at this time will proceed with cycle 3 day 1.  Orders written reviewed can be seen back by nurse practitioner for day 8 and by primary oncologist  this begin cycle 4 would recommend if possible CT chest abdomen pelvis for response to therapy.    Bo Davies Jr, MD FACP

## 2022-06-07 ENCOUNTER — INFUSION (OUTPATIENT)
Dept: INFUSION THERAPY | Facility: HOSPITAL | Age: 72
End: 2022-06-07
Attending: INTERNAL MEDICINE
Payer: MEDICARE

## 2022-06-07 VITALS
HEART RATE: 87 BPM | SYSTOLIC BLOOD PRESSURE: 123 MMHG | DIASTOLIC BLOOD PRESSURE: 75 MMHG | OXYGEN SATURATION: 97 % | TEMPERATURE: 98 F | RESPIRATION RATE: 18 BRPM

## 2022-06-07 DIAGNOSIS — C23 ADENOCARCINOMA OF GALLBLADDER: Primary | ICD-10-CM

## 2022-06-07 PROCEDURE — 96361 HYDRATE IV INFUSION ADD-ON: CPT

## 2022-06-07 PROCEDURE — 96366 THER/PROPH/DIAG IV INF ADDON: CPT

## 2022-06-07 PROCEDURE — 96415 CHEMO IV INFUSION ADDL HR: CPT

## 2022-06-07 PROCEDURE — 25000003 PHARM REV CODE 250: Performed by: INTERNAL MEDICINE

## 2022-06-07 PROCEDURE — 63600175 PHARM REV CODE 636 W HCPCS: Performed by: INTERNAL MEDICINE

## 2022-06-07 PROCEDURE — 96417 CHEMO IV INFUS EACH ADDL SEQ: CPT

## 2022-06-07 PROCEDURE — 96375 TX/PRO/DX INJ NEW DRUG ADDON: CPT

## 2022-06-07 PROCEDURE — 96367 TX/PROPH/DG ADDL SEQ IV INF: CPT

## 2022-06-07 PROCEDURE — 96413 CHEMO IV INFUSION 1 HR: CPT

## 2022-06-07 RX ORDER — HEPARIN 100 UNIT/ML
500 SYRINGE INTRAVENOUS
Status: DISCONTINUED | OUTPATIENT
Start: 2022-06-07 | End: 2022-06-07 | Stop reason: HOSPADM

## 2022-06-07 RX ORDER — SODIUM CHLORIDE 9 MG/ML
500 INJECTION, SOLUTION INTRAVENOUS
Status: COMPLETED | OUTPATIENT
Start: 2022-06-07 | End: 2022-06-07

## 2022-06-07 RX ADMIN — HEPARIN 500 UNITS: 100 SYRINGE at 03:06

## 2022-06-07 RX ADMIN — CISPLATIN 47 MG: 1 INJECTION INTRAVENOUS at 12:06

## 2022-06-07 RX ADMIN — SODIUM CHLORIDE 500 ML: 0.9 INJECTION, SOLUTION INTRAVENOUS at 01:06

## 2022-06-07 RX ADMIN — GEMCITABINE 1880 MG: 38 INJECTION, SOLUTION INTRAVENOUS at 12:06

## 2022-06-07 RX ADMIN — PALONOSETRON HYDROCHLORIDE: 0.25 INJECTION, SOLUTION INTRAVENOUS at 11:06

## 2022-06-07 RX ADMIN — APREPITANT 130 MG: 130 INJECTION, EMULSION INTRAVENOUS at 11:06

## 2022-06-07 RX ADMIN — MAGNESIUM SULFATE HEPTAHYDRATE 500 ML/HR: 500 INJECTION, SOLUTION INTRAMUSCULAR; INTRAVENOUS at 09:06

## 2022-06-07 NOTE — PLAN OF CARE
Problem: Adult Inpatient Plan of Care  Goal: Plan of Care Review  Outcome: Ongoing, Progressing  Flowsheets (Taken 6/7/2022 0903)  Plan of Care Reviewed With:   patient   daughter  Goal: Patient-Specific Goal (Individualized)  Outcome: Ongoing, Progressing  Flowsheets (Taken 6/7/2022 0903)  Anxieties, Fears or Concerns: None verbazlied today  Individualized Care Needs: Blankets, daugther at side, tv on  Patient-Specific Goals (Include Timeframe): Tolerate infusion today  Goal: Optimal Comfort and Wellbeing  Outcome: Ongoing, Progressing  Intervention: Provide Person-Centered Care  Flowsheets (Taken 6/7/2022 0903)  Trust Relationship/Rapport:   care explained   reassurance provided   choices provided   thoughts/feelings acknowledged   emotional support provided   empathic listening provided   questions encouraged   questions answered     Problem: Neutropenia (Chemotherapy Effects)  Goal: Absence of Infection  Outcome: Ongoing, Progressing  Intervention: Prevent Infection and Maximize Resistance  Flowsheets (Taken 6/7/2022 0903)  Infection Prevention:   hand hygiene promoted   equipment surfaces disinfected   personal protective equipment utilized   rest/sleep promoted

## 2022-06-13 ENCOUNTER — OFFICE VISIT (OUTPATIENT)
Dept: HEMATOLOGY/ONCOLOGY | Facility: CLINIC | Age: 72
End: 2022-06-13
Payer: MEDICARE

## 2022-06-13 ENCOUNTER — INFUSION (OUTPATIENT)
Dept: INFUSION THERAPY | Facility: HOSPITAL | Age: 72
End: 2022-06-13
Attending: INTERNAL MEDICINE
Payer: MEDICARE

## 2022-06-13 VITALS
DIASTOLIC BLOOD PRESSURE: 74 MMHG | HEIGHT: 67 IN | SYSTOLIC BLOOD PRESSURE: 113 MMHG | BODY MASS INDEX: 24.33 KG/M2 | TEMPERATURE: 97 F | OXYGEN SATURATION: 96 % | HEART RATE: 114 BPM | WEIGHT: 155 LBS

## 2022-06-13 DIAGNOSIS — C23 ADENOCARCINOMA OF GALLBLADDER: Primary | ICD-10-CM

## 2022-06-13 DIAGNOSIS — T45.1X5A IMMUNODEFICIENCY DUE TO CHEMOTHERAPY: ICD-10-CM

## 2022-06-13 DIAGNOSIS — G89.3 NEOPLASM RELATED PAIN: ICD-10-CM

## 2022-06-13 DIAGNOSIS — E87.6 HYPOKALEMIA: ICD-10-CM

## 2022-06-13 DIAGNOSIS — D64.81 ANEMIA ASSOCIATED WITH CHEMOTHERAPY: ICD-10-CM

## 2022-06-13 DIAGNOSIS — T45.1X5A ANEMIA ASSOCIATED WITH CHEMOTHERAPY: ICD-10-CM

## 2022-06-13 DIAGNOSIS — Z79.899 IMMUNODEFICIENCY DUE TO CHEMOTHERAPY: ICD-10-CM

## 2022-06-13 DIAGNOSIS — D84.821 IMMUNODEFICIENCY DUE TO CHEMOTHERAPY: ICD-10-CM

## 2022-06-13 DIAGNOSIS — Z86.59 HISTORY OF DEPRESSIVE SYMPTOMS: ICD-10-CM

## 2022-06-13 LAB
ALBUMIN SERPL BCP-MCNC: 2.8 G/DL (ref 3.5–5.2)
ALP SERPL-CCNC: 103 U/L (ref 55–135)
ALT SERPL W/O P-5'-P-CCNC: 12 U/L (ref 10–44)
ANION GAP SERPL CALC-SCNC: 11 MMOL/L (ref 8–16)
AST SERPL-CCNC: 11 U/L (ref 10–40)
BASOPHILS # BLD AUTO: 0.02 K/UL (ref 0–0.2)
BASOPHILS NFR BLD: 0.5 % (ref 0–1.9)
BILIRUB SERPL-MCNC: 0.3 MG/DL (ref 0.1–1)
BUN SERPL-MCNC: 13 MG/DL (ref 8–23)
CALCIUM SERPL-MCNC: 9.3 MG/DL (ref 8.7–10.5)
CHLORIDE SERPL-SCNC: 93 MMOL/L (ref 95–110)
CO2 SERPL-SCNC: 36 MMOL/L (ref 23–29)
CREAT SERPL-MCNC: 0.8 MG/DL (ref 0.5–1.4)
DIFFERENTIAL METHOD: ABNORMAL
EOSINOPHIL # BLD AUTO: 0 K/UL (ref 0–0.5)
EOSINOPHIL NFR BLD: 0.9 % (ref 0–8)
ERYTHROCYTE [DISTWIDTH] IN BLOOD BY AUTOMATED COUNT: 14.1 % (ref 11.5–14.5)
EST. GFR  (AFRICAN AMERICAN): >60 ML/MIN/1.73 M^2
EST. GFR  (NON AFRICAN AMERICAN): >60 ML/MIN/1.73 M^2
FERRITIN SERPL-MCNC: 800 NG/ML (ref 20–300)
GLUCOSE SERPL-MCNC: 166 MG/DL (ref 70–110)
HCT VFR BLD AUTO: 25.9 % (ref 37–48.5)
HGB BLD-MCNC: 8.2 G/DL (ref 12–16)
IMM GRANULOCYTES # BLD AUTO: 0.08 K/UL (ref 0–0.04)
IMM GRANULOCYTES NFR BLD AUTO: 1.8 % (ref 0–0.5)
IRON SERPL-MCNC: 32 UG/DL (ref 30–160)
LYMPHOCYTES # BLD AUTO: 1.8 K/UL (ref 1–4.8)
LYMPHOCYTES NFR BLD: 41.5 % (ref 18–48)
MAGNESIUM SERPL-MCNC: 1.2 MG/DL (ref 1.6–2.6)
MCH RBC QN AUTO: 30.3 PG (ref 27–31)
MCHC RBC AUTO-ENTMCNC: 31.7 G/DL (ref 32–36)
MCV RBC AUTO: 96 FL (ref 82–98)
MONOCYTES # BLD AUTO: 0.2 K/UL (ref 0.3–1)
MONOCYTES NFR BLD: 5.4 % (ref 4–15)
NEUTROPHILS # BLD AUTO: 2.2 K/UL (ref 1.8–7.7)
NEUTROPHILS NFR BLD: 49.9 % (ref 38–73)
NRBC BLD-RTO: 0 /100 WBC
PLATELET # BLD AUTO: 390 K/UL (ref 150–450)
PMV BLD AUTO: 8 FL (ref 9.2–12.9)
POTASSIUM SERPL-SCNC: 3.1 MMOL/L (ref 3.5–5.1)
PROT SERPL-MCNC: 6.8 G/DL (ref 6–8.4)
RBC # BLD AUTO: 2.71 M/UL (ref 4–5.4)
SATURATED IRON: 14 % (ref 20–50)
SODIUM SERPL-SCNC: 140 MMOL/L (ref 136–145)
TOTAL IRON BINDING CAPACITY: 235 UG/DL (ref 250–450)
TRANSFERRIN SERPL-MCNC: 159 MG/DL (ref 200–375)
WBC # BLD AUTO: 4.41 K/UL (ref 3.9–12.7)

## 2022-06-13 PROCEDURE — 1126F AMNT PAIN NOTED NONE PRSNT: CPT | Mod: CPTII,S$GLB,, | Performed by: INTERNAL MEDICINE

## 2022-06-13 PROCEDURE — 1101F PT FALLS ASSESS-DOCD LE1/YR: CPT | Mod: CPTII,S$GLB,, | Performed by: INTERNAL MEDICINE

## 2022-06-13 PROCEDURE — 84466 ASSAY OF TRANSFERRIN: CPT | Performed by: INTERNAL MEDICINE

## 2022-06-13 PROCEDURE — 3074F SYST BP LT 130 MM HG: CPT | Mod: CPTII,S$GLB,, | Performed by: INTERNAL MEDICINE

## 2022-06-13 PROCEDURE — 3288F PR FALLS RISK ASSESSMENT DOCUMENTED: ICD-10-PCS | Mod: CPTII,S$GLB,, | Performed by: INTERNAL MEDICINE

## 2022-06-13 PROCEDURE — 83735 ASSAY OF MAGNESIUM: CPT | Performed by: INTERNAL MEDICINE

## 2022-06-13 PROCEDURE — 1126F PR PAIN SEVERITY QUANTIFIED, NO PAIN PRESENT: ICD-10-PCS | Mod: CPTII,S$GLB,, | Performed by: INTERNAL MEDICINE

## 2022-06-13 PROCEDURE — 4010F PR ACE/ARB THEARPY RXD/TAKEN: ICD-10-PCS | Mod: CPTII,S$GLB,, | Performed by: INTERNAL MEDICINE

## 2022-06-13 PROCEDURE — 36592 COLLECT BLOOD FROM PICC: CPT

## 2022-06-13 PROCEDURE — 99215 OFFICE O/P EST HI 40 MIN: CPT | Mod: S$GLB,,, | Performed by: INTERNAL MEDICINE

## 2022-06-13 PROCEDURE — 63600175 PHARM REV CODE 636 W HCPCS: Performed by: NURSE PRACTITIONER

## 2022-06-13 PROCEDURE — 80053 COMPREHEN METABOLIC PANEL: CPT | Performed by: INTERNAL MEDICINE

## 2022-06-13 PROCEDURE — 3008F BODY MASS INDEX DOCD: CPT | Mod: CPTII,S$GLB,, | Performed by: INTERNAL MEDICINE

## 2022-06-13 PROCEDURE — 82728 ASSAY OF FERRITIN: CPT | Performed by: INTERNAL MEDICINE

## 2022-06-13 PROCEDURE — 3078F DIAST BP <80 MM HG: CPT | Mod: CPTII,S$GLB,, | Performed by: INTERNAL MEDICINE

## 2022-06-13 PROCEDURE — 1159F MED LIST DOCD IN RCRD: CPT | Mod: CPTII,S$GLB,, | Performed by: INTERNAL MEDICINE

## 2022-06-13 PROCEDURE — 3074F PR MOST RECENT SYSTOLIC BLOOD PRESSURE < 130 MM HG: ICD-10-PCS | Mod: CPTII,S$GLB,, | Performed by: INTERNAL MEDICINE

## 2022-06-13 PROCEDURE — 85025 COMPLETE CBC W/AUTO DIFF WBC: CPT | Performed by: INTERNAL MEDICINE

## 2022-06-13 PROCEDURE — 1101F PR PT FALLS ASSESS DOC 0-1 FALLS W/OUT INJ PAST YR: ICD-10-PCS | Mod: CPTII,S$GLB,, | Performed by: INTERNAL MEDICINE

## 2022-06-13 PROCEDURE — A4216 STERILE WATER/SALINE, 10 ML: HCPCS | Performed by: NURSE PRACTITIONER

## 2022-06-13 PROCEDURE — 3288F FALL RISK ASSESSMENT DOCD: CPT | Mod: CPTII,S$GLB,, | Performed by: INTERNAL MEDICINE

## 2022-06-13 PROCEDURE — 1160F PR REVIEW ALL MEDS BY PRESCRIBER/CLIN PHARMACIST DOCUMENTED: ICD-10-PCS | Mod: CPTII,S$GLB,, | Performed by: INTERNAL MEDICINE

## 2022-06-13 PROCEDURE — 3008F PR BODY MASS INDEX (BMI) DOCUMENTED: ICD-10-PCS | Mod: CPTII,S$GLB,, | Performed by: INTERNAL MEDICINE

## 2022-06-13 PROCEDURE — 99215 PR OFFICE/OUTPT VISIT, EST, LEVL V, 40-54 MIN: ICD-10-PCS | Mod: S$GLB,,, | Performed by: INTERNAL MEDICINE

## 2022-06-13 PROCEDURE — 99999 PR PBB SHADOW E&M-EST. PATIENT-LVL III: ICD-10-PCS | Mod: PBBFAC,,, | Performed by: INTERNAL MEDICINE

## 2022-06-13 PROCEDURE — 1160F RVW MEDS BY RX/DR IN RCRD: CPT | Mod: CPTII,S$GLB,, | Performed by: INTERNAL MEDICINE

## 2022-06-13 PROCEDURE — 1159F PR MEDICATION LIST DOCUMENTED IN MEDICAL RECORD: ICD-10-PCS | Mod: CPTII,S$GLB,, | Performed by: INTERNAL MEDICINE

## 2022-06-13 PROCEDURE — 4010F ACE/ARB THERAPY RXD/TAKEN: CPT | Mod: CPTII,S$GLB,, | Performed by: INTERNAL MEDICINE

## 2022-06-13 PROCEDURE — 3078F PR MOST RECENT DIASTOLIC BLOOD PRESSURE < 80 MM HG: ICD-10-PCS | Mod: CPTII,S$GLB,, | Performed by: INTERNAL MEDICINE

## 2022-06-13 PROCEDURE — 1157F PR ADVANCE CARE PLAN OR EQUIV PRESENT IN MEDICAL RECORD: ICD-10-PCS | Mod: CPTII,S$GLB,, | Performed by: INTERNAL MEDICINE

## 2022-06-13 PROCEDURE — 99999 PR PBB SHADOW E&M-EST. PATIENT-LVL III: CPT | Mod: PBBFAC,,, | Performed by: INTERNAL MEDICINE

## 2022-06-13 PROCEDURE — 1157F ADVNC CARE PLAN IN RCRD: CPT | Mod: CPTII,S$GLB,, | Performed by: INTERNAL MEDICINE

## 2022-06-13 PROCEDURE — 25000003 PHARM REV CODE 250: Performed by: NURSE PRACTITIONER

## 2022-06-13 RX ORDER — HEPARIN 100 UNIT/ML
500 SYRINGE INTRAVENOUS
Status: COMPLETED | OUTPATIENT
Start: 2022-06-13 | End: 2022-06-13

## 2022-06-13 RX ORDER — HEPARIN 100 UNIT/ML
500 SYRINGE INTRAVENOUS
Status: CANCELLED | OUTPATIENT
Start: 2022-06-13

## 2022-06-13 RX ORDER — POTASSIUM CHLORIDE 20 MEQ/1
20 TABLET, EXTENDED RELEASE ORAL DAILY
Qty: 3 TABLET | Refills: 3 | Status: SHIPPED | OUTPATIENT
Start: 2022-06-13 | End: 2022-06-16

## 2022-06-13 RX ORDER — SODIUM CHLORIDE 0.9 % (FLUSH) 0.9 %
10 SYRINGE (ML) INJECTION
Status: CANCELLED | OUTPATIENT
Start: 2022-06-13

## 2022-06-13 RX ORDER — SODIUM CHLORIDE 0.9 % (FLUSH) 0.9 %
10 SYRINGE (ML) INJECTION
Status: COMPLETED | OUTPATIENT
Start: 2022-06-13 | End: 2022-06-13

## 2022-06-13 RX ADMIN — Medication 500 UNITS: at 08:06

## 2022-06-13 RX ADMIN — Medication 10 ML: at 08:06

## 2022-06-13 NOTE — NURSING
Labs drawn from PICC. Both lumens flushed with Normal Saline and Heparin as documented on MAR. Patient to have dressing change after treatment scheduled tomorrow.

## 2022-06-13 NOTE — DISCHARGE INSTRUCTIONS
[facility]  [address] [address]  [address]  [phone] phone     [phone] fax  Hours of Operation: Monday- Friday 8:00am- 5:00pm  After hours phone  [phone]  Hematology / Oncology Physicians on call      EDUARDO Sol [address] Dr., TERI Spangler, TERI Arellano, TERI Srinivasan, MARIA LUISA Song, TERI    Please call with any concerns regarding your appointment today.        FALL PREVENTION   Falls often occur due to slipping, tripping or losing your balance. Here are ways to reduce your risk of falling again.   Was there anything that caused your fall that can be fixed, removed or replaced?   Make your home safe by keeping walkways clear of objects you may trip over.   Use non-slip pads under rugs.   Do not walk in poorly lit areas.   Do not stand on chairs or wobbly ladders.   Use caution when reaching overhead or looking upward. This position can cause a loss of balance.   Be sure your shoes fit properly, have non-slip bottoms and are in good condition.   Be cautious when going up and down stairs, curbs, and when walking on uneven sidewalks.   If your balance is poor, consider using a cane or walker.   If your fall was related to alcohol use, stop or limit alcohol intake.   If your fall was related to use of sleeping medicines, talk to your doctor about this. You may need to reduce your dosage at bedtime if you awaken during the night to go to the bathroom.   To reduce the need for nighttime bathroom trips:   Avoid drinking fluids for several hours before going to bed   Empty your bladder before going to bed   Men can keep a urinal at the bedside   © 0571-0725 [facility], [address] [place] [address], [place] [address]. All rights reserved. This information is not intended as a substitute for professional medical care. Always follow your healthcare professional's instructions.

## 2022-06-13 NOTE — PROGRESS NOTES
Subjective:      DATE OF VISIT: 6/13/22     ?  Patient ID:?Madelyn Serna is a 71 y.o. female.?? MR#: [id number]     PRIMARY ONCOLOGIST: Dr. Marcos    ? Primary Care Providers:  Orin Henderson MD, MD (General)     CHIEF COMPLAINT: ?  Follow-up on palliative chemotherapy?   ?   ONCOLOGIC DIAGNOSIS:  Gallbladder adenocarcinoma, stage IV, pT4 NX pM1  ?   CURRENT TREATMENT:  Gemcitabine and cisplatin cycle 1 day 1 4/25/22    PAST TREATMENT:  Laparoscopic cholecystectomy, 02/04/2022, [facility]  ?   ONCOLOGIC HISTORY    I the pleasure of meeting I had the pleasure meeting for the 1st time Ms. Serna a pleasant 71-year-old woman accompanied by her 2 daughters today for newly diagnosed gallbladder adenocarcinoma.    Medical history is notable for former tobacco use quit December 2021, COPD, anxiety, cataracts.      She notes 4 days of right lower quadrant abdominal pain acute onset for which she [facility] on [facility]/04/2022.  One episode of vomiting per emergency room note.  Labs with leukocytosis WBC 18.7, hemoglobin 13, , renal function intact GFR over 60, mild alkaline phosphatase elevation 130, normal transaminases and bilirubin.  Albumin 4.5, calcium 10.4.  Urinalysis positive for E coli pansensitive.  Blood cultures negative.    Imaging reports a from outside radiology:  CT abdomen and pelvis with contrast distended gallbladder extensive cholelithiasis including 17 mm stone in the region of gallbladder neck.  Pericholecystic stranding and small adjacent fluid.  Lymph nodes noted to be unremarkable.  Emphysema lung bases.    She was taken to emergency surgery with laparoscopic cholecystectomy.  Surgical note mentions during this maneuver would appear to be a peritoneal nodule was discovered.  The peritoneal nodule was dissected from all surrounding structures with the LigaSure device.  The nodule is passed off the table and sent separately as a  "specimen.    Outside pathology:  "  Gallbladder cholecystectomy adenocarcinoma immunohistochemistry positive for CK7 and CDX2 and negative for CK 20, TTF1 and PAX8  Tumor size at least 2.2 cm.  The tumor invades the liver.  Lymphovascular invasion present.  Perineural invasion not identified.  Margins cannot be assessed.  Regional lymph nodes not applicable.    peritoneal nodule excisional biopsy positive for metastatic adenocarcinoma consistent with origin from gallbladder.      HPI    Today she is accompanied by her 2 daughters.  She notes tenderness at insertion site of drainage tube has follow-up tomorrow with her surgeon.  No fevers or chills.  She denies any weight loss prior to event but has lost a couple lb since surgery.  She notes following surgery having an episode of dark stool but denies any other melena, hematochezia, hematuria, hemoptysis, hematemesis.  No chest pain or shortness of breath.  Anxiety related to diagnosis.    Review of Systems    ?   A comprehensive 14-point review of systems was reviewed with patient and was negative other than as specified above.   ?   PAST MEDICAL HISTORY:   Past Medical History:   Diagnosis Date    Cancer of gallbladder     Essential (primary) hypertension     Infection following a procedure, deep incisional surgical site, initial encounter 3/3/2022    Romaine pus on bandage with induration to RUQ. She has already completed a 5 day course of Cipro which completed on 2/27. Recommend prompt evaluation with ED and whether or not imaging is warranted.    ?     PAST SURGICAL HISTORY:   No past surgical history on file.   ?   ALLERGIES:   Allergies as of 06/13/2022 - Reviewed 06/06/2022   Allergen Reaction Noted    Aspirin  02/21/2022    Ibuprofen  02/21/2022    Sulfa (sulfonamide antibiotics)  02/21/2022    Amoxicillin-pot clavulanate Itching 02/21/2022      ?   MEDICATIONS:?   No outpatient medications have been marked as taking for the 6/13/22 encounter " "(Appointment) with Ivonne Marcos MD.      ?   SOCIAL HISTORY:?   Social History     Tobacco Use    Smoking status: Former Smoker    Smokeless tobacco: Former User   Substance Use Topics    Alcohol use: Not on file      ?   Former tobacco use half pack per day times many years" unable to quantify  ?   FAMILY HISTORY:   family history is not on file.   ?   Brother with colon cancer in 70s  Niece with breast cancer in 40s     Objective:      Physical Exam      ?   There were no vitals filed for this visit.   ?   ECOG:?1   General appearance: Generally well appearing, anxious, occasionally tearful, daughters accompanying her  Head, eyes, ears, nose, and throat:  moist mucous membranes.   Respiratory:  Normal work of breathing   Abdomen: nondistended.  Drainage tube without noted discharge  Extremities: Warm, without edema.   Neurologic: Alert and oriented.  Sitting in wheelchair  Skin: No rashes, ecchymoses or petechial lesion.   ?      ?   Laboratory:  ?   No visits with results within 1 Day(s) from this visit.   Latest known visit with results is:   Infusion on 06/06/2022   Component Date Value Ref Range Status    Magnesium 06/06/2022 1.6  1.6 - 2.6 mg/dL Final    Sodium 06/06/2022 141  136 - 145 mmol/L Final    Potassium 06/06/2022 4.0  3.5 - 5.1 mmol/L Final    Chloride 06/06/2022 104  95 - 110 mmol/L Final    CO2 06/06/2022 27  23 - 29 mmol/L Final    Glucose 06/06/2022 106  70 - 110 mg/dL Final    BUN 06/06/2022 10  8 - 23 mg/dL Final    Creatinine 06/06/2022 0.8  0.5 - 1.4 mg/dL Final    Calcium 06/06/2022 9.3  8.7 - 10.5 mg/dL Final    Total Protein 06/06/2022 6.9  6.0 - 8.4 g/dL Final    Albumin 06/06/2022 2.7 (A) 3.5 - 5.2 g/dL Final    Total Bilirubin 06/06/2022 0.3  0.1 - 1.0 mg/dL Final    Alkaline Phosphatase 06/06/2022 111  55 - 135 U/L Final    AST 06/06/2022 16  10 - 40 U/L Final    ALT 06/06/2022 16  10 - 44 U/L Final    Anion Gap 06/06/2022 10  8 - 16 mmol/L Final    eGFR if "  06/06/2022 >60  >60 mL/min/1.73 m^2 Final    eGFR if non African American 06/06/2022 >60  >60 mL/min/1.73 m^2 Final    WBC 06/06/2022 8.53  3.90 - 12.70 K/uL Final    RBC 06/06/2022 2.60 (A) 4.00 - 5.40 M/uL Final    Hemoglobin 06/06/2022 7.9 (A) 12.0 - 16.0 g/dL Final    Hematocrit 06/06/2022 25.1 (A) 37.0 - 48.5 % Final    MCV 06/06/2022 97  82 - 98 fL Final    MCH 06/06/2022 30.4  27.0 - 31.0 pg Final    MCHC 06/06/2022 31.5 (A) 32.0 - 36.0 g/dL Final    RDW 06/06/2022 14.6 (A) 11.5 - 14.5 % Final    Platelets 06/06/2022 395  150 - 450 K/uL Final    MPV 06/06/2022 9.4  9.2 - 12.9 fL Final    Immature Granulocytes 06/06/2022 0.5  0.0 - 0.5 % Final    Gran # (ANC) 06/06/2022 5.9  1.8 - 7.7 K/uL Final    Immature Grans (Abs) 06/06/2022 0.04  0.00 - 0.04 K/uL Final    Lymph # 06/06/2022 1.8  1.0 - 4.8 K/uL Final    Mono # 06/06/2022 0.7  0.3 - 1.0 K/uL Final    Eos # 06/06/2022 0.1  0.0 - 0.5 K/uL Final    Baso # 06/06/2022 0.03  0.00 - 0.20 K/uL Final    nRBC 06/06/2022 0  0 /100 WBC Final    Gran % 06/06/2022 68.7  38.0 - 73.0 % Final    Lymph % 06/06/2022 21.3  18.0 - 48.0 % Final    Mono % 06/06/2022 8.3  4.0 - 15.0 % Final    Eosinophil % 06/06/2022 0.8  0.0 - 8.0 % Final    Basophil % 06/06/2022 0.4  0.0 - 1.9 % Final    Differential Method 06/06/2022 Automated   Final      ?   Tumor markers   ?  CEA, CA 19 9 pending  ?   Imaging:  ?  Outside see above  Results for orders placed or performed during the hospital encounter of 04/22/22 (from the past 2160 hour(s))   CT Chest Abdomen Pelvis With Contrast    Impression    Again there is a mixed attenuating lesion at the fundus of the gallbladder with associated gallstones suspected.  Overall smaller than on prior exam.  Exact measurement is difficult.  Findings could reflect sequela of prior gallbladder disease.  Infiltrating lesion suspected given the patient's history however small abscesses not excluded.  8 mm and 10 mm  hypodensities within segment 2 of the left hepatic lobe also not fully characterized.  Recommend continued surveillance.    Question 2 x 1.5 cm lesion within the uncinate or adjacent uncinate which could reflect necrotic lesion or lymph node.  Findings could be further evaluated with endoscopic ultrasound    Moderate to severe constipation throughout the large bowel.    All CT scans at this facility use dose modulation, iterative reconstruction and/or weight based dosing when appropriate to reduce radiation dose to as low as reasonably achievable.      Electronically signed by: Fadi Owusu MD  Date:    04/22/2022  Time:    13:45     No results found for this or any previous visit (from the past 2160 hour(s)).  No results found for this or any previous visit (from the past 2160 hour(s)).      Pathology:    Outside see above     ?   Assessment/Plan:   Adenocarcinoma of gallbladder    Immunodeficiency due to chemotherapy    Anemia associated with chemotherapy    Neoplasm related pain       1. Adenocarcinoma of gallbladder    2. Immunodeficiency due to chemotherapy    3. Anemia associated with chemotherapy    4. Neoplasm related pain          Plan:     Problem List Items Addressed This Visit        Immunology/Multi System    Immunodeficiency due to chemotherapy       Oncology    Adenocarcinoma of gallbladder - Primary    Neoplasm related pain    Overview     Reports minimal usage. Refills have been provided by oncologist.     I will defer to them for the time being but happy to assume if asked and agreed that we would be the sole prescribers.             Other Visit Diagnoses     Anemia associated with chemotherapy            Gallbladder adenocarcinoma, stage IV, pT4 NX pM1: NGS Guardant with NATY, PDL1 CPS 2. No NTRK mutation detected.   On palliative chemotherapy with cisplatin gemcitabine cycle 1 day 1 04/25/2022.    Today presents for cycle 3 day 8. Labs reviewed with worsened anemia will assess iron indices  previously low iron saturation.  Hypokalemia and will send supplement to pharmacy; discussed with patient    Would plan on restaging imaging after completion of cycle 3 or mid cycle 4 if iv contrast available    Anemia:  Likely chemotherapy induced.  No clear evidence of bleeding.  Will assess iron indices disease supplementation may be helpful.    Depressive symptoms related to malignancy/treatment; interested in psych onc consult, placed    Pain: resolved after infection resolved.     Continue follow-up with palliative care    Follow-Up:   Cycle 3 day 8 tomorrow  Psych consult placed  Revisit 2 weeks with prior labs CBC, CMP, magnesium

## 2022-06-14 ENCOUNTER — INFUSION (OUTPATIENT)
Dept: INFUSION THERAPY | Facility: HOSPITAL | Age: 72
End: 2022-06-14
Attending: INTERNAL MEDICINE
Payer: MEDICARE

## 2022-06-14 VITALS
WEIGHT: 156.06 LBS | HEART RATE: 91 BPM | HEIGHT: 67 IN | OXYGEN SATURATION: 94 % | DIASTOLIC BLOOD PRESSURE: 78 MMHG | TEMPERATURE: 98 F | BODY MASS INDEX: 24.49 KG/M2 | SYSTOLIC BLOOD PRESSURE: 146 MMHG | RESPIRATION RATE: 18 BRPM

## 2022-06-14 DIAGNOSIS — C23 ADENOCARCINOMA OF GALLBLADDER: Primary | ICD-10-CM

## 2022-06-14 PROCEDURE — 96413 CHEMO IV INFUSION 1 HR: CPT

## 2022-06-14 PROCEDURE — 96375 TX/PRO/DX INJ NEW DRUG ADDON: CPT

## 2022-06-14 PROCEDURE — 63600175 PHARM REV CODE 636 W HCPCS: Performed by: INTERNAL MEDICINE

## 2022-06-14 PROCEDURE — 25000003 PHARM REV CODE 250: Performed by: INTERNAL MEDICINE

## 2022-06-14 PROCEDURE — 96417 CHEMO IV INFUS EACH ADDL SEQ: CPT

## 2022-06-14 PROCEDURE — 96361 HYDRATE IV INFUSION ADD-ON: CPT

## 2022-06-14 PROCEDURE — 96366 THER/PROPH/DIAG IV INF ADDON: CPT

## 2022-06-14 PROCEDURE — 96367 TX/PROPH/DG ADDL SEQ IV INF: CPT

## 2022-06-14 RX ORDER — HEPARIN 100 UNIT/ML
500 SYRINGE INTRAVENOUS
Status: DISCONTINUED | OUTPATIENT
Start: 2022-06-14 | End: 2022-06-14 | Stop reason: HOSPADM

## 2022-06-14 RX ORDER — SODIUM CHLORIDE 9 MG/ML
500 INJECTION, SOLUTION INTRAVENOUS
Status: COMPLETED | OUTPATIENT
Start: 2022-06-14 | End: 2022-06-14

## 2022-06-14 RX ORDER — SODIUM CHLORIDE 0.9 % (FLUSH) 0.9 %
10 SYRINGE (ML) INJECTION
Status: DISCONTINUED | OUTPATIENT
Start: 2022-06-14 | End: 2022-06-14 | Stop reason: HOSPADM

## 2022-06-14 RX ADMIN — SODIUM CHLORIDE 500 ML: 0.9 INJECTION, SOLUTION INTRAVENOUS at 02:06

## 2022-06-14 RX ADMIN — MAGNESIUM SULFATE HEPTAHYDRATE 500 ML/HR: 500 INJECTION, SOLUTION INTRAMUSCULAR; INTRAVENOUS at 09:06

## 2022-06-14 RX ADMIN — DEXAMETHASONE SODIUM PHOSPHATE: 4 INJECTION, SOLUTION INTRA-ARTICULAR; INTRALESIONAL; INTRAMUSCULAR; INTRAVENOUS; SOFT TISSUE at 12:06

## 2022-06-14 RX ADMIN — HEPARIN 500 UNITS: 100 SYRINGE at 04:06

## 2022-06-14 RX ADMIN — SODIUM CHLORIDE 47 MG: 9 INJECTION, SOLUTION INTRAVENOUS at 01:06

## 2022-06-14 RX ADMIN — SODIUM CHLORIDE: 0.9 INJECTION, SOLUTION INTRAVENOUS at 11:06

## 2022-06-14 RX ADMIN — GEMCITABINE 1800 MG: 38 INJECTION, SOLUTION INTRAVENOUS at 12:06

## 2022-06-14 RX ADMIN — APREPITANT 130 MG: 130 INJECTION, EMULSION INTRAVENOUS at 11:06

## 2022-06-14 NOTE — PLAN OF CARE
Discussed plan of care with pt. Addressed any and ongoing concerns. Pt denies   Problem: Adult Inpatient Plan of Care  Goal: Plan of Care Review  Outcome: Ongoing, Progressing  Goal: Patient-Specific Goal (Individualized)  Outcome: Ongoing, Progressing  Goal: Absence of Hospital-Acquired Illness or Injury  Outcome: Ongoing, Progressing  Intervention: Identify and Manage Fall Risk  Flowsheets (Taken 6/14/2022 1149)  Safety Promotion/Fall Prevention: in recliner, wheels locked  Intervention: Prevent Infection  Flowsheets (Taken 6/14/2022 1149)  Infection Prevention:   personal protective equipment utilized   hand hygiene promoted  Goal: Optimal Comfort and Wellbeing  Outcome: Ongoing, Progressing  Intervention: Provide Person-Centered Care  Flowsheets (Taken 6/14/2022 1149)  Trust Relationship/Rapport:   care explained   choices provided   emotional support provided   empathic listening provided   questions answered   questions encouraged   reassurance provided   thoughts/feelings acknowledged

## 2022-06-26 PROCEDURE — G0179 PR HOME HEALTH MD RECERTIFICATION: ICD-10-PCS | Mod: ,,, | Performed by: INTERNAL MEDICINE

## 2022-06-26 PROCEDURE — G0179 MD RECERTIFICATION HHA PT: HCPCS | Mod: ,,, | Performed by: INTERNAL MEDICINE

## 2022-06-27 ENCOUNTER — INFUSION (OUTPATIENT)
Dept: INFUSION THERAPY | Facility: HOSPITAL | Age: 72
End: 2022-06-27
Attending: INTERNAL MEDICINE
Payer: MEDICARE

## 2022-06-27 ENCOUNTER — OFFICE VISIT (OUTPATIENT)
Dept: HEMATOLOGY/ONCOLOGY | Facility: CLINIC | Age: 72
End: 2022-06-27
Payer: MEDICARE

## 2022-06-27 VITALS
WEIGHT: 157.44 LBS | TEMPERATURE: 98 F | SYSTOLIC BLOOD PRESSURE: 122 MMHG | DIASTOLIC BLOOD PRESSURE: 72 MMHG | OXYGEN SATURATION: 92 % | BODY MASS INDEX: 24.71 KG/M2 | HEIGHT: 67 IN | HEART RATE: 108 BPM | RESPIRATION RATE: 16 BRPM

## 2022-06-27 DIAGNOSIS — Z79.899 IMMUNODEFICIENCY DUE TO CHEMOTHERAPY: ICD-10-CM

## 2022-06-27 DIAGNOSIS — F32.9 REACTIVE DEPRESSION: Primary | ICD-10-CM

## 2022-06-27 DIAGNOSIS — D64.81 ANEMIA ASSOCIATED WITH CHEMOTHERAPY: ICD-10-CM

## 2022-06-27 DIAGNOSIS — D84.821 IMMUNODEFICIENCY DUE TO CHEMOTHERAPY: ICD-10-CM

## 2022-06-27 DIAGNOSIS — G89.3 NEOPLASM RELATED PAIN: ICD-10-CM

## 2022-06-27 DIAGNOSIS — T45.1X5A IMMUNODEFICIENCY DUE TO CHEMOTHERAPY: ICD-10-CM

## 2022-06-27 DIAGNOSIS — T45.1X5A ANEMIA ASSOCIATED WITH CHEMOTHERAPY: ICD-10-CM

## 2022-06-27 DIAGNOSIS — C23 ADENOCARCINOMA OF GALLBLADDER: ICD-10-CM

## 2022-06-27 DIAGNOSIS — C23 ADENOCARCINOMA OF GALLBLADDER: Primary | ICD-10-CM

## 2022-06-27 DIAGNOSIS — G89.3 NEOPLASM RELATED PAIN: Primary | ICD-10-CM

## 2022-06-27 LAB
ALBUMIN SERPL BCP-MCNC: 2.8 G/DL (ref 3.5–5.2)
ALP SERPL-CCNC: 102 U/L (ref 55–135)
ALT SERPL W/O P-5'-P-CCNC: 8 U/L (ref 10–44)
ANION GAP SERPL CALC-SCNC: 11 MMOL/L (ref 8–16)
AST SERPL-CCNC: 11 U/L (ref 10–40)
BASOPHILS # BLD AUTO: 0.02 K/UL (ref 0–0.2)
BASOPHILS NFR BLD: 0.3 % (ref 0–1.9)
BILIRUB SERPL-MCNC: 0.3 MG/DL (ref 0.1–1)
BUN SERPL-MCNC: 7 MG/DL (ref 8–23)
CALCIUM SERPL-MCNC: 9.2 MG/DL (ref 8.7–10.5)
CHLORIDE SERPL-SCNC: 102 MMOL/L (ref 95–110)
CO2 SERPL-SCNC: 29 MMOL/L (ref 23–29)
CREAT SERPL-MCNC: 0.9 MG/DL (ref 0.5–1.4)
DIFFERENTIAL METHOD: ABNORMAL
EOSINOPHIL # BLD AUTO: 0.1 K/UL (ref 0–0.5)
EOSINOPHIL NFR BLD: 1.6 % (ref 0–8)
ERYTHROCYTE [DISTWIDTH] IN BLOOD BY AUTOMATED COUNT: 16.2 % (ref 11.5–14.5)
EST. GFR  (AFRICAN AMERICAN): >60 ML/MIN/1.73 M^2
EST. GFR  (NON AFRICAN AMERICAN): >60 ML/MIN/1.73 M^2
GLUCOSE SERPL-MCNC: 150 MG/DL (ref 70–110)
HCT VFR BLD AUTO: 21.7 % (ref 37–48.5)
HGB BLD-MCNC: 6.6 G/DL (ref 12–16)
IMM GRANULOCYTES # BLD AUTO: 0.04 K/UL (ref 0–0.04)
IMM GRANULOCYTES NFR BLD AUTO: 0.6 % (ref 0–0.5)
LYMPHOCYTES # BLD AUTO: 1.7 K/UL (ref 1–4.8)
LYMPHOCYTES NFR BLD: 25.3 % (ref 18–48)
MCH RBC QN AUTO: 30.1 PG (ref 27–31)
MCHC RBC AUTO-ENTMCNC: 30.4 G/DL (ref 32–36)
MCV RBC AUTO: 99 FL (ref 82–98)
MONOCYTES # BLD AUTO: 0.6 K/UL (ref 0.3–1)
MONOCYTES NFR BLD: 8.5 % (ref 4–15)
NEUTROPHILS # BLD AUTO: 4.4 K/UL (ref 1.8–7.7)
NEUTROPHILS NFR BLD: 63.7 % (ref 38–73)
NRBC BLD-RTO: 0 /100 WBC
PLATELET # BLD AUTO: 369 K/UL (ref 150–450)
PMV BLD AUTO: 9.1 FL (ref 9.2–12.9)
POTASSIUM SERPL-SCNC: 4 MMOL/L (ref 3.5–5.1)
PROT SERPL-MCNC: 6.6 G/DL (ref 6–8.4)
RBC # BLD AUTO: 2.19 M/UL (ref 4–5.4)
SODIUM SERPL-SCNC: 142 MMOL/L (ref 136–145)
WBC # BLD AUTO: 6.83 K/UL (ref 3.9–12.7)

## 2022-06-27 PROCEDURE — 99999 PR PBB SHADOW E&M-EST. PATIENT-LVL IV: ICD-10-PCS | Mod: PBBFAC,,, | Performed by: INTERNAL MEDICINE

## 2022-06-27 PROCEDURE — 1159F PR MEDICATION LIST DOCUMENTED IN MEDICAL RECORD: ICD-10-PCS | Mod: CPTII,S$GLB,, | Performed by: INTERNAL MEDICINE

## 2022-06-27 PROCEDURE — 63600175 PHARM REV CODE 636 W HCPCS: Performed by: NURSE PRACTITIONER

## 2022-06-27 PROCEDURE — 1160F RVW MEDS BY RX/DR IN RCRD: CPT | Mod: CPTII,S$GLB,, | Performed by: INTERNAL MEDICINE

## 2022-06-27 PROCEDURE — 1126F PR PAIN SEVERITY QUANTIFIED, NO PAIN PRESENT: ICD-10-PCS | Mod: CPTII,S$GLB,, | Performed by: INTERNAL MEDICINE

## 2022-06-27 PROCEDURE — 85025 COMPLETE CBC W/AUTO DIFF WBC: CPT | Performed by: INTERNAL MEDICINE

## 2022-06-27 PROCEDURE — A4216 STERILE WATER/SALINE, 10 ML: HCPCS | Performed by: NURSE PRACTITIONER

## 2022-06-27 PROCEDURE — 1159F MED LIST DOCD IN RCRD: CPT | Mod: CPTII,S$GLB,, | Performed by: INTERNAL MEDICINE

## 2022-06-27 PROCEDURE — 3288F PR FALLS RISK ASSESSMENT DOCUMENTED: ICD-10-PCS | Mod: CPTII,S$GLB,, | Performed by: INTERNAL MEDICINE

## 2022-06-27 PROCEDURE — 80053 COMPREHEN METABOLIC PANEL: CPT | Performed by: INTERNAL MEDICINE

## 2022-06-27 PROCEDURE — 25000003 PHARM REV CODE 250: Performed by: NURSE PRACTITIONER

## 2022-06-27 PROCEDURE — 3078F DIAST BP <80 MM HG: CPT | Mod: CPTII,S$GLB,, | Performed by: INTERNAL MEDICINE

## 2022-06-27 PROCEDURE — 3288F FALL RISK ASSESSMENT DOCD: CPT | Mod: CPTII,S$GLB,, | Performed by: INTERNAL MEDICINE

## 2022-06-27 PROCEDURE — 4010F PR ACE/ARB THEARPY RXD/TAKEN: ICD-10-PCS | Mod: CPTII,S$GLB,, | Performed by: INTERNAL MEDICINE

## 2022-06-27 PROCEDURE — 36592 COLLECT BLOOD FROM PICC: CPT

## 2022-06-27 PROCEDURE — 1160F PR REVIEW ALL MEDS BY PRESCRIBER/CLIN PHARMACIST DOCUMENTED: ICD-10-PCS | Mod: CPTII,S$GLB,, | Performed by: INTERNAL MEDICINE

## 2022-06-27 PROCEDURE — 99215 OFFICE O/P EST HI 40 MIN: CPT | Mod: S$GLB,,, | Performed by: INTERNAL MEDICINE

## 2022-06-27 PROCEDURE — 1157F ADVNC CARE PLAN IN RCRD: CPT | Mod: CPTII,S$GLB,, | Performed by: INTERNAL MEDICINE

## 2022-06-27 PROCEDURE — 1101F PR PT FALLS ASSESS DOC 0-1 FALLS W/OUT INJ PAST YR: ICD-10-PCS | Mod: CPTII,S$GLB,, | Performed by: INTERNAL MEDICINE

## 2022-06-27 PROCEDURE — 3074F PR MOST RECENT SYSTOLIC BLOOD PRESSURE < 130 MM HG: ICD-10-PCS | Mod: CPTII,S$GLB,, | Performed by: INTERNAL MEDICINE

## 2022-06-27 PROCEDURE — 3078F PR MOST RECENT DIASTOLIC BLOOD PRESSURE < 80 MM HG: ICD-10-PCS | Mod: CPTII,S$GLB,, | Performed by: INTERNAL MEDICINE

## 2022-06-27 PROCEDURE — 3008F PR BODY MASS INDEX (BMI) DOCUMENTED: ICD-10-PCS | Mod: CPTII,S$GLB,, | Performed by: INTERNAL MEDICINE

## 2022-06-27 PROCEDURE — 1126F AMNT PAIN NOTED NONE PRSNT: CPT | Mod: CPTII,S$GLB,, | Performed by: INTERNAL MEDICINE

## 2022-06-27 PROCEDURE — 3074F SYST BP LT 130 MM HG: CPT | Mod: CPTII,S$GLB,, | Performed by: INTERNAL MEDICINE

## 2022-06-27 PROCEDURE — 4010F ACE/ARB THERAPY RXD/TAKEN: CPT | Mod: CPTII,S$GLB,, | Performed by: INTERNAL MEDICINE

## 2022-06-27 PROCEDURE — 1101F PT FALLS ASSESS-DOCD LE1/YR: CPT | Mod: CPTII,S$GLB,, | Performed by: INTERNAL MEDICINE

## 2022-06-27 PROCEDURE — 3008F BODY MASS INDEX DOCD: CPT | Mod: CPTII,S$GLB,, | Performed by: INTERNAL MEDICINE

## 2022-06-27 PROCEDURE — 99215 PR OFFICE/OUTPT VISIT, EST, LEVL V, 40-54 MIN: ICD-10-PCS | Mod: S$GLB,,, | Performed by: INTERNAL MEDICINE

## 2022-06-27 PROCEDURE — 1157F PR ADVANCE CARE PLAN OR EQUIV PRESENT IN MEDICAL RECORD: ICD-10-PCS | Mod: CPTII,S$GLB,, | Performed by: INTERNAL MEDICINE

## 2022-06-27 PROCEDURE — 99999 PR PBB SHADOW E&M-EST. PATIENT-LVL IV: CPT | Mod: PBBFAC,,, | Performed by: INTERNAL MEDICINE

## 2022-06-27 RX ORDER — SODIUM CHLORIDE 0.9 % (FLUSH) 0.9 %
10 SYRINGE (ML) INJECTION
Status: CANCELLED | OUTPATIENT
Start: 2022-06-27

## 2022-06-27 RX ORDER — HEPARIN 100 UNIT/ML
500 SYRINGE INTRAVENOUS
Status: COMPLETED | OUTPATIENT
Start: 2022-06-27 | End: 2022-06-27

## 2022-06-27 RX ORDER — HYDROCODONE BITARTRATE AND ACETAMINOPHEN 500; 5 MG/1; MG/1
TABLET ORAL ONCE
Status: CANCELLED | OUTPATIENT
Start: 2022-06-27 | End: 2022-06-27

## 2022-06-27 RX ORDER — HEPARIN 100 UNIT/ML
500 SYRINGE INTRAVENOUS
Status: CANCELLED | OUTPATIENT
Start: 2022-06-27

## 2022-06-27 RX ORDER — MIRTAZAPINE 7.5 MG/1
7.5 TABLET, FILM COATED ORAL NIGHTLY
Qty: 30 TABLET | Refills: 11 | Status: SHIPPED | OUTPATIENT
Start: 2022-06-27 | End: 2022-07-22

## 2022-06-27 RX ORDER — SODIUM CHLORIDE 0.9 % (FLUSH) 0.9 %
10 SYRINGE (ML) INJECTION
Status: COMPLETED | OUTPATIENT
Start: 2022-06-27 | End: 2022-06-27

## 2022-06-27 RX ADMIN — Medication 10 ML: at 01:06

## 2022-06-27 RX ADMIN — HEPARIN 500 UNITS: 100 SYRINGE at 01:06

## 2022-06-27 NOTE — NURSING
Lab obtained from right upper arm PICC line, 2 patient identifiers obtained, labeled and sent to lab.  Adequate blood return noted.  Purple lumen flushed with 10ml NS and 5ml Heparin solution. Site without redness, swelling or drainage noted.

## 2022-06-27 NOTE — Clinical Note
Last week I place consult for psychiatry however they say no in contacted them.  Can you please look into this?

## 2022-06-27 NOTE — PROGRESS NOTES
Subjective:      DATE OF VISIT: 6/27/22     ?  Patient ID:?Madelyn Serna is a 71 y.o. female.?? MR#: [id number]     PRIMARY ONCOLOGIST: Dr. Marcos    ? Primary Care Providers:  Orin Henderson MD, MD (General)     CHIEF COMPLAINT: ?  Follow-up on palliative chemotherapy?   ?   ONCOLOGIC DIAGNOSIS:  Gallbladder adenocarcinoma, stage IV, pT4 NX pM1  ?   CURRENT TREATMENT:  Gemcitabine and cisplatin cycle 1 day 1 4/25/22    PAST TREATMENT:  Laparoscopic cholecystectomy, 02/04/2022, [facility]  ?   ONCOLOGIC HISTORY    I the pleasure of meeting I had the pleasure meeting for the 1st time Ms. Serna a pleasant 71-year-old woman accompanied by her 2 daughters today for newly diagnosed gallbladder adenocarcinoma.    Medical history is notable for former tobacco use quit December 2021, COPD, anxiety, cataracts.      She notes 4 days of right lower quadrant abdominal pain acute onset for which she presented to [email or web address] emergency room on 02/04/2022.  One episode of vomiting per emergency room note.  Labs with leukocytosis WBC 18.7, hemoglobin 13, , renal function intact GFR over 60, mild alkaline phosphatase elevation 130, normal transaminases and bilirubin.  Albumin 4.5, calcium 10.4.  Urinalysis positive for E coli pansensitive.  Blood cultures negative.    Imaging reports a from outside radiology:  CT abdomen and pelvis with contrast distended gallbladder extensive cholelithiasis including 17 mm stone in the region of gallbladder neck.  Pericholecystic stranding and small adjacent fluid.  Lymph nodes noted to be unremarkable.  Emphysema lung bases.    She was taken to emergency surgery with laparoscopic cholecystectomy.  Surgical note mentions during this maneuver would appear to be a peritoneal nodule was discovered.  The peritoneal nodule was dissected from all surrounding structures with the LigaSure device.  The nodule is passed off the table and sent separately as a  "specimen.    Outside pathology:  "  Gallbladder cholecystectomy adenocarcinoma immunohistochemistry positive for CK7 and CDX2 and negative for CK 20, TTF1 and PAX8  Tumor size at least 2.2 cm.  The tumor invades the liver.  Lymphovascular invasion present.  Perineural invasion not identified.  Margins cannot be assessed.  Regional lymph nodes not applicable.    peritoneal nodule excisional biopsy positive for metastatic adenocarcinoma consistent with origin from gallbladder.      HPI     Today she is accompanied by her  Daughter.  She denies any bleeding.  She does endorse fatigue.  No fevers or chills.  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  She endorses good appetite and eating well.    Review of Systems    ?   A comprehensive 14-point review of systems was reviewed with patient and was negative other than as specified above.   ?     Objective:      Physical Exam      ?   Vitals:    06/27/22 1427   BP: 122/72   Pulse: 108   Resp: 16   Temp: 97.8 °F (36.6 °C)      ?   ECOG:?1   General appearance: Generally well appearing, anxious, occasionally tearful, daughters accompanying her  Head, eyes, ears, nose, and throat:  moist mucous membranes.   Respiratory:  Normal work of breathing   Abdomen: nondistended.  Drainage tube without noted discharge  Extremities: Warm, without edema.   Neurologic: Alert and oriented.  Sitting in wheelchair  Skin: No rashes, ecchymoses or petechial lesion.   ?      ?   Laboratory:  ?   Infusion on 06/27/2022   Component Date Value Ref Range Status    WBC 06/27/2022 6.83  3.90 - 12.70 K/uL Final    RBC 06/27/2022 2.19 (A) 4.00 - 5.40 M/uL Final    Hemoglobin 06/27/2022 6.6 (A) 12.0 - 16.0 g/dL Final    Hematocrit 06/27/2022 21.7 (A) 37.0 - 48.5 % Final    MCV 06/27/2022 99 (A) 82 - 98 fL Final    MCH 06/27/2022 30.1  27.0 - 31.0 pg Final    MCHC 06/27/2022 30.4 (A) 32.0 - 36.0 g/dL Final    RDW 06/27/2022 16.2 (A) 11.5 - 14.5 % Final    Platelets 06/27/2022 369  " 150 - 450 K/uL Final    MPV 06/27/2022 9.1 (A) 9.2 - 12.9 fL Final    Immature Granulocytes 06/27/2022 0.6 (A) 0.0 - 0.5 % Final    Gran # (ANC) 06/27/2022 4.4  1.8 - 7.7 K/uL Final    Immature Grans (Abs) 06/27/2022 0.04  0.00 - 0.04 K/uL Final    Lymph # 06/27/2022 1.7  1.0 - 4.8 K/uL Final    Mono # 06/27/2022 0.6  0.3 - 1.0 K/uL Final    Eos # 06/27/2022 0.1  0.0 - 0.5 K/uL Final    Baso # 06/27/2022 0.02  0.00 - 0.20 K/uL Final    nRBC 06/27/2022 0  0 /100 WBC Final    Gran % 06/27/2022 63.7  38.0 - 73.0 % Final    Lymph % 06/27/2022 25.3  18.0 - 48.0 % Final    Mono % 06/27/2022 8.5  4.0 - 15.0 % Final    Eosinophil % 06/27/2022 1.6  0.0 - 8.0 % Final    Basophil % 06/27/2022 0.3  0.0 - 1.9 % Final    Differential Method 06/27/2022 Automated   Final    Sodium 06/27/2022 142  136 - 145 mmol/L Final    Potassium 06/27/2022 4.0  3.5 - 5.1 mmol/L Final    Chloride 06/27/2022 102  95 - 110 mmol/L Final    CO2 06/27/2022 29  23 - 29 mmol/L Final    Glucose 06/27/2022 150 (A) 70 - 110 mg/dL Final    BUN 06/27/2022 7 (A) 8 - 23 mg/dL Final    Creatinine 06/27/2022 0.9  0.5 - 1.4 mg/dL Final    Calcium 06/27/2022 9.2  8.7 - 10.5 mg/dL Final    Total Protein 06/27/2022 6.6  6.0 - 8.4 g/dL Final    Albumin 06/27/2022 2.8 (A) 3.5 - 5.2 g/dL Final    Total Bilirubin 06/27/2022 0.3  0.1 - 1.0 mg/dL Final    Alkaline Phosphatase 06/27/2022 102  55 - 135 U/L Final    AST 06/27/2022 11  10 - 40 U/L Final    ALT 06/27/2022 8 (A) 10 - 44 U/L Final    Anion Gap 06/27/2022 11  8 - 16 mmol/L Final    eGFR if African American 06/27/2022 >60  >60 mL/min/1.73 m^2 Final    eGFR if non African American 06/27/2022 >60  >60 mL/min/1.73 m^2 Final      ?   Tumor markers   ?  CEA, CA 19 9 pending  ?   Imaging:  ?  Outside see above  Results for orders placed or performed during the hospital encounter of 04/22/22 (from the past 2160 hour(s))   CT Chest Abdomen Pelvis With Contrast    Impression    Again there  is a mixed attenuating lesion at the fundus of the gallbladder with associated gallstones suspected.  Overall smaller than on prior exam.  Exact measurement is difficult.  Findings could reflect sequela of prior gallbladder disease.  Infiltrating lesion suspected given the patient's history however small abscesses not excluded.  8 mm and 10 mm hypodensities within segment 2 of the left hepatic lobe also not fully characterized.  Recommend continued surveillance.    Question 2 x 1.5 cm lesion within the uncinate or adjacent uncinate which could reflect necrotic lesion or lymph node.  Findings could be further evaluated with endoscopic ultrasound    Moderate to severe constipation throughout the large bowel.    All CT scans at this facility use dose modulation, iterative reconstruction and/or weight based dosing when appropriate to reduce radiation dose to as low as reasonably achievable.      Electronically signed by: Fadi Owusu MD  Date:    04/22/2022  Time:    13:45     No results found for this or any previous visit (from the past 2160 hour(s)).  No results found for this or any previous visit (from the past 2160 hour(s)).      Pathology:    Outside see above     ?   Assessment/Plan:   Adenocarcinoma of gallbladder  -     Verify informed consent for blood administration; Standing  -     Vital signs Document Pre-transfusion, 15 minutes after transfusion begins and immediately Post-transfusion for each unit transfused; Standing  -     Discharge instructions; Standing  -     Hold Transfusion and Notify Physician if:; Standing  -     If transfusion reaction confirmed by physician/mid-level:; Standing  -     0.9%  NaCl infusion (for blood administration)  -     Type & Screen; Future; Expected date: 06/27/2022  -     Prepare RBC 1 Unit; Future; Expected date: 06/27/2022  -     Transfuse RBC 1 Unit; Standing       1. Adenocarcinoma of gallbladder          Plan:     Problem List Items Addressed This Visit         Oncology    Adenocarcinoma of gallbladder - Primary        Gallbladder adenocarcinoma, stage IV, pT4 NX pM1: NGS Guardant with NATY, PDL1 CPS 2. No NTRK mutation detected.   On palliative chemotherapy with cisplatin gemcitabine cycle 1 day 1 04/25/2022.    Today presents for   Treatment however due to severe anemia symptomatic hemoglobin 6.6 will hold treatment and transfuse tomorrow and reschedule with repeat lab work.    Type and screening consent today.  Would plan on restaging imaging after completion of cycle 3 or mid cycle 4 if iv contrast available    Anemia:  Likely chemotherapy induced.  No clear evidence of bleeding.  Will assess iron indices disease supplementation may be helpful.    Depressive symptoms related to malignancy/treatment; interested in psych onc consult, placed    Pain: resolved after infection resolved.     Continue follow-up with palliative care    Follow-Up:     Holding treatment today   Type and screen, 1 unit PRBC transfusion, consent completed today.  Psych consult placed    Reschedule lab CBC, CMP, magnesium in infusion planned after transfusion

## 2022-06-28 ENCOUNTER — OFFICE VISIT (OUTPATIENT)
Dept: HEMATOLOGY/ONCOLOGY | Facility: CLINIC | Age: 72
End: 2022-06-28
Payer: MEDICARE

## 2022-06-28 ENCOUNTER — INFUSION (OUTPATIENT)
Dept: INFUSION THERAPY | Facility: HOSPITAL | Age: 72
End: 2022-06-28
Attending: INTERNAL MEDICINE
Payer: MEDICARE

## 2022-06-28 ENCOUNTER — DOCUMENTATION ONLY (OUTPATIENT)
Dept: HEMATOLOGY/ONCOLOGY | Facility: CLINIC | Age: 72
End: 2022-06-28
Payer: MEDICARE

## 2022-06-28 ENCOUNTER — HOSPITAL ENCOUNTER (OUTPATIENT)
Dept: RADIOLOGY | Facility: HOSPITAL | Age: 72
Discharge: HOME | End: 2022-06-28
Attending: NURSE PRACTITIONER
Payer: MEDICARE

## 2022-06-28 VITALS
WEIGHT: 157.44 LBS | DIASTOLIC BLOOD PRESSURE: 68 MMHG | SYSTOLIC BLOOD PRESSURE: 118 MMHG | HEIGHT: 67 IN | BODY MASS INDEX: 24.71 KG/M2 | OXYGEN SATURATION: 93 % | HEART RATE: 100 BPM | TEMPERATURE: 98 F

## 2022-06-28 VITALS
TEMPERATURE: 98 F | HEART RATE: 90 BPM | RESPIRATION RATE: 18 BRPM | SYSTOLIC BLOOD PRESSURE: 123 MMHG | DIASTOLIC BLOOD PRESSURE: 69 MMHG | HEIGHT: 67 IN | OXYGEN SATURATION: 99 % | BODY MASS INDEX: 24.71 KG/M2 | WEIGHT: 157.44 LBS

## 2022-06-28 DIAGNOSIS — C23 ADENOCARCINOMA OF GALLBLADDER: Primary | ICD-10-CM

## 2022-06-28 DIAGNOSIS — I82.4Z1 DVT, LOWER EXTREMITY, DISTAL, ACUTE, RIGHT: ICD-10-CM

## 2022-06-28 DIAGNOSIS — M79.89 PAIN AND SWELLING OF RIGHT LOWER LEG: Primary | ICD-10-CM

## 2022-06-28 DIAGNOSIS — M79.661 PAIN AND SWELLING OF RIGHT LOWER LEG: ICD-10-CM

## 2022-06-28 DIAGNOSIS — M79.89 PAIN AND SWELLING OF RIGHT LOWER LEG: ICD-10-CM

## 2022-06-28 DIAGNOSIS — C23 ADENOCARCINOMA OF GALLBLADDER: ICD-10-CM

## 2022-06-28 DIAGNOSIS — M79.661 PAIN AND SWELLING OF RIGHT LOWER LEG: Primary | ICD-10-CM

## 2022-06-28 PROCEDURE — 1159F PR MEDICATION LIST DOCUMENTED IN MEDICAL RECORD: ICD-10-PCS | Mod: CPTII,S$GLB,, | Performed by: NURSE PRACTITIONER

## 2022-06-28 PROCEDURE — 3078F PR MOST RECENT DIASTOLIC BLOOD PRESSURE < 80 MM HG: ICD-10-PCS | Mod: CPTII,S$GLB,, | Performed by: NURSE PRACTITIONER

## 2022-06-28 PROCEDURE — 3288F PR FALLS RISK ASSESSMENT DOCUMENTED: ICD-10-PCS | Mod: CPTII,S$GLB,, | Performed by: NURSE PRACTITIONER

## 2022-06-28 PROCEDURE — 36430 TRANSFUSION BLD/BLD COMPNT: CPT

## 2022-06-28 PROCEDURE — 3074F PR MOST RECENT SYSTOLIC BLOOD PRESSURE < 130 MM HG: ICD-10-PCS | Mod: CPTII,S$GLB,, | Performed by: NURSE PRACTITIONER

## 2022-06-28 PROCEDURE — 1157F PR ADVANCE CARE PLAN OR EQUIV PRESENT IN MEDICAL RECORD: ICD-10-PCS | Mod: CPTII,S$GLB,, | Performed by: NURSE PRACTITIONER

## 2022-06-28 PROCEDURE — 1159F MED LIST DOCD IN RCRD: CPT | Mod: CPTII,S$GLB,, | Performed by: NURSE PRACTITIONER

## 2022-06-28 PROCEDURE — 3078F DIAST BP <80 MM HG: CPT | Mod: CPTII,S$GLB,, | Performed by: NURSE PRACTITIONER

## 2022-06-28 PROCEDURE — 3288F FALL RISK ASSESSMENT DOCD: CPT | Mod: CPTII,S$GLB,, | Performed by: NURSE PRACTITIONER

## 2022-06-28 PROCEDURE — 1157F ADVNC CARE PLAN IN RCRD: CPT | Mod: CPTII,S$GLB,, | Performed by: NURSE PRACTITIONER

## 2022-06-28 PROCEDURE — 3008F PR BODY MASS INDEX (BMI) DOCUMENTED: ICD-10-PCS | Mod: CPTII,S$GLB,, | Performed by: NURSE PRACTITIONER

## 2022-06-28 PROCEDURE — 93971 EXTREMITY STUDY: CPT | Mod: TC,RT

## 2022-06-28 PROCEDURE — 1160F RVW MEDS BY RX/DR IN RCRD: CPT | Mod: CPTII,S$GLB,, | Performed by: NURSE PRACTITIONER

## 2022-06-28 PROCEDURE — 99214 PR OFFICE/OUTPT VISIT, EST, LEVL IV, 30-39 MIN: ICD-10-PCS | Mod: S$GLB,,, | Performed by: NURSE PRACTITIONER

## 2022-06-28 PROCEDURE — 1160F PR REVIEW ALL MEDS BY PRESCRIBER/CLIN PHARMACIST DOCUMENTED: ICD-10-PCS | Mod: CPTII,S$GLB,, | Performed by: NURSE PRACTITIONER

## 2022-06-28 PROCEDURE — 3074F SYST BP LT 130 MM HG: CPT | Mod: CPTII,S$GLB,, | Performed by: NURSE PRACTITIONER

## 2022-06-28 PROCEDURE — 1125F PR PAIN SEVERITY QUANTIFIED, PAIN PRESENT: ICD-10-PCS | Mod: CPTII,S$GLB,, | Performed by: NURSE PRACTITIONER

## 2022-06-28 PROCEDURE — 3008F BODY MASS INDEX DOCD: CPT | Mod: CPTII,S$GLB,, | Performed by: NURSE PRACTITIONER

## 2022-06-28 PROCEDURE — 4010F PR ACE/ARB THEARPY RXD/TAKEN: ICD-10-PCS | Mod: CPTII,S$GLB,, | Performed by: NURSE PRACTITIONER

## 2022-06-28 PROCEDURE — 1125F AMNT PAIN NOTED PAIN PRSNT: CPT | Mod: CPTII,S$GLB,, | Performed by: NURSE PRACTITIONER

## 2022-06-28 PROCEDURE — 99999 PR PBB SHADOW E&M-EST. PATIENT-LVL V: ICD-10-PCS | Mod: PBBFAC,,, | Performed by: NURSE PRACTITIONER

## 2022-06-28 PROCEDURE — 99214 OFFICE O/P EST MOD 30 MIN: CPT | Mod: S$GLB,,, | Performed by: NURSE PRACTITIONER

## 2022-06-28 PROCEDURE — 4010F ACE/ARB THERAPY RXD/TAKEN: CPT | Mod: CPTII,S$GLB,, | Performed by: NURSE PRACTITIONER

## 2022-06-28 PROCEDURE — 1101F PT FALLS ASSESS-DOCD LE1/YR: CPT | Mod: CPTII,S$GLB,, | Performed by: NURSE PRACTITIONER

## 2022-06-28 PROCEDURE — 1101F PR PT FALLS ASSESS DOC 0-1 FALLS W/OUT INJ PAST YR: ICD-10-PCS | Mod: CPTII,S$GLB,, | Performed by: NURSE PRACTITIONER

## 2022-06-28 PROCEDURE — 99999 PR PBB SHADOW E&M-EST. PATIENT-LVL V: CPT | Mod: PBBFAC,,, | Performed by: NURSE PRACTITIONER

## 2022-06-28 PROCEDURE — 25000003 PHARM REV CODE 250: Performed by: INTERNAL MEDICINE

## 2022-06-28 RX ORDER — HYDROCODONE BITARTRATE AND ACETAMINOPHEN 500; 5 MG/1; MG/1
TABLET ORAL ONCE
Status: COMPLETED | OUTPATIENT
Start: 2022-06-28 | End: 2022-06-28

## 2022-06-28 RX ADMIN — SODIUM CHLORIDE: 0.9 INJECTION, SOLUTION INTRAVENOUS at 10:06

## 2022-06-28 NOTE — PLAN OF CARE
Problem: Adult Inpatient Plan of Care  Goal: Plan of Care Review  Outcome: Ongoing, Progressing  Flowsheets (Taken 6/28/2022 1129)  Plan of Care Reviewed With:   patient   daughter  Goal: Patient-Specific Goal (Individualized)  Outcome: Ongoing, Progressing  Flowsheets (Taken 6/28/2022 1129)  Anxieties, Fears or Concerns: Pt anxious, first time receiving blood transfusion  Individualized Care Needs: legs elevated, warm blanket and apple juice provided  Patient-Specific Goals (Include Timeframe): pt will tolerate infusion with no adverse reactions  Goal: Absence of Hospital-Acquired Illness or Injury  Outcome: Ongoing, Progressing  Goal: Optimal Comfort and Wellbeing  Outcome: Ongoing, Progressing     Problem: Anemia (Chemotherapy Effects)  Goal: Anemia Symptom Improvement  Outcome: Ongoing, Progressing

## 2022-06-28 NOTE — ASSESSMENT & PLAN NOTE
RLE US + DVT Occlusive deep vein thrombosis involving the right distal superficial femoral vein, popliteal vein and all visualized calf veins.    Likely provoked in setting of active malignancy, smoker, limited mobility     Case discussed with Dr. Davies     -Start Eliquis 10 mg PO BID x 7 days followed by Eliquis 5 mg PO BID  -Bleeding precautions reviewed in detail  -repeat cbc, cmp on Friday prior to chemotherapy  -f/u with Primary Oncologist as previously scheduled

## 2022-06-28 NOTE — DISCHARGE INSTRUCTIONS
.[facility]  [address] [facility] [place] [facility]  [address]  [phone] phone     [phone] fax  Hours of Operation: Monday- Friday 8:00am- 5:00pm  After hours phone  [phone]  Hematology / Oncology Physicians on call    Dr. Samson Hutchins      Nurse Practitioners:    Gracie Spangler, TERI Song, TERI Solomon, TERI Arellano, TERI Andrea, PA      Please don't hesitate to call if you have any concerns.   .FALL PREVENTION   Falls often occur due to slipping, tripping or losing your balance. Here are ways to reduce your risk of falling again.   Was there anything that caused your fall that can be fixed, removed or replaced?   Make your home safe by keeping walkways clear of objects you may trip over.   Use non-slip pads under rugs.   Do not walk in poorly lit areas.   Do not stand on chairs or wobbly ladders.   Use caution when reaching overhead or looking upward. This position can cause a loss of balance.   Be sure your shoes fit properly, have non-slip bottoms and are in good condition.   Be cautious when going up and down stairs, curbs, and when walking on uneven sidewalks.   If your balance is poor, consider using a cane or walker.   If your fall was related to alcohol use, stop or limit alcohol intake.   If your fall was related to use of sleeping medicines, talk to your doctor about this. You may need to reduce your dosage at bedtime if you awaken during the night to go to the bathroom.   To reduce the need for nighttime bathroom trips:   Avoid drinking fluids for several hours before going to bed   Empty your bladder before going to bed   Men can keep a urinal at the bedside   © 5748-1363 [facility], [address] [place] [address], [place] [address]. All rights reserved. This information is not intended as a substitute for professional medical care. Always follow your healthcare professional's instructions.  .WAYS  TO HELP PREVENT INFECTION        WASH YOUR HANDS OFTEN DURING THE DAY, ESPECIALLY BEFORE YOU EAT, AFTER USING THE BATHROOM, AND AFTER TOUCHING ANIMALS    STAY AWAY FROM PEOPLE WHO HAVE ILLNESSES YOU CAN CATCH; SUCH AS COLDS, FLU, CHICKEN POX    TRY TO AVOID CROWDS    STAY AWAY FROM CHILDREN WHO RECENTLY HAVE RECEIVED LIVE VIRUS VACCINES    MAINTAIN GOOD MOUTH CARE    DO NOT SQUEEZE OR SCRATCH PIMPLES    CLEAN CUTS & SCRAPES RIGHT AWAY AND DAILY UNTIL HEALED WITH WARM WATER, SOAP & AN ANTISEPTIC    AVOID CONTACT WITH LITTER BOXES, BIRD CAGES, & FISH TANKS    AVOID STANDING WATER, IE., BIRD BATHS, FLOWER POTS/VASES, OR HUMIDIFIERS    WEAR GLOVES WHEN GARDENING OR CLEANING UP AFTER OTHERS, ESPECIALLY BABIES & SMALL CHILDREN    DO NOT EAT RAW FISH, SEAFOOD, MEAT, OR EGGS  '

## 2022-06-28 NOTE — PROGRESS NOTES
Subjective:       Patient ID: Madelyn Serna is a 71 y.o. female.    Chief Complaint: Acute DVT    HPI: 71 y.o female with stage IV gallbladder adenocarcinoma seen in infusion center while getting blood transfusion with c/o RLE discomfort, swelling which she reports onset this am. She denies CP, SOB, syncope, dizziness.     Social History     Socioeconomic History    Marital status:    Tobacco Use    Smoking status: Current Every Day Smoker    Smokeless tobacco: Former User       Past Medical History:   Diagnosis Date    Cancer of gallbladder     Essential (primary) hypertension     Infection following a procedure, deep incisional surgical site, initial encounter 3/3/2022    Romaine pus on bandage with induration to RUQ. She has already completed a 5 day course of Cipro which completed on 2/27. Recommend prompt evaluation with ED and whether or not imaging is warranted.       History reviewed. No pertinent family history.    History reviewed. No pertinent surgical history.    Review of Systems   Constitutional: Positive for activity change and fatigue. Negative for appetite change, chills, fever and unexpected weight change.   HENT: Negative for congestion, mouth sores, nosebleeds, sore throat, trouble swallowing and voice change.    Eyes: Negative for visual disturbance.   Respiratory: Negative for cough, chest tightness, shortness of breath and wheezing.    Cardiovascular: Positive for leg swelling. Negative for chest pain and palpitations.   Gastrointestinal: Negative for abdominal distention, abdominal pain, anal bleeding, blood in stool, constipation, diarrhea, nausea and vomiting.   Genitourinary: Negative for difficulty urinating, dysuria and hematuria.   Musculoskeletal: Negative for arthralgias, back pain and myalgias.   Skin: Negative for pallor, rash and wound.   Neurological: Positive for weakness. Negative for dizziness, syncope and headaches.   Hematological: Negative for adenopathy. Does not  bruise/bleed easily.   Psychiatric/Behavioral: The patient is nervous/anxious.          Medication List with Changes/Refills   New Medications    APIXABAN (ELIQUIS) 5 MG TAB    Take 1 tablet (5 mg total) by mouth 2 (two) times daily.   Current Medications    ALBUTEROL (PROVENTIL/VENTOLIN HFA) 90 MCG/ACTUATION INHALER    INHALE TWO PUFFS FOUR TIMES DAILY AS NEEDED FOR WHEEZING    AMLODIPINE (NORVASC) 5 MG TABLET    amlodipine Take 1 time per day No date recorded tablet 1 time per day No route recorded No set duration recorded No set duration amount recorded active 5 mg    CLONAZEPAM (KLONOPIN) 0.5 MG TABLET    clonazepam Take 2 times per day No date recorded tablet 2 times per day No route recorded No set duration recorded No set duration amount recorded active 0.5 mg    CYPROHEPTADINE (PERIACTIN) 4 MG TABLET    Take 4 mg by mouth 2 (two) times daily.    DEXAMETHASONE (DECADRON) 4 MG TAB    Take 2 tablets (8mg total) by mouth once daily. Take as directed on days 2, 3, 4 and days 9, 10, 11 of your chemotherapy cycle.    DEXAMETHASONE (DECADRON) 4 MG TAB    Take 2 tablets (8 mg total) by mouth once daily. Take as directed on days 2, 3, 4 and days 9, 10, 11 of your chemotherapy cycle.    DICLOFENAC SODIUM (VOLTAREN) 1 % GEL    APPLY 1 GRAM TO AFFECTED AREA FOUR TIMES DAILY AS NEEDED    IPRATROPIUM (ATROVENT) 21 MCG (0.03 %) NASAL SPRAY        LABETALOL (NORMODYNE) 300 MG TABLET    labetalol Take 2 times per day No date recorded tablet 2 times per day No route recorded No set duration recorded No set duration amount recorded suspended 300 mg    LISINOPRIL 10 MG TABLET    Take 1 tablet (10 mg total) by mouth once daily.    MIRTAZAPINE (REMERON) 7.5 MG TAB    Take 1 tablet (7.5 mg total) by mouth every evening. For sleep    MONTELUKAST (SINGULAIR) 10 MG TABLET    Take 10 mg by mouth once daily.    NALOXONE (NARCAN) 4 MG/ACTUATION SPRY    USE ONE SPRAY IN ONE NOSTRIL AS DIRECTED UPON SIGNS OF OPIOID OVERDOSE CALL 911     ONDANSETRON (ZOFRAN-ODT) 8 MG TBDL    Take 1 tablet (8 mg total) by mouth every 6 (six) hours as needed.    ONDANSETRON (ZOFRAN-ODT) 8 MG TBDL    Take 1 tablet (8 mg total) by mouth every 8 (eight) hours as needed (nausea/vomiting).    OXYCODONE (ROXICODONE) 5 MG IMMEDIATE RELEASE TABLET    TAKE ONE TABLET BY MOUTH EVERY 4 HOURS AS NEEDED FOR PAIN - MAX FOUR DOSES A DAY    PROCHLORPERAZINE (COMPAZINE) 5 MG TABLET    Take 1 tablet (5 mg total) by mouth every 6 (six) hours as needed for Nausea. May take every 6 hours scheduled for prevention of nausea.    VENLAFAXINE (EFFEXOR-XR) 75 MG 24 HR CAPSULE    Take 1 capsule (75 mg total) by mouth once daily.     Objective:     Vitals:    06/28/22 1430   BP: 118/68   Pulse: 100   Temp: 97.5 °F (36.4 °C)     Lab Results   Component Value Date    WBC 6.83 06/27/2022    HGB 6.6 (L) 06/27/2022    HCT 21.7 (L) 06/27/2022    MCV 99 (H) 06/27/2022     06/27/2022       BMP  Lab Results   Component Value Date     06/27/2022    K 4.0 06/27/2022     06/27/2022    CO2 29 06/27/2022    BUN 7 (L) 06/27/2022    CREATININE 0.9 06/27/2022    CALCIUM 9.2 06/27/2022    ANIONGAP 11 06/27/2022    ESTGFRAFRICA >60 06/27/2022    EGFRNONAA >60 06/27/2022     Lab Results   Component Value Date    ALT 8 (L) 06/27/2022    AST 11 06/27/2022    ALKPHOS 102 06/27/2022    BILITOT 0.3 06/27/2022         Physical Exam  Vitals reviewed.   Constitutional:       Appearance: She is well-developed.   HENT:      Head: Normocephalic.      Right Ear: External ear normal.      Left Ear: External ear normal.   Eyes:      General: Lids are normal. No scleral icterus.        Right eye: No discharge.         Left eye: No discharge.      Conjunctiva/sclera: Conjunctivae normal.   Neck:      Thyroid: No thyroid mass.   Cardiovascular:      Rate and Rhythm: Normal rate and regular rhythm.      Heart sounds: Normal heart sounds. No murmur heard.  Pulmonary:      Effort: Pulmonary effort is normal. No  respiratory distress.      Breath sounds: Normal breath sounds. No wheezing or rales.   Genitourinary:     Comments: deferred  Musculoskeletal:         General: Normal range of motion.      Cervical back: Normal range of motion.      Right lower leg: Edema present.   Skin:     General: Skin is warm and dry.   Neurological:      Mental Status: She is alert and oriented to person, place, and time.   Psychiatric:         Speech: Speech normal.         Behavior: Behavior normal. Behavior is cooperative.         Thought Content: Thought content normal.          Assessment:     Problem List Items Addressed This Visit        Hematology    DVT, lower extremity, distal, acute, right     RLE US + DVT Occlusive deep vein thrombosis involving the right distal superficial femoral vein, popliteal vein and all visualized calf veins.    Likely provoked in setting of active malignancy, smoker, limited mobility     Case discussed with Dr. Davies     -Start Eliquis 10 mg PO BID x 7 days followed by Eliquis 5 mg PO BID  -Bleeding precautions reviewed in detail  -repeat cbc, cmp on Friday prior to chemotherapy  -f/u with Primary Oncologist as previously scheduled           Relevant Medications    apixaban (ELIQUIS) 5 mg Tab (Start on 7/27/2023)       Oncology    Adenocarcinoma of gallbladder - Primary    Relevant Medications    apixaban (ELIQUIS) 5 mg Tab (Start on 7/27/2023)    Other Relevant Orders    CBC Auto Differential    Comprehensive Metabolic Panel            Plan:     Adenocarcinoma of gallbladder  -     CBC Auto Differential; Future; Expected date: 06/28/2022  -     Comprehensive Metabolic Panel; Future; Expected date: 06/28/2022  -     apixaban (ELIQUIS) 5 mg Tab; Take 1 tablet (5 mg total) by mouth 2 (two) times daily.  Dispense: 74 tablet; Refill: 0    DVT, lower extremity, distal, acute, right  -     apixaban (ELIQUIS) 5 mg Tab; Take 1 tablet (5 mg total) by mouth 2 (two) times daily.  Dispense: 74 tablet; Refill:  0            Cristal Solomon, ALECIAP-C

## 2022-06-28 NOTE — PROGRESS NOTES
Swer met with pt. during today's infusion appointment time per pt. request. Swer met with pt. alone. Pt. appeared guarded and anxious as exhibited by looking around, leg movement and focusing on hands. Pt. reported feeling well other than having some lower right leg pt. Pt. reports having a good family support and discussed her family dynamics with swer. NP, Cristal Solomon and pt.'s daughter arrived during conversation to discuss today's plan for assessing possible blood clot and need for blood transfusion. Pt. appeared nervous, however pt. became more talkative and at ease once her daughter was present. Pt. talked more about her relationship with her daughter and reported relationship was good. Pt. voiced appreciation for daughter's assistance and presence through treatment. Pt. inquired about boost, swer provided pt. with one case of strawberry boost on today's date. Swer provided pt. and daughter with address and telephone number for pt.'s referral to cancer services. Swer will remain available for pt. needs.

## 2022-06-29 ENCOUNTER — EXTERNAL HOME HEALTH (OUTPATIENT)
Dept: HOME HEALTH SERVICES | Facility: HOSPITAL | Age: 72
End: 2022-06-29
Payer: MEDICARE

## 2022-07-01 ENCOUNTER — TELEPHONE (OUTPATIENT)
Dept: HEMATOLOGY/ONCOLOGY | Facility: CLINIC | Age: 72
End: 2022-07-01
Payer: MEDICARE

## 2022-07-01 ENCOUNTER — INFUSION (OUTPATIENT)
Dept: INFUSION THERAPY | Facility: HOSPITAL | Age: 72
End: 2022-07-01
Attending: INTERNAL MEDICINE
Payer: MEDICARE

## 2022-07-01 VITALS
DIASTOLIC BLOOD PRESSURE: 80 MMHG | HEIGHT: 67 IN | TEMPERATURE: 98 F | BODY MASS INDEX: 24.71 KG/M2 | WEIGHT: 157.44 LBS | HEART RATE: 85 BPM | SYSTOLIC BLOOD PRESSURE: 145 MMHG | RESPIRATION RATE: 17 BRPM | OXYGEN SATURATION: 98 %

## 2022-07-01 DIAGNOSIS — C23 ADENOCARCINOMA OF GALLBLADDER: Primary | ICD-10-CM

## 2022-07-01 DIAGNOSIS — N39.0 URINARY TRACT INFECTION WITHOUT HEMATURIA, SITE UNSPECIFIED: Primary | ICD-10-CM

## 2022-07-01 LAB
ALBUMIN SERPL BCP-MCNC: 2.7 G/DL (ref 3.5–5.2)
ALP SERPL-CCNC: 112 U/L (ref 55–135)
ALT SERPL W/O P-5'-P-CCNC: 16 U/L (ref 10–44)
ANION GAP SERPL CALC-SCNC: 10 MMOL/L (ref 8–16)
AST SERPL-CCNC: 10 U/L (ref 10–40)
BASOPHILS # BLD AUTO: 0.04 K/UL (ref 0–0.2)
BASOPHILS NFR BLD: 0.6 % (ref 0–1.9)
BILIRUB SERPL-MCNC: 0.3 MG/DL (ref 0.1–1)
BUN SERPL-MCNC: 5 MG/DL (ref 8–23)
CALCIUM SERPL-MCNC: 9.6 MG/DL (ref 8.7–10.5)
CHLORIDE SERPL-SCNC: 100 MMOL/L (ref 95–110)
CO2 SERPL-SCNC: 31 MMOL/L (ref 23–29)
CREAT SERPL-MCNC: 0.8 MG/DL (ref 0.5–1.4)
DIFFERENTIAL METHOD: ABNORMAL
EOSINOPHIL # BLD AUTO: 0.2 K/UL (ref 0–0.5)
EOSINOPHIL NFR BLD: 2.7 % (ref 0–8)
ERYTHROCYTE [DISTWIDTH] IN BLOOD BY AUTOMATED COUNT: 16.2 % (ref 11.5–14.5)
EST. GFR  (AFRICAN AMERICAN): >60 ML/MIN/1.73 M^2
EST. GFR  (NON AFRICAN AMERICAN): >60 ML/MIN/1.73 M^2
GLUCOSE SERPL-MCNC: 100 MG/DL (ref 70–110)
HCT VFR BLD AUTO: 26.9 % (ref 37–48.5)
HGB BLD-MCNC: 8.3 G/DL (ref 12–16)
IMM GRANULOCYTES # BLD AUTO: 0.06 K/UL (ref 0–0.04)
IMM GRANULOCYTES NFR BLD AUTO: 0.9 % (ref 0–0.5)
LYMPHOCYTES # BLD AUTO: 1.7 K/UL (ref 1–4.8)
LYMPHOCYTES NFR BLD: 24.7 % (ref 18–48)
MCH RBC QN AUTO: 30.4 PG (ref 27–31)
MCHC RBC AUTO-ENTMCNC: 30.9 G/DL (ref 32–36)
MCV RBC AUTO: 99 FL (ref 82–98)
MONOCYTES # BLD AUTO: 0.9 K/UL (ref 0.3–1)
MONOCYTES NFR BLD: 13.9 % (ref 4–15)
NEUTROPHILS # BLD AUTO: 3.9 K/UL (ref 1.8–7.7)
NEUTROPHILS NFR BLD: 57.2 % (ref 38–73)
NRBC BLD-RTO: 0 /100 WBC
PLATELET # BLD AUTO: 613 K/UL (ref 150–450)
PLATELET BLD QL SMEAR: ABNORMAL
PMV BLD AUTO: 8.7 FL (ref 9.2–12.9)
POTASSIUM SERPL-SCNC: 4.4 MMOL/L (ref 3.5–5.1)
PROT SERPL-MCNC: 6.8 G/DL (ref 6–8.4)
RBC # BLD AUTO: 2.73 M/UL (ref 4–5.4)
SODIUM SERPL-SCNC: 141 MMOL/L (ref 136–145)
WBC # BLD AUTO: 6.76 K/UL (ref 3.9–12.7)

## 2022-07-01 PROCEDURE — A4216 STERILE WATER/SALINE, 10 ML: HCPCS | Performed by: INTERNAL MEDICINE

## 2022-07-01 PROCEDURE — 96375 TX/PRO/DX INJ NEW DRUG ADDON: CPT

## 2022-07-01 PROCEDURE — 96367 TX/PROPH/DG ADDL SEQ IV INF: CPT

## 2022-07-01 PROCEDURE — 96366 THER/PROPH/DIAG IV INF ADDON: CPT

## 2022-07-01 PROCEDURE — 85025 COMPLETE CBC W/AUTO DIFF WBC: CPT | Performed by: NURSE PRACTITIONER

## 2022-07-01 PROCEDURE — 96413 CHEMO IV INFUSION 1 HR: CPT

## 2022-07-01 PROCEDURE — 96417 CHEMO IV INFUS EACH ADDL SEQ: CPT

## 2022-07-01 PROCEDURE — 96360 HYDRATION IV INFUSION INIT: CPT

## 2022-07-01 PROCEDURE — 25000003 PHARM REV CODE 250: Performed by: INTERNAL MEDICINE

## 2022-07-01 PROCEDURE — 96415 CHEMO IV INFUSION ADDL HR: CPT

## 2022-07-01 PROCEDURE — 96361 HYDRATE IV INFUSION ADD-ON: CPT

## 2022-07-01 PROCEDURE — 80053 COMPREHEN METABOLIC PANEL: CPT | Performed by: NURSE PRACTITIONER

## 2022-07-01 PROCEDURE — 63600175 PHARM REV CODE 636 W HCPCS: Performed by: INTERNAL MEDICINE

## 2022-07-01 RX ORDER — SODIUM CHLORIDE 9 MG/ML
500 INJECTION, SOLUTION INTRAVENOUS
Status: COMPLETED | OUTPATIENT
Start: 2022-07-01 | End: 2022-07-01

## 2022-07-01 RX ORDER — SODIUM CHLORIDE 0.9 % (FLUSH) 0.9 %
10 SYRINGE (ML) INJECTION
Status: DISCONTINUED | OUTPATIENT
Start: 2022-07-01 | End: 2022-07-01 | Stop reason: HOSPADM

## 2022-07-01 RX ORDER — HEPARIN 100 UNIT/ML
500 SYRINGE INTRAVENOUS
Status: CANCELLED | OUTPATIENT
Start: 2022-07-01

## 2022-07-01 RX ORDER — HEPARIN 100 UNIT/ML
500 SYRINGE INTRAVENOUS
Status: DISCONTINUED | OUTPATIENT
Start: 2022-07-01 | End: 2022-07-01 | Stop reason: HOSPADM

## 2022-07-01 RX ORDER — SODIUM CHLORIDE 0.9 % (FLUSH) 0.9 %
10 SYRINGE (ML) INJECTION
Status: CANCELLED | OUTPATIENT
Start: 2022-07-01

## 2022-07-01 RX ORDER — CIPROFLOXACIN 500 MG/1
500 TABLET ORAL 2 TIMES DAILY
Qty: 14 TABLET | Refills: 0 | Status: SHIPPED | OUTPATIENT
Start: 2022-07-01 | End: 2022-07-08

## 2022-07-01 RX ADMIN — SODIUM CHLORIDE: 0.9 INJECTION, SOLUTION INTRAVENOUS at 11:07

## 2022-07-01 RX ADMIN — APREPITANT 130 MG: 130 INJECTION, EMULSION INTRAVENOUS at 11:07

## 2022-07-01 RX ADMIN — GEMCITABINE 1800 MG: 38 INJECTION, SOLUTION INTRAVENOUS at 12:07

## 2022-07-01 RX ADMIN — PALONOSETRON HYDROCHLORIDE: 0.25 INJECTION, SOLUTION INTRAVENOUS at 11:07

## 2022-07-01 RX ADMIN — SODIUM CHLORIDE 500 ML: 0.9 INJECTION, SOLUTION INTRAVENOUS at 03:07

## 2022-07-01 RX ADMIN — Medication 10 ML: at 05:07

## 2022-07-01 RX ADMIN — MAGNESIUM SULFATE HEPTAHYDRATE 500 ML/HR: 500 INJECTION, SOLUTION INTRAMUSCULAR; INTRAVENOUS at 09:07

## 2022-07-01 RX ADMIN — SODIUM CHLORIDE 47 MG: 9 INJECTION, SOLUTION INTRAVENOUS at 02:07

## 2022-07-01 NOTE — PLAN OF CARE
Discussed plan of care with pt. Addressed any and ongoing concerns. Pt denies     Problem: Adult Inpatient Plan of Care  Goal: Plan of Care Review  Outcome: Ongoing, Progressing  Goal: Patient-Specific Goal (Individualized)  Outcome: Ongoing, Progressing  Goal: Absence of Hospital-Acquired Illness or Injury  Outcome: Ongoing, Progressing  Intervention: Identify and Manage Fall Risk  Flowsheets (Taken 7/1/2022 1724)  Safety Promotion/Fall Prevention:   high risk medications identified   in recliner, wheels locked   Fall Risk reviewed with patient/family  Intervention: Prevent and Manage VTE (Venous Thromboembolism) Risk  Flowsheets (Taken 7/1/2022 1724)  VTE Prevention/Management:   bleeding precations maintained   bleeding risk factor(s) identified, provider notified  Intervention: Prevent Infection  Flowsheets (Taken 7/1/2022 1724)  Infection Prevention:   hand hygiene promoted   personal protective equipment utilized  Goal: Optimal Comfort and Wellbeing  Outcome: Ongoing, Progressing  Intervention: Provide Person-Centered Care  Flowsheets (Taken 7/1/2022 1724)  Trust Relationship/Rapport:   care explained   choices provided   emotional support provided   empathic listening provided   questions answered   questions encouraged   reassurance provided   thoughts/feelings acknowledged

## 2022-07-01 NOTE — TELEPHONE ENCOUNTER
Nurse called pt to let her know Dr. Marcos results note for urine test. States that she is positive for a UTI, asked if allergic to anything other than the sulfa and amoxicillin, pt states no.     [facility] [place]

## 2022-07-01 NOTE — NURSING
Patient informed nurse that she had been having burning upon urination that started today, with some frequency and urgency, MD notified , order received for UA, specimen collected, new mediation called in , pt instructed to complete all of the medication and to contact her provider if she has any worsening symptoms .

## 2022-07-07 ENCOUNTER — OFFICE VISIT (OUTPATIENT)
Dept: HEMATOLOGY/ONCOLOGY | Facility: CLINIC | Age: 72
End: 2022-07-07
Payer: MEDICARE

## 2022-07-07 ENCOUNTER — INFUSION (OUTPATIENT)
Dept: INFUSION THERAPY | Facility: HOSPITAL | Age: 72
End: 2022-07-07
Attending: INTERNAL MEDICINE
Payer: MEDICARE

## 2022-07-07 VITALS
BODY MASS INDEX: 24.47 KG/M2 | HEART RATE: 116 BPM | TEMPERATURE: 98 F | WEIGHT: 155.88 LBS | OXYGEN SATURATION: 96 % | SYSTOLIC BLOOD PRESSURE: 112 MMHG | HEIGHT: 67 IN | DIASTOLIC BLOOD PRESSURE: 70 MMHG

## 2022-07-07 DIAGNOSIS — N39.0 URINARY TRACT INFECTION WITHOUT HEMATURIA, SITE UNSPECIFIED: ICD-10-CM

## 2022-07-07 DIAGNOSIS — C23 ADENOCARCINOMA OF GALLBLADDER: Primary | ICD-10-CM

## 2022-07-07 DIAGNOSIS — I82.4Z1 DVT, LOWER EXTREMITY, DISTAL, ACUTE, RIGHT: ICD-10-CM

## 2022-07-07 DIAGNOSIS — G89.3 NEOPLASM RELATED PAIN: ICD-10-CM

## 2022-07-07 LAB
ALBUMIN SERPL BCP-MCNC: 2.9 G/DL (ref 3.5–5.2)
ALP SERPL-CCNC: 92 U/L (ref 55–135)
ALT SERPL W/O P-5'-P-CCNC: 8 U/L (ref 10–44)
ANION GAP SERPL CALC-SCNC: 10 MMOL/L (ref 8–16)
AST SERPL-CCNC: 10 U/L (ref 10–40)
BASOPHILS # BLD AUTO: 0.06 K/UL (ref 0–0.2)
BASOPHILS NFR BLD: 0.9 % (ref 0–1.9)
BILIRUB SERPL-MCNC: 0.2 MG/DL (ref 0.1–1)
BUN SERPL-MCNC: 21 MG/DL (ref 8–23)
CALCIUM SERPL-MCNC: 8.8 MG/DL (ref 8.7–10.5)
CHLORIDE SERPL-SCNC: 99 MMOL/L (ref 95–110)
CO2 SERPL-SCNC: 31 MMOL/L (ref 23–29)
CREAT SERPL-MCNC: 1.2 MG/DL (ref 0.5–1.4)
DIFFERENTIAL METHOD: ABNORMAL
EOSINOPHIL # BLD AUTO: 0.1 K/UL (ref 0–0.5)
EOSINOPHIL NFR BLD: 0.9 % (ref 0–8)
ERYTHROCYTE [DISTWIDTH] IN BLOOD BY AUTOMATED COUNT: 15.7 % (ref 11.5–14.5)
EST. GFR  (AFRICAN AMERICAN): 52 ML/MIN/1.73 M^2
EST. GFR  (NON AFRICAN AMERICAN): 45 ML/MIN/1.73 M^2
GLUCOSE SERPL-MCNC: 143 MG/DL (ref 70–110)
HCT VFR BLD AUTO: 28 % (ref 37–48.5)
HGB BLD-MCNC: 8.5 G/DL (ref 12–16)
IMM GRANULOCYTES # BLD AUTO: 0.28 K/UL (ref 0–0.04)
IMM GRANULOCYTES NFR BLD AUTO: 4.3 % (ref 0–0.5)
LYMPHOCYTES # BLD AUTO: 2.7 K/UL (ref 1–4.8)
LYMPHOCYTES NFR BLD: 41.8 % (ref 18–48)
MCH RBC QN AUTO: 29.6 PG (ref 27–31)
MCHC RBC AUTO-ENTMCNC: 30.4 G/DL (ref 32–36)
MCV RBC AUTO: 98 FL (ref 82–98)
MONOCYTES # BLD AUTO: 0.3 K/UL (ref 0.3–1)
MONOCYTES NFR BLD: 5 % (ref 4–15)
NEUTROPHILS # BLD AUTO: 3 K/UL (ref 1.8–7.7)
NEUTROPHILS NFR BLD: 47.1 % (ref 38–73)
NRBC BLD-RTO: 1 /100 WBC
PLATELET # BLD AUTO: 333 K/UL (ref 150–450)
PLATELET BLD QL SMEAR: ABNORMAL
PMV BLD AUTO: 8.1 FL (ref 9.2–12.9)
POTASSIUM SERPL-SCNC: 3.8 MMOL/L (ref 3.5–5.1)
PROT SERPL-MCNC: 6.7 G/DL (ref 6–8.4)
RBC # BLD AUTO: 2.87 M/UL (ref 4–5.4)
SODIUM SERPL-SCNC: 140 MMOL/L (ref 136–145)
WBC # BLD AUTO: 6.46 K/UL (ref 3.9–12.7)

## 2022-07-07 PROCEDURE — 1125F PR PAIN SEVERITY QUANTIFIED, PAIN PRESENT: ICD-10-PCS | Mod: CPTII,S$GLB,, | Performed by: INTERNAL MEDICINE

## 2022-07-07 PROCEDURE — 3288F PR FALLS RISK ASSESSMENT DOCUMENTED: ICD-10-PCS | Mod: CPTII,S$GLB,, | Performed by: INTERNAL MEDICINE

## 2022-07-07 PROCEDURE — 99999 PR PBB SHADOW E&M-EST. PATIENT-LVL IV: CPT | Mod: PBBFAC,,, | Performed by: INTERNAL MEDICINE

## 2022-07-07 PROCEDURE — 4010F ACE/ARB THERAPY RXD/TAKEN: CPT | Mod: CPTII,S$GLB,, | Performed by: INTERNAL MEDICINE

## 2022-07-07 PROCEDURE — 36592 COLLECT BLOOD FROM PICC: CPT

## 2022-07-07 PROCEDURE — 99215 PR OFFICE/OUTPT VISIT, EST, LEVL V, 40-54 MIN: ICD-10-PCS | Mod: S$GLB,,, | Performed by: INTERNAL MEDICINE

## 2022-07-07 PROCEDURE — 99999 PR PBB SHADOW E&M-EST. PATIENT-LVL IV: ICD-10-PCS | Mod: PBBFAC,,, | Performed by: INTERNAL MEDICINE

## 2022-07-07 PROCEDURE — 85025 COMPLETE CBC W/AUTO DIFF WBC: CPT | Performed by: INTERNAL MEDICINE

## 2022-07-07 PROCEDURE — 3288F FALL RISK ASSESSMENT DOCD: CPT | Mod: CPTII,S$GLB,, | Performed by: INTERNAL MEDICINE

## 2022-07-07 PROCEDURE — 80053 COMPREHEN METABOLIC PANEL: CPT | Performed by: INTERNAL MEDICINE

## 2022-07-07 PROCEDURE — 3008F PR BODY MASS INDEX (BMI) DOCUMENTED: ICD-10-PCS | Mod: CPTII,S$GLB,, | Performed by: INTERNAL MEDICINE

## 2022-07-07 PROCEDURE — 1159F PR MEDICATION LIST DOCUMENTED IN MEDICAL RECORD: ICD-10-PCS | Mod: CPTII,S$GLB,, | Performed by: INTERNAL MEDICINE

## 2022-07-07 PROCEDURE — 3074F PR MOST RECENT SYSTOLIC BLOOD PRESSURE < 130 MM HG: ICD-10-PCS | Mod: CPTII,S$GLB,, | Performed by: INTERNAL MEDICINE

## 2022-07-07 PROCEDURE — 3008F BODY MASS INDEX DOCD: CPT | Mod: CPTII,S$GLB,, | Performed by: INTERNAL MEDICINE

## 2022-07-07 PROCEDURE — 25000003 PHARM REV CODE 250: Performed by: NURSE PRACTITIONER

## 2022-07-07 PROCEDURE — A4216 STERILE WATER/SALINE, 10 ML: HCPCS | Performed by: NURSE PRACTITIONER

## 2022-07-07 PROCEDURE — 3074F SYST BP LT 130 MM HG: CPT | Mod: CPTII,S$GLB,, | Performed by: INTERNAL MEDICINE

## 2022-07-07 PROCEDURE — 63600175 PHARM REV CODE 636 W HCPCS: Performed by: NURSE PRACTITIONER

## 2022-07-07 PROCEDURE — 4010F PR ACE/ARB THEARPY RXD/TAKEN: ICD-10-PCS | Mod: CPTII,S$GLB,, | Performed by: INTERNAL MEDICINE

## 2022-07-07 PROCEDURE — 1101F PR PT FALLS ASSESS DOC 0-1 FALLS W/OUT INJ PAST YR: ICD-10-PCS | Mod: CPTII,S$GLB,, | Performed by: INTERNAL MEDICINE

## 2022-07-07 PROCEDURE — 3078F DIAST BP <80 MM HG: CPT | Mod: CPTII,S$GLB,, | Performed by: INTERNAL MEDICINE

## 2022-07-07 PROCEDURE — 1157F ADVNC CARE PLAN IN RCRD: CPT | Mod: CPTII,S$GLB,, | Performed by: INTERNAL MEDICINE

## 2022-07-07 PROCEDURE — 99215 OFFICE O/P EST HI 40 MIN: CPT | Mod: S$GLB,,, | Performed by: INTERNAL MEDICINE

## 2022-07-07 PROCEDURE — 3078F PR MOST RECENT DIASTOLIC BLOOD PRESSURE < 80 MM HG: ICD-10-PCS | Mod: CPTII,S$GLB,, | Performed by: INTERNAL MEDICINE

## 2022-07-07 PROCEDURE — 1157F PR ADVANCE CARE PLAN OR EQUIV PRESENT IN MEDICAL RECORD: ICD-10-PCS | Mod: CPTII,S$GLB,, | Performed by: INTERNAL MEDICINE

## 2022-07-07 PROCEDURE — 1101F PT FALLS ASSESS-DOCD LE1/YR: CPT | Mod: CPTII,S$GLB,, | Performed by: INTERNAL MEDICINE

## 2022-07-07 PROCEDURE — 1125F AMNT PAIN NOTED PAIN PRSNT: CPT | Mod: CPTII,S$GLB,, | Performed by: INTERNAL MEDICINE

## 2022-07-07 PROCEDURE — 1159F MED LIST DOCD IN RCRD: CPT | Mod: CPTII,S$GLB,, | Performed by: INTERNAL MEDICINE

## 2022-07-07 RX ORDER — OXYCODONE HYDROCHLORIDE 5 MG/1
5 TABLET ORAL EVERY 12 HOURS PRN
Qty: 60 TABLET | Refills: 0 | Status: SHIPPED | OUTPATIENT
Start: 2022-07-07 | End: 2022-08-01 | Stop reason: SDUPTHER

## 2022-07-07 RX ORDER — SODIUM CHLORIDE 0.9 % (FLUSH) 0.9 %
10 SYRINGE (ML) INJECTION
Status: CANCELLED | OUTPATIENT
Start: 2022-07-08

## 2022-07-07 RX ORDER — SODIUM CHLORIDE 0.9 % (FLUSH) 0.9 %
10 SYRINGE (ML) INJECTION
Status: COMPLETED | OUTPATIENT
Start: 2022-07-07 | End: 2022-07-07

## 2022-07-07 RX ORDER — HEPARIN 100 UNIT/ML
500 SYRINGE INTRAVENOUS
Status: COMPLETED | OUTPATIENT
Start: 2022-07-07 | End: 2022-07-07

## 2022-07-07 RX ORDER — OXYCODONE HYDROCHLORIDE 5 MG/1
5 TABLET ORAL EVERY 12 HOURS PRN
Qty: 60 TABLET | Refills: 0 | Status: SHIPPED | OUTPATIENT
Start: 2022-07-07 | End: 2022-07-07

## 2022-07-07 RX ORDER — HEPARIN 100 UNIT/ML
500 SYRINGE INTRAVENOUS
Status: CANCELLED | OUTPATIENT
Start: 2022-07-08

## 2022-07-07 RX ORDER — HEPARIN 100 UNIT/ML
500 SYRINGE INTRAVENOUS
Status: CANCELLED | OUTPATIENT
Start: 2022-07-07

## 2022-07-07 RX ORDER — SODIUM CHLORIDE 0.9 % (FLUSH) 0.9 %
10 SYRINGE (ML) INJECTION
Status: CANCELLED | OUTPATIENT
Start: 2022-07-07

## 2022-07-07 RX ADMIN — HEPARIN 500 UNITS: 100 SYRINGE at 08:07

## 2022-07-07 RX ADMIN — Medication 10 ML: at 08:07

## 2022-07-07 NOTE — PROGRESS NOTES
Subjective:      DATE OF VISIT: 7/7/22     ?  Patient ID:?Madelyn Serna is a 71 y.o. female.?? MR#: [id number]     PRIMARY ONCOLOGIST: Dr. Marcos    ? Primary Care Providers:  Orin Henderson MD, MD (General)     CHIEF COMPLAINT: ?  Follow-up on palliative chemotherapy?   ?   ONCOLOGIC DIAGNOSIS:  Gallbladder adenocarcinoma, stage IV, pT4 NX pM1  ?   CURRENT TREATMENT:  Gemcitabine and cisplatin cycle 1 day 1 4/25/22    PAST TREATMENT:  Laparoscopic cholecystectomy, 02/04/2022, [facility]  ?   ONCOLOGIC HISTORY    I the pleasure of meeting I had the pleasure meeting for the 1st time Ms. Serna a pleasant 71-year-old woman accompanied by her 2 daughters today for newly diagnosed gallbladder adenocarcinoma.    Medical history is notable for former tobacco use quit December 2021, COPD, anxiety, cataracts.      She notes 4 days of right lower quadrant abdominal pain acute onset for which she presented to [facility] emergency room on 02/04/2022.  One episode of vomiting per emergency room note.  Labs with leukocytosis WBC 18.7, hemoglobin 13, , renal function intact GFR over 60, mild alkaline phosphatase elevation 130, normal transaminases and bilirubin.  Albumin 4.5, calcium 10.4.  Urinalysis positive for E coli pansensitive.  Blood cultures negative.    Imaging reports a from outside radiology:  CT abdomen and pelvis with contrast distended gallbladder extensive cholelithiasis including 17 mm stone in the region of gallbladder neck.  Pericholecystic stranding and small adjacent fluid.  Lymph nodes noted to be unremarkable.  Emphysema lung bases.    She was taken to emergency surgery with laparoscopic cholecystectomy.  Surgical note mentions during this maneuver would appear to be a peritoneal nodule was discovered.  The peritoneal nodule was dissected from all surrounding structures with the LigaSure device.  The nodule is passed off the table and sent separately as a  "specimen.    Outside pathology:  "  Gallbladder cholecystectomy adenocarcinoma immunohistochemistry positive for CK7 and CDX2 and negative for CK 20, TTF1 and PAX8  Tumor size at least 2.2 cm.  The tumor invades the liver.  Lymphovascular invasion present.  Perineural invasion not identified.  Margins cannot be assessed.  Regional lymph nodes not applicable.    peritoneal nodule excisional biopsy positive for metastatic adenocarcinoma consistent with origin from gallbladder.      HPI     Today she is accompanied by her  Daughter.  She is on apixaban decrease to 5 mg b.i.d. after leg pain and edema acutely and finding of right lower extremity DVT last Tuesday.  She did proceed with chemotherapy last Friday following blood transfusion in does feel improved from fatigue standpoint.  She denies any evidence of bleeding.      Review of Systems    ?   A comprehensive 14-point review of systems was reviewed with patient and was negative other than as specified above.   ?     Objective:      Physical Exam      ?   Vitals:    07/07/22 0848   BP: 112/70   Pulse: (!) 116   Temp: 98.3 °F (36.8 °C)      ?   ECOG:?  2  General appearance: Generally well appearing, anxious, daughters accompanying her  Head, eyes, ears, nose, and throat:  moist mucous membranes.   Respiratory:  Normal work of breathing   Abdomen: nondistended.   Extremities: Warm, no appreciable edema or pain bilateral lower extremities  Neurologic: Alert and oriented.  Sitting in wheelchair  Skin: No rashes, ecchymoses or petechial lesion.   ?      ?   Laboratory:  ?   Infusion on 07/07/2022   Component Date Value Ref Range Status    Sodium 07/07/2022 140  136 - 145 mmol/L Final    Potassium 07/07/2022 3.8  3.5 - 5.1 mmol/L Final    Chloride 07/07/2022 99  95 - 110 mmol/L Final    CO2 07/07/2022 31 (A) 23 - 29 mmol/L Final    Glucose 07/07/2022 143 (A) 70 - 110 mg/dL Final    BUN 07/07/2022 21  8 - 23 mg/dL Final    Creatinine 07/07/2022 1.2  0.5 - 1.4 " mg/dL Final    Calcium 07/07/2022 8.8  8.7 - 10.5 mg/dL Final    Total Protein 07/07/2022 6.7  6.0 - 8.4 g/dL Final    Albumin 07/07/2022 2.9 (A) 3.5 - 5.2 g/dL Final    Total Bilirubin 07/07/2022 0.2  0.1 - 1.0 mg/dL Final    Alkaline Phosphatase 07/07/2022 92  55 - 135 U/L Final    AST 07/07/2022 10  10 - 40 U/L Final    ALT 07/07/2022 8 (A) 10 - 44 U/L Final    Anion Gap 07/07/2022 10  8 - 16 mmol/L Final    eGFR if  07/07/2022 52 (A) >60 mL/min/1.73 m^2 Final    eGFR if non African American 07/07/2022 45 (A) >60 mL/min/1.73 m^2 Final    WBC 07/07/2022 6.46  3.90 - 12.70 K/uL Final    RBC 07/07/2022 2.87 (A) 4.00 - 5.40 M/uL Final    Hemoglobin 07/07/2022 8.5 (A) 12.0 - 16.0 g/dL Final    Hematocrit 07/07/2022 28.0 (A) 37.0 - 48.5 % Final    MCV 07/07/2022 98  82 - 98 fL Final    MCH 07/07/2022 29.6  27.0 - 31.0 pg Final    MCHC 07/07/2022 30.4 (A) 32.0 - 36.0 g/dL Final    RDW 07/07/2022 15.7 (A) 11.5 - 14.5 % Final    Platelets 07/07/2022 333  150 - 450 K/uL Final    MPV 07/07/2022 8.1 (A) 9.2 - 12.9 fL Final    Immature Granulocytes 07/07/2022 4.3 (A) 0.0 - 0.5 % Final    Gran # (ANC) 07/07/2022 3.0  1.8 - 7.7 K/uL Final    Immature Grans (Abs) 07/07/2022 0.28 (A) 0.00 - 0.04 K/uL Final    Lymph # 07/07/2022 2.7  1.0 - 4.8 K/uL Final    Mono # 07/07/2022 0.3  0.3 - 1.0 K/uL Final    Eos # 07/07/2022 0.1  0.0 - 0.5 K/uL Final    Baso # 07/07/2022 0.06  0.00 - 0.20 K/uL Final    nRBC 07/07/2022 1 (A) 0 /100 WBC Final    Gran % 07/07/2022 47.1  38.0 - 73.0 % Final    Lymph % 07/07/2022 41.8  18.0 - 48.0 % Final    Mono % 07/07/2022 5.0  4.0 - 15.0 % Final    Eosinophil % 07/07/2022 0.9  0.0 - 8.0 % Final    Basophil % 07/07/2022 0.9  0.0 - 1.9 % Final    Platelet Estimate 07/07/2022 Appears normal   Final    Differential Method 07/07/2022 Automated   Final      ?   Tumor markers   ?  CEA, CA 19 9 pending  ?   Imaging:  ?  Outside see above  Results for orders  placed or performed during the hospital encounter of 04/22/22 (from the past 2160 hour(s))   CT Chest Abdomen Pelvis With Contrast    Impression    Again there is a mixed attenuating lesion at the fundus of the gallbladder with associated gallstones suspected.  Overall smaller than on prior exam.  Exact measurement is difficult.  Findings could reflect sequela of prior gallbladder disease.  Infiltrating lesion suspected given the patient's history however small abscesses not excluded.  8 mm and 10 mm hypodensities within segment 2 of the left hepatic lobe also not fully characterized.  Recommend continued surveillance.    Question 2 x 1.5 cm lesion within the uncinate or adjacent uncinate which could reflect necrotic lesion or lymph node.  Findings could be further evaluated with endoscopic ultrasound    Moderate to severe constipation throughout the large bowel.    All CT scans at this facility use dose modulation, iterative reconstruction and/or weight based dosing when appropriate to reduce radiation dose to as low as reasonably achievable.      Electronically signed by: Fadi Owusu MD  Date:    04/22/2022  Time:    13:45     No results found for this or any previous visit (from the past 2160 hour(s)).  No results found for this or any previous visit (from the past 2160 hour(s)).      Pathology:    Outside see above     ?   Assessment/Plan:   Adenocarcinoma of gallbladder    Neoplasm related pain  -     Discontinue: oxyCODONE (ROXICODONE) 5 MG immediate release tablet; Take 1 tablet (5 mg total) by mouth every 12 (twelve) hours as needed for Pain. q 12h prn severe pain  Dispense: 60 tablet; Refill: 0  -     oxyCODONE (ROXICODONE) 5 MG immediate release tablet; Take 1 tablet (5 mg total) by mouth every 12 (twelve) hours as needed for Pain. q 12h prn severe pain  Dispense: 60 tablet; Refill: 0    DVT, lower extremity, distal, acute, right    Urinary tract infection without hematuria, site unspecified    Other  orders  -     sodium chloride 0.9% 1,000 mL with magnesium sulfate 1 g, potassium chloride 20 mEq infusion  -     aprepitant (CINVANTI) injection 130 mg  -     palonosetron (ALOXI) 0.25 mg with Dexamethasone (DECADRON) 12 mg in NS 50 mL IVPB  -     gemcitabine (GEMZAR) 1,880 mg in sodium chloride 0.9% 250 mL chemo infusion  -     CISplatin (PLATINOL) 25 mg/m2 = 47 mg in sodium chloride 0.9% 500 mL chemo infusion  -     sodium chloride 0.9% bolus 500 mL  -     sodium chloride 0.9% 250 mL flush bag  -     sodium chloride 0.9% flush 10 mL  -     heparin, porcine (PF) 100 unit/mL injection flush 500 Units  -     alteplase injection 2 mg       1. Adenocarcinoma of gallbladder    2. Neoplasm related pain    3. DVT, lower extremity, distal, acute, right    4. Urinary tract infection without hematuria, site unspecified          Plan:     Problem List Items Addressed This Visit        Hematology    DVT, lower extremity, distal, acute, right       Oncology    Adenocarcinoma of gallbladder - Primary    Neoplasm related pain    Overview     Reports minimal usage. Refills have been provided by oncologist.     I will defer to them for the time being but happy to assume if asked and agreed that we would be the sole prescribers.             Other Visit Diagnoses     Urinary tract infection without hematuria, site unspecified            Gallbladder adenocarcinoma, stage IV, pT4 NX pM1: NGS Guardant with NATY, PDL1 CPS 2. No NTRK mutation detected.   On palliative chemotherapy with cisplatin gemcitabine cycle 1 day 1 04/25/2022.    Today presents for evaluation of lab work for treatment plan tomorrow.  Would plan on restaging imaging now that IV contrast available  Labs reviewed with relatively stable anemia improved after transfusion last week.  Note made of mild tachycardia improved, p.o. intake encouraged hydration.  Will recheck tomorrow in infusion.  Additional IV fluids as indicated.    Anemia:  Likely chemotherapy induced.  No  "clear evidence of bleeding.      Urinary tract infection:  Resolution of dysuria.  Pansensitive, recommended completion of antibiotic course.     Right lower extremity acute DVT:  On apixaban 10 mg b.i.d. now down to 5 mg b.i.d. after 1 week.  Resolution of pain and edema.  Continue anticoagulation indefinitely given malignancy metastatic on palliative treatment.  Monitor for any signs or symptoms of bleeding.    Anorexia: Note made of mild tachycardia improved, p.o. intake encouraged hydration.  Will recheck tomorrow in infusion.  Additional IV fluids as indicated.  We did discuss potential appetite stimulants however given recent acute DVT recommend against using Megace which can be associated with thrombotic risk.    Depressive symptoms related to malignancy/treatment; interested in psych onc consult, placed    Pain:  Follow-up with palliative Care encouraged.  Refilled oxycodone 5 mg which were also controls her pain at this point; takes b.i.d. p.r.n. severe pain.      Follow-Up:   Plan for treatment tomorrow 07/08/2022  Restaging CT with contrast if available, ordered  Follow up repeat heart rate, IV fluids p.r.n., p.o. intake encouraged    Addendum 7/12/22:  Received report from Radiology of "New segmental left lower lobe pulmonary embolus.  New left internal iliac vein thrombus" as compared to her prior scan 4/2022.  She had known development of acute DVT started on apixaban per above likely developed at same time and therefore not failure of anticoagulation.  Will call patient to inform her unsure clinical stability and that she has continued on anticoagulation.    New segmental left lower lobe pulmonary embolus.  New left internal iliac vein thrombus  "

## 2022-07-07 NOTE — NURSING
.Lab obtained from Right upper arm PICC line, 2 patient identifiers obtained, labeled and sent to lab.  Adequate blood return noted.  2 lumen(s) flushed with 10ml NS and 5ml Heparin solution. Site without redness, swelling or drainage noted.

## 2022-07-07 NOTE — DISCHARGE INSTRUCTIONS
.[facility]  [address] [address]  [address] [address]  [phone] phone     [phone] fax  Hours of Operation: Monday- Friday 8:00am- 5:00pm  After hours phone  [phone]  Hematology / Oncology Physicians on call    Dr. Samson Hutchins      Nurse Practitioners:    Gracie Spangler, TERI Song, TERI Solomon, TERI Arellano, TERI Andrea, PA      Please don't hesitate to call if you have any concerns.    .FALL PREVENTION   Falls often occur due to slipping, tripping or losing your balance. Here are ways to reduce your risk of falling again.   Was there anything that caused your fall that can be fixed, removed or replaced?   Make your home safe by keeping walkways clear of objects you may trip over.   Use non-slip pads under rugs.   Do not walk in poorly lit areas.   Do not stand on chairs or wobbly ladders.   Use caution when reaching overhead or looking upward. This position can cause a loss of balance.   Be sure your shoes fit properly, have non-slip bottoms and are in good condition.   Be cautious when going up and down stairs, curbs, and when walking on uneven sidewalks.   If your balance is poor, consider using a cane or walker.   If your fall was related to alcohol use, stop or limit alcohol intake.   If your fall was related to use of sleeping medicines, talk to your doctor about this. You may need to reduce your dosage at bedtime if you awaken during the night to go to the bathroom.   To reduce the need for nighttime bathroom trips:   Avoid drinking fluids for several hours before going to bed   Empty your bladder before going to bed   Men can keep a urinal at the bedside   © 8814-6596 [employer] [facility], [address] [place] [address], [place] [address]. All rights reserved. This information is not intended as a substitute for professional medical care. Always follow your healthcare professional's instructions.  .WAYS  TO HELP PREVENT INFECTION        WASH YOUR HANDS OFTEN DURING THE DAY, ESPECIALLY BEFORE YOU EAT, AFTER USING THE BATHROOM, AND AFTER TOUCHING ANIMALS    STAY AWAY FROM PEOPLE WHO HAVE ILLNESSES YOU CAN CATCH; SUCH AS COLDS, FLU, CHICKEN POX    TRY TO AVOID CROWDS    STAY AWAY FROM CHILDREN WHO RECENTLY HAVE RECEIVED LIVE VIRUS VACCINES    MAINTAIN GOOD MOUTH CARE    DO NOT SQUEEZE OR SCRATCH PIMPLES    CLEAN CUTS & SCRAPES RIGHT AWAY AND DAILY UNTIL HEALED WITH WARM WATER, SOAP & AN ANTISEPTIC    AVOID CONTACT WITH LITTER BOXES, BIRD CAGES, & FISH TANKS    AVOID STANDING WATER, IE., BIRD BATHS, FLOWER POTS/VASES, OR HUMIDIFIERS    WEAR GLOVES WHEN GARDENING OR CLEANING UP AFTER OTHERS, ESPECIALLY BABIES & SMALL CHILDREN    DO NOT EAT RAW FISH, SEAFOOD, MEAT, OR EGGS

## 2022-07-08 ENCOUNTER — INFUSION (OUTPATIENT)
Dept: INFUSION THERAPY | Facility: HOSPITAL | Age: 72
End: 2022-07-08
Attending: INTERNAL MEDICINE
Payer: MEDICARE

## 2022-07-08 ENCOUNTER — PATIENT MESSAGE (OUTPATIENT)
Dept: PALLIATIVE MEDICINE | Facility: CLINIC | Age: 72
End: 2022-07-08
Payer: MEDICARE

## 2022-07-08 VITALS
SYSTOLIC BLOOD PRESSURE: 130 MMHG | OXYGEN SATURATION: 95 % | RESPIRATION RATE: 18 BRPM | TEMPERATURE: 98 F | HEART RATE: 85 BPM | DIASTOLIC BLOOD PRESSURE: 80 MMHG

## 2022-07-08 DIAGNOSIS — C23 ADENOCARCINOMA OF GALLBLADDER: Primary | ICD-10-CM

## 2022-07-08 PROCEDURE — 96417 CHEMO IV INFUS EACH ADDL SEQ: CPT

## 2022-07-08 PROCEDURE — 96375 TX/PRO/DX INJ NEW DRUG ADDON: CPT

## 2022-07-08 PROCEDURE — 96366 THER/PROPH/DIAG IV INF ADDON: CPT

## 2022-07-08 PROCEDURE — 96413 CHEMO IV INFUSION 1 HR: CPT

## 2022-07-08 PROCEDURE — 25000003 PHARM REV CODE 250: Performed by: INTERNAL MEDICINE

## 2022-07-08 PROCEDURE — 63600175 PHARM REV CODE 636 W HCPCS: Performed by: INTERNAL MEDICINE

## 2022-07-08 PROCEDURE — 96367 TX/PROPH/DG ADDL SEQ IV INF: CPT

## 2022-07-08 PROCEDURE — 96361 HYDRATE IV INFUSION ADD-ON: CPT

## 2022-07-08 RX ORDER — SODIUM CHLORIDE 0.9 % (FLUSH) 0.9 %
10 SYRINGE (ML) INJECTION
Status: DISCONTINUED | OUTPATIENT
Start: 2022-07-08 | End: 2022-07-08 | Stop reason: HOSPADM

## 2022-07-08 RX ORDER — HEPARIN 100 UNIT/ML
500 SYRINGE INTRAVENOUS
Status: DISCONTINUED | OUTPATIENT
Start: 2022-07-08 | End: 2022-07-08 | Stop reason: HOSPADM

## 2022-07-08 RX ORDER — SODIUM CHLORIDE 9 MG/ML
500 INJECTION, SOLUTION INTRAVENOUS
Status: COMPLETED | OUTPATIENT
Start: 2022-07-08 | End: 2022-07-08

## 2022-07-08 RX ADMIN — APREPITANT 130 MG: 130 INJECTION, EMULSION INTRAVENOUS at 11:07

## 2022-07-08 RX ADMIN — GEMCITABINE 1800 MG: 38 INJECTION, SOLUTION INTRAVENOUS at 12:07

## 2022-07-08 RX ADMIN — SODIUM CHLORIDE 500 ML: 0.9 INJECTION, SOLUTION INTRAVENOUS at 02:07

## 2022-07-08 RX ADMIN — PALONOSETRON HYDROCHLORIDE: 0.25 INJECTION, SOLUTION INTRAVENOUS at 11:07

## 2022-07-08 RX ADMIN — SODIUM CHLORIDE: 9 INJECTION, SOLUTION INTRAVENOUS at 11:07

## 2022-07-08 RX ADMIN — MAGNESIUM SULFATE HEPTAHYDRATE 500 ML/HR: 500 INJECTION, SOLUTION INTRAMUSCULAR; INTRAVENOUS at 09:07

## 2022-07-08 RX ADMIN — SODIUM CHLORIDE 47 MG: 9 INJECTION, SOLUTION INTRAVENOUS at 12:07

## 2022-07-08 RX ADMIN — HEPARIN 500 UNITS: 100 SYRINGE at 04:07

## 2022-07-12 ENCOUNTER — HOSPITAL ENCOUNTER (OUTPATIENT)
Dept: RADIOLOGY | Facility: HOSPITAL | Age: 72
Discharge: HOME | End: 2022-07-12
Attending: INTERNAL MEDICINE
Payer: MEDICARE

## 2022-07-12 DIAGNOSIS — C23 ADENOCARCINOMA OF GALLBLADDER: ICD-10-CM

## 2022-07-12 PROCEDURE — 71260 CT THORAX DX C+: CPT | Mod: TC

## 2022-07-12 PROCEDURE — 25500020 PHARM REV CODE 255: Performed by: INTERNAL MEDICINE

## 2022-07-12 PROCEDURE — 71260 CT CHEST ABDOMEN PELVIS WITH CONTRAST (XPD): ICD-10-PCS | Mod: 26,,, | Performed by: RADIOLOGY

## 2022-07-12 PROCEDURE — 71260 CT THORAX DX C+: CPT | Mod: 26,,, | Performed by: RADIOLOGY

## 2022-07-12 PROCEDURE — 74177 CT ABD & PELVIS W/CONTRAST: CPT | Mod: TC

## 2022-07-12 PROCEDURE — 74177 CT ABD & PELVIS W/CONTRAST: CPT | Mod: 26,,, | Performed by: RADIOLOGY

## 2022-07-12 PROCEDURE — 74177 CT CHEST ABDOMEN PELVIS WITH CONTRAST (XPD): ICD-10-PCS | Mod: 26,,, | Performed by: RADIOLOGY

## 2022-07-12 RX ADMIN — IOHEXOL 100 ML: 350 INJECTION, SOLUTION INTRAVENOUS at 10:07

## 2022-07-12 RX ADMIN — IOHEXOL 30 ML: 350 INJECTION, SOLUTION INTRAVENOUS at 10:07

## 2022-07-13 ENCOUNTER — TELEPHONE (OUTPATIENT)
Dept: HEMATOLOGY/ONCOLOGY | Facility: CLINIC | Age: 72
End: 2022-07-13
Payer: MEDICARE

## 2022-07-13 NOTE — TELEPHONE ENCOUNTER
----- Message from Ivonne Marcos MD sent at 7/12/2022  3:57 PM CDT -----  Regarding: blood clot  Please call patient to let her know that her scan did show a blood clot in lung and pelvis which are new as compared to April however I suspect this developed at the same time as the leg blood clot that we knew about.  She was started the other week on blood thinner.  Please be sure she has no issues with new swelling or shortness of breath.  Please ensure she is still taking blood thinner as prescribed without missed doses is important to help treat all blood clots.

## 2022-07-13 NOTE — TELEPHONE ENCOUNTER
Contacted pt and notified her of CT results and Dr. Marcos recommendation for her to remain on her blood thinner as prescribed. Pt verbalized understanding.

## 2022-07-15 NOTE — PROGRESS NOTES
"Subjective:       Patient ID: Madelyn Serna is a 71 y.o. female.    Chief Complaint:   1. Adenocarcinoma of gallbladder  Stage IV, pT4 NX pM1     Current Treatment:  OP GEMCITABINE CISPLATIN Q3W  Eliquis 5mg po BID    Treatment History:  S/p Laparoscopic cholecystectomy, 02/04/2022, [facility]    HPI: This is a 71 year old woman currently on treatment for Stage IV adenocarcinoma of the gallbladder. She presented to [facility] on 2/4/2022 after 4 days of right lower quadrant abdominal pain; she experienced an episode of vomiting per the ER notes. Labs revealed leukocytosis WBC 18.7, hemoglobin 13, , renal function intact GFR over 60, mild alkaline phosphatase elevation 130, normal transaminases and bilirubin. Albumin 4.5, calcium 10.4.  Urinalysis positive for E coli pansensitive.  Blood cultures negative.    CT A/P  with contrast showed distended gallbladder extensive cholelithiasis including 17 mm stone in the region of gallbladder neck.  Pericholecystic stranding and small adjacent fluid.  Lymph nodes noted to be unremarkable.  Emphysema lung bases. She underwent emergency surgery with laparoscopic cholecystectomy.  Surgical note mentions during this maneuver would appear to be a peritoneal nodule was discovered.  The peritoneal nodule was dissected from all surrounding structures with the LigaSure device.  The nodule is passed off the table and sent separately as a specimen.    Pathology demonstrated "gallbladder cholecystectomy adenocarcinoma IHC positive for CK7 & CDX2 and negative for CK 20, TTF1 and PAX8. Tumor size at least 2.2 cm. The tumor invades the liver.  Lymphovascular invasion present. Perineural invasion not identified.  Margins cannot be assessed. Regional lymph nodes not applicable. Peritoneal nodule excisional biopsy positive for metastatic adenocarcinoma consistent with origin from gallbladder.     Staging PET performed on 3/2/2022 indicated uptake at right " upper quadrant along liver margin site of prior cholecystectomy with postoperative fluid and peritoneal implants eleanor hepatis region potential/likely postoperative changes, right internal mammary chain and cardiophrenic lymph nodes. Her case was discussed at Tumor Board where it was decided to offer her palliative chemotherapy. She was started on Cisplatin/Gemzar every 3 weeks on 3/24/2022.    Her primary oncologist is Dr. Marcos.    Interval History: Patient presents for follow up on Cisplatin/Gemzar; she is scheduled to receive C5D1 tomorrow. She presents in a wheelchair with her daughter and granddaughter. She reports a good appetite & normal BMs. She does admit some fatigue.     Reviewed labs with her: H&H 6.7 & 21.8; WBC, ANC WNL. Plts 68k; she denies epistaxis, hematuria, hematochezia, etc. CMP stable. Explained to her that treatment will need to be held for plt <100k because she is starting a new cycle. Also explained to her that she will need blood again. Reviewed recent CT scan results with her to include mixed response with decrease in size of 2 liver lesions and 2 new liver lesions, 1 measuring 6mm and another measuring 3mm. Advised her that mixed responses do not warrant a change in treatment but close follow up with repeat scans in 2-3 months. Also informed her that CT scans revealed a blood clot for which she can continue her current blood thinner; she verbalizes understanding, stating Dr. Marcos informed her of this last week.     She begins to cry with the decision to hold treatment until next week. Assured her that her cancer is not likely to worsen significantly in 1 week; also assured her that it is best to hold treatment and not run the risk of further reducing her platelet count by giving her treatment this week.     Social History     Socioeconomic History    Marital status:    Tobacco Use    Smoking status: Current Every Day Smoker    Smokeless tobacco: Former User     Past Medical  History:   Diagnosis Date    Cancer of gallbladder     Essential (primary) hypertension     Infection following a procedure, deep incisional surgical site, initial encounter 3/3/2022    Romaine pus on bandage with induration to RUQ. She has already completed a 5 day course of Cipro which completed on 2/27. Recommend prompt evaluation with ED and whether or not imaging is warranted.     History reviewed. No pertinent family history.    History reviewed. No pertinent surgical history.    Review of Systems   Constitutional: Positive for fatigue. Negative for appetite change.   HENT: Negative.    Eyes: Negative.    Respiratory: Positive for cough (secondary to sinus issues).    Cardiovascular: Negative.    Gastrointestinal: Negative for abdominal pain, constipation, diarrhea, nausea and vomiting.   Endocrine: Negative.    Genitourinary: Negative.    Musculoskeletal: Negative.    Skin: Negative.    Allergic/Immunologic: Negative.    Neurological: Negative.  Negative for dizziness, weakness, light-headedness and headaches.   Hematological: Negative.    Psychiatric/Behavioral: Negative.        Medication List with Changes/Refills   Current Medications    ALBUTEROL (PROVENTIL/VENTOLIN HFA) 90 MCG/ACTUATION INHALER    INHALE TWO PUFFS FOUR TIMES DAILY AS NEEDED FOR WHEEZING    AMLODIPINE (NORVASC) 5 MG TABLET    amlodipine Take 1 time per day No date recorded tablet 1 time per day No route recorded No set duration recorded No set duration amount recorded active 5 mg    APIXABAN (ELIQUIS) 5 MG TAB    Take 1 tablet (5 mg total) by mouth 2 (two) times daily.    CLONAZEPAM (KLONOPIN) 0.5 MG TABLET    clonazepam Take 2 times per day No date recorded tablet 2 times per day No route recorded No set duration recorded No set duration amount recorded active 0.5 mg    CYPROHEPTADINE (PERIACTIN) 4 MG TABLET    Take 4 mg by mouth 2 (two) times daily.    DEXAMETHASONE (DECADRON) 4 MG TAB    Take 2 tablets (8mg total) by mouth once daily.  Take as directed on days 2, 3, 4 and days 9, 10, 11 of your chemotherapy cycle.    DEXAMETHASONE (DECADRON) 4 MG TAB    Take 2 tablets (8 mg total) by mouth once daily. Take as directed on days 2, 3, 4 and days 9, 10, 11 of your chemotherapy cycle.    DICLOFENAC SODIUM (VOLTAREN) 1 % GEL    APPLY 1 GRAM TO AFFECTED AREA FOUR TIMES DAILY AS NEEDED    IPRATROPIUM (ATROVENT) 21 MCG (0.03 %) NASAL SPRAY        LABETALOL (NORMODYNE) 300 MG TABLET    labetalol Take 2 times per day No date recorded tablet 2 times per day No route recorded No set duration recorded No set duration amount recorded suspended 300 mg    LISINOPRIL 10 MG TABLET    Take 1 tablet (10 mg total) by mouth once daily.    MIRTAZAPINE (REMERON) 7.5 MG TAB    Take 1 tablet (7.5 mg total) by mouth every evening. For sleep    MONTELUKAST (SINGULAIR) 10 MG TABLET    Take 10 mg by mouth once daily.    NALOXONE (NARCAN) 4 MG/ACTUATION SPRY    USE ONE SPRAY IN ONE NOSTRIL AS DIRECTED UPON SIGNS OF OPIOID OVERDOSE CALL 911    ONDANSETRON (ZOFRAN-ODT) 8 MG TBDL    Take 1 tablet (8 mg total) by mouth every 6 (six) hours as needed.    ONDANSETRON (ZOFRAN-ODT) 8 MG TBDL    Take 1 tablet (8 mg total) by mouth every 8 (eight) hours as needed (nausea/vomiting).    OXYCODONE (ROXICODONE) 5 MG IMMEDIATE RELEASE TABLET    Take 1 tablet (5 mg total) by mouth every 12 (twelve) hours as needed for Pain. q 12h prn severe pain    PROCHLORPERAZINE (COMPAZINE) 5 MG TABLET    Take 1 tablet (5 mg total) by mouth every 6 (six) hours as needed for Nausea. May take every 6 hours scheduled for prevention of nausea.    VENLAFAXINE (EFFEXOR-XR) 75 MG 24 HR CAPSULE    Take 1 capsule (75 mg total) by mouth once daily.     Objective:     Vitals:    07/18/22 1053   BP: 133/72   Pulse: 97   Temp: 97.9 °F (36.6 °C)     Lab Results   Component Value Date    WBC 6.48 07/18/2022    HGB 6.7 (L) 07/18/2022    HCT 21.8 (L) 07/18/2022    MCV 99 (H) 07/18/2022    PLT 68 (L) 07/18/2022     CMP  Sodium    Date Value Ref Range Status   07/18/2022 142 136 - 145 mmol/L Final     Potassium   Date Value Ref Range Status   07/18/2022 3.6 3.5 - 5.1 mmol/L Final     Chloride   Date Value Ref Range Status   07/18/2022 102 95 - 110 mmol/L Final     CO2   Date Value Ref Range Status   07/18/2022 32 (H) 23 - 29 mmol/L Final     Glucose   Date Value Ref Range Status   07/18/2022 144 (H) 70 - 110 mg/dL Final     BUN   Date Value Ref Range Status   07/18/2022 9 8 - 23 mg/dL Final     Creatinine   Date Value Ref Range Status   07/18/2022 0.8 0.5 - 1.4 mg/dL Final     Calcium   Date Value Ref Range Status   07/18/2022 8.6 (L) 8.7 - 10.5 mg/dL Final     Total Protein   Date Value Ref Range Status   07/18/2022 6.2 6.0 - 8.4 g/dL Final     Albumin   Date Value Ref Range Status   07/18/2022 2.9 (L) 3.5 - 5.2 g/dL Final     Total Bilirubin   Date Value Ref Range Status   07/18/2022 0.2 0.1 - 1.0 mg/dL Final     Comment:     For infants and newborns, interpretation of results should be based  on gestational age, weight and in agreement with clinical  observations.    Premature Infant recommended reference ranges:  Up to 24 hours.............<8.0 mg/dL  Up to 48 hours............<12.0 mg/dL  3-5 days..................<15.0 mg/dL  6-29 days.................<15.0 mg/dL       Alkaline Phosphatase   Date Value Ref Range Status   07/18/2022 81 55 - 135 U/L Final     AST   Date Value Ref Range Status   07/18/2022 10 10 - 40 U/L Final     ALT   Date Value Ref Range Status   07/18/2022 8 (L) 10 - 44 U/L Final     Anion Gap   Date Value Ref Range Status   07/18/2022 8 8 - 16 mmol/L Final     eGFR if    Date Value Ref Range Status   07/18/2022 >60 >60 mL/min/1.73 m^2 Final     eGFR if non    Date Value Ref Range Status   07/18/2022 >60 >60 mL/min/1.73 m^2 Final     Comment:     Calculation used to obtain the estimated glomerular filtration  rate (eGFR) is the CKD-EPI equation.        Physical Exam  Vitals reviewed.  "  Constitutional:       Appearance: Normal appearance.   HENT:      Head: Normocephalic.   Eyes:      Extraocular Movements: Extraocular movements intact.      Pupils: Pupils are equal, round, and reactive to light.      Comments: Glasses     Cardiovascular:      Rate and Rhythm: Normal rate and regular rhythm.      Heart sounds: Normal heart sounds.   Pulmonary:      Effort: Pulmonary effort is normal.      Breath sounds: Wheezing (mild inspiratory posterioly; mild inspiratory and expiratory left lung anteriorly) present.   Abdominal:      General: Bowel sounds are normal.      Palpations: Abdomen is soft.   Genitourinary:     Comments: deferred  Musculoskeletal:      Cervical back: Normal range of motion.      Comments: Presents in wheelchair   Skin:     General: Skin is warm and dry.   Neurological:      Mental Status: She is alert and oriented to person, place, and time.   Psychiatric:         Behavior: Behavior normal.         Thought Content: Thought content normal.          (2) Ambulatory and capable of self care, unable to carry out work activity, up and about > 50% or waking hours  Assessment:     Problem List Items Addressed This Visit        Hematology    DVT, lower extremity, distal, acute, right     On apixaban 10 mg b.i.d. now down to 5 mg b.i.d. after 1 week.  Resolution of pain and edema.  Continue anticoagulation indefinitely given malignancy metastatic on palliative treatment.  Monitor for any signs or symptoms of bleeding.    Received report from Radiology of "New segmental left lower lobe pulmonary embolus.  New left internal iliac vein thrombus" as compared to her prior scan 4/2022.  She had known development of acute DVT started on apixaban per above likely developed at same time and therefore not failure of anticoagulation.  Will call patient to inform her unsure clinical stability and that she has continued on anticoagulation.              Oncology    Adenocarcinoma of gallbladder - Primary "     NGS Guardant with NATY, PDL1 CPS 2. No NTRK mutation detected.   On palliative chemotherapy with cisplatin gemcitabine               Plan:     Adenocarcinoma of gallbladder    DVT, lower extremity, distal, acute, right    Labs reviewed.  HOLD C5D1 of Cisplatin/Gemzar tomorrow for thrombocytopenia (68k).   Transfuse 1U PRBCs tomorrow for H&H 6.7 & 21.7.   Follow up in 1 week with Mg, CBC and Comprehensive Metabolic Panel to reattempt C5D1.    I will review assessment/plan with collaborating physician Dr. Martinez.    MARIA LUISA Brown

## 2022-07-18 ENCOUNTER — INFUSION (OUTPATIENT)
Dept: INFUSION THERAPY | Facility: HOSPITAL | Age: 72
End: 2022-07-18
Attending: INTERNAL MEDICINE
Payer: MEDICARE

## 2022-07-18 ENCOUNTER — OFFICE VISIT (OUTPATIENT)
Dept: HEMATOLOGY/ONCOLOGY | Facility: CLINIC | Age: 72
End: 2022-07-18
Payer: MEDICARE

## 2022-07-18 VITALS
WEIGHT: 158.94 LBS | HEART RATE: 97 BPM | TEMPERATURE: 98 F | SYSTOLIC BLOOD PRESSURE: 120 MMHG | DIASTOLIC BLOOD PRESSURE: 76 MMHG | HEIGHT: 67 IN | OXYGEN SATURATION: 96 % | SYSTOLIC BLOOD PRESSURE: 133 MMHG | HEART RATE: 94 BPM | BODY MASS INDEX: 24.94 KG/M2 | DIASTOLIC BLOOD PRESSURE: 72 MMHG | RESPIRATION RATE: 18 BRPM | OXYGEN SATURATION: 95 % | TEMPERATURE: 98 F

## 2022-07-18 DIAGNOSIS — T45.1X5A ANEMIA ASSOCIATED WITH CHEMOTHERAPY: Primary | ICD-10-CM

## 2022-07-18 DIAGNOSIS — I82.4Z1 DVT, LOWER EXTREMITY, DISTAL, ACUTE, RIGHT: ICD-10-CM

## 2022-07-18 DIAGNOSIS — D64.81 ANEMIA ASSOCIATED WITH CHEMOTHERAPY: Primary | ICD-10-CM

## 2022-07-18 DIAGNOSIS — C23 ADENOCARCINOMA OF GALLBLADDER: Primary | ICD-10-CM

## 2022-07-18 PROCEDURE — 99214 OFFICE O/P EST MOD 30 MIN: CPT | Mod: 25,S$GLB,, | Performed by: NURSE PRACTITIONER

## 2022-07-18 PROCEDURE — 1159F MED LIST DOCD IN RCRD: CPT | Mod: CPTII,S$GLB,, | Performed by: NURSE PRACTITIONER

## 2022-07-18 PROCEDURE — A4216 STERILE WATER/SALINE, 10 ML: HCPCS | Performed by: NURSE PRACTITIONER

## 2022-07-18 PROCEDURE — 1101F PT FALLS ASSESS-DOCD LE1/YR: CPT | Mod: CPTII,S$GLB,, | Performed by: NURSE PRACTITIONER

## 2022-07-18 PROCEDURE — 1160F RVW MEDS BY RX/DR IN RCRD: CPT | Mod: CPTII,S$GLB,, | Performed by: NURSE PRACTITIONER

## 2022-07-18 PROCEDURE — 1101F PR PT FALLS ASSESS DOC 0-1 FALLS W/OUT INJ PAST YR: ICD-10-PCS | Mod: CPTII,S$GLB,, | Performed by: NURSE PRACTITIONER

## 2022-07-18 PROCEDURE — 1157F ADVNC CARE PLAN IN RCRD: CPT | Mod: CPTII,S$GLB,, | Performed by: NURSE PRACTITIONER

## 2022-07-18 PROCEDURE — 3075F PR MOST RECENT SYSTOLIC BLOOD PRESS GE 130-139MM HG: ICD-10-PCS | Mod: CPTII,S$GLB,, | Performed by: NURSE PRACTITIONER

## 2022-07-18 PROCEDURE — 99999 PR PBB SHADOW E&M-EST. PATIENT-LVL IV: CPT | Mod: PBBFAC,,, | Performed by: NURSE PRACTITIONER

## 2022-07-18 PROCEDURE — 3008F PR BODY MASS INDEX (BMI) DOCUMENTED: ICD-10-PCS | Mod: CPTII,S$GLB,, | Performed by: NURSE PRACTITIONER

## 2022-07-18 PROCEDURE — 3288F FALL RISK ASSESSMENT DOCD: CPT | Mod: CPTII,S$GLB,, | Performed by: NURSE PRACTITIONER

## 2022-07-18 PROCEDURE — 1159F PR MEDICATION LIST DOCUMENTED IN MEDICAL RECORD: ICD-10-PCS | Mod: CPTII,S$GLB,, | Performed by: NURSE PRACTITIONER

## 2022-07-18 PROCEDURE — 3078F DIAST BP <80 MM HG: CPT | Mod: CPTII,S$GLB,, | Performed by: NURSE PRACTITIONER

## 2022-07-18 PROCEDURE — 4010F PR ACE/ARB THEARPY RXD/TAKEN: ICD-10-PCS | Mod: CPTII,S$GLB,, | Performed by: NURSE PRACTITIONER

## 2022-07-18 PROCEDURE — 1160F PR REVIEW ALL MEDS BY PRESCRIBER/CLIN PHARMACIST DOCUMENTED: ICD-10-PCS | Mod: CPTII,S$GLB,, | Performed by: NURSE PRACTITIONER

## 2022-07-18 PROCEDURE — 36592 COLLECT BLOOD FROM PICC: CPT

## 2022-07-18 PROCEDURE — 1126F AMNT PAIN NOTED NONE PRSNT: CPT | Mod: CPTII,S$GLB,, | Performed by: NURSE PRACTITIONER

## 2022-07-18 PROCEDURE — 3075F SYST BP GE 130 - 139MM HG: CPT | Mod: CPTII,S$GLB,, | Performed by: NURSE PRACTITIONER

## 2022-07-18 PROCEDURE — 1157F PR ADVANCE CARE PLAN OR EQUIV PRESENT IN MEDICAL RECORD: ICD-10-PCS | Mod: CPTII,S$GLB,, | Performed by: NURSE PRACTITIONER

## 2022-07-18 PROCEDURE — 1126F PR PAIN SEVERITY QUANTIFIED, NO PAIN PRESENT: ICD-10-PCS | Mod: CPTII,S$GLB,, | Performed by: NURSE PRACTITIONER

## 2022-07-18 PROCEDURE — 63600175 PHARM REV CODE 636 W HCPCS: Performed by: NURSE PRACTITIONER

## 2022-07-18 PROCEDURE — 3078F PR MOST RECENT DIASTOLIC BLOOD PRESSURE < 80 MM HG: ICD-10-PCS | Mod: CPTII,S$GLB,, | Performed by: NURSE PRACTITIONER

## 2022-07-18 PROCEDURE — 3008F BODY MASS INDEX DOCD: CPT | Mod: CPTII,S$GLB,, | Performed by: NURSE PRACTITIONER

## 2022-07-18 PROCEDURE — 4010F ACE/ARB THERAPY RXD/TAKEN: CPT | Mod: CPTII,S$GLB,, | Performed by: NURSE PRACTITIONER

## 2022-07-18 PROCEDURE — 3288F PR FALLS RISK ASSESSMENT DOCUMENTED: ICD-10-PCS | Mod: CPTII,S$GLB,, | Performed by: NURSE PRACTITIONER

## 2022-07-18 PROCEDURE — 25000003 PHARM REV CODE 250: Performed by: NURSE PRACTITIONER

## 2022-07-18 PROCEDURE — 99999 PR PBB SHADOW E&M-EST. PATIENT-LVL IV: ICD-10-PCS | Mod: PBBFAC,,, | Performed by: NURSE PRACTITIONER

## 2022-07-18 PROCEDURE — 99214 PR OFFICE/OUTPT VISIT, EST, LEVL IV, 30-39 MIN: ICD-10-PCS | Mod: 25,S$GLB,, | Performed by: NURSE PRACTITIONER

## 2022-07-18 RX ORDER — HEPARIN 100 UNIT/ML
500 SYRINGE INTRAVENOUS
Status: CANCELLED | OUTPATIENT
Start: 2022-07-18

## 2022-07-18 RX ORDER — SODIUM CHLORIDE 0.9 % (FLUSH) 0.9 %
10 SYRINGE (ML) INJECTION
Status: COMPLETED | OUTPATIENT
Start: 2022-07-18 | End: 2022-07-18

## 2022-07-18 RX ORDER — SODIUM CHLORIDE 0.9 % (FLUSH) 0.9 %
10 SYRINGE (ML) INJECTION
Status: CANCELLED | OUTPATIENT
Start: 2022-07-18

## 2022-07-18 RX ORDER — HEPARIN 100 UNIT/ML
500 SYRINGE INTRAVENOUS
Status: COMPLETED | OUTPATIENT
Start: 2022-07-18 | End: 2022-07-18

## 2022-07-18 RX ORDER — HYDROCODONE BITARTRATE AND ACETAMINOPHEN 500; 5 MG/1; MG/1
TABLET ORAL ONCE
Status: CANCELLED | OUTPATIENT
Start: 2022-07-18 | End: 2022-07-18

## 2022-07-18 RX ADMIN — HEPARIN 500 UNITS: 100 SYRINGE at 10:07

## 2022-07-18 RX ADMIN — Medication 10 ML: at 10:07

## 2022-07-18 NOTE — DISCHARGE INSTRUCTIONS
[facility]  [address] [facility]  [address]  [phone] phone     [phone] fax  Hours of Operation: Monday- Friday 8:00am- 5:00pm  After hours phone  [phone]  Hematology / Oncology Physicians on call      EDUARDO [address] Dr., TERI Spangler, TERI Arellano, MARIA LUISA Griffith    Please call with any concerns regarding your appointment today.        FALL PREVENTION   Falls often occur due to slipping, tripping or losing your balance. Here are ways to reduce your risk of falling again.   Was there anything that caused your fall that can be fixed, removed or replaced?   Make your home safe by keeping walkways clear of objects you may trip over.   Use non-slip pads under rugs.   Do not walk in poorly lit areas.   Do not stand on chairs or wobbly ladders.   Use caution when reaching overhead or looking upward. This position can cause a loss of balance.   Be sure your shoes fit properly, have non-slip bottoms and are in good condition.   Be cautious when going up and down stairs, curbs, and when walking on uneven sidewalks.   If your balance is poor, consider using a cane or walker.   If your fall was related to alcohol use, stop or limit alcohol intake.   If your fall was related to use of sleeping medicines, talk to your doctor about this. You may need to reduce your dosage at bedtime if you awaken during the night to go to the bathroom.   To reduce the need for nighttime bathroom trips:   Avoid drinking fluids for several hours before going to bed   Empty your bladder before going to bed   Men can keep a urinal at the bedside   © 7958-2236 [facility] [facility], [address] [place] [address], [place] [address]. All rights reserved. This information is not intended as a substitute for professional medical care. Always follow your healthcare professional's instructions.

## 2022-07-18 NOTE — ASSESSMENT & PLAN NOTE
NGS Guardant with NATY, PDL1 CPS 2. No NTRK mutation detected.   On palliative chemotherapy with cisplatin gemcitabine

## 2022-07-18 NOTE — NURSING
Patient here today for labs to be drawn from PICC. Patient and second RN verified correct labels placed on tubes. Both lumens flushed per protocol NS and Heparin 500 units as documented on MAR. Vela needle removed and intact. Patient tolerated well. Patient escorted in w/c by RN to lobby for patient to see provider for follow up.

## 2022-07-18 NOTE — ASSESSMENT & PLAN NOTE
"On apixaban 10 mg b.i.d. now down to 5 mg b.i.d. after 1 week.  Resolution of pain and edema.  Continue anticoagulation indefinitely given malignancy metastatic on palliative treatment.  Monitor for any signs or symptoms of bleeding.    Received report from Radiology of "New segmental left lower lobe pulmonary embolus.  New left internal iliac vein thrombus" as compared to her prior scan 4/2022.  She had known development of acute DVT started on apixaban per above likely developed at same time and therefore not failure of anticoagulation.  Will call patient to inform her unsure clinical stability and that she has continued on anticoagulation.  "

## 2022-07-19 ENCOUNTER — INFUSION (OUTPATIENT)
Dept: INFUSION THERAPY | Facility: HOSPITAL | Age: 72
End: 2022-07-19
Attending: INTERNAL MEDICINE
Payer: MEDICARE

## 2022-07-19 VITALS
HEART RATE: 87 BPM | SYSTOLIC BLOOD PRESSURE: 161 MMHG | WEIGHT: 158.94 LBS | HEIGHT: 67 IN | OXYGEN SATURATION: 96 % | BODY MASS INDEX: 24.94 KG/M2 | DIASTOLIC BLOOD PRESSURE: 89 MMHG | RESPIRATION RATE: 18 BRPM | TEMPERATURE: 97 F

## 2022-07-19 DIAGNOSIS — D64.81 ANEMIA ASSOCIATED WITH CHEMOTHERAPY: Primary | ICD-10-CM

## 2022-07-19 DIAGNOSIS — T45.1X5A ANEMIA ASSOCIATED WITH CHEMOTHERAPY: Primary | ICD-10-CM

## 2022-07-19 DIAGNOSIS — C23 ADENOCARCINOMA OF GALLBLADDER: ICD-10-CM

## 2022-07-19 PROCEDURE — 36430 TRANSFUSION BLD/BLD COMPNT: CPT

## 2022-07-19 PROCEDURE — 25000003 PHARM REV CODE 250: Performed by: INTERNAL MEDICINE

## 2022-07-19 PROCEDURE — A4216 STERILE WATER/SALINE, 10 ML: HCPCS | Performed by: NURSE PRACTITIONER

## 2022-07-19 PROCEDURE — 25000003 PHARM REV CODE 250: Performed by: NURSE PRACTITIONER

## 2022-07-19 PROCEDURE — 63600175 PHARM REV CODE 636 W HCPCS: Performed by: NURSE PRACTITIONER

## 2022-07-19 RX ORDER — SODIUM CHLORIDE 0.9 % (FLUSH) 0.9 %
10 SYRINGE (ML) INJECTION
Status: COMPLETED | OUTPATIENT
Start: 2022-07-19 | End: 2022-07-19

## 2022-07-19 RX ORDER — SODIUM CHLORIDE 0.9 % (FLUSH) 0.9 %
10 SYRINGE (ML) INJECTION
Status: CANCELLED | OUTPATIENT
Start: 2022-07-19

## 2022-07-19 RX ORDER — HYDROCODONE BITARTRATE AND ACETAMINOPHEN 500; 5 MG/1; MG/1
TABLET ORAL ONCE
Status: COMPLETED | OUTPATIENT
Start: 2022-07-19 | End: 2022-07-19

## 2022-07-19 RX ORDER — HEPARIN 100 UNIT/ML
500 SYRINGE INTRAVENOUS
Status: CANCELLED | OUTPATIENT
Start: 2022-07-19

## 2022-07-19 RX ORDER — HEPARIN 100 UNIT/ML
500 SYRINGE INTRAVENOUS
Status: COMPLETED | OUTPATIENT
Start: 2022-07-19 | End: 2022-07-19

## 2022-07-19 RX ADMIN — Medication 500 UNITS: at 11:07

## 2022-07-19 RX ADMIN — Medication 10 ML: at 11:07

## 2022-07-19 RX ADMIN — SODIUM CHLORIDE: 0.9 INJECTION, SOLUTION INTRAVENOUS at 09:07

## 2022-07-19 NOTE — DISCHARGE INSTRUCTIONS
.[facility]  [address] [facility]  [address]  [phone] phone     [phone] fax  Hours of Operation: Monday- Friday 8:00am- 5:00pm  After hours phone  [phone]  Hematology / Oncology Physicians on call    Dr. Samson Hutchins      Nurse Practitioners:    Gracie Spangler, TERI Song, TERI Solomon, TERI Arellano, TERI Andrea, PA      Please don't hesitate to call if you have any concerns.   .FALL PREVENTION   Falls often occur due to slipping, tripping or losing your balance. Here are ways to reduce your risk of falling again.   Was there anything that caused your fall that can be fixed, removed or replaced?   Make your home safe by keeping walkways clear of objects you may trip over.   Use non-slip pads under rugs.   Do not walk in poorly lit areas.   Do not stand on chairs or wobbly ladders.   Use caution when reaching overhead or looking upward. This position can cause a loss of balance.   Be sure your shoes fit properly, have non-slip bottoms and are in good condition.   Be cautious when going up and down stairs, curbs, and when walking on uneven sidewalks.   If your balance is poor, consider using a cane or walker.   If your fall was related to alcohol use, stop or limit alcohol intake.   If your fall was related to use of sleeping medicines, talk to your doctor about this. You may need to reduce your dosage at bedtime if you awaken during the night to go to the bathroom.   To reduce the need for nighttime bathroom trips:   Avoid drinking fluids for several hours before going to bed   Empty your bladder before going to bed   Men can keep a urinal at the bedside   © 0613-2074 [employer] [facility], [address] [place] [address], [place] [address]. All rights reserved. This information is not intended as a substitute for professional medical care. Always follow your healthcare professional's instructions.  .WAYS  TO HELP PREVENT INFECTION        WASH YOUR HANDS OFTEN DURING THE DAY, ESPECIALLY BEFORE YOU EAT, AFTER USING THE BATHROOM, AND AFTER TOUCHING ANIMALS    STAY AWAY FROM PEOPLE WHO HAVE ILLNESSES YOU CAN CATCH; SUCH AS COLDS, FLU, CHICKEN POX    TRY TO AVOID CROWDS    STAY AWAY FROM CHILDREN WHO RECENTLY HAVE RECEIVED LIVE VIRUS VACCINES    MAINTAIN GOOD MOUTH CARE    DO NOT SQUEEZE OR SCRATCH PIMPLES    CLEAN CUTS & SCRAPES RIGHT AWAY AND DAILY UNTIL HEALED WITH WARM WATER, SOAP & AN ANTISEPTIC    AVOID CONTACT WITH LITTER BOXES, BIRD CAGES, & FISH TANKS    AVOID STANDING WATER, IE., BIRD BATHS, FLOWER POTS/VASES, OR HUMIDIFIERS    WEAR GLOVES WHEN GARDENING OR CLEANING UP AFTER OTHERS, ESPECIALLY BABIES & SMALL CHILDREN    DO NOT EAT RAW FISH, SEAFOOD, MEAT, OR EGGS

## 2022-07-19 NOTE — PLAN OF CARE
Problem: Adult Inpatient Plan of Care  Goal: Plan of Care Review  Description: Pt here for gemzar/cisplatin  Outcome: Ongoing, Progressing  Flowsheets (Taken 7/19/2022 0947)  Plan of Care Reviewed With:   patient   daughter  Goal: Patient-Specific Goal (Individualized)  Description: Feet up/pillow/blanket  Outcome: Ongoing, Progressing  Flowsheets (Taken 7/19/2022 0947)  Anxieties, Fears or Concerns: pt states she is feeling very tired  Individualized Care Needs: legs elevated, warm blanket/pillow and apple juice provided  Patient-Specific Goals (Include Timeframe): pt will tolerate blood transfusion with no adverse reaction  Goal: Absence of Hospital-Acquired Illness or Injury  Outcome: Ongoing, Progressing  Goal: Optimal Comfort and Wellbeing  Outcome: Ongoing, Progressing     Problem: Anemia (Chemotherapy Effects)  Goal: Anemia Symptom Improvement  Outcome: Ongoing, Progressing

## 2022-07-22 ENCOUNTER — PATIENT MESSAGE (OUTPATIENT)
Dept: HEMATOLOGY/ONCOLOGY | Facility: CLINIC | Age: 72
End: 2022-07-22
Payer: MEDICARE

## 2022-07-22 DIAGNOSIS — I82.4Z1 DVT, LOWER EXTREMITY, DISTAL, ACUTE, RIGHT: ICD-10-CM

## 2022-07-22 DIAGNOSIS — C23 ADENOCARCINOMA OF GALLBLADDER: ICD-10-CM

## 2022-07-22 PROBLEM — C78.6 CANCER OF PERITONEUM/RETROPERITONEUM, SECONDARY: Status: ACTIVE | Noted: 2022-07-22

## 2022-07-22 NOTE — ASSESSMENT & PLAN NOTE
NGS Guardant with NATY, PDL1 CPS 2. No NTRK mutation detected.     On palliative chemotherapy with Gemzar/Cisplatin.    Recent CT C/A/P reveal mixed response with significant decrease in size of 2 left hepatic lesions and complete resolution of another; also new development of 2 left hepatic lesions and a right liver lesion. PE & DVT also noted on CT.     Will repeat restaging CTs in 2 months for short term follow up.

## 2022-07-22 NOTE — PROGRESS NOTES
"Subjective:       Patient ID: Madelyn Serna is a 71 y.o. female.    Chief Complaint:   1. Adenocarcinoma of gallbladder  Stage IV, pT4 NX pM1     Current Treatment:  OP GEMCITABINE CISPLATIN Q3W  Eliquis 5mg po BID    Treatment History:  S/p Laparoscopic cholecystectomy, 02/04/2022, [facility]    HPI: This is a 71 year old woman currently on treatment for Stage IV adenocarcinoma of the gallbladder. She presented to [facility] on 2/4/2022 after 4 days of right lower quadrant abdominal pain; she experienced an episode of vomiting per the ER notes. Labs revealed leukocytosis WBC 18.7, hemoglobin 13, , renal function intact GFR over 60, mild alkaline phosphatase elevation 130, normal transaminases and bilirubin. Albumin 4.5, calcium 10.4.  Urinalysis positive for E coli pansensitive.  Blood cultures negative.    CT A/P with contrast showed distended gallbladder extensive cholelithiasis including 17 mm stone in the region of gallbladder neck.  Pericholecystic stranding and small adjacent fluid.  Lymph nodes noted to be unremarkable.  Emphysema lung bases. She underwent emergency surgery with laparoscopic cholecystectomy.  Surgical note mentions during this maneuver would appear to be a peritoneal nodule was discovered.  The peritoneal nodule was dissected from all surrounding structures with the LigaSure device.  The nodule is passed off the table and sent separately as a specimen.    Pathology demonstrated "gallbladder cholecystectomy adenocarcinoma IHC positive for CK7 & CDX2 and negative for CK 20, TTF1 and PAX8. Tumor size at least 2.2 cm. The tumor invades the liver.  Lymphovascular invasion present. Perineural invasion not identified.  Margins cannot be assessed. Regional lymph nodes not applicable. Peritoneal nodule excisional biopsy positive for metastatic adenocarcinoma consistent with origin from gallbladder.     Staging PET performed on 3/2/2022 indicated uptake at right upper " quadrant along liver margin site of prior cholecystectomy with postoperative fluid and peritoneal implants eleanor hepatis region potential/likely postoperative changes, right internal mammary chain and cardiophrenic lymph nodes. Her case was discussed at Tumor Board where it was decided to offer her palliative chemotherapy. She was started on Cisplatin/Gemzar every 3 weeks on 3/24/2022.    Restaging CT C/A/P performed on 7/12/2022 revealed interval reduction in size of left hepatic lobe lesions with one measuring 3mm (previously 10mm) and another measuring 4mm (previously 8mm); a third lesion previously measuring 8mm is no longer appreciated. However, there are 2 new hepatic lesions, one measuring 3mm and another 6mm; there is also a 1.4cm right hepatic lesion not previously seen. She also had a left lower lobe PE and left internal iliac vein DVT noted and was started on Eliquis.     Her primary oncologist is Dr. Marcos.    Interval History: Patient presents for follow up on Cisplatin/Gemzar; she is scheduled to receive C5D1 tomorrow. She presents in a wheelchair with her daughter and has no complaints today. She reports a good appetite with no taste changes. She also reports normal BMs. Her cough secondary to sinus issues is improving. She denies tinnitus & neuropathy. She reports an increase in her energy and overall feeling better since receiving blood last week. Reviewed labs with her: H&H 8.7 & 27.5; plts WNL at 450k. All 3 levels retested per lab for accuracy. WBC 5.78, ANC 3100. CMP stable.     The patient location is: [facility]  The chief complaint leading to consultation is: gallbladder cancer    Visit type: audiovisual    Face to Face time with patient: 12 minutes  43 minutes of total time spent on the encounter, which includes face to face time and non-face to face time preparing to see the patient (eg, review of tests), Obtaining and/or reviewing separately obtained history, Documenting clinical information in  the electronic or other health record, Independently interpreting results (not separately reported) and communicating results to the patient/family/caregiver, or Care coordination (not separately reported).     Each patient to whom he or she provides medical services by telemedicine is:  (1) informed of the relationship between the physician and patient and the respective role of any other health care provider with respect to management of the patient; and (2) notified that he or she may decline to receive medical services by telemedicine and may withdraw from such care at any time.    Social History     Socioeconomic History    Marital status:    Tobacco Use    Smoking status: Current Every Day Smoker    Smokeless tobacco: Former User     Past Medical History:   Diagnosis Date    Cancer of gallbladder     Essential (primary) hypertension     Infection following a procedure, deep incisional surgical site, initial encounter 3/3/2022    Romaine pus on bandage with induration to RUQ. She has already completed a 5 day course of Cipro which completed on 2/27. Recommend prompt evaluation with ED and whether or not imaging is warranted.     No family history on file.    No past surgical history on file.    Review of Systems   Constitutional: Positive for fatigue. Negative for appetite change.   HENT: Negative.  Negative for tinnitus.    Eyes: Negative.    Respiratory: Positive for cough (secondary to sinus issues; improving).    Cardiovascular: Negative.    Gastrointestinal: Negative for abdominal pain, constipation, diarrhea, nausea and vomiting.   Endocrine: Negative.    Genitourinary: Negative.    Musculoskeletal: Negative.    Skin: Negative.    Allergic/Immunologic: Negative.    Neurological: Negative.  Negative for dizziness, weakness, light-headedness, numbness and headaches.   Hematological: Negative.    Psychiatric/Behavioral: Negative.        Medication List with Changes/Refills   Current Medications     ALBUTEROL (PROVENTIL/VENTOLIN HFA) 90 MCG/ACTUATION INHALER    INHALE TWO PUFFS FOUR TIMES DAILY AS NEEDED FOR WHEEZING    AMLODIPINE (NORVASC) 5 MG TABLET    amlodipine Take 1 time per day No date recorded tablet 1 time per day No route recorded No set duration recorded No set duration amount recorded active 5 mg    APIXABAN (ELIQUIS) 5 MG TAB    Take 1 tablet (5 mg total) by mouth 2 (two) times daily.    CLONAZEPAM (KLONOPIN) 0.5 MG TABLET    clonazepam Take 2 times per day No date recorded tablet 2 times per day No route recorded No set duration recorded No set duration amount recorded active 0.5 mg    CYPROHEPTADINE (PERIACTIN) 4 MG TABLET    Take 4 mg by mouth 2 (two) times daily.    DEXAMETHASONE (DECADRON) 4 MG TAB    Take 2 tablets (8mg total) by mouth once daily. Take as directed on days 2, 3, 4 and days 9, 10, 11 of your chemotherapy cycle.    DEXAMETHASONE (DECADRON) 4 MG TAB    Take 2 tablets (8 mg total) by mouth once daily. Take as directed on days 2, 3, 4 and days 9, 10, 11 of your chemotherapy cycle.    DICLOFENAC SODIUM (VOLTAREN) 1 % GEL    APPLY 1 GRAM TO AFFECTED AREA FOUR TIMES DAILY AS NEEDED    IPRATROPIUM (ATROVENT) 21 MCG (0.03 %) NASAL SPRAY        LABETALOL (NORMODYNE) 300 MG TABLET    labetalol Take 2 times per day No date recorded tablet 2 times per day No route recorded No set duration recorded No set duration amount recorded suspended 300 mg    LISINOPRIL 10 MG TABLET    Take 1 tablet (10 mg total) by mouth once daily.    MIRTAZAPINE (REMERON) 7.5 MG TAB    Take 1 tablet (7.5 mg total) by mouth nightly.    MONTELUKAST (SINGULAIR) 10 MG TABLET    Take 10 mg by mouth once daily.    NALOXONE (NARCAN) 4 MG/ACTUATION SPRY    USE ONE SPRAY IN ONE NOSTRIL AS DIRECTED UPON SIGNS OF OPIOID OVERDOSE CALL 911    ONDANSETRON (ZOFRAN-ODT) 8 MG TBDL    Take 1 tablet (8 mg total) by mouth every 6 (six) hours as needed.    ONDANSETRON (ZOFRAN-ODT) 8 MG TBDL    Take 1 tablet (8 mg total) by mouth every  8 (eight) hours as needed (nausea/vomiting).    OXYCODONE (ROXICODONE) 5 MG IMMEDIATE RELEASE TABLET    Take 1 tablet (5 mg total) by mouth every 12 (twelve) hours as needed for Pain. q 12h prn severe pain    PROCHLORPERAZINE (COMPAZINE) 5 MG TABLET    Take 1 tablet (5 mg total) by mouth every 6 (six) hours as needed for Nausea. May take every 6 hours scheduled for prevention of nausea.    VENLAFAXINE (EFFEXOR-XR) 75 MG 24 HR CAPSULE    Take 1 capsule (75 mg total) by mouth once daily.     Objective:     There were no vitals filed for this visit.  Lab Results   Component Value Date    WBC 5.78 07/25/2022    HGB 8.7 (L) 07/25/2022    HCT 27.5 (L) 07/25/2022    MCV 98 07/25/2022     07/25/2022     CMP  Sodium   Date Value Ref Range Status   07/25/2022 143 136 - 145 mmol/L Final     Potassium   Date Value Ref Range Status   07/25/2022 3.8 3.5 - 5.1 mmol/L Final     Chloride   Date Value Ref Range Status   07/25/2022 100 95 - 110 mmol/L Final     CO2   Date Value Ref Range Status   07/25/2022 32 (H) 23 - 29 mmol/L Final     Glucose   Date Value Ref Range Status   07/25/2022 124 (H) 70 - 110 mg/dL Final     BUN   Date Value Ref Range Status   07/25/2022 10 8 - 23 mg/dL Final     Creatinine   Date Value Ref Range Status   07/25/2022 0.8 0.5 - 1.4 mg/dL Final     Calcium   Date Value Ref Range Status   07/25/2022 9.2 8.7 - 10.5 mg/dL Final     Total Protein   Date Value Ref Range Status   07/25/2022 6.6 6.0 - 8.4 g/dL Final     Albumin   Date Value Ref Range Status   07/25/2022 2.9 (L) 3.5 - 5.2 g/dL Final     Total Bilirubin   Date Value Ref Range Status   07/25/2022 0.3 0.1 - 1.0 mg/dL Final     Comment:     For infants and newborns, interpretation of results should be based  on gestational age, weight and in agreement with clinical  observations.    Premature Infant recommended reference ranges:  Up to 24 hours.............<8.0 mg/dL  Up to 48 hours............<12.0 mg/dL  3-5 days..................<15.0  mg/dL  6-29 days.................<15.0 mg/dL       Alkaline Phosphatase   Date Value Ref Range Status   07/25/2022 97 55 - 135 U/L Final     AST   Date Value Ref Range Status   07/25/2022 10 10 - 40 U/L Final     ALT   Date Value Ref Range Status   07/25/2022 7 (L) 10 - 44 U/L Final     Anion Gap   Date Value Ref Range Status   07/25/2022 11 8 - 16 mmol/L Final     eGFR if    Date Value Ref Range Status   07/25/2022 >60 >60 mL/min/1.73 m^2 Final     eGFR if non    Date Value Ref Range Status   07/25/2022 >60 >60 mL/min/1.73 m^2 Final     Comment:     Calculation used to obtain the estimated glomerular filtration  rate (eGFR) is the CKD-EPI equation.        Physical Exam     Unable to assess due to virtual visit.    (2) Ambulatory and capable of self care, unable to carry out work activity, up and about > 50% or waking hours  Assessment:     Problem List Items Addressed This Visit        Hematology    Acute deep vein thrombosis (DVT) of iliac vein of left lower extremity     Incidentally noted on restaging CTs. Started on Eliquis 5mg po BID.            Pulmonary embolism without acute cor pulmonale     Noted on restaging CT C/A/P. Started on Eliquis 5mg po BID.               Oncology    Adenocarcinoma of gallbladder - Primary     NGS Guardant with NATY, PDL1 CPS 2. No NTRK mutation detected.     On palliative chemotherapy with Gemzar/Cisplatin.    Recent CT C/A/P reveal mixed response with significant decrease in size of 2 left hepatic lesions and complete resolution of another; also new development of 2 left hepatic lesions and a right liver lesion. PE & DVT also noted on CT.     Will repeat restaging CTs in 2 months for short term follow up.            Cancer of peritoneum/retroperitoneum, secondary    Secondary liver cancer     Interval decrease in size of 2 liver lesions on recent restaging CTs; one liver lesion previously seen on PET not visible on CT. Also 3 new liver lesions  noted, 2 on the left lobe, 1 on the right.     Will repeat restaging CTs in 2 months for short term follow up.                Plan:     Adenocarcinoma of gallbladder    Cancer of peritoneum/retroperitoneum, secondary    Secondary liver cancer    Pulmonary embolism without acute cor pulmonale, unspecified chronicity, unspecified pulmonary embolism type    Acute deep vein thrombosis (DVT) of iliac vein of left lower extremity    Labs reviewed.  Ok to proceed with C5D1 of Cisplatin/Gemzar tomorrow.   Follow up in 1 week with Mg, CBC and Comprehensive Metabolic Panel prior to C5D8.  Repeat restaging CT C/A/P in 2 months for short-term follow up.     I will review assessment/plan with collaborating physician Dr. Martinez.    MARIA LUISA Brown

## 2022-07-25 ENCOUNTER — OFFICE VISIT (OUTPATIENT)
Dept: HEMATOLOGY/ONCOLOGY | Facility: CLINIC | Age: 72
End: 2022-07-25
Payer: MEDICARE

## 2022-07-25 ENCOUNTER — INFUSION (OUTPATIENT)
Dept: INFUSION THERAPY | Facility: HOSPITAL | Age: 72
End: 2022-07-25
Attending: INTERNAL MEDICINE
Payer: MEDICARE

## 2022-07-25 ENCOUNTER — EXTERNAL HOME HEALTH (OUTPATIENT)
Dept: HOME HEALTH SERVICES | Facility: HOSPITAL | Age: 72
End: 2022-07-25
Payer: MEDICARE

## 2022-07-25 VITALS
RESPIRATION RATE: 18 BRPM | TEMPERATURE: 99 F | SYSTOLIC BLOOD PRESSURE: 140 MMHG | HEART RATE: 86 BPM | OXYGEN SATURATION: 96 % | DIASTOLIC BLOOD PRESSURE: 86 MMHG

## 2022-07-25 DIAGNOSIS — C78.7 SECONDARY LIVER CANCER: ICD-10-CM

## 2022-07-25 DIAGNOSIS — C23 ADENOCARCINOMA OF GALLBLADDER: Primary | ICD-10-CM

## 2022-07-25 DIAGNOSIS — C78.6 CANCER OF PERITONEUM/RETROPERITONEUM, SECONDARY: ICD-10-CM

## 2022-07-25 DIAGNOSIS — I26.99 PULMONARY EMBOLISM WITHOUT ACUTE COR PULMONALE, UNSPECIFIED CHRONICITY, UNSPECIFIED PULMONARY EMBOLISM TYPE: ICD-10-CM

## 2022-07-25 DIAGNOSIS — I82.422 ACUTE DEEP VEIN THROMBOSIS (DVT) OF ILIAC VEIN OF LEFT LOWER EXTREMITY: ICD-10-CM

## 2022-07-25 LAB
ALBUMIN SERPL BCP-MCNC: 2.9 G/DL (ref 3.5–5.2)
ALP SERPL-CCNC: 97 U/L (ref 55–135)
ALT SERPL W/O P-5'-P-CCNC: 7 U/L (ref 10–44)
ANION GAP SERPL CALC-SCNC: 11 MMOL/L (ref 8–16)
AST SERPL-CCNC: 10 U/L (ref 10–40)
BILIRUB SERPL-MCNC: 0.3 MG/DL (ref 0.1–1)
BUN SERPL-MCNC: 10 MG/DL (ref 8–23)
CALCIUM SERPL-MCNC: 9.2 MG/DL (ref 8.7–10.5)
CHLORIDE SERPL-SCNC: 100 MMOL/L (ref 95–110)
CO2 SERPL-SCNC: 32 MMOL/L (ref 23–29)
CREAT SERPL-MCNC: 0.8 MG/DL (ref 0.5–1.4)
ERYTHROCYTE [DISTWIDTH] IN BLOOD BY AUTOMATED COUNT: 16.4 % (ref 11.5–14.5)
EST. GFR  (AFRICAN AMERICAN): >60 ML/MIN/1.73 M^2
EST. GFR  (NON AFRICAN AMERICAN): >60 ML/MIN/1.73 M^2
GLUCOSE SERPL-MCNC: 124 MG/DL (ref 70–110)
HCT VFR BLD AUTO: 27.5 % (ref 37–48.5)
HGB BLD-MCNC: 8.7 G/DL (ref 12–16)
IMM GRANULOCYTES # BLD AUTO: 0.03 K/UL (ref 0–0.04)
MAGNESIUM SERPL-MCNC: 1.3 MG/DL (ref 1.6–2.6)
MCH RBC QN AUTO: 31.1 PG (ref 27–31)
MCHC RBC AUTO-ENTMCNC: 31.6 G/DL (ref 32–36)
MCV RBC AUTO: 98 FL (ref 82–98)
NEUTROPHILS # BLD AUTO: 3.1 K/UL (ref 1.8–7.7)
PLATELET # BLD AUTO: 450 K/UL (ref 150–450)
PMV BLD AUTO: 9.1 FL (ref 9.2–12.9)
POTASSIUM SERPL-SCNC: 3.8 MMOL/L (ref 3.5–5.1)
PROT SERPL-MCNC: 6.6 G/DL (ref 6–8.4)
RBC # BLD AUTO: 2.8 M/UL (ref 4–5.4)
SODIUM SERPL-SCNC: 143 MMOL/L (ref 136–145)
WBC # BLD AUTO: 5.78 K/UL (ref 3.9–12.7)

## 2022-07-25 PROCEDURE — 80053 COMPREHEN METABOLIC PANEL: CPT | Performed by: NURSE PRACTITIONER

## 2022-07-25 PROCEDURE — 83735 ASSAY OF MAGNESIUM: CPT | Performed by: NURSE PRACTITIONER

## 2022-07-25 PROCEDURE — 1157F PR ADVANCE CARE PLAN OR EQUIV PRESENT IN MEDICAL RECORD: ICD-10-PCS | Mod: CPTII,95,, | Performed by: NURSE PRACTITIONER

## 2022-07-25 PROCEDURE — 99214 PR OFFICE/OUTPT VISIT, EST, LEVL IV, 30-39 MIN: ICD-10-PCS | Mod: 95,,, | Performed by: NURSE PRACTITIONER

## 2022-07-25 PROCEDURE — 36592 COLLECT BLOOD FROM PICC: CPT

## 2022-07-25 PROCEDURE — 1157F ADVNC CARE PLAN IN RCRD: CPT | Mod: CPTII,95,, | Performed by: NURSE PRACTITIONER

## 2022-07-25 PROCEDURE — 4010F PR ACE/ARB THEARPY RXD/TAKEN: ICD-10-PCS | Mod: CPTII,95,, | Performed by: NURSE PRACTITIONER

## 2022-07-25 PROCEDURE — A4216 STERILE WATER/SALINE, 10 ML: HCPCS | Performed by: NURSE PRACTITIONER

## 2022-07-25 PROCEDURE — 85027 COMPLETE CBC AUTOMATED: CPT | Performed by: NURSE PRACTITIONER

## 2022-07-25 PROCEDURE — 4010F ACE/ARB THERAPY RXD/TAKEN: CPT | Mod: CPTII,95,, | Performed by: NURSE PRACTITIONER

## 2022-07-25 PROCEDURE — 1160F RVW MEDS BY RX/DR IN RCRD: CPT | Mod: CPTII,95,, | Performed by: NURSE PRACTITIONER

## 2022-07-25 PROCEDURE — 99214 OFFICE O/P EST MOD 30 MIN: CPT | Mod: 95,,, | Performed by: NURSE PRACTITIONER

## 2022-07-25 PROCEDURE — 1159F MED LIST DOCD IN RCRD: CPT | Mod: CPTII,95,, | Performed by: NURSE PRACTITIONER

## 2022-07-25 PROCEDURE — 1160F PR REVIEW ALL MEDS BY PRESCRIBER/CLIN PHARMACIST DOCUMENTED: ICD-10-PCS | Mod: CPTII,95,, | Performed by: NURSE PRACTITIONER

## 2022-07-25 PROCEDURE — 25000003 PHARM REV CODE 250: Performed by: NURSE PRACTITIONER

## 2022-07-25 PROCEDURE — 1159F PR MEDICATION LIST DOCUMENTED IN MEDICAL RECORD: ICD-10-PCS | Mod: CPTII,95,, | Performed by: NURSE PRACTITIONER

## 2022-07-25 RX ORDER — SODIUM CHLORIDE 0.9 % (FLUSH) 0.9 %
10 SYRINGE (ML) INJECTION
Status: CANCELLED | OUTPATIENT
Start: 2022-07-25

## 2022-07-25 RX ORDER — SODIUM CHLORIDE 0.9 % (FLUSH) 0.9 %
10 SYRINGE (ML) INJECTION
Status: COMPLETED | OUTPATIENT
Start: 2022-07-25 | End: 2022-07-25

## 2022-07-25 RX ORDER — HEPARIN 100 UNIT/ML
500 SYRINGE INTRAVENOUS
Status: CANCELLED | OUTPATIENT
Start: 2022-07-25

## 2022-07-25 RX ADMIN — Medication 10 ML: at 09:07

## 2022-07-25 NOTE — NURSING
Lab obtained from Right upper arm PICC line, 2 patient identifiers obtained, labeled and sent to lab.  Adequate blood return noted. 1 lumen(s) flushed with 10ml NS.  Site without redness, swelling or drainage noted.

## 2022-07-25 NOTE — ASSESSMENT & PLAN NOTE
Interval decrease in size of 2 liver lesions on recent restaging CTs; one liver lesion previously seen on PET not visible on CT. Also 3 new liver lesions noted, 2 on the left lobe, 1 on the right.     Will repeat restaging CTs in 2 months for short term follow up.

## 2022-07-26 ENCOUNTER — INFUSION (OUTPATIENT)
Dept: INFUSION THERAPY | Facility: HOSPITAL | Age: 72
End: 2022-07-26
Attending: INTERNAL MEDICINE
Payer: MEDICARE

## 2022-07-26 VITALS
DIASTOLIC BLOOD PRESSURE: 90 MMHG | RESPIRATION RATE: 18 BRPM | TEMPERATURE: 98 F | OXYGEN SATURATION: 96 % | HEART RATE: 97 BPM | SYSTOLIC BLOOD PRESSURE: 154 MMHG

## 2022-07-26 DIAGNOSIS — C23 ADENOCARCINOMA OF GALLBLADDER: Primary | ICD-10-CM

## 2022-07-26 DIAGNOSIS — C78.6 CANCER OF PERITONEUM/RETROPERITONEUM, SECONDARY: ICD-10-CM

## 2022-07-26 DIAGNOSIS — C78.7 SECONDARY LIVER CANCER: ICD-10-CM

## 2022-07-26 PROCEDURE — 96366 THER/PROPH/DIAG IV INF ADDON: CPT

## 2022-07-26 PROCEDURE — 96367 TX/PROPH/DG ADDL SEQ IV INF: CPT

## 2022-07-26 PROCEDURE — 96417 CHEMO IV INFUS EACH ADDL SEQ: CPT

## 2022-07-26 PROCEDURE — 96413 CHEMO IV INFUSION 1 HR: CPT

## 2022-07-26 PROCEDURE — 96375 TX/PRO/DX INJ NEW DRUG ADDON: CPT

## 2022-07-26 PROCEDURE — 63600175 PHARM REV CODE 636 W HCPCS: Mod: JG | Performed by: NURSE PRACTITIONER

## 2022-07-26 PROCEDURE — 25000003 PHARM REV CODE 250: Performed by: NURSE PRACTITIONER

## 2022-07-26 RX ORDER — HEPARIN 100 UNIT/ML
500 SYRINGE INTRAVENOUS
Status: DISCONTINUED | OUTPATIENT
Start: 2022-07-26 | End: 2022-07-26 | Stop reason: HOSPADM

## 2022-07-26 RX ORDER — SODIUM CHLORIDE 0.9 % (FLUSH) 0.9 %
10 SYRINGE (ML) INJECTION
Status: DISCONTINUED | OUTPATIENT
Start: 2022-07-26 | End: 2022-07-26 | Stop reason: HOSPADM

## 2022-07-26 RX ORDER — SODIUM CHLORIDE 9 MG/ML
500 INJECTION, SOLUTION INTRAVENOUS
Status: COMPLETED | OUTPATIENT
Start: 2022-07-26 | End: 2022-07-26

## 2022-07-26 RX ADMIN — SODIUM CHLORIDE: 9 INJECTION, SOLUTION INTRAVENOUS at 10:07

## 2022-07-26 RX ADMIN — SODIUM CHLORIDE 500 ML: 0.9 INJECTION, SOLUTION INTRAVENOUS at 12:07

## 2022-07-26 RX ADMIN — HEPARIN 500 UNITS: 100 SYRINGE at 08:07

## 2022-07-26 RX ADMIN — GEMCITABINE 1800 MG: 38 INJECTION, SOLUTION INTRAVENOUS at 11:07

## 2022-07-26 RX ADMIN — MAGNESIUM SULFATE HEPTAHYDRATE 500 ML/HR: 500 INJECTION, SOLUTION INTRAMUSCULAR; INTRAVENOUS at 08:07

## 2022-07-26 RX ADMIN — SODIUM CHLORIDE 47 MG: 9 INJECTION, SOLUTION INTRAVENOUS at 11:07

## 2022-07-26 RX ADMIN — APREPITANT 130 MG: 130 INJECTION, EMULSION INTRAVENOUS at 08:07

## 2022-07-26 RX ADMIN — HEPARIN 500 UNITS: 100 SYRINGE at 02:07

## 2022-07-26 RX ADMIN — DEXAMETHASONE SODIUM PHOSPHATE: 4 INJECTION, SOLUTION INTRA-ARTICULAR; INTRALESIONAL; INTRAMUSCULAR; INTRAVENOUS; SOFT TISSUE at 10:07

## 2022-07-26 NOTE — PLAN OF CARE
Problem: Anemia (Chemotherapy Effects)  Goal: Anemia Symptom Improvement  Outcome: Ongoing, Progressing  Intervention: Monitor and Manage Anemia  Flowsheets (Taken 7/26/2022 0905)  Safety Promotion/Fall Prevention:   assistive device/personal item within reach   Fall Risk reviewed with patient/family   in recliner, wheels locked   medications reviewed   instructed to call staff for mobility  Fatigue Management: frequent rest breaks encouraged     Problem: Adult Inpatient Plan of Care  Goal: Plan of Care Review  Description: Pt here for gemzar/cisplatin  Outcome: Ongoing, Progressing  Flowsheets (Taken 7/26/2022 0905)  Plan of Care Reviewed With:   patient   daughter  Goal: Patient-Specific Goal (Individualized)  Description: Feet up/pillow/blanket  Outcome: Ongoing, Progressing  Flowsheets (Taken 7/26/2022 0905)  Anxieties, Fears or Concerns: feeling nauseated today  Individualized Care Needs: feet elevated, warm blanket pillow and snack provided  Patient-Specific Goals (Include Timeframe): tolerate chemo today  Goal: Absence of Hospital-Acquired Illness or Injury  Outcome: Ongoing, Progressing  Intervention: Identify and Manage Fall Risk  Flowsheets (Taken 7/26/2022 0905)  Safety Promotion/Fall Prevention:   assistive device/personal item within reach   Fall Risk reviewed with patient/family   in recliner, wheels locked   medications reviewed   instructed to call staff for mobility  Intervention: Prevent Infection  Flowsheets (Taken 7/26/2022 0905)  Infection Prevention:   environmental surveillance performed   equipment surfaces disinfected   hand hygiene promoted   personal protective equipment utilized  Goal: Optimal Comfort and Wellbeing  Outcome: Ongoing, Progressing  Intervention: Provide Person-Centered Care  Flowsheets (Taken 7/26/2022 0905)  Trust Relationship/Rapport:   care explained   reassurance provided   choices provided   thoughts/feelings acknowledged   emotional support provided   empathic  listening provided   questions answered   questions encouraged     Problem: Neutropenia (Chemotherapy Effects)  Goal: Absence of Infection  Outcome: Ongoing, Progressing  Intervention: Prevent Infection and Maximize Resistance  Flowsheets (Taken 7/26/2022 0905)  Infection Prevention:   environmental surveillance performed   equipment surfaces disinfected   hand hygiene promoted   personal protective equipment utilized     Problem: Fall Injury Risk  Goal: Absence of Fall and Fall-Related Injury  Outcome: Ongoing, Progressing  Intervention: Identify and Manage Contributors  Flowsheets (Taken 7/26/2022 0905)  Self-Care Promotion:   independence encouraged   BADL personal routines maintained   BADL personal objects within reach   safe use of adaptive equipment encouraged  Medication Review/Management: medications reviewed  Intervention: Promote Injury-Free Environment  Flowsheets (Taken 7/26/2022 0905)  Safety Promotion/Fall Prevention:   assistive device/personal item within reach   Fall Risk reviewed with patient/family   in recliner, wheels locked   medications reviewed   instructed to call staff for mobility

## 2022-08-01 ENCOUNTER — INFUSION (OUTPATIENT)
Dept: INFUSION THERAPY | Facility: HOSPITAL | Age: 72
End: 2022-08-01
Attending: INTERNAL MEDICINE
Payer: MEDICARE

## 2022-08-01 ENCOUNTER — OFFICE VISIT (OUTPATIENT)
Dept: HEMATOLOGY/ONCOLOGY | Facility: CLINIC | Age: 72
End: 2022-08-01
Payer: MEDICARE

## 2022-08-01 VITALS
WEIGHT: 155.63 LBS | SYSTOLIC BLOOD PRESSURE: 135 MMHG | DIASTOLIC BLOOD PRESSURE: 83 MMHG | HEART RATE: 109 BPM | TEMPERATURE: 98 F | DIASTOLIC BLOOD PRESSURE: 75 MMHG | HEART RATE: 113 BPM | OXYGEN SATURATION: 95 % | RESPIRATION RATE: 20 BRPM | HEIGHT: 67 IN | OXYGEN SATURATION: 95 % | SYSTOLIC BLOOD PRESSURE: 149 MMHG | TEMPERATURE: 97 F | BODY MASS INDEX: 24.43 KG/M2

## 2022-08-01 DIAGNOSIS — C23 ADENOCARCINOMA OF GALLBLADDER: Primary | ICD-10-CM

## 2022-08-01 DIAGNOSIS — C78.6 CANCER OF PERITONEUM/RETROPERITONEUM, SECONDARY: ICD-10-CM

## 2022-08-01 DIAGNOSIS — G89.3 NEOPLASM RELATED PAIN: ICD-10-CM

## 2022-08-01 DIAGNOSIS — I82.4Z1 DVT, LOWER EXTREMITY, DISTAL, ACUTE, RIGHT: ICD-10-CM

## 2022-08-01 DIAGNOSIS — E87.6 HYPOKALEMIA: ICD-10-CM

## 2022-08-01 DIAGNOSIS — I26.99 PULMONARY EMBOLISM WITHOUT ACUTE COR PULMONALE, UNSPECIFIED CHRONICITY, UNSPECIFIED PULMONARY EMBOLISM TYPE: ICD-10-CM

## 2022-08-01 DIAGNOSIS — C78.7 SECONDARY LIVER CANCER: ICD-10-CM

## 2022-08-01 PROCEDURE — 3079F PR MOST RECENT DIASTOLIC BLOOD PRESSURE 80-89 MM HG: ICD-10-PCS | Mod: CPTII,S$GLB,, | Performed by: INTERNAL MEDICINE

## 2022-08-01 PROCEDURE — 25000003 PHARM REV CODE 250: Performed by: NURSE PRACTITIONER

## 2022-08-01 PROCEDURE — 99999 PR PBB SHADOW E&M-EST. PATIENT-LVL IV: CPT | Mod: PBBFAC,,, | Performed by: INTERNAL MEDICINE

## 2022-08-01 PROCEDURE — 4010F PR ACE/ARB THEARPY RXD/TAKEN: ICD-10-PCS | Mod: CPTII,S$GLB,, | Performed by: INTERNAL MEDICINE

## 2022-08-01 PROCEDURE — 1159F MED LIST DOCD IN RCRD: CPT | Mod: CPTII,S$GLB,, | Performed by: INTERNAL MEDICINE

## 2022-08-01 PROCEDURE — 1160F RVW MEDS BY RX/DR IN RCRD: CPT | Mod: CPTII,S$GLB,, | Performed by: INTERNAL MEDICINE

## 2022-08-01 PROCEDURE — 3077F PR MOST RECENT SYSTOLIC BLOOD PRESSURE >= 140 MM HG: ICD-10-PCS | Mod: CPTII,S$GLB,, | Performed by: INTERNAL MEDICINE

## 2022-08-01 PROCEDURE — 1157F ADVNC CARE PLAN IN RCRD: CPT | Mod: CPTII,S$GLB,, | Performed by: INTERNAL MEDICINE

## 2022-08-01 PROCEDURE — 3079F DIAST BP 80-89 MM HG: CPT | Mod: CPTII,S$GLB,, | Performed by: INTERNAL MEDICINE

## 2022-08-01 PROCEDURE — 3008F BODY MASS INDEX DOCD: CPT | Mod: CPTII,S$GLB,, | Performed by: INTERNAL MEDICINE

## 2022-08-01 PROCEDURE — 1101F PT FALLS ASSESS-DOCD LE1/YR: CPT | Mod: CPTII,S$GLB,, | Performed by: INTERNAL MEDICINE

## 2022-08-01 PROCEDURE — 36591 DRAW BLOOD OFF VENOUS DEVICE: CPT

## 2022-08-01 PROCEDURE — 3008F PR BODY MASS INDEX (BMI) DOCUMENTED: ICD-10-PCS | Mod: CPTII,S$GLB,, | Performed by: INTERNAL MEDICINE

## 2022-08-01 PROCEDURE — 1157F PR ADVANCE CARE PLAN OR EQUIV PRESENT IN MEDICAL RECORD: ICD-10-PCS | Mod: CPTII,S$GLB,, | Performed by: INTERNAL MEDICINE

## 2022-08-01 PROCEDURE — 1126F AMNT PAIN NOTED NONE PRSNT: CPT | Mod: CPTII,S$GLB,, | Performed by: INTERNAL MEDICINE

## 2022-08-01 PROCEDURE — 3288F PR FALLS RISK ASSESSMENT DOCUMENTED: ICD-10-PCS | Mod: CPTII,S$GLB,, | Performed by: INTERNAL MEDICINE

## 2022-08-01 PROCEDURE — 1101F PR PT FALLS ASSESS DOC 0-1 FALLS W/OUT INJ PAST YR: ICD-10-PCS | Mod: CPTII,S$GLB,, | Performed by: INTERNAL MEDICINE

## 2022-08-01 PROCEDURE — 3077F SYST BP >= 140 MM HG: CPT | Mod: CPTII,S$GLB,, | Performed by: INTERNAL MEDICINE

## 2022-08-01 PROCEDURE — A4216 STERILE WATER/SALINE, 10 ML: HCPCS | Performed by: NURSE PRACTITIONER

## 2022-08-01 PROCEDURE — 1126F PR PAIN SEVERITY QUANTIFIED, NO PAIN PRESENT: ICD-10-PCS | Mod: CPTII,S$GLB,, | Performed by: INTERNAL MEDICINE

## 2022-08-01 PROCEDURE — 99215 PR OFFICE/OUTPT VISIT, EST, LEVL V, 40-54 MIN: ICD-10-PCS | Mod: S$GLB,,, | Performed by: INTERNAL MEDICINE

## 2022-08-01 PROCEDURE — 3288F FALL RISK ASSESSMENT DOCD: CPT | Mod: CPTII,S$GLB,, | Performed by: INTERNAL MEDICINE

## 2022-08-01 PROCEDURE — 63600175 PHARM REV CODE 636 W HCPCS: Performed by: NURSE PRACTITIONER

## 2022-08-01 PROCEDURE — 99215 OFFICE O/P EST HI 40 MIN: CPT | Mod: S$GLB,,, | Performed by: INTERNAL MEDICINE

## 2022-08-01 PROCEDURE — 1159F PR MEDICATION LIST DOCUMENTED IN MEDICAL RECORD: ICD-10-PCS | Mod: CPTII,S$GLB,, | Performed by: INTERNAL MEDICINE

## 2022-08-01 PROCEDURE — 99999 PR PBB SHADOW E&M-EST. PATIENT-LVL IV: ICD-10-PCS | Mod: PBBFAC,,, | Performed by: INTERNAL MEDICINE

## 2022-08-01 PROCEDURE — 1160F PR REVIEW ALL MEDS BY PRESCRIBER/CLIN PHARMACIST DOCUMENTED: ICD-10-PCS | Mod: CPTII,S$GLB,, | Performed by: INTERNAL MEDICINE

## 2022-08-01 PROCEDURE — 4010F ACE/ARB THERAPY RXD/TAKEN: CPT | Mod: CPTII,S$GLB,, | Performed by: INTERNAL MEDICINE

## 2022-08-01 RX ORDER — SODIUM CHLORIDE 0.9 % (FLUSH) 0.9 %
10 SYRINGE (ML) INJECTION
Status: CANCELLED | OUTPATIENT
Start: 2022-08-02

## 2022-08-01 RX ORDER — SODIUM CHLORIDE 0.9 % (FLUSH) 0.9 %
10 SYRINGE (ML) INJECTION
Status: CANCELLED | OUTPATIENT
Start: 2022-08-01

## 2022-08-01 RX ORDER — HEPARIN 100 UNIT/ML
500 SYRINGE INTRAVENOUS
Status: CANCELLED | OUTPATIENT
Start: 2022-08-01

## 2022-08-01 RX ORDER — HEPARIN 100 UNIT/ML
500 SYRINGE INTRAVENOUS
Status: CANCELLED | OUTPATIENT
Start: 2022-08-02

## 2022-08-01 RX ORDER — SODIUM CHLORIDE 0.9 % (FLUSH) 0.9 %
10 SYRINGE (ML) INJECTION
Status: COMPLETED | OUTPATIENT
Start: 2022-08-01 | End: 2022-08-01

## 2022-08-01 RX ORDER — OXYCODONE HYDROCHLORIDE 5 MG/1
5 TABLET ORAL EVERY 12 HOURS PRN
Qty: 60 TABLET | Refills: 0 | Status: SHIPPED | OUTPATIENT
Start: 2022-08-01 | End: 2022-10-03

## 2022-08-01 RX ORDER — HEPARIN 100 UNIT/ML
500 SYRINGE INTRAVENOUS
Status: COMPLETED | OUTPATIENT
Start: 2022-08-01 | End: 2022-08-01

## 2022-08-01 RX ORDER — POTASSIUM CHLORIDE 750 MG/1
10 CAPSULE, EXTENDED RELEASE ORAL DAILY
Qty: 30 CAPSULE | Refills: 2 | Status: SHIPPED | OUTPATIENT
Start: 2022-08-01 | End: 2022-10-19

## 2022-08-01 RX ADMIN — HEPARIN 500 UNITS: 100 SYRINGE at 12:08

## 2022-08-01 RX ADMIN — Medication 10 ML: at 12:08

## 2022-08-01 NOTE — NURSING
Labs obtained from PICC line. Patient tolerated well. Discharged without distress or complaints. Accompanied by daughter.

## 2022-08-01 NOTE — PROGRESS NOTES
Subjective:      DATE OF VISIT: 8/1/22     ?  Patient ID:?Madelyn Serna is a 71 y.o. female.?? MR#: [id number]     PRIMARY ONCOLOGIST: Dr. Marcos    ? Primary Care Providers:  Orin Henderson MD, MD (General)     CHIEF COMPLAINT: ?  Follow-up on palliative chemotherapy?   ?   ONCOLOGIC DIAGNOSIS:  Gallbladder adenocarcinoma, stage IV, pT4 NX pM1  ?   CURRENT TREATMENT:  Gemcitabine and cisplatin cycle 1 day 1 4/25/22    PAST TREATMENT:  Laparoscopic cholecystectomy, 02/04/2022, [facility]  ?   ONCOLOGIC HISTORY    I the pleasure of meeting I had the pleasure meeting for the 1st time Ms. Serna a pleasant 71-year-old woman accompanied by her 2 daughters today for newly diagnosed gallbladder adenocarcinoma.    Medical history is notable for former tobacco use quit December 2021, COPD, anxiety, cataracts.      She notes 4 days of right lower quadrant abdominal pain acute onset for which she presented to [facility] emergency room on 02/04/2022.  One episode of vomiting per emergency room note.  Labs with leukocytosis WBC 18.7, hemoglobin 13, , renal function intact GFR over 60, mild alkaline phosphatase elevation 130, normal transaminases and bilirubin.  Albumin 4.5, calcium 10.4.  Urinalysis positive for E coli pansensitive.  Blood cultures negative.    Imaging reports a from outside radiology:  CT abdomen and pelvis with contrast distended gallbladder extensive cholelithiasis including 17 mm stone in the region of gallbladder neck.  Pericholecystic stranding and small adjacent fluid.  Lymph nodes noted to be unremarkable.  Emphysema lung bases.    She was taken to emergency surgery with laparoscopic cholecystectomy.  Surgical note mentions during this maneuver would appear to be a peritoneal nodule was discovered.  The peritoneal nodule was dissected from all surrounding structures with the LigaSure device.  The nodule is passed off the table and sent separately as a  "specimen.    Outside pathology:  "  Gallbladder cholecystectomy adenocarcinoma immunohistochemistry positive for CK7 and CDX2 and negative for CK 20, TTF1 and PAX8  Tumor size at least 2.2 cm.  The tumor invades the liver.  Lymphovascular invasion present.  Perineural invasion not identified.  Margins cannot be assessed.  Regional lymph nodes not applicable.    peritoneal nodule excisional biopsy positive for metastatic adenocarcinoma consistent with origin from gallbladder.      HPI       She is accompanied by daughter in stable condition.  She does continue on anticoagulation.  She denies any bleeding.  She does have chronic fatigue.  She has been eating her meals well at home.  Nutritional supplements can.    Review of Systems    ?   A comprehensive 14-point review of systems was reviewed with patient and was negative other than as specified above.   ?     Objective:      Physical Exam      ?   Vitals:    08/01/22 1256   BP: (!) 149/83   Pulse: (!) 113   Temp: 97.1 °F (36.2 °C)      ?   ECOG:?  2  General appearance: Generally well appearing, anxious, daughters accompanying her  Head, eyes, ears, nose, and throat:  moist mucous membranes.   Respiratory:  Normal work of breathing   Heart rate on recheck 80s  Abdomen: nondistended.   Extremities: Warm, no appreciable edema or pain bilateral lower extremities  Neurologic: Alert and oriented.  Sitting in wheelchair  Skin: No rashes, ecchymoses or petechial lesion.   ?      ?   Laboratory:  ?   Lab Visit on 08/01/2022   Component Date Value Ref Range Status    Sodium 08/01/2022 144  136 - 145 mmol/L Final    Potassium 08/01/2022 3.3 (A) 3.5 - 5.1 mmol/L Final    Chloride 08/01/2022 97  95 - 110 mmol/L Final    CO2 08/01/2022 37 (A) 23 - 29 mmol/L Final    Glucose 08/01/2022 146 (A) 70 - 110 mg/dL Final    BUN 08/01/2022 16  8 - 23 mg/dL Final    Creatinine 08/01/2022 0.8  0.5 - 1.4 mg/dL Final    Calcium 08/01/2022 9.4  8.7 - 10.5 mg/dL Final    Total " Protein 08/01/2022 6.5  6.0 - 8.4 g/dL Final    Albumin 08/01/2022 3.0 (A) 3.5 - 5.2 g/dL Final    Total Bilirubin 08/01/2022 0.2  0.1 - 1.0 mg/dL Final    Alkaline Phosphatase 08/01/2022 93  55 - 135 U/L Final    AST 08/01/2022 10  10 - 40 U/L Final    ALT 08/01/2022 7 (A) 10 - 44 U/L Final    Anion Gap 08/01/2022 10  8 - 16 mmol/L Final    eGFR 08/01/2022 >60  >60 mL/min/1.73 m^2 Final    Magnesium 08/01/2022 1.2 (A) 1.6 - 2.6 mg/dL Final    WBC 08/01/2022 4.78  3.90 - 12.70 K/uL Final    RBC 08/01/2022 2.70 (A) 4.00 - 5.40 M/uL Final    Hemoglobin 08/01/2022 8.2 (A) 12.0 - 16.0 g/dL Final    Hematocrit 08/01/2022 26.0 (A) 37.0 - 48.5 % Final    MCV 08/01/2022 96  82 - 98 fL Final    MCH 08/01/2022 30.4  27.0 - 31.0 pg Final    MCHC 08/01/2022 31.5 (A) 32.0 - 36.0 g/dL Final    RDW 08/01/2022 15.7 (A) 11.5 - 14.5 % Final    Platelets 08/01/2022 221  150 - 450 K/uL Final    MPV 08/01/2022 8.9 (A) 9.2 - 12.9 fL Final    Gran # (ANC) 08/01/2022 2.3  1.8 - 7.7 K/uL Final    Immature Grans (Abs) 08/01/2022 0.10 (A) 0.00 - 0.04 K/uL Final      ?   Tumor markers   ?  CEA, CA 19 9 pending  ?   Imaging:  ?  Outside see above  Results for orders placed or performed during the hospital encounter of 07/12/22 (from the past 2160 hour(s))   CT Chest Abdomen Pelvis With Contrast (xpd)    Impression    1.  New segmental left lower lobe pulmonary embolus.  New left internal iliac vein thrombus.    2.  No significant change of the gallbladder fundus soft tissue thickening in this patient with history of gallbladder malignancy.    3.  Mixed response of multiple hypoattenuating hepatic lesions as detailed above.    4.  Stable heterogeneous mildly enlarged periportal lymph node.    5.  Unchanged left kidney complex cystic lesion.    This report was flagged in Epic as abnormal.    The findings of this examination were discussed with Dr. Marcos by Dr. Tang via secure chat on July 12, 2022 at 1345  hours.      Electronically signed by: Jaylon Tang  Date:    07/12/2022  Time:    14:07     No results found for this or any previous visit (from the past 2160 hour(s)).  No results found for this or any previous visit (from the past 2160 hour(s)).      Pathology:    Outside see above     ?   Assessment/Plan:   Adenocarcinoma of gallbladder  -     Cancel: aprepitant (CINVANTI) injection 130 mg  -     Cancel: palonosetron (ALOXI) 0.25 mg with Dexamethasone (DECADRON) 12 mg in NS 50 mL IVPB  -     Cancel: gemcitabine (GEMZAR) 1,880 mg in sodium chloride 0.9% 250 mL chemo infusion  -     Cancel: CISplatin (PLATINOL) 25 mg/m2 = 47 mg in sodium chloride 0.9% 500 mL chemo infusion  -     Cancel: sodium chloride 0.9% bolus 500 mL  -     Cancel: sodium chloride 0.9% 250 mL flush bag  -     Cancel: heparin, porcine (PF) 100 unit/mL injection flush 500 Units  -     Cancel: sodium chloride 0.9% 1,000 mL with magnesium sulfate 2 g, potassium chloride 20 mEq infusion    Pulmonary embolism without acute cor pulmonale, unspecified chronicity, unspecified pulmonary embolism type    DVT, lower extremity, distal, acute, right    Cancer of peritoneum/retroperitoneum, secondary    Secondary liver cancer    Hypokalemia  -     potassium chloride (MICRO-K) 10 MEQ CpSR; Take 1 capsule (10 mEq total) by mouth once daily.  Dispense: 30 capsule; Refill: 2    Neoplasm related pain  -     oxyCODONE (ROXICODONE) 5 MG immediate release tablet; Take 1 tablet (5 mg total) by mouth every 12 (twelve) hours as needed for Pain. q 12h prn severe pain  Dispense: 60 tablet; Refill: 0    Other orders  -     Cancel: sodium chloride 0.9% 1,000 mL with magnesium sulfate 1 g, potassium chloride 20 mEq infusion  -     Cancel: sodium chloride 0.9% flush 10 mL  -     Cancel: alteplase injection 2 mg       1. Adenocarcinoma of gallbladder    2. Pulmonary embolism without acute cor pulmonale, unspecified chronicity, unspecified pulmonary embolism type    3.  DVT, lower extremity, distal, acute, right    4. Cancer of peritoneum/retroperitoneum, secondary    5. Secondary liver cancer    6. Hypokalemia    7. Neoplasm related pain          Plan:     Problem List Items Addressed This Visit        Hematology    DVT, lower extremity, distal, acute, right    Pulmonary embolism without acute cor pulmonale       Oncology    Adenocarcinoma of gallbladder - Primary    Neoplasm related pain    Overview     Reports minimal usage. Refills have been provided by oncologist.     I will defer to them for the time being but happy to assume if asked and agreed that we would be the sole prescribers.           Cancer of peritoneum/retroperitoneum, secondary    Secondary liver cancer      Other Visit Diagnoses     Hypokalemia            Gallbladder adenocarcinoma, stage IV, pT4 NX pM1: NGS Guardant with NATY, PDL1 CPS 2. No NTRK mutation detected.   On palliative chemotherapy with cisplatin gemcitabine cycle 1 day 1 04/25/2022.   restaging CT July 2022 with mixed response couple subcentimeter  Liver lesions others improved without  Other areas of involvement.  She is tolerating therapy well and given mixed response we discussed plan for short interval restaging scans in 2 months early October 2022.    Anemia:  Likely chemotherapy induced.  No clear evidence of bleeding.   Relatively stable continue to closely monitor and provide supportive transfusion as needed.     Right lower extremity acute DVT and pulmonary embolism:   Continue indefinite anticoagulation apixaban 5 mg b.i.d..  Clinical improvement in lower extremity edema.  No current shortness of breath or chest pain.    Anorexia: Note made of mild tachycardia improved, p.o. intake encouraged hydration.  Will recheck tomorrow in infusion.  Additional IV fluids as indicated.  We did discuss potential appetite stimulants however given recent acute DVT recommend against using Megace which can be associated with thrombotic risk.    Depressive  symptoms related to malignancy/treatment; interested in psych onc consult, placed    Pain:  Follow-up with palliative Care encouraged.  Refilled oxycodone 5 mg   08/01/2022 which were also controls her pain at this point; takes b.i.d. p.r.n. severe pain.     hypokalemia/hypomagnesemia:  Prescribed standing 10 mEq potassium.  Increase magnesium in premedication tomorrow.      Follow-Up:   Patient Instructions   Chemo tomorrow note: Mg increased dose premed  RV in 2 wk cbc, cmp prior with chemo potentially

## 2022-08-02 ENCOUNTER — INFUSION (OUTPATIENT)
Dept: INFUSION THERAPY | Facility: HOSPITAL | Age: 72
End: 2022-08-02
Attending: INTERNAL MEDICINE
Payer: MEDICARE

## 2022-08-02 VITALS
DIASTOLIC BLOOD PRESSURE: 96 MMHG | OXYGEN SATURATION: 95 % | WEIGHT: 155.63 LBS | SYSTOLIC BLOOD PRESSURE: 154 MMHG | BODY MASS INDEX: 24.43 KG/M2 | RESPIRATION RATE: 18 BRPM | TEMPERATURE: 99 F | HEART RATE: 99 BPM | HEIGHT: 67 IN

## 2022-08-02 DIAGNOSIS — C23 ADENOCARCINOMA OF GALLBLADDER: Primary | ICD-10-CM

## 2022-08-02 PROCEDURE — 96413 CHEMO IV INFUSION 1 HR: CPT

## 2022-08-02 PROCEDURE — 63600175 PHARM REV CODE 636 W HCPCS: Performed by: INTERNAL MEDICINE

## 2022-08-02 PROCEDURE — 96366 THER/PROPH/DIAG IV INF ADDON: CPT

## 2022-08-02 PROCEDURE — 96375 TX/PRO/DX INJ NEW DRUG ADDON: CPT

## 2022-08-02 PROCEDURE — 96360 HYDRATION IV INFUSION INIT: CPT

## 2022-08-02 PROCEDURE — 96367 TX/PROPH/DG ADDL SEQ IV INF: CPT

## 2022-08-02 PROCEDURE — 25000003 PHARM REV CODE 250: Performed by: INTERNAL MEDICINE

## 2022-08-02 PROCEDURE — 96417 CHEMO IV INFUS EACH ADDL SEQ: CPT

## 2022-08-02 RX ORDER — HEPARIN 100 UNIT/ML
500 SYRINGE INTRAVENOUS
Status: DISCONTINUED | OUTPATIENT
Start: 2022-08-02 | End: 2022-08-02 | Stop reason: HOSPADM

## 2022-08-02 RX ORDER — SODIUM CHLORIDE 9 MG/ML
500 INJECTION, SOLUTION INTRAVENOUS
Status: COMPLETED | OUTPATIENT
Start: 2022-08-02 | End: 2022-08-02

## 2022-08-02 RX ADMIN — DEXAMETHASONE SODIUM PHOSPHATE: 4 INJECTION, SOLUTION INTRA-ARTICULAR; INTRALESIONAL; INTRAMUSCULAR; INTRAVENOUS; SOFT TISSUE at 11:08

## 2022-08-02 RX ADMIN — GEMCITABINE 1800 MG: 38 INJECTION, SOLUTION INTRAVENOUS at 01:08

## 2022-08-02 RX ADMIN — HEPARIN 500 UNITS: 100 SYRINGE at 03:08

## 2022-08-02 RX ADMIN — SODIUM CHLORIDE 47 MG: 9 INJECTION, SOLUTION INTRAVENOUS at 12:08

## 2022-08-02 RX ADMIN — APREPITANT 130 MG: 130 INJECTION, EMULSION INTRAVENOUS at 11:08

## 2022-08-02 RX ADMIN — SODIUM CHLORIDE 500 ML: 0.9 INJECTION, SOLUTION INTRAVENOUS at 02:08

## 2022-08-02 RX ADMIN — SODIUM CHLORIDE: 0.9 INJECTION, SOLUTION INTRAVENOUS at 09:08

## 2022-08-02 RX ADMIN — MAGNESIUM SULFATE HEPTAHYDRATE 500 ML/HR: 500 INJECTION, SOLUTION INTRAMUSCULAR; INTRAVENOUS at 09:08

## 2022-08-02 NOTE — PLAN OF CARE
Problem: Anemia (Chemotherapy Effects)  Goal: Anemia Symptom Improvement  Outcome: Ongoing, Progressing     Problem: Neutropenia (Chemotherapy Effects)  Goal: Absence of Infection  Outcome: Ongoing, Progressing     Problem: Adult Inpatient Plan of Care  Goal: Plan of Care Review  Description: Pt here for gemzar/cisplatin  Outcome: Ongoing, Progressing  Flowsheets (Taken 8/2/2022 1005)  Plan of Care Reviewed With: patient  Goal: Patient-Specific Goal (Individualized)  Description: Feet up/pillow/blanket  Outcome: Ongoing, Progressing  Flowsheets (Taken 8/2/2022 1005)  Anxieties, Fears or Concerns: No concerns at this time  Individualized Care Needs: Legs elevated, warm blankets and pillow provided, snacks offered  Patient-Specific Goals (Include Timeframe): Tolerate cisplatin with no adverse reaction  Goal: Absence of Hospital-Acquired Illness or Injury  Outcome: Ongoing, Progressing  Goal: Optimal Comfort and Wellbeing  Outcome: Ongoing, Progressing     Problem: Fall Injury Risk  Goal: Absence of Fall and Fall-Related Injury  Outcome: Ongoing, Progressing

## 2022-08-02 NOTE — DISCHARGE INSTRUCTIONS
.[facility]  [address] [address]  [address] [address]  [phone] phone     [phone] fax  Hours of Operation: Monday- Friday 8:00am- 5:00pm  After hours phone  [phone]  Hematology / Oncology Physicians on call    Dr. Samson Villalobos      Nurse Practitioners:    Gracie Spangler, TERI Song, TERI Solomon, TERI Arellano, TERI Srinivasan, TERI Andrea, PA      Please don't hesitate to call if you have any concerns.   .FALL PREVENTION   Falls often occur due to slipping, tripping or losing your balance. Here are ways to reduce your risk of falling again.   Was there anything that caused your fall that can be fixed, removed or replaced?   Make your home safe by keeping walkways clear of objects you may trip over.   Use non-slip pads under rugs.   Do not walk in poorly lit areas.   Do not stand on chairs or wobbly ladders.   Use caution when reaching overhead or looking upward. This position can cause a loss of balance.   Be sure your shoes fit properly, have non-slip bottoms and are in good condition.   Be cautious when going up and down stairs, curbs, and when walking on uneven sidewalks.   If your balance is poor, consider using a cane or walker.   If your fall was related to alcohol use, stop or limit alcohol intake.   If your fall was related to use of sleeping medicines, talk to your doctor about this. You may need to reduce your dosage at bedtime if you awaken during the night to go to the bathroom.   To reduce the need for nighttime bathroom trips:   Avoid drinking fluids for several hours before going to bed   Empty your bladder before going to bed   Men can keep a urinal at the bedside   © 4537-2467 [employer], [address] [place] [address], [place] [address]. All rights reserved. This information is not intended as a substitute for professional medical care. Always follow your healthcare  professional's instructions.  .WAYS TO HELP PREVENT INFECTION        WASH YOUR HANDS OFTEN DURING THE DAY, ESPECIALLY BEFORE YOU EAT, AFTER USING THE BATHROOM, AND AFTER TOUCHING ANIMALS    STAY AWAY FROM PEOPLE WHO HAVE ILLNESSES YOU CAN CATCH; SUCH AS COLDS, FLU, CHICKEN POX    TRY TO AVOID CROWDS    STAY AWAY FROM CHILDREN WHO RECENTLY HAVE RECEIVED LIVE VIRUS VACCINES    MAINTAIN GOOD MOUTH CARE    DO NOT SQUEEZE OR SCRATCH PIMPLES    CLEAN CUTS & SCRAPES RIGHT AWAY AND DAILY UNTIL HEALED WITH WARM WATER, SOAP & AN ANTISEPTIC    AVOID CONTACT WITH LITTER BOXES, BIRD CAGES, & FISH TANKS    AVOID STANDING WATER, IE., BIRD BATHS, FLOWER POTS/VASES, OR HUMIDIFIERS    WEAR GLOVES WHEN GARDENING OR CLEANING UP AFTER OTHERS, ESPECIALLY BABIES & SMALL CHILDREN    DO NOT EAT RAW FISH, SEAFOOD, MEAT, OR EGGS

## 2022-08-03 ENCOUNTER — PATIENT MESSAGE (OUTPATIENT)
Dept: HEMATOLOGY/ONCOLOGY | Facility: CLINIC | Age: 72
End: 2022-08-03
Payer: MEDICARE

## 2022-08-15 ENCOUNTER — OFFICE VISIT (OUTPATIENT)
Dept: HEMATOLOGY/ONCOLOGY | Facility: CLINIC | Age: 72
End: 2022-08-15
Payer: MEDICARE

## 2022-08-15 ENCOUNTER — INFUSION (OUTPATIENT)
Dept: INFUSION THERAPY | Facility: HOSPITAL | Age: 72
End: 2022-08-15
Attending: INTERNAL MEDICINE
Payer: MEDICARE

## 2022-08-15 ENCOUNTER — LAB VISIT (OUTPATIENT)
Dept: LAB | Facility: HOSPITAL | Age: 72
End: 2022-08-15
Attending: INTERNAL MEDICINE
Payer: MEDICARE

## 2022-08-15 VITALS
HEART RATE: 107 BPM | DIASTOLIC BLOOD PRESSURE: 71 MMHG | BODY MASS INDEX: 24.85 KG/M2 | HEART RATE: 101 BPM | TEMPERATURE: 98 F | SYSTOLIC BLOOD PRESSURE: 116 MMHG | DIASTOLIC BLOOD PRESSURE: 65 MMHG | SYSTOLIC BLOOD PRESSURE: 112 MMHG | HEIGHT: 67 IN | TEMPERATURE: 97 F | OXYGEN SATURATION: 94 % | WEIGHT: 158.31 LBS | RESPIRATION RATE: 18 BRPM | OXYGEN SATURATION: 90 %

## 2022-08-15 DIAGNOSIS — D64.81 ANEMIA ASSOCIATED WITH CHEMOTHERAPY: ICD-10-CM

## 2022-08-15 DIAGNOSIS — I26.99 PULMONARY EMBOLISM WITHOUT ACUTE COR PULMONALE, UNSPECIFIED CHRONICITY, UNSPECIFIED PULMONARY EMBOLISM TYPE: Primary | ICD-10-CM

## 2022-08-15 DIAGNOSIS — T45.1X5A ANEMIA ASSOCIATED WITH CHEMOTHERAPY: ICD-10-CM

## 2022-08-15 DIAGNOSIS — C23 ADENOCARCINOMA OF GALLBLADDER: ICD-10-CM

## 2022-08-15 DIAGNOSIS — C78.7 SECONDARY LIVER CANCER: ICD-10-CM

## 2022-08-15 DIAGNOSIS — C23 ADENOCARCINOMA OF GALLBLADDER: Primary | ICD-10-CM

## 2022-08-15 DIAGNOSIS — D69.6 THROMBOCYTOPENIA: ICD-10-CM

## 2022-08-15 LAB
ALBUMIN SERPL BCP-MCNC: 3 G/DL (ref 3.5–5.2)
ALP SERPL-CCNC: 90 U/L (ref 55–135)
ALT SERPL W/O P-5'-P-CCNC: 9 U/L (ref 10–44)
ANION GAP SERPL CALC-SCNC: 8 MMOL/L (ref 8–16)
AST SERPL-CCNC: 10 U/L (ref 10–40)
BASOPHILS # BLD AUTO: 0.03 K/UL (ref 0–0.2)
BASOPHILS NFR BLD: 0.5 % (ref 0–1.9)
BILIRUB SERPL-MCNC: 0.3 MG/DL (ref 0.1–1)
BUN SERPL-MCNC: 11 MG/DL (ref 8–23)
CALCIUM SERPL-MCNC: 9.1 MG/DL (ref 8.7–10.5)
CHLORIDE SERPL-SCNC: 101 MMOL/L (ref 95–110)
CO2 SERPL-SCNC: 33 MMOL/L (ref 23–29)
CREAT SERPL-MCNC: 0.9 MG/DL (ref 0.5–1.4)
DIFFERENTIAL METHOD: ABNORMAL
EOSINOPHIL # BLD AUTO: 0.1 K/UL (ref 0–0.5)
EOSINOPHIL NFR BLD: 1.6 % (ref 0–8)
ERYTHROCYTE [DISTWIDTH] IN BLOOD BY AUTOMATED COUNT: 16.7 % (ref 11.5–14.5)
EST. GFR  (NO RACE VARIABLE): >60 ML/MIN/1.73 M^2
FERRITIN SERPL-MCNC: 541 NG/ML (ref 20–300)
GLUCOSE SERPL-MCNC: 118 MG/DL (ref 70–110)
HCT VFR BLD AUTO: 21.9 % (ref 37–48.5)
HGB BLD-MCNC: 6.8 G/DL (ref 12–16)
IMM GRANULOCYTES # BLD AUTO: 0.02 K/UL (ref 0–0.04)
IMM GRANULOCYTES NFR BLD AUTO: 0.3 % (ref 0–0.5)
LYMPHOCYTES # BLD AUTO: 1.4 K/UL (ref 1–4.8)
LYMPHOCYTES NFR BLD: 23.2 % (ref 18–48)
MCH RBC QN AUTO: 30.5 PG (ref 27–31)
MCHC RBC AUTO-ENTMCNC: 31.1 G/DL (ref 32–36)
MCV RBC AUTO: 98 FL (ref 82–98)
MONOCYTES # BLD AUTO: 0.5 K/UL (ref 0.3–1)
MONOCYTES NFR BLD: 7.4 % (ref 4–15)
NEUTROPHILS # BLD AUTO: 4.1 K/UL (ref 1.8–7.7)
NEUTROPHILS NFR BLD: 67 % (ref 38–73)
NRBC BLD-RTO: 0 /100 WBC
PLATELET # BLD AUTO: 147 K/UL (ref 150–450)
PMV BLD AUTO: 9.7 FL (ref 9.2–12.9)
POTASSIUM SERPL-SCNC: 4.4 MMOL/L (ref 3.5–5.1)
PROT SERPL-MCNC: 6.5 G/DL (ref 6–8.4)
RBC # BLD AUTO: 2.23 M/UL (ref 4–5.4)
SODIUM SERPL-SCNC: 142 MMOL/L (ref 136–145)
WBC # BLD AUTO: 6.08 K/UL (ref 3.9–12.7)

## 2022-08-15 PROCEDURE — 3074F SYST BP LT 130 MM HG: CPT | Mod: CPTII,S$GLB,, | Performed by: INTERNAL MEDICINE

## 2022-08-15 PROCEDURE — 85025 COMPLETE CBC W/AUTO DIFF WBC: CPT | Performed by: INTERNAL MEDICINE

## 2022-08-15 PROCEDURE — 4010F PR ACE/ARB THEARPY RXD/TAKEN: ICD-10-PCS | Mod: CPTII,S$GLB,, | Performed by: INTERNAL MEDICINE

## 2022-08-15 PROCEDURE — 99215 OFFICE O/P EST HI 40 MIN: CPT | Mod: S$GLB,,, | Performed by: INTERNAL MEDICINE

## 2022-08-15 PROCEDURE — 3078F DIAST BP <80 MM HG: CPT | Mod: CPTII,S$GLB,, | Performed by: INTERNAL MEDICINE

## 2022-08-15 PROCEDURE — 1126F PR PAIN SEVERITY QUANTIFIED, NO PAIN PRESENT: ICD-10-PCS | Mod: CPTII,S$GLB,, | Performed by: INTERNAL MEDICINE

## 2022-08-15 PROCEDURE — 3008F PR BODY MASS INDEX (BMI) DOCUMENTED: ICD-10-PCS | Mod: CPTII,S$GLB,, | Performed by: INTERNAL MEDICINE

## 2022-08-15 PROCEDURE — 1157F ADVNC CARE PLAN IN RCRD: CPT | Mod: CPTII,S$GLB,, | Performed by: INTERNAL MEDICINE

## 2022-08-15 PROCEDURE — 3288F FALL RISK ASSESSMENT DOCD: CPT | Mod: CPTII,S$GLB,, | Performed by: INTERNAL MEDICINE

## 2022-08-15 PROCEDURE — 99999 PR PBB SHADOW E&M-EST. PATIENT-LVL III: CPT | Mod: PBBFAC,,, | Performed by: INTERNAL MEDICINE

## 2022-08-15 PROCEDURE — 1101F PT FALLS ASSESS-DOCD LE1/YR: CPT | Mod: CPTII,S$GLB,, | Performed by: INTERNAL MEDICINE

## 2022-08-15 PROCEDURE — 80053 COMPREHEN METABOLIC PANEL: CPT | Performed by: INTERNAL MEDICINE

## 2022-08-15 PROCEDURE — 4010F ACE/ARB THERAPY RXD/TAKEN: CPT | Mod: CPTII,S$GLB,, | Performed by: INTERNAL MEDICINE

## 2022-08-15 PROCEDURE — A4216 STERILE WATER/SALINE, 10 ML: HCPCS | Performed by: NURSE PRACTITIONER

## 2022-08-15 PROCEDURE — 1101F PR PT FALLS ASSESS DOC 0-1 FALLS W/OUT INJ PAST YR: ICD-10-PCS | Mod: CPTII,S$GLB,, | Performed by: INTERNAL MEDICINE

## 2022-08-15 PROCEDURE — 1159F PR MEDICATION LIST DOCUMENTED IN MEDICAL RECORD: ICD-10-PCS | Mod: CPTII,S$GLB,, | Performed by: INTERNAL MEDICINE

## 2022-08-15 PROCEDURE — 36415 COLL VENOUS BLD VENIPUNCTURE: CPT | Performed by: INTERNAL MEDICINE

## 2022-08-15 PROCEDURE — 25000003 PHARM REV CODE 250: Performed by: NURSE PRACTITIONER

## 2022-08-15 PROCEDURE — 82728 ASSAY OF FERRITIN: CPT | Performed by: INTERNAL MEDICINE

## 2022-08-15 PROCEDURE — 1159F MED LIST DOCD IN RCRD: CPT | Mod: CPTII,S$GLB,, | Performed by: INTERNAL MEDICINE

## 2022-08-15 PROCEDURE — 3288F PR FALLS RISK ASSESSMENT DOCUMENTED: ICD-10-PCS | Mod: CPTII,S$GLB,, | Performed by: INTERNAL MEDICINE

## 2022-08-15 PROCEDURE — 99215 PR OFFICE/OUTPT VISIT, EST, LEVL V, 40-54 MIN: ICD-10-PCS | Mod: S$GLB,,, | Performed by: INTERNAL MEDICINE

## 2022-08-15 PROCEDURE — 1126F AMNT PAIN NOTED NONE PRSNT: CPT | Mod: CPTII,S$GLB,, | Performed by: INTERNAL MEDICINE

## 2022-08-15 PROCEDURE — 63600175 PHARM REV CODE 636 W HCPCS: Performed by: NURSE PRACTITIONER

## 2022-08-15 PROCEDURE — 3008F BODY MASS INDEX DOCD: CPT | Mod: CPTII,S$GLB,, | Performed by: INTERNAL MEDICINE

## 2022-08-15 PROCEDURE — 3078F PR MOST RECENT DIASTOLIC BLOOD PRESSURE < 80 MM HG: ICD-10-PCS | Mod: CPTII,S$GLB,, | Performed by: INTERNAL MEDICINE

## 2022-08-15 PROCEDURE — 99999 PR PBB SHADOW E&M-EST. PATIENT-LVL III: ICD-10-PCS | Mod: PBBFAC,,, | Performed by: INTERNAL MEDICINE

## 2022-08-15 PROCEDURE — 84466 ASSAY OF TRANSFERRIN: CPT | Performed by: INTERNAL MEDICINE

## 2022-08-15 PROCEDURE — 3074F PR MOST RECENT SYSTOLIC BLOOD PRESSURE < 130 MM HG: ICD-10-PCS | Mod: CPTII,S$GLB,, | Performed by: INTERNAL MEDICINE

## 2022-08-15 PROCEDURE — 1157F PR ADVANCE CARE PLAN OR EQUIV PRESENT IN MEDICAL RECORD: ICD-10-PCS | Mod: CPTII,S$GLB,, | Performed by: INTERNAL MEDICINE

## 2022-08-15 PROCEDURE — 36592 COLLECT BLOOD FROM PICC: CPT

## 2022-08-15 RX ORDER — SODIUM CHLORIDE 0.9 % (FLUSH) 0.9 %
10 SYRINGE (ML) INJECTION
Status: COMPLETED | OUTPATIENT
Start: 2022-08-15 | End: 2022-08-15

## 2022-08-15 RX ORDER — SODIUM CHLORIDE 0.9 % (FLUSH) 0.9 %
10 SYRINGE (ML) INJECTION
Status: CANCELLED | OUTPATIENT
Start: 2022-08-16

## 2022-08-15 RX ORDER — HEPARIN 100 UNIT/ML
500 SYRINGE INTRAVENOUS
Status: CANCELLED | OUTPATIENT
Start: 2022-08-15

## 2022-08-15 RX ORDER — SODIUM CHLORIDE 0.9 % (FLUSH) 0.9 %
10 SYRINGE (ML) INJECTION
Status: CANCELLED | OUTPATIENT
Start: 2022-08-15

## 2022-08-15 RX ORDER — HEPARIN 100 UNIT/ML
500 SYRINGE INTRAVENOUS
Status: CANCELLED | OUTPATIENT
Start: 2022-08-16

## 2022-08-15 RX ORDER — HYDROCODONE BITARTRATE AND ACETAMINOPHEN 500; 5 MG/1; MG/1
TABLET ORAL ONCE
Status: CANCELLED | OUTPATIENT
Start: 2022-08-15 | End: 2022-08-15

## 2022-08-15 RX ORDER — HEPARIN 100 UNIT/ML
500 SYRINGE INTRAVENOUS
Status: COMPLETED | OUTPATIENT
Start: 2022-08-15 | End: 2022-08-15

## 2022-08-15 RX ADMIN — Medication 10 ML: at 09:08

## 2022-08-15 RX ADMIN — HEPARIN 500 UNITS: 100 SYRINGE at 09:08

## 2022-08-15 NOTE — NURSING
Lab obtained from right upper arm PICC line, 2 patient identifiers obtained, labeled and sent to lab.  Adequate blood return noted. 1 lumen(s) flushed with 10ml NS and 5ml Heparin solution.  Dressing to be changed Wednesday by home health nurse.  Site without redness, swelling or drainage noted.

## 2022-08-15 NOTE — PROGRESS NOTES
Subjective:      DATE OF VISIT: 8/15/22     ?  Patient ID:?Madelyn Serna is a 71 y.o. female.?? MR#: [id number]     PRIMARY ONCOLOGIST: Dr. Marcos    ? Primary Care Providers:  Orin Henderson MD, MD (General)     CHIEF COMPLAINT: ?  Follow-up on palliative chemotherapy?   ?   ONCOLOGIC DIAGNOSIS:  Gallbladder adenocarcinoma, stage IV, pT4 NX pM1  ?   CURRENT TREATMENT:  Gemcitabine and cisplatin cycle 1 day 1, 4/25/22    PAST TREATMENT:  Laparoscopic cholecystectomy, 02/04/2022, [facility]  ?   ONCOLOGIC HISTORY    I the pleasure of meeting I had the pleasure meeting for the 1st time Ms. Serna a pleasant 71-year-old woman accompanied by her 2 daughters today for newly diagnosed gallbladder adenocarcinoma.    Medical history is notable for former tobacco use quit December 2021, COPD, anxiety, cataracts.      She notes 4 days of right lower quadrant abdominal pain acute onset for which she presented to [facility] emergency room on 02/04/2022.  One episode of vomiting per emergency room note.  Labs with leukocytosis WBC 18.7, hemoglobin 13, , renal function intact GFR over 60, mild alkaline phosphatase elevation 130, normal transaminases and bilirubin.  Albumin 4.5, calcium 10.4.  Urinalysis positive for E coli pansensitive.  Blood cultures negative.    Imaging reports a from outside radiology:  CT abdomen and pelvis with contrast distended gallbladder extensive cholelithiasis including 17 mm stone in the region of gallbladder neck.  Pericholecystic stranding and small adjacent fluid.  Lymph nodes noted to be unremarkable.  Emphysema lung bases.    She was taken to emergency surgery with laparoscopic cholecystectomy.  Surgical note mentions during this maneuver would appear to be a peritoneal nodule was discovered.  The peritoneal nodule was dissected from all surrounding structures with the LigaSure device.  The nodule is passed off the table and sent separately as a  "specimen.    Outside pathology:  "  Gallbladder cholecystectomy adenocarcinoma immunohistochemistry positive for CK7 and CDX2 and negative for CK 20, TTF1 and PAX8  Tumor size at least 2.2 cm.  The tumor invades the liver.  Lymphovascular invasion present.  Perineural invasion not identified.  Margins cannot be assessed.  Regional lymph nodes not applicable.    peritoneal nodule excisional biopsy positive for metastatic adenocarcinoma consistent with origin from gallbladder.      HPI     She endorses fatigue.  In stable condition.  No new edema chest pain or shortness of breath.  No evidence of bleeding.  She continues on anticoagulation.    Review of Systems    ?   A comprehensive 14-point review of systems was reviewed with patient and was negative other than as specified above.   ?     Objective:      Physical Exam      ?   Vitals:    08/15/22 0923   BP: 112/65   Pulse: 107   Temp: 97 °F (36.1 °C)      ?   ECOG:?  2  General appearance: Generally well appearing, anxious, daughters accompanying her  Head, eyes, ears, nose, and throat:  moist mucous membranes.   Respiratory:  Normal work of breathing   Heart rate on exam 80-90bpm  Abdomen: nondistended.   Extremities: Warm, no appreciable edema or pain bilateral lower extremities  Neurologic: Alert and oriented.  Sitting in wheelchair  Skin: No rashes, ecchymoses or petechial lesion.   ?      ?   Laboratory:  ?   Lab Visit on 08/15/2022   Component Date Value Ref Range Status    WBC 08/15/2022 6.08  3.90 - 12.70 K/uL Final    RBC 08/15/2022 2.23 (A) 4.00 - 5.40 M/uL Final    Hemoglobin 08/15/2022 6.8 (A) 12.0 - 16.0 g/dL Final    Hematocrit 08/15/2022 21.9 (A) 37.0 - 48.5 % Final    MCV 08/15/2022 98  82 - 98 fL Final    MCH 08/15/2022 30.5  27.0 - 31.0 pg Final    MCHC 08/15/2022 31.1 (A) 32.0 - 36.0 g/dL Final    RDW 08/15/2022 16.7 (A) 11.5 - 14.5 % Final    Platelets 08/15/2022 147 (A) 150 - 450 K/uL Final    MPV 08/15/2022 9.7  9.2 - 12.9 fL Final "    Immature Granulocytes 08/15/2022 0.3  0.0 - 0.5 % Final    Gran # (ANC) 08/15/2022 4.1  1.8 - 7.7 K/uL Final    Immature Grans (Abs) 08/15/2022 0.02  0.00 - 0.04 K/uL Final    Lymph # 08/15/2022 1.4  1.0 - 4.8 K/uL Final    Mono # 08/15/2022 0.5  0.3 - 1.0 K/uL Final    Eos # 08/15/2022 0.1  0.0 - 0.5 K/uL Final    Baso # 08/15/2022 0.03  0.00 - 0.20 K/uL Final    nRBC 08/15/2022 0  0 /100 WBC Final    Gran % 08/15/2022 67.0  38.0 - 73.0 % Final    Lymph % 08/15/2022 23.2  18.0 - 48.0 % Final    Mono % 08/15/2022 7.4  4.0 - 15.0 % Final    Eosinophil % 08/15/2022 1.6  0.0 - 8.0 % Final    Basophil % 08/15/2022 0.5  0.0 - 1.9 % Final    Differential Method 08/15/2022 Automated   Final    Sodium 08/15/2022 142  136 - 145 mmol/L Final    Potassium 08/15/2022 4.4  3.5 - 5.1 mmol/L Final    Chloride 08/15/2022 101  95 - 110 mmol/L Final    CO2 08/15/2022 33 (A) 23 - 29 mmol/L Final    Glucose 08/15/2022 118 (A) 70 - 110 mg/dL Final    BUN 08/15/2022 11  8 - 23 mg/dL Final    Creatinine 08/15/2022 0.9  0.5 - 1.4 mg/dL Final    Calcium 08/15/2022 9.1  8.7 - 10.5 mg/dL Final    Total Protein 08/15/2022 6.5  6.0 - 8.4 g/dL Final    Albumin 08/15/2022 3.0 (A) 3.5 - 5.2 g/dL Final    Total Bilirubin 08/15/2022 0.3  0.1 - 1.0 mg/dL Final    Alkaline Phosphatase 08/15/2022 90  55 - 135 U/L Final    AST 08/15/2022 10  10 - 40 U/L Final    ALT 08/15/2022 9 (A) 10 - 44 U/L Final    Anion Gap 08/15/2022 8  8 - 16 mmol/L Final    eGFR 08/15/2022 >60  >60 mL/min/1.73 m^2 Final        ?   Imaging:  ?  Outside see above  Results for orders placed or performed during the hospital encounter of 07/12/22 (from the past 2160 hour(s))   CT Chest Abdomen Pelvis With Contrast (xpd)    Impression    1.  New segmental left lower lobe pulmonary embolus.  New left internal iliac vein thrombus.    2.  No significant change of the gallbladder fundus soft tissue thickening in this patient with history of gallbladder  malignancy.    3.  Mixed response of multiple hypoattenuating hepatic lesions as detailed above.    4.  Stable heterogeneous mildly enlarged periportal lymph node.    5.  Unchanged left kidney complex cystic lesion.    This report was flagged in Epic as abnormal.    The findings of this examination were discussed with Dr. Marcos by Dr. Tang via secure chat on July 12, 2022 at 1345 hours.      Electronically signed by: Jaylon Tang  Date:    07/12/2022  Time:    14:07     No results found for this or any previous visit (from the past 2160 hour(s)).  No results found for this or any previous visit (from the past 2160 hour(s)).      Pathology:    Outside see above     ?   Assessment/Plan:   Pulmonary embolism without acute cor pulmonale, unspecified chronicity, unspecified pulmonary embolism type    Anemia associated with chemotherapy  -     Verify informed consent for blood administration; Standing  -     Vital signs Document Pre-transfusion, 15 minutes after transfusion begins and immediately Post-transfusion for each unit transfused; Standing  -     Discharge instructions; Standing  -     Hold Transfusion and Notify Physician if:; Standing  -     If transfusion reaction confirmed by physician/mid-level:; Standing  -     0.9%  NaCl infusion (for blood administration)  -     Type & Screen; Future; Expected date: 08/15/2022  -     Prepare RBC 1 Unit; Future  -     Transfuse RBC 1 Unit; Standing  -     Iron and TIBC; Future; Expected date: 08/15/2022  -     Ferritin; Future; Expected date: 08/15/2022    Thrombocytopenia    Adenocarcinoma of gallbladder    Secondary liver cancer    Other orders  -     sodium chloride 0.9% 1,000 mL with magnesium sulfate 1 g, potassium chloride 20 mEq infusion  -     aprepitant (CINVANTI) injection 130 mg  -     palonosetron (ALOXI) 0.25 mg with Dexamethasone (DECADRON) 12 mg in NS 50 mL IVPB  -     gemcitabine (GEMZAR) 1,880 mg in sodium chloride 0.9% 250 mL chemo infusion  -      CISplatin (PLATINOL) 25 mg/m2 = 47 mg in sodium chloride 0.9% 500 mL chemo infusion  -     sodium chloride 0.9% bolus 500 mL  -     sodium chloride 0.9% 250 mL flush bag  -     sodium chloride 0.9% flush 10 mL  -     heparin, porcine (PF) 100 unit/mL injection flush 500 Units  -     alteplase injection 2 mg       1. Pulmonary embolism without acute cor pulmonale, unspecified chronicity, unspecified pulmonary embolism type    2. Anemia associated with chemotherapy    3. Thrombocytopenia    4. Adenocarcinoma of gallbladder    5. Secondary liver cancer          Plan:     Problem List Items Addressed This Visit        Hematology    Pulmonary embolism without acute cor pulmonale - Primary       Oncology    Adenocarcinoma of gallbladder    Secondary liver cancer      Other Visit Diagnoses     Anemia associated with chemotherapy        Thrombocytopenia            Gallbladder adenocarcinoma, stage IV, pT4 NX pM1: NGS Guardant with NATY, PDL1 CPS 2. No NTRK mutation detected.   On palliative chemotherapy with cisplatin gemcitabine cycle 1 day 1 04/25/2022.   restaging CT July 2022 with mixed response couple subcentimeter  Liver lesions others improved without  Other areas of involvement.  She is tolerating therapy well and given mixed response we discussed plan for short interval restaging scans in 2 months early October 2022.  Cycle 6 day 1 planned.  Labs reviewed with recurrent anemia.  Will check iron indices and give supportive transfusion.  No evidence of bleeding.    Anemia:  Likely chemotherapy induced.  No clear evidence of bleeding.   Relatively stable continue to closely monitor and provide supportive transfusion as needed.  1 unit PRBC ordered for 08/16/2022     Right lower extremity acute DVT and pulmonary embolism:   Continue indefinite anticoagulation apixaban 5 mg b.i.d..  Clinical improvement in lower extremity edema.  No current shortness of breath or chest pain.    Anorexia: Note made of mild tachycardia  improved, p.o. intake encouraged hydration.  Will recheck tomorrow in infusion.  Additional IV fluids as indicated.  We did discuss potential appetite stimulants however given recent acute DVT recommend against using Megace which can be associated with thrombotic risk.    Depressive symptoms related to malignancy/treatment; interested in psych onc consult, placed    Pain:  Follow-up with palliative Care encouraged.  Refilled oxycodone 5 mg   08/01/2022 which were also controls her pain at this point; takes b.i.d. p.r.n. severe pain.     hypokalemia/hypomagnesemia:  Prescribed standing 10 mEq potassium.      Follow-Up:   1 unit PRBC transfusion  Add on iron labs, replete prn  Cycle 6 day 1 planned 08/16/2022  Revisit 1 week with CBC, CMP and day 8 planned, npok

## 2022-08-16 ENCOUNTER — INFUSION (OUTPATIENT)
Dept: INFUSION THERAPY | Facility: HOSPITAL | Age: 72
End: 2022-08-16
Attending: INTERNAL MEDICINE
Payer: MEDICARE

## 2022-08-16 VITALS
HEART RATE: 81 BPM | DIASTOLIC BLOOD PRESSURE: 85 MMHG | OXYGEN SATURATION: 97 % | RESPIRATION RATE: 18 BRPM | SYSTOLIC BLOOD PRESSURE: 154 MMHG | TEMPERATURE: 97 F

## 2022-08-16 DIAGNOSIS — D64.81 ANEMIA ASSOCIATED WITH CHEMOTHERAPY: Primary | ICD-10-CM

## 2022-08-16 DIAGNOSIS — T45.1X5A ANEMIA ASSOCIATED WITH CHEMOTHERAPY: Primary | ICD-10-CM

## 2022-08-16 DIAGNOSIS — C23 ADENOCARCINOMA OF GALLBLADDER: ICD-10-CM

## 2022-08-16 PROCEDURE — 36430 TRANSFUSION BLD/BLD COMPNT: CPT

## 2022-08-16 PROCEDURE — 63600175 PHARM REV CODE 636 W HCPCS: Performed by: NURSE PRACTITIONER

## 2022-08-16 RX ORDER — HYDROCODONE BITARTRATE AND ACETAMINOPHEN 500; 5 MG/1; MG/1
TABLET ORAL ONCE
Status: DISCONTINUED | OUTPATIENT
Start: 2022-08-16 | End: 2022-08-16 | Stop reason: HOSPADM

## 2022-08-16 RX ORDER — SODIUM CHLORIDE 0.9 % (FLUSH) 0.9 %
10 SYRINGE (ML) INJECTION
Status: DISCONTINUED | OUTPATIENT
Start: 2022-08-16 | End: 2022-08-16 | Stop reason: HOSPADM

## 2022-08-16 RX ORDER — HEPARIN 100 UNIT/ML
500 SYRINGE INTRAVENOUS
Status: COMPLETED | OUTPATIENT
Start: 2022-08-16 | End: 2022-08-16

## 2022-08-16 RX ORDER — SODIUM CHLORIDE 0.9 % (FLUSH) 0.9 %
10 SYRINGE (ML) INJECTION
Status: CANCELLED | OUTPATIENT
Start: 2022-08-16

## 2022-08-16 RX ORDER — HEPARIN 100 UNIT/ML
500 SYRINGE INTRAVENOUS
Status: CANCELLED | OUTPATIENT
Start: 2022-08-16

## 2022-08-16 RX ADMIN — HEPARIN 500 UNITS: 100 SYRINGE at 11:08

## 2022-08-16 NOTE — PLAN OF CARE
Problem: Adult Inpatient Plan of Care  Goal: Plan of Care Review  Outcome: Ongoing, Progressing  Flowsheets (Taken 8/16/2022 1126)  Plan of Care Reviewed With:   patient   daughter  Goal: Patient-Specific Goal (Individualized)  Outcome: Ongoing, Progressing  Flowsheets (Taken 8/16/2022 1126)  Anxieties, Fears or Concerns: None verbalized tdoay  Individualized Care Needs: Legs elevated, warm blankets, pillows, daughter at chairside, tv on  Patient-Specific Goals (Include Timeframe): Tolerate transfusion today  Goal: Optimal Comfort and Wellbeing  Outcome: Ongoing, Progressing  Intervention: Provide Person-Centered Care  Flowsheets (Taken 8/16/2022 1126)  Trust Relationship/Rapport:   care explained   questions encouraged   choices provided   reassurance provided   emotional support provided   thoughts/feelings acknowledged   empathic listening provided   questions answered     Problem: Anemia  Goal: Anemia Symptom Improvement  Outcome: Ongoing, Progressing  Intervention: Monitor and Manage Anemia  Flowsheets (Taken 8/16/2022 1126)  Safety Promotion/Fall Prevention:   instructed to call staff for mobility   in recliner, wheels locked   high risk medications identified  Fatigue Management:   activity assistance provided   frequent rest breaks encouraged     
-1, 0, 1

## 2022-08-17 ENCOUNTER — INFUSION (OUTPATIENT)
Dept: INFUSION THERAPY | Facility: HOSPITAL | Age: 72
End: 2022-08-17
Attending: INTERNAL MEDICINE
Payer: MEDICARE

## 2022-08-17 VITALS
RESPIRATION RATE: 18 BRPM | OXYGEN SATURATION: 93 % | SYSTOLIC BLOOD PRESSURE: 154 MMHG | HEART RATE: 90 BPM | HEIGHT: 67 IN | DIASTOLIC BLOOD PRESSURE: 88 MMHG | WEIGHT: 158.31 LBS | TEMPERATURE: 98 F | BODY MASS INDEX: 24.85 KG/M2

## 2022-08-17 DIAGNOSIS — C23 ADENOCARCINOMA OF GALLBLADDER: Primary | ICD-10-CM

## 2022-08-17 PROCEDURE — 96361 HYDRATE IV INFUSION ADD-ON: CPT

## 2022-08-17 PROCEDURE — 96366 THER/PROPH/DIAG IV INF ADDON: CPT

## 2022-08-17 PROCEDURE — 96375 TX/PRO/DX INJ NEW DRUG ADDON: CPT

## 2022-08-17 PROCEDURE — 25000003 PHARM REV CODE 250: Performed by: INTERNAL MEDICINE

## 2022-08-17 PROCEDURE — 96367 TX/PROPH/DG ADDL SEQ IV INF: CPT

## 2022-08-17 PROCEDURE — 63600175 PHARM REV CODE 636 W HCPCS: Mod: JG | Performed by: INTERNAL MEDICINE

## 2022-08-17 PROCEDURE — 96413 CHEMO IV INFUSION 1 HR: CPT

## 2022-08-17 PROCEDURE — 96417 CHEMO IV INFUS EACH ADDL SEQ: CPT

## 2022-08-17 RX ORDER — SODIUM CHLORIDE 0.9 % (FLUSH) 0.9 %
10 SYRINGE (ML) INJECTION
Status: DISCONTINUED | OUTPATIENT
Start: 2022-08-17 | End: 2022-08-17 | Stop reason: HOSPADM

## 2022-08-17 RX ORDER — SODIUM CHLORIDE 9 MG/ML
500 INJECTION, SOLUTION INTRAVENOUS
Status: COMPLETED | OUTPATIENT
Start: 2022-08-17 | End: 2022-08-17

## 2022-08-17 RX ADMIN — GEMCITABINE 1800 MG: 38 INJECTION, SOLUTION INTRAVENOUS at 12:08

## 2022-08-17 RX ADMIN — SODIUM CHLORIDE 500 ML: 9 INJECTION, SOLUTION INTRAVENOUS at 02:08

## 2022-08-17 RX ADMIN — SODIUM CHLORIDE: 0.9 INJECTION, SOLUTION INTRAVENOUS at 11:08

## 2022-08-17 RX ADMIN — APREPITANT 130 MG: 130 INJECTION, EMULSION INTRAVENOUS at 11:08

## 2022-08-17 RX ADMIN — CISPLATIN 47 MG: 1 INJECTION INTRAVENOUS at 01:08

## 2022-08-17 RX ADMIN — MAGNESIUM SULFATE HEPTAHYDRATE 500 ML/HR: 500 INJECTION, SOLUTION INTRAMUSCULAR; INTRAVENOUS at 09:08

## 2022-08-17 RX ADMIN — DEXAMETHASONE SODIUM PHOSPHATE: 4 INJECTION, SOLUTION INTRA-ARTICULAR; INTRALESIONAL; INTRAMUSCULAR; INTRAVENOUS; SOFT TISSUE at 12:08

## 2022-08-17 NOTE — PLAN OF CARE
Discussed plan of care with pt. Addressed any and ongoing concerns. Pt denies   Problem: Adult Inpatient Plan of Care  Goal: Plan of Care Review  Outcome: Ongoing, Progressing  Goal: Patient-Specific Goal (Individualized)  Outcome: Ongoing, Progressing  Flowsheets (Taken 8/17/2022 1002)  Anxieties, Fears or Concerns: Received blood yesterday concerned about receiving chemo today and whether or not she will need to receive iron  Individualized Care Needs: legs elevated in recliner, warm blankets and pillows, chair of choice , son at side  Patient-Specific Goals (Include Timeframe): tolerate chemo infusion today without side effects  Goal: Absence of Hospital-Acquired Illness or Injury  Outcome: Ongoing, Progressing  Intervention: Identify and Manage Fall Risk  Flowsheets (Taken 8/17/2022 1002)  Safety Promotion/Fall Prevention: in recliner, wheels locked  Intervention: Prevent Infection  Flowsheets (Taken 8/17/2022 1002)  Infection Prevention:   equipment surfaces disinfected   hand hygiene promoted   personal protective equipment utilized  Goal: Optimal Comfort and Wellbeing  Outcome: Ongoing, Progressing  Intervention: Provide Person-Centered Care  Flowsheets (Taken 8/17/2022 1002)  Trust Relationship/Rapport:   care explained   choices provided   emotional support provided   empathic listening provided   questions answered   questions encouraged   reassurance provided   thoughts/feelings acknowledged

## 2022-08-19 NOTE — ASSESSMENT & PLAN NOTE
Continue indefinite anticoagulation apixaban 5 mg b.i.d..  Clinical improvement in lower extremity edema.  No current shortness of breath or chest pain.

## 2022-08-19 NOTE — PROGRESS NOTES
"Subjective:       Patient ID: Madelyn Serna is a 71 y.o. female.    Chief Complaint:   1. Adenocarcinoma of gallbladder     2. Cancer of peritoneum/retroperitoneum, secondary     3. Secondary liver cancer     4. Pulmonary embolism without acute cor pulmonale, unspecified chronicity, unspecified pulmonary embolism type     5. DVT, lower extremity, distal, acute, right       Current Treatment:  OP GEMCITABINE CISPLATIN Q3W  PICC DOUBLE LUMEN LINE FLUSH     Treatment History:  S/p Laparoscopic cholecystectomy, 02/04/2022, [facility]    HPI: This is a 71 year old woman currently on treatment for Stage IV adenocarcinoma of the gallbladder. She presented to [facility] on 2/4/2022 after 4 days of right lower quadrant abdominal pain; she experienced an episode of vomiting per the ER notes. Labs revealed leukocytosis WBC 18.7, hemoglobin 13, , renal function intact GFR over 60, mild alkaline phosphatase elevation 130, normal transaminases and bilirubin. Albumin 4.5, calcium 10.4.  Urinalysis positive for E coli pansensitive.  Blood cultures negative.     CT A/P with contrast showed distended gallbladder extensive cholelithiasis including 17 mm stone in the region of gallbladder neck.  Pericholecystic stranding and small adjacent fluid.  Lymph nodes noted to be unremarkable.  Emphysema lung bases. She underwent emergency surgery with laparoscopic cholecystectomy.  Surgical note mentions during this maneuver would appear to be a peritoneal nodule was discovered.  The peritoneal nodule was dissected from all surrounding structures with the LigaSure device.  The nodule is passed off the table and sent separately as a specimen.     Pathology demonstrated "gallbladder cholecystectomy adenocarcinoma IHC positive for CK7 & CDX2 and negative for CK 20, TTF1 and PAX8. Tumor size at least 2.2 cm. The tumor invades the liver.  Lymphovascular invasion present. Perineural invasion not identified.  Margins " "cannot be assessed. Regional lymph nodes not applicable. Peritoneal nodule excisional biopsy positive for metastatic adenocarcinoma consistent with origin from gallbladder.      Staging PET performed on 3/2/2022 indicated uptake at right upper quadrant along liver margin site of prior cholecystectomy with postoperative fluid and peritoneal implants eleanor hepatis region potential/likely postoperative changes, right internal mammary chain and cardiophrenic lymph nodes. Her case was discussed at Tumor Board where it was decided to offer her palliative chemotherapy. She was started on Cisplatin/Gemzar every 3 weeks on 3/24/2022.     Restaging CT C/A/P performed on 7/12/2022 revealed interval reduction in size of left hepatic lobe lesions with one measuring 3mm (previously 10mm) and another measuring 4mm (previously 8mm); a third lesion previously measuring 8mm is no longer appreciated. However, there are 2 new hepatic lesions, one measuring 3mm and another 6mm; there is also a 1.4cm right hepatic lesion not previously seen. She also had a left lower lobe PE and left internal iliac vein DVT noted and was started on Eliquis.     Her primary Hematologist/Oncologist is Dr. Marcos.    Interval History: Patient presents for follow up on Gemzar/Cisplatin; She is scheduled to receive C6D8 tomorrow. She presents in a wheelchair with her granddaughter who helps to care for her. She states her grandmother was confused the night after receiving a blood transfusion. She states her grandmother thought it was 4am but it was 4pm; she couldn't remember if she had taken her medications; and she was shaking. She also states her grandmother "didn't look right" but denies slurred speech and facial droop.     Reviewed labs with patient:   CBC:   Recent Labs   Lab 08/22/22  0809   WBC 5.17   RBC 2.97 L   Hemoglobin 9.1 L   Hematocrit 29.1 L   Platelets 313   MCV 98   MCH 30.6   MCHC 31.3 L     CMP:  Recent Labs   Lab 08/22/22  0809   Glucose " 111 H   Calcium 8.8   Albumin 3.3 L   Total Protein 6.8   Sodium 143   Potassium 3.4 L   CO2 32 H   Chloride 99   BUN 21   Creatinine 1.0   Alkaline Phosphatase 89   ALT 9 L   AST 8 L   Total Bilirubin 0.4     Per Infusion nurses, the patient did not receive any premeds for blood as she does not typically receive premeds. Discussed the possibility of a TIA. Also, the patient could be exhibiting signs of brain mets. Will consult with Dr. Marcos regarding MRI brain.     The patient location is: [facility]  The chief complaint leading to consultation is: gallbladder cancer    Visit type: audiovisual    Face to Face time with patient: 19 minutes  41 minutes of total time spent on the encounter, which includes face to face time and non-face to face time preparing to see the patient (eg, review of tests), Obtaining and/or reviewing separately obtained history, Documenting clinical information in the electronic or other health record, Independently interpreting results (not separately reported) and communicating results to the patient/family/caregiver, or Care coordination (not separately reported).     Each patient to whom he or she provides medical services by telemedicine is:  (1) informed of the relationship between the physician and patient and the respective role of any other health care provider with respect to management of the patient; and (2) notified that he or she may decline to receive medical services by telemedicine and may withdraw from such care at any time.    Social History     Socioeconomic History    Marital status:    Tobacco Use    Smoking status: Current Every Day Smoker    Smokeless tobacco: Former User     Past Medical History:   Diagnosis Date    Cancer of gallbladder     Essential (primary) hypertension     Infection following a procedure, deep incisional surgical site, initial encounter 3/3/2022    Romaine pus on bandage with induration to RUQ. She has already completed a 5 day course of  Cipro which completed on 2/27. Recommend prompt evaluation with ED and whether or not imaging is warranted.     History reviewed. No pertinent family history.  History reviewed. No pertinent surgical history.  Review of Systems   Constitutional: Negative for appetite change and fatigue.   HENT: Negative for mouth sores, rhinorrhea and sore throat.    Eyes: Negative.    Respiratory: Negative.    Cardiovascular: Negative.    Gastrointestinal: Negative for constipation, diarrhea, nausea and vomiting.   Endocrine: Negative.    Genitourinary: Negative.    Musculoskeletal: Negative.    Integumentary:  Negative.   Allergic/Immunologic: Negative.    Neurological: Negative for weakness and numbness.   Hematological: Negative.    Psychiatric/Behavioral: Negative.        Medication List with Changes/Refills   Current Medications    ALBUTEROL (PROVENTIL/VENTOLIN HFA) 90 MCG/ACTUATION INHALER    INHALE TWO PUFFS FOUR TIMES DAILY AS NEEDED FOR WHEEZING    AMLODIPINE (NORVASC) 5 MG TABLET    amlodipine Take 1 time per day No date recorded tablet 1 time per day No route recorded No set duration recorded No set duration amount recorded active 5 mg    APIXABAN (ELIQUIS) 5 MG TAB    Take 1 tablet (5 mg total) by mouth 2 (two) times daily.    CLONAZEPAM (KLONOPIN) 0.5 MG TABLET    clonazepam Take 2 times per day No date recorded tablet 2 times per day No route recorded No set duration recorded No set duration amount recorded active 0.5 mg    CYPROHEPTADINE (PERIACTIN) 4 MG TABLET    Take 4 mg by mouth 2 (two) times daily.    DEXAMETHASONE (DECADRON) 4 MG TAB    Take 2 tablets (8mg total) by mouth once daily. Take as directed on days 2, 3, 4 and days 9, 10, 11 of your chemotherapy cycle.    DEXAMETHASONE (DECADRON) 4 MG TAB    Take 2 tablets (8 mg total) by mouth once daily. Take as directed on days 2, 3, 4 and days 9, 10, 11 of your chemotherapy cycle.    DICLOFENAC SODIUM (VOLTAREN) 1 % GEL    APPLY 1 GRAM TO AFFECTED AREA FOUR TIMES  DAILY AS NEEDED    IPRATROPIUM (ATROVENT) 21 MCG (0.03 %) NASAL SPRAY        LABETALOL (NORMODYNE) 300 MG TABLET    labetalol Take 2 times per day No date recorded tablet 2 times per day No route recorded No set duration recorded No set duration amount recorded suspended 300 mg    LISINOPRIL 10 MG TABLET    Take 1 tablet (10 mg total) by mouth once daily.    MIRTAZAPINE (REMERON) 7.5 MG TAB    Take 1 tablet (7.5 mg total) by mouth nightly.    MONTELUKAST (SINGULAIR) 10 MG TABLET    Take 10 mg by mouth once daily.    NALOXONE (NARCAN) 4 MG/ACTUATION SPRY    USE ONE SPRAY IN ONE NOSTRIL AS DIRECTED UPON SIGNS OF OPIOID OVERDOSE CALL 911    ONDANSETRON (ZOFRAN-ODT) 8 MG TBDL    Take 1 tablet (8 mg total) by mouth every 6 (six) hours as needed.    ONDANSETRON (ZOFRAN-ODT) 8 MG TBDL    Take 1 tablet (8 mg total) by mouth every 8 (eight) hours as needed (nausea/vomiting).    OXYCODONE (ROXICODONE) 5 MG IMMEDIATE RELEASE TABLET    Take 1 tablet (5 mg total) by mouth every 12 (twelve) hours as needed for Pain. q 12h prn severe pain    POTASSIUM CHLORIDE (MICRO-K) 10 MEQ CPSR    Take 1 capsule (10 mEq total) by mouth once daily.    PROCHLORPERAZINE (COMPAZINE) 5 MG TABLET    Take 1 tablet (5 mg total) by mouth every 6 (six) hours as needed for Nausea. May take every 6 hours scheduled for prevention of nausea.    VENLAFAXINE (EFFEXOR-XR) 75 MG 24 HR CAPSULE    Take 1 capsule (75 mg total) by mouth once daily.     Objective:     Vitals:    08/22/22 0904   BP: 132/76   Pulse: 108   Temp: 98 °F (36.7 °C)     Physical Exam     Unable to assess due to virtual visit.    (2) Ambulatory and capable of self care, unable to carry out work activity, up and about > 50% or waking hours  Assessment:     Problem List Items Addressed This Visit        Hematology    DVT, lower extremity, distal, acute, right    Pulmonary embolism without acute cor pulmonale     Continue indefinite anticoagulation apixaban 5 mg b.i.d..  Clinical improvement in  lower extremity edema.  No current shortness of breath or chest pain.              Oncology    Adenocarcinoma of gallbladder - Primary     NGS Guardant with NATY, PDL1 CPS 2. No NTRK mutation detected.      On palliative chemotherapy with Gemzar/Cisplatin.     Recent CT C/A/P reveal mixed response with significant decrease in size of 2 left hepatic lesions and complete resolution of another; also new development of 2 left hepatic lesions and a right liver lesion. PE & DVT also noted on CT.      Will repeat restaging CTs in 2 months for short term follow up.            Cancer of peritoneum/retroperitoneum, secondary     Interval decrease in size of 2 liver lesions on recent restaging CTs; one liver lesion previously seen on PET not visible on CT. Also 3 new liver lesions noted, 2 on the left lobe, 1 on the right.      Will repeat restaging CTs in 2 months for short term follow up.            Secondary liver cancer        Plan:     Adenocarcinoma of gallbladder    Cancer of peritoneum/retroperitoneum, secondary    Secondary liver cancer    Pulmonary embolism without acute cor pulmonale, unspecified chronicity, unspecified pulmonary embolism type    DVT, lower extremity, distal, acute, right    Labs reviewed.   Ok to proceed with C6D8 of Gemzar/Cisplatin tomorrow.  Follow up in 2 weeks with Dr. Marcos/Tin with Mg, CBC and Comprehensive Metabolic Panel prior to C7D1.  Repeat CT scans early 10/2022.    I will review assessment/plan with collaborating physician Dr. Martinez.      MARIA LUISA Brown

## 2022-08-22 ENCOUNTER — INFUSION (OUTPATIENT)
Dept: INFUSION THERAPY | Facility: HOSPITAL | Age: 72
End: 2022-08-22
Attending: INTERNAL MEDICINE
Payer: MEDICARE

## 2022-08-22 ENCOUNTER — OFFICE VISIT (OUTPATIENT)
Dept: HEMATOLOGY/ONCOLOGY | Facility: CLINIC | Age: 72
End: 2022-08-22
Payer: MEDICARE

## 2022-08-22 VITALS
OXYGEN SATURATION: 96 % | HEART RATE: 108 BPM | TEMPERATURE: 98 F | BODY MASS INDEX: 24.29 KG/M2 | WEIGHT: 154.75 LBS | DIASTOLIC BLOOD PRESSURE: 76 MMHG | SYSTOLIC BLOOD PRESSURE: 132 MMHG | HEIGHT: 67 IN

## 2022-08-22 VITALS
RESPIRATION RATE: 17 BRPM | OXYGEN SATURATION: 93 % | SYSTOLIC BLOOD PRESSURE: 150 MMHG | HEART RATE: 111 BPM | TEMPERATURE: 98 F | DIASTOLIC BLOOD PRESSURE: 95 MMHG

## 2022-08-22 DIAGNOSIS — C78.6 CANCER OF PERITONEUM/RETROPERITONEUM, SECONDARY: ICD-10-CM

## 2022-08-22 DIAGNOSIS — I26.99 PULMONARY EMBOLISM WITHOUT ACUTE COR PULMONALE, UNSPECIFIED CHRONICITY, UNSPECIFIED PULMONARY EMBOLISM TYPE: ICD-10-CM

## 2022-08-22 DIAGNOSIS — C78.7 SECONDARY LIVER CANCER: ICD-10-CM

## 2022-08-22 DIAGNOSIS — C23 ADENOCARCINOMA OF GALLBLADDER: Primary | ICD-10-CM

## 2022-08-22 DIAGNOSIS — I82.4Z1 DVT, LOWER EXTREMITY, DISTAL, ACUTE, RIGHT: ICD-10-CM

## 2022-08-22 LAB
ALBUMIN SERPL BCP-MCNC: 3.3 G/DL (ref 3.5–5.2)
ALP SERPL-CCNC: 89 U/L (ref 55–135)
ALT SERPL W/O P-5'-P-CCNC: 9 U/L (ref 10–44)
ANION GAP SERPL CALC-SCNC: 12 MMOL/L (ref 8–16)
AST SERPL-CCNC: 8 U/L (ref 10–40)
BASOPHILS # BLD AUTO: 0.01 K/UL (ref 0–0.2)
BASOPHILS NFR BLD: 0.2 % (ref 0–1.9)
BILIRUB SERPL-MCNC: 0.4 MG/DL (ref 0.1–1)
BUN SERPL-MCNC: 21 MG/DL (ref 8–23)
CALCIUM SERPL-MCNC: 8.8 MG/DL (ref 8.7–10.5)
CHLORIDE SERPL-SCNC: 99 MMOL/L (ref 95–110)
CO2 SERPL-SCNC: 32 MMOL/L (ref 23–29)
CREAT SERPL-MCNC: 1 MG/DL (ref 0.5–1.4)
DIFFERENTIAL METHOD: ABNORMAL
EOSINOPHIL # BLD AUTO: 0.1 K/UL (ref 0–0.5)
EOSINOPHIL NFR BLD: 2.3 % (ref 0–8)
ERYTHROCYTE [DISTWIDTH] IN BLOOD BY AUTOMATED COUNT: 15.6 % (ref 11.5–14.5)
EST. GFR  (NO RACE VARIABLE): 59 ML/MIN/1.73 M^2
GLUCOSE SERPL-MCNC: 111 MG/DL (ref 70–110)
HCT VFR BLD AUTO: 29.1 % (ref 37–48.5)
HGB BLD-MCNC: 9.1 G/DL (ref 12–16)
IMM GRANULOCYTES # BLD AUTO: 0.01 K/UL (ref 0–0.04)
IMM GRANULOCYTES NFR BLD AUTO: 0.2 % (ref 0–0.5)
LYMPHOCYTES # BLD AUTO: 2.7 K/UL (ref 1–4.8)
LYMPHOCYTES NFR BLD: 52.2 % (ref 18–48)
MCH RBC QN AUTO: 30.6 PG (ref 27–31)
MCHC RBC AUTO-ENTMCNC: 31.3 G/DL (ref 32–36)
MCV RBC AUTO: 98 FL (ref 82–98)
MONOCYTES # BLD AUTO: 0.1 K/UL (ref 0.3–1)
MONOCYTES NFR BLD: 1.5 % (ref 4–15)
NEUTROPHILS # BLD AUTO: 2.3 K/UL (ref 1.8–7.7)
NEUTROPHILS NFR BLD: 43.6 % (ref 38–73)
NRBC BLD-RTO: 0 /100 WBC
PLATELET # BLD AUTO: 313 K/UL (ref 150–450)
PLATELET BLD QL SMEAR: ABNORMAL
PMV BLD AUTO: 9 FL (ref 9.2–12.9)
POTASSIUM SERPL-SCNC: 3.4 MMOL/L (ref 3.5–5.1)
PROT SERPL-MCNC: 6.8 G/DL (ref 6–8.4)
RBC # BLD AUTO: 2.97 M/UL (ref 4–5.4)
SODIUM SERPL-SCNC: 143 MMOL/L (ref 136–145)
WBC # BLD AUTO: 5.17 K/UL (ref 3.9–12.7)

## 2022-08-22 PROCEDURE — 3008F PR BODY MASS INDEX (BMI) DOCUMENTED: ICD-10-PCS | Mod: CPTII,95,, | Performed by: NURSE PRACTITIONER

## 2022-08-22 PROCEDURE — 99214 PR OFFICE/OUTPT VISIT, EST, LEVL IV, 30-39 MIN: ICD-10-PCS | Mod: 95,,, | Performed by: NURSE PRACTITIONER

## 2022-08-22 PROCEDURE — 3008F BODY MASS INDEX DOCD: CPT | Mod: CPTII,95,, | Performed by: NURSE PRACTITIONER

## 2022-08-22 PROCEDURE — A4216 STERILE WATER/SALINE, 10 ML: HCPCS | Performed by: NURSE PRACTITIONER

## 2022-08-22 PROCEDURE — 1159F MED LIST DOCD IN RCRD: CPT | Mod: CPTII,95,, | Performed by: NURSE PRACTITIONER

## 2022-08-22 PROCEDURE — 3078F PR MOST RECENT DIASTOLIC BLOOD PRESSURE < 80 MM HG: ICD-10-PCS | Mod: CPTII,95,, | Performed by: NURSE PRACTITIONER

## 2022-08-22 PROCEDURE — 1126F PR PAIN SEVERITY QUANTIFIED, NO PAIN PRESENT: ICD-10-PCS | Mod: CPTII,95,, | Performed by: NURSE PRACTITIONER

## 2022-08-22 PROCEDURE — 3075F SYST BP GE 130 - 139MM HG: CPT | Mod: CPTII,95,, | Performed by: NURSE PRACTITIONER

## 2022-08-22 PROCEDURE — 36591 DRAW BLOOD OFF VENOUS DEVICE: CPT

## 2022-08-22 PROCEDURE — 85025 COMPLETE CBC W/AUTO DIFF WBC: CPT | Performed by: NURSE PRACTITIONER

## 2022-08-22 PROCEDURE — 4010F ACE/ARB THERAPY RXD/TAKEN: CPT | Mod: CPTII,95,, | Performed by: NURSE PRACTITIONER

## 2022-08-22 PROCEDURE — 1160F PR REVIEW ALL MEDS BY PRESCRIBER/CLIN PHARMACIST DOCUMENTED: ICD-10-PCS | Mod: CPTII,95,, | Performed by: NURSE PRACTITIONER

## 2022-08-22 PROCEDURE — 4010F PR ACE/ARB THEARPY RXD/TAKEN: ICD-10-PCS | Mod: CPTII,95,, | Performed by: NURSE PRACTITIONER

## 2022-08-22 PROCEDURE — 80053 COMPREHEN METABOLIC PANEL: CPT | Performed by: NURSE PRACTITIONER

## 2022-08-22 PROCEDURE — 99214 OFFICE O/P EST MOD 30 MIN: CPT | Mod: 95,,, | Performed by: NURSE PRACTITIONER

## 2022-08-22 PROCEDURE — 25000003 PHARM REV CODE 250: Performed by: NURSE PRACTITIONER

## 2022-08-22 PROCEDURE — 1159F PR MEDICATION LIST DOCUMENTED IN MEDICAL RECORD: ICD-10-PCS | Mod: CPTII,95,, | Performed by: NURSE PRACTITIONER

## 2022-08-22 PROCEDURE — 1157F ADVNC CARE PLAN IN RCRD: CPT | Mod: CPTII,95,, | Performed by: NURSE PRACTITIONER

## 2022-08-22 PROCEDURE — 1160F RVW MEDS BY RX/DR IN RCRD: CPT | Mod: CPTII,95,, | Performed by: NURSE PRACTITIONER

## 2022-08-22 PROCEDURE — 1126F AMNT PAIN NOTED NONE PRSNT: CPT | Mod: CPTII,95,, | Performed by: NURSE PRACTITIONER

## 2022-08-22 PROCEDURE — 3078F DIAST BP <80 MM HG: CPT | Mod: CPTII,95,, | Performed by: NURSE PRACTITIONER

## 2022-08-22 PROCEDURE — 36592 COLLECT BLOOD FROM PICC: CPT

## 2022-08-22 PROCEDURE — 63600175 PHARM REV CODE 636 W HCPCS: Performed by: NURSE PRACTITIONER

## 2022-08-22 PROCEDURE — 1157F PR ADVANCE CARE PLAN OR EQUIV PRESENT IN MEDICAL RECORD: ICD-10-PCS | Mod: CPTII,95,, | Performed by: NURSE PRACTITIONER

## 2022-08-22 PROCEDURE — 3075F PR MOST RECENT SYSTOLIC BLOOD PRESS GE 130-139MM HG: ICD-10-PCS | Mod: CPTII,95,, | Performed by: NURSE PRACTITIONER

## 2022-08-22 RX ORDER — SODIUM CHLORIDE 0.9 % (FLUSH) 0.9 %
10 SYRINGE (ML) INJECTION
Status: CANCELLED | OUTPATIENT
Start: 2022-08-22

## 2022-08-22 RX ORDER — HEPARIN 100 UNIT/ML
500 SYRINGE INTRAVENOUS
Status: CANCELLED | OUTPATIENT
Start: 2022-08-22

## 2022-08-22 RX ORDER — HEPARIN 100 UNIT/ML
500 SYRINGE INTRAVENOUS
Status: CANCELLED | OUTPATIENT
Start: 2022-08-23

## 2022-08-22 RX ORDER — SODIUM CHLORIDE 0.9 % (FLUSH) 0.9 %
10 SYRINGE (ML) INJECTION
Status: COMPLETED | OUTPATIENT
Start: 2022-08-22 | End: 2022-08-22

## 2022-08-22 RX ORDER — SODIUM CHLORIDE 0.9 % (FLUSH) 0.9 %
10 SYRINGE (ML) INJECTION
Status: CANCELLED | OUTPATIENT
Start: 2022-08-23

## 2022-08-22 RX ORDER — HEPARIN 100 UNIT/ML
500 SYRINGE INTRAVENOUS
Status: COMPLETED | OUTPATIENT
Start: 2022-08-22 | End: 2022-08-22

## 2022-08-22 RX ADMIN — HEPARIN 500 UNITS: 100 SYRINGE at 08:08

## 2022-08-22 RX ADMIN — Medication 10 ML: at 08:08

## 2022-08-22 NOTE — NURSING
Lab obtained from right upper arm PICC line, 2 patient identifiers obtained, labeled and sent to lab.  Adequate blood return noted.  Purple lumen flushed with 10ml NS and 5ml Heparin solution.

## 2022-08-23 ENCOUNTER — INFUSION (OUTPATIENT)
Dept: INFUSION THERAPY | Facility: HOSPITAL | Age: 72
End: 2022-08-23
Attending: INTERNAL MEDICINE
Payer: MEDICARE

## 2022-08-23 VITALS
RESPIRATION RATE: 16 BRPM | SYSTOLIC BLOOD PRESSURE: 131 MMHG | HEART RATE: 84 BPM | DIASTOLIC BLOOD PRESSURE: 78 MMHG | OXYGEN SATURATION: 95 % | BODY MASS INDEX: 24.29 KG/M2 | TEMPERATURE: 98 F | WEIGHT: 154.75 LBS | HEIGHT: 67 IN

## 2022-08-23 DIAGNOSIS — C23 ADENOCARCINOMA OF GALLBLADDER: Primary | ICD-10-CM

## 2022-08-23 PROCEDURE — 96367 TX/PROPH/DG ADDL SEQ IV INF: CPT

## 2022-08-23 PROCEDURE — 25000003 PHARM REV CODE 250: Performed by: NURSE PRACTITIONER

## 2022-08-23 PROCEDURE — 96417 CHEMO IV INFUS EACH ADDL SEQ: CPT

## 2022-08-23 PROCEDURE — 96375 TX/PRO/DX INJ NEW DRUG ADDON: CPT

## 2022-08-23 PROCEDURE — 96413 CHEMO IV INFUSION 1 HR: CPT

## 2022-08-23 PROCEDURE — 96366 THER/PROPH/DIAG IV INF ADDON: CPT

## 2022-08-23 PROCEDURE — 63600175 PHARM REV CODE 636 W HCPCS: Performed by: NURSE PRACTITIONER

## 2022-08-23 RX ORDER — SODIUM CHLORIDE 9 MG/ML
500 INJECTION, SOLUTION INTRAVENOUS
Status: COMPLETED | OUTPATIENT
Start: 2022-08-23 | End: 2022-08-23

## 2022-08-23 RX ORDER — HEPARIN 100 UNIT/ML
500 SYRINGE INTRAVENOUS
Status: DISCONTINUED | OUTPATIENT
Start: 2022-08-23 | End: 2022-08-23 | Stop reason: HOSPADM

## 2022-08-23 RX ADMIN — DEXAMETHASONE SODIUM PHOSPHATE: 4 INJECTION, SOLUTION INTRA-ARTICULAR; INTRALESIONAL; INTRAMUSCULAR; INTRAVENOUS; SOFT TISSUE at 12:08

## 2022-08-23 RX ADMIN — HEPARIN 500 UNITS: 100 SYRINGE at 04:08

## 2022-08-23 RX ADMIN — GEMCITABINE 1400 MG: 38 INJECTION, SOLUTION INTRAVENOUS at 12:08

## 2022-08-23 RX ADMIN — SODIUM CHLORIDE 500 ML: 9 INJECTION, SOLUTION INTRAVENOUS at 02:08

## 2022-08-23 RX ADMIN — SODIUM CHLORIDE 35 MG: 9 INJECTION, SOLUTION INTRAVENOUS at 01:08

## 2022-08-23 RX ADMIN — SODIUM CHLORIDE: 0.9 INJECTION, SOLUTION INTRAVENOUS at 12:08

## 2022-08-23 RX ADMIN — MAGNESIUM SULFATE HEPTAHYDRATE 500 ML/HR: 500 INJECTION, SOLUTION INTRAMUSCULAR; INTRAVENOUS at 09:08

## 2022-08-23 RX ADMIN — APREPITANT 130 MG: 130 INJECTION, EMULSION INTRAVENOUS at 11:08

## 2022-08-23 NOTE — DISCHARGE INSTRUCTIONS
.[facility]  [address] [address]  [address]  [phone] phone     [phone] fax  Hours of Operation: Monday- Friday 8:00am- 5:00pm  After hours phone  [phone]  Hematology / Oncology Physicians on call    Dr. Samson Villalobos      Nurse Practitioners:    Gracie Spangler, TERI Song, TERI Solomon, TERI Arellano, TERI Srinivasan, TERI Andrea, PA      Please don't hesitate to call if you have any concerns.    .WAYS TO HELP PREVENT INFECTION        WASH YOUR HANDS OFTEN DURING THE DAY, ESPECIALLY BEFORE YOU EAT, AFTER USING THE BATHROOM, AND AFTER TOUCHING ANIMALS    STAY AWAY FROM PEOPLE WHO HAVE ILLNESSES YOU CAN CATCH; SUCH AS COLDS, FLU, CHICKEN POX    TRY TO AVOID CROWDS    STAY AWAY FROM CHILDREN WHO RECENTLY HAVE RECEIVED LIVE VIRUS VACCINES    MAINTAIN GOOD MOUTH CARE    DO NOT SQUEEZE OR SCRATCH PIMPLES    CLEAN CUTS & SCRAPES RIGHT AWAY AND DAILY UNTIL HEALED WITH WARM WATER, SOAP & AN ANTISEPTIC    AVOID CONTACT WITH LITTER BOXES, BIRD CAGES, & FISH TANKS    AVOID STANDING WATER, IE., BIRD BATHS, FLOWER POTS/VASES, OR HUMIDIFIERS    WEAR GLOVES WHEN GARDENING OR CLEANING UP AFTER OTHERS, ESPECIALLY BABIES & SMALL CHILDREN    DO NOT EAT RAW FISH, SEAFOOD, MEAT, OR EGGS  FALL PREVENTION   Falls often occur due to slipping, tripping or losing your balance. Here are ways to reduce your risk of falling again.   Was there anything that caused your fall that can be fixed, removed or replaced?   Make your home safe by keeping walkways clear of objects you may trip over.   Use non-slip pads under rugs.   Do not walk in poorly lit areas.   Do not stand on chairs or wobbly ladders.   Use caution when reaching overhead or looking upward. This position can cause a loss of balance.   Be sure your shoes fit properly, have non-slip bottoms and are in good condition.   Be cautious when going  up and down stairs, curbs, and when walking on uneven sidewalks.   If your balance is poor, consider using a cane or walker.   If your fall was related to alcohol use, stop or limit alcohol intake.   If your fall was related to use of sleeping medicines, talk to your doctor about this. You may need to reduce your dosage at bedtime if you awaken during the night to go to the bathroom.   To reduce the need for nighttime bathroom trips:   Avoid drinking fluids for several hours before going to bed   Empty your bladder before going to bed   Men can keep a urinal at the bedside   © 4476-0747 [facility], [address] [place] [address], [place] [address]. All rights reserved. This information is not intended as a substitute for professional medical care. Always follow your healthcare professional's instructions.

## 2022-08-23 NOTE — PLAN OF CARE
Patient states she feels pretty good. Daughter said the patient is back to her baseline.   Problem: Adult Inpatient Plan of Care  Goal: Plan of Care Review  Outcome: Ongoing, Progressing  Flowsheets (Taken 8/23/2022 1000)  Plan of Care Reviewed With:   patient   daughter  Goal: Patient-Specific Goal (Individualized)  Outcome: Ongoing, Progressing  Flowsheets (Taken 8/23/2022 1000)  Anxieties, Fears or Concerns: Not being able to have chemo today due to her episode over the weekend.  Individualized Care Needs: Legs elevated, warm blankets and pillow, daughter at chair side and brought breakfast for patient  Patient-Specific Goals (Include Timeframe): Pt to tolerate chemo today  Goal: Absence of Hospital-Acquired Illness or Injury  Outcome: Ongoing, Progressing  Intervention: Identify and Manage Fall Risk  Flowsheets (Taken 8/23/2022 1000)  Safety Promotion/Fall Prevention:   in recliner, wheels locked   assistive device/personal item within reach  Intervention: Prevent and Manage VTE (Venous Thromboembolism) Risk  Flowsheets (Taken 8/23/2022 1000)  VTE Prevention/Management:   bleeding risk factor(s) identified, provider notified   bleeding precations maintained   bleeding risk assessed  Intervention: Prevent Infection  Flowsheets (Taken 8/23/2022 1000)  Infection Prevention:   equipment surfaces disinfected   hand hygiene promoted   personal protective equipment utilized   rest/sleep promoted  Goal: Optimal Comfort and Wellbeing  Outcome: Ongoing, Progressing  Intervention: Monitor Pain and Promote Comfort  Flowsheets (Taken 8/23/2022 1000)  Pain Management Interventions: quiet environment facilitated  Intervention: Provide Person-Centered Care  Flowsheets (Taken 8/23/2022 1000)  Trust Relationship/Rapport:   care explained   choices provided   emotional support provided   empathic listening provided   questions answered   questions encouraged   reassurance provided   thoughts/feelings acknowledged     Problem:  Anemia  Goal: Anemia Symptom Improvement  Outcome: Ongoing, Progressing  Intervention: Monitor and Manage Anemia  Flowsheets (Taken 8/23/2022 1000)  Safety Promotion/Fall Prevention:   in recliner, wheels locked   assistive device/personal item within reach  Fatigue Management:   activity assistance provided   frequent rest breaks encouraged     Problem: Anemia (Chemotherapy Effects)  Goal: Anemia Symptom Improvement  Outcome: Ongoing, Progressing  Intervention: Monitor and Manage Anemia  Flowsheets (Taken 8/23/2022 1000)  Safety Promotion/Fall Prevention:   in recliner, wheels locked   assistive device/personal item within reach  Fatigue Management:   activity assistance provided   frequent rest breaks encouraged     Problem: Neutropenia (Chemotherapy Effects)  Goal: Absence of Infection  Outcome: Ongoing, Progressing     Problem: Fall Injury Risk  Goal: Absence of Fall and Fall-Related Injury  Outcome: Ongoing, Progressing  Intervention: Identify and Manage Contributors  Flowsheets (Taken 8/23/2022 1000)  Self-Care Promotion: BADL personal objects within reach  Medication Review/Management:   medications reviewed   high-risk medications identified  Intervention: Promote Injury-Free Environment  Flowsheets (Taken 8/23/2022 1000)  Safety Promotion/Fall Prevention:   in recliner, wheels locked   assistive device/personal item within reach

## 2022-08-23 NOTE — PROGRESS NOTES
TERI Painting said to hold off on treatment until Dr. Marcos says if she wants to evaluate patient before starting. Patient's daughter had reported an episode the patient had over the weekend after receiving a blood transfusion. She had episode of confusion but states this morning that she is 100% back to her baseline. Awaiting on MD to give ok to proceed.

## 2022-08-25 PROCEDURE — G0179 MD RECERTIFICATION HHA PT: HCPCS | Mod: ,,, | Performed by: INTERNAL MEDICINE

## 2022-08-25 PROCEDURE — G0179 PR HOME HEALTH MD RECERTIFICATION: ICD-10-PCS | Mod: ,,, | Performed by: INTERNAL MEDICINE

## 2022-09-01 ENCOUNTER — DOCUMENT SCAN (OUTPATIENT)
Dept: HOME HEALTH SERVICES | Facility: HOSPITAL | Age: 72
End: 2022-09-01
Payer: MEDICARE

## 2022-09-06 ENCOUNTER — TELEPHONE (OUTPATIENT)
Dept: HEMATOLOGY/ONCOLOGY | Facility: CLINIC | Age: 72
End: 2022-09-06
Payer: MEDICARE

## 2022-09-06 ENCOUNTER — OFFICE VISIT (OUTPATIENT)
Dept: HEMATOLOGY/ONCOLOGY | Facility: CLINIC | Age: 72
End: 2022-09-06
Payer: MEDICARE

## 2022-09-06 ENCOUNTER — INFUSION (OUTPATIENT)
Dept: INFUSION THERAPY | Facility: HOSPITAL | Age: 72
End: 2022-09-06
Attending: INTERNAL MEDICINE
Payer: MEDICARE

## 2022-09-06 VITALS
DIASTOLIC BLOOD PRESSURE: 74 MMHG | SYSTOLIC BLOOD PRESSURE: 112 MMHG | HEIGHT: 67 IN | OXYGEN SATURATION: 93 % | WEIGHT: 157.63 LBS | HEART RATE: 110 BPM | BODY MASS INDEX: 24.74 KG/M2 | TEMPERATURE: 97 F

## 2022-09-06 DIAGNOSIS — C23 ADENOCARCINOMA OF GALLBLADDER: Primary | ICD-10-CM

## 2022-09-06 DIAGNOSIS — C78.7 SECONDARY LIVER CANCER: ICD-10-CM

## 2022-09-06 DIAGNOSIS — E83.42 HYPOMAGNESEMIA: ICD-10-CM

## 2022-09-06 DIAGNOSIS — D64.81 ANEMIA ASSOCIATED WITH CHEMOTHERAPY: Primary | ICD-10-CM

## 2022-09-06 DIAGNOSIS — T45.1X5A ANEMIA ASSOCIATED WITH CHEMOTHERAPY: Primary | ICD-10-CM

## 2022-09-06 DIAGNOSIS — C78.6 CANCER OF PERITONEUM/RETROPERITONEUM, SECONDARY: ICD-10-CM

## 2022-09-06 DIAGNOSIS — I82.4Z1 DVT, LOWER EXTREMITY, DISTAL, ACUTE, RIGHT: ICD-10-CM

## 2022-09-06 DIAGNOSIS — C23 ADENOCARCINOMA OF GALLBLADDER: ICD-10-CM

## 2022-09-06 DIAGNOSIS — I26.99 PULMONARY EMBOLISM WITHOUT ACUTE COR PULMONALE, UNSPECIFIED CHRONICITY, UNSPECIFIED PULMONARY EMBOLISM TYPE: ICD-10-CM

## 2022-09-06 LAB
ALBUMIN SERPL BCP-MCNC: 2.8 G/DL (ref 3.5–5.2)
ALP SERPL-CCNC: 89 U/L (ref 55–135)
ALT SERPL W/O P-5'-P-CCNC: 11 U/L (ref 10–44)
ANION GAP SERPL CALC-SCNC: 5 MMOL/L (ref 8–16)
AST SERPL-CCNC: 11 U/L (ref 10–40)
BILIRUB SERPL-MCNC: 0.2 MG/DL (ref 0.1–1)
BUN SERPL-MCNC: 17 MG/DL (ref 8–23)
CALCIUM SERPL-MCNC: 8.8 MG/DL (ref 8.7–10.5)
CHLORIDE SERPL-SCNC: 104 MMOL/L (ref 95–110)
CO2 SERPL-SCNC: 35 MMOL/L (ref 23–29)
CREAT SERPL-MCNC: 1 MG/DL (ref 0.5–1.4)
ERYTHROCYTE [DISTWIDTH] IN BLOOD BY AUTOMATED COUNT: 15.9 % (ref 11.5–14.5)
EST. GFR  (NO RACE VARIABLE): 59 ML/MIN/1.73 M^2
GLUCOSE SERPL-MCNC: 131 MG/DL (ref 70–110)
HCT VFR BLD AUTO: 21.1 % (ref 37–48.5)
HGB BLD-MCNC: 6.4 G/DL (ref 12–16)
IMM GRANULOCYTES # BLD AUTO: 0 K/UL (ref 0–0.04)
MAGNESIUM SERPL-MCNC: 0.8 MG/DL (ref 1.6–2.6)
MCH RBC QN AUTO: 30.5 PG (ref 27–31)
MCHC RBC AUTO-ENTMCNC: 30.3 G/DL (ref 32–36)
MCV RBC AUTO: 101 FL (ref 82–98)
NEUTROPHILS # BLD AUTO: 1.2 K/UL (ref 1.8–7.7)
PLATELET # BLD AUTO: 154 K/UL (ref 150–450)
PMV BLD AUTO: 9.9 FL (ref 9.2–12.9)
POTASSIUM SERPL-SCNC: 4.3 MMOL/L (ref 3.5–5.1)
PROT SERPL-MCNC: 6.5 G/DL (ref 6–8.4)
RBC # BLD AUTO: 2.1 M/UL (ref 4–5.4)
SODIUM SERPL-SCNC: 144 MMOL/L (ref 136–145)
WBC # BLD AUTO: 2.68 K/UL (ref 3.9–12.7)

## 2022-09-06 PROCEDURE — 4010F ACE/ARB THERAPY RXD/TAKEN: CPT | Mod: CPTII,S$GLB,,

## 2022-09-06 PROCEDURE — 1101F PR PT FALLS ASSESS DOC 0-1 FALLS W/OUT INJ PAST YR: ICD-10-PCS | Mod: CPTII,S$GLB,,

## 2022-09-06 PROCEDURE — 99999 PR PBB SHADOW E&M-EST. PATIENT-LVL IV: ICD-10-PCS | Mod: PBBFAC,,,

## 2022-09-06 PROCEDURE — 80053 COMPREHEN METABOLIC PANEL: CPT | Performed by: NURSE PRACTITIONER

## 2022-09-06 PROCEDURE — 99214 PR OFFICE/OUTPT VISIT, EST, LEVL IV, 30-39 MIN: ICD-10-PCS | Mod: S$GLB,,,

## 2022-09-06 PROCEDURE — 3288F FALL RISK ASSESSMENT DOCD: CPT | Mod: CPTII,S$GLB,,

## 2022-09-06 PROCEDURE — 1101F PT FALLS ASSESS-DOCD LE1/YR: CPT | Mod: CPTII,S$GLB,,

## 2022-09-06 PROCEDURE — 1157F ADVNC CARE PLAN IN RCRD: CPT | Mod: CPTII,S$GLB,,

## 2022-09-06 PROCEDURE — 1126F PR PAIN SEVERITY QUANTIFIED, NO PAIN PRESENT: ICD-10-PCS | Mod: CPTII,S$GLB,,

## 2022-09-06 PROCEDURE — 63600175 PHARM REV CODE 636 W HCPCS: Performed by: NURSE PRACTITIONER

## 2022-09-06 PROCEDURE — 3288F PR FALLS RISK ASSESSMENT DOCUMENTED: ICD-10-PCS | Mod: CPTII,S$GLB,,

## 2022-09-06 PROCEDURE — 25000003 PHARM REV CODE 250: Performed by: NURSE PRACTITIONER

## 2022-09-06 PROCEDURE — 4010F PR ACE/ARB THEARPY RXD/TAKEN: ICD-10-PCS | Mod: CPTII,S$GLB,,

## 2022-09-06 PROCEDURE — 3074F SYST BP LT 130 MM HG: CPT | Mod: CPTII,S$GLB,,

## 2022-09-06 PROCEDURE — 1160F RVW MEDS BY RX/DR IN RCRD: CPT | Mod: CPTII,S$GLB,,

## 2022-09-06 PROCEDURE — 1159F PR MEDICATION LIST DOCUMENTED IN MEDICAL RECORD: ICD-10-PCS | Mod: CPTII,S$GLB,,

## 2022-09-06 PROCEDURE — 3008F BODY MASS INDEX DOCD: CPT | Mod: CPTII,S$GLB,,

## 2022-09-06 PROCEDURE — 3008F PR BODY MASS INDEX (BMI) DOCUMENTED: ICD-10-PCS | Mod: CPTII,S$GLB,,

## 2022-09-06 PROCEDURE — 1160F PR REVIEW ALL MEDS BY PRESCRIBER/CLIN PHARMACIST DOCUMENTED: ICD-10-PCS | Mod: CPTII,S$GLB,,

## 2022-09-06 PROCEDURE — 1159F MED LIST DOCD IN RCRD: CPT | Mod: CPTII,S$GLB,,

## 2022-09-06 PROCEDURE — 3078F DIAST BP <80 MM HG: CPT | Mod: CPTII,S$GLB,,

## 2022-09-06 PROCEDURE — A4216 STERILE WATER/SALINE, 10 ML: HCPCS | Performed by: NURSE PRACTITIONER

## 2022-09-06 PROCEDURE — 83735 ASSAY OF MAGNESIUM: CPT | Performed by: NURSE PRACTITIONER

## 2022-09-06 PROCEDURE — 3078F PR MOST RECENT DIASTOLIC BLOOD PRESSURE < 80 MM HG: ICD-10-PCS | Mod: CPTII,S$GLB,,

## 2022-09-06 PROCEDURE — 99214 OFFICE O/P EST MOD 30 MIN: CPT | Mod: S$GLB,,,

## 2022-09-06 PROCEDURE — 1157F PR ADVANCE CARE PLAN OR EQUIV PRESENT IN MEDICAL RECORD: ICD-10-PCS | Mod: CPTII,S$GLB,,

## 2022-09-06 PROCEDURE — 1126F AMNT PAIN NOTED NONE PRSNT: CPT | Mod: CPTII,S$GLB,,

## 2022-09-06 PROCEDURE — 85027 COMPLETE CBC AUTOMATED: CPT | Performed by: NURSE PRACTITIONER

## 2022-09-06 PROCEDURE — 99999 PR PBB SHADOW E&M-EST. PATIENT-LVL IV: CPT | Mod: PBBFAC,,,

## 2022-09-06 PROCEDURE — 3074F PR MOST RECENT SYSTOLIC BLOOD PRESSURE < 130 MM HG: ICD-10-PCS | Mod: CPTII,S$GLB,,

## 2022-09-06 PROCEDURE — 36592 COLLECT BLOOD FROM PICC: CPT

## 2022-09-06 RX ORDER — HEPARIN 100 UNIT/ML
500 SYRINGE INTRAVENOUS
Status: COMPLETED | OUTPATIENT
Start: 2022-09-06 | End: 2022-09-06

## 2022-09-06 RX ORDER — SODIUM CHLORIDE 0.9 % (FLUSH) 0.9 %
10 SYRINGE (ML) INJECTION
Status: CANCELLED | OUTPATIENT
Start: 2022-09-06

## 2022-09-06 RX ORDER — DIPHENHYDRAMINE HCL 25 MG
25 CAPSULE ORAL
Status: ACTIVE | OUTPATIENT
Start: 2022-09-06

## 2022-09-06 RX ORDER — HYDROCODONE BITARTRATE AND ACETAMINOPHEN 500; 5 MG/1; MG/1
TABLET ORAL ONCE
Status: DISCONTINUED | OUTPATIENT
Start: 2022-09-06 | End: 2022-10-10

## 2022-09-06 RX ORDER — SODIUM CHLORIDE 0.9 % (FLUSH) 0.9 %
10 SYRINGE (ML) INJECTION
Status: COMPLETED | OUTPATIENT
Start: 2022-09-06 | End: 2022-09-06

## 2022-09-06 RX ORDER — HEPARIN 100 UNIT/ML
500 SYRINGE INTRAVENOUS
Status: CANCELLED | OUTPATIENT
Start: 2022-09-06

## 2022-09-06 RX ORDER — ACETAMINOPHEN 325 MG/1
650 TABLET ORAL
Status: ACTIVE | OUTPATIENT
Start: 2022-09-06

## 2022-09-06 RX ADMIN — Medication 10 ML: at 09:09

## 2022-09-06 RX ADMIN — HEPARIN 500 UNITS: 100 SYRINGE at 09:09

## 2022-09-06 NOTE — PROGRESS NOTES
"Subjective:     Patient ID:?Madelyn Serna is a 71 y.o. female.?? MR#: [id number]   ?   PRIMARY ONCOLOGIST: Dr. Marcos  ?   CHIEF COMPLAINT: lab review/assessment for chemo?????   ?   ONCOLOGIC DIAGNOSIS: Gallbladder adenocarcinoma, stage IV, pT4 NX pM1  ?   CURRENT TREATMENT: OP GEMCITABINE CISPLATIN Q3W     PAST TREATMENT:  S/p Laparoscopic cholecystectomy, 02/04/2022, [facility]    HPI  Ms. Serna a pleasant 71-year-old woman  with gallbladder adenocarcinoma who presents today with her daughter for consideration of C7D1 Gemzar/Cisplatin. She has history of tobacco use who quit smoking 12/2021, COPD, anxiety, and cataracts. Per review of the medical record, she initially presented to [facility] on 2/4/2022 after 4 days of right lower quadrant abdominal pain, with an episode of vomiting. ED workup revealed leukocytosis WBC 18.7, hemoglobin 13, , renal function intact GFR over 60, mild alkaline phosphatase elevation 130, normal transaminases and bilirubin. Albumin 4.5, calcium 10.4.  Urinalysis positive for E coli. CT CAP found a distended gallbladder with extensive cholelithiasis including 17 mm stone in the region of gallbladder neck.  Pericholecystic stranding and small adjacent fluid.  Lymph nodes noted to be unremarkable.  Emphysema lung bases. She underwent emergency surgery with laparoscopic cholecystectomy. Surgical note mentions during this maneuver would appear to be a peritoneal nodule was discovered.  The peritoneal nodule was dissected from all surrounding structures with the LigaSure device.  The nodule is passed off the table and sent separately as a specimen.     Pathology demonstrated "gallbladder cholecystectomy adenocarcinoma IHC positive for CK7 & CDX2 and negative for CK 20, TTF1 and PAX8. Tumor size at least 2.2 cm. The tumor invades the liver.  Lymphovascular invasion present. Perineural invasion not identified.  Margins cannot be assessed. Regional lymph nodes " not applicable. Peritoneal nodule excisional biopsy positive for metastatic adenocarcinoma consistent with origin from gallbladder.      Staging PET performed on 3/2/2022 indicated uptake at right upper quadrant along liver margin site of prior cholecystectomy with postoperative fluid and peritoneal implants eleanor hepatis region potential/likely postoperative changes, right internal mammary chain and cardiophrenic lymph nodes. Her case was discussed at Tumor Board where it was decided to offer her palliative chemotherapy. She was started on Cisplatin/Gemzar every 3 weeks on 3/24/2022.        Interval History: Pt presents today with her daughter for lab review and assessment for C7D1 Gemzar/Cisplatin. She c/o sob which is not new or worsening and notes nausea this morning but believes this is related to taking her medication on an empty stomach. She also notes numbness to fingertips. Denies v/d/c, cp, fever, chills, bleeding, bruising.      Oncology History   Adenocarcinoma of gallbladder   3/3/2022 Initial Diagnosis    Adenocarcinoma of gallbladder      Genetic Testing    Patient has genetic testing done for GUARDANT.                                              Results revealed patient has the following mutation(s):  No actionable mutations found     4/25/2022 Cancer Staged    Staging form: Gallbladder, AJCC 8th Edition  - Clinical stage from 4/25/2022: Stage IVB (cT2a, cN1, cM1)       4/26/2022 -  Chemotherapy    Treatment Summary   Plan Name: OP GEMCITABINE CISPLATIN Q3W  Treatment Goal: Palliative  Status: Active  Start Date: 4/26/2022  End Date: 10/4/2022 (Planned)  Provider: Ivonne Marcos MD  Chemotherapy: dexAMETHasone (DECADRON) 4 MG Tab, 8 mg, Oral, Daily, 1 of 1 cycle, Start date: 4/20/2022, End date: --  CISplatin (PLATINOL) 25 mg/m2 = 47 mg in sodium chloride 0.9% 547 mL chemo infusion, 25 mg/m2 = 47 mg, Intravenous, Clinic/HOD 1 time, 6 of 8 cycles  Dose modification: 18.75 mg/m2 (original dose  25 mg/m2, Cycle 6, Reason: Other (see comments), Comment: decrease in CrCl)  Administration: 47 mg (4/26/2022), 47 mg (5/3/2022), 47 mg (5/17/2022), 47 mg (5/24/2022), 47 mg (6/7/2022), 47 mg (6/14/2022), 47 mg (7/1/2022), 47 mg (7/8/2022), 47 mg (7/26/2022), 47 mg (8/2/2022), 47 mg (8/17/2022), 35 mg (8/23/2022)  gemcitabine 1,880 mg in sodium chloride 0.9% 250 mL chemo infusion, 1,000 mg/m2 = 1,880 mg, Intravenous, Clinic/HOD 1 time, 6 of 8 cycles  Dose modification: 750 mg/m2 (original dose 1,000 mg/m2, Cycle 6, Reason: Other (see comments), Comment: decrease in CrCl)  Administration: 1,880 mg (4/26/2022), 1,880 mg (5/3/2022), 1,880 mg (5/17/2022), 1,880 mg (5/24/2022), 1,880 mg (6/7/2022), 1,800 mg (6/14/2022), 1,800 mg (7/1/2022), 1,800 mg (7/8/2022), 1,800 mg (7/26/2022), 1,800 mg (8/2/2022), 1,800 mg (8/17/2022), 1,400 mg (8/23/2022)          Social History     Socioeconomic History    Marital status:    Tobacco Use    Smoking status: Every Day    Smokeless tobacco: Former      History reviewed. No pertinent family history.   History reviewed. No pertinent surgical history.     Review of Systems   Constitutional:  Negative for activity change, appetite change, chills, fatigue and fever.   HENT:  Negative for congestion, facial swelling, nosebleeds, rhinorrhea, sore throat and trouble swallowing.    Eyes:  Negative for photophobia, pain and visual disturbance.   Respiratory:  Negative for cough, shortness of breath and wheezing.    Cardiovascular:  Negative for chest pain, palpitations and leg swelling.   Gastrointestinal:  Negative for abdominal distention, abdominal pain, anal bleeding, blood in stool, constipation, diarrhea, nausea, rectal pain and vomiting.   Genitourinary:  Negative for difficulty urinating, dysuria, hematuria and vaginal bleeding.   Musculoskeletal:  Negative for arthralgias.   Skin:  Negative for color change, pallor, rash and wound.   Neurological:  Negative for dizziness,  light-headedness, numbness and headaches.   Hematological:  Negative for adenopathy. Does not bruise/bleed easily.   Psychiatric/Behavioral:  The patient is not nervous/anxious.      ?   A comprehensive 14-point review of systems was reviewed with patient and was negative other than as specified above.   ?     Objective:      Physical Exam  Vitals reviewed.   HENT:      Head: Normocephalic and atraumatic.      Right Ear: External ear normal.      Left Ear: External ear normal.      Nose: Nose normal.   Cardiovascular:      Rate and Rhythm: Tachycardia present.   Pulmonary:      Effort: Pulmonary effort is normal.   Abdominal:      General: Abdomen is flat.   Genitourinary:     Comments: deferred  Musculoskeletal:      Cervical back: Normal range of motion.      Right lower leg: No edema.      Left lower leg: No edema.   Skin:     General: Skin is warm and dry.   Neurological:      Mental Status: She is alert and oriented to person, place, and time.      Motor: No weakness.   Psychiatric:         Behavior: Behavior normal.         ?   Vitals:    09/06/22 0923   BP: 112/74   Pulse: 110   Temp: 97.2 °F (36.2 °C)      ?       ?   Laboratory:  ?   Lab Visit on 09/06/2022   Component Date Value Ref Range Status    Group & Rh 09/06/2022 B POS   Final    Indirect Wagner 09/06/2022 NEG   Final    UNIT NUMBER 09/06/2022 [id number]   Preliminary    Product Code 09/06/2022 [id number]   Preliminary    DISPENSE STATUS 09/06/2022 CROSSMATCHED   Preliminary    CODING SYSTEM 09/06/2022 [id number]   Preliminary    Unit Blood Type Code 09/06/2022 7300   Preliminary    Unit Blood Type 09/06/2022 B POS   Preliminary    Unit Expiration 09/06/2022 [id number]   Preliminary   Infusion on 09/06/2022   Component Date Value Ref Range Status    Magnesium 09/06/2022 0.8 (LL)  1.6 - 2.6 mg/dL Final    Sodium 09/06/2022 144  136 - 145 mmol/L Final    Potassium 09/06/2022 4.3  3.5 - 5.1 mmol/L Final    Chloride 09/06/2022 104  95 - 110 mmol/L Final     CO2 09/06/2022 35 (H)  23 - 29 mmol/L Final    Glucose 09/06/2022 131 (H)  70 - 110 mg/dL Final    BUN 09/06/2022 17  8 - 23 mg/dL Final    Creatinine 09/06/2022 1.0  0.5 - 1.4 mg/dL Final    Calcium 09/06/2022 8.8  8.7 - 10.5 mg/dL Final    Total Protein 09/06/2022 6.5  6.0 - 8.4 g/dL Final    Albumin 09/06/2022 2.8 (L)  3.5 - 5.2 g/dL Final    Total Bilirubin 09/06/2022 0.2  0.1 - 1.0 mg/dL Final    Alkaline Phosphatase 09/06/2022 89  55 - 135 U/L Final    AST 09/06/2022 11  10 - 40 U/L Final    ALT 09/06/2022 11  10 - 44 U/L Final    Anion Gap 09/06/2022 5 (L)  8 - 16 mmol/L Final    eGFR 09/06/2022 59 (A)  >60 mL/min/1.73 m^2 Final    WBC 09/06/2022 2.68 (L)  3.90 - 12.70 K/uL Final    RBC 09/06/2022 2.10 (L)  4.00 - 5.40 M/uL Final    Hemoglobin 09/06/2022 6.4 (L)  12.0 - 16.0 g/dL Final    Hematocrit 09/06/2022 21.1 (L)  37.0 - 48.5 % Final    MCV 09/06/2022 101 (H)  82 - 98 fL Final    MCH 09/06/2022 30.5  27.0 - 31.0 pg Final    MCHC 09/06/2022 30.3 (L)  32.0 - 36.0 g/dL Final    RDW 09/06/2022 15.9 (H)  11.5 - 14.5 % Final    Platelets 09/06/2022 154  150 - 450 K/uL Final    MPV 09/06/2022 9.9  9.2 - 12.9 fL Final    Gran # (ANC) 09/06/2022 1.2 (L)  1.8 - 7.7 K/uL Final    Immature Grans (Abs) 09/06/2022 0.00  0.00 - 0.04 K/uL Final      ?   Imaging:    No results found for this or any previous visit (from the past 2160 hour(s)).     Results for orders placed or performed during the hospital encounter of 07/12/22 (from the past 2160 hour(s))   CT Chest Abdomen Pelvis With Contrast (xpd)    Impression    1.  New segmental left lower lobe pulmonary embolus.  New left internal iliac vein thrombus.    2.  No significant change of the gallbladder fundus soft tissue thickening in this patient with history of gallbladder malignancy.    3.  Mixed response of multiple hypoattenuating hepatic lesions as detailed above.    4.  Stable heterogeneous mildly enlarged periportal lymph node.    5.  Unchanged left kidney  complex cystic lesion.    This report was flagged in Epic as abnormal.    The findings of this examination were discussed with Dr. Marcos by Dr. Tang via secure chat on July 12, 2022 at 1345 hours.      Electronically signed by: Jaylon Tang  Date:    07/12/2022  Time:    14:07        ?   Assessment/Plan:     Problem List Items Addressed This Visit          Oncology    Adenocarcinoma of gallbladder      Cancer Staging   Adenocarcinoma of gallbladder  Staging form: Gallbladder, AJCC 8th Edition  - Clinical stage from 4/25/2022: Stage IVB (cT2a, cN1, cM1) - Signed by Bo Davies MD on 4/25/2022    NGS Guardant with NATY, PDL1 CPS 2. No NTRK mutation detected.      On palliative chemotherapy with Gemzar/Cisplatin.     CT CAP 07/12/22 found new segmental left lower lobe pulmonary embolus.  New left internal iliac vein thrombus. No significant change of the gallbladder fundus soft tissue thickening in this patient with history of gallbladder malignancy. Mixed response of multiple hypoattenuating hepatic lesions, stable heterogeneous mildly enlarged periportal lymph node, and unchanged left kidney complex cystic lesion.      Will repeat restaging CT CAP in early October 2022    Labs reviewed  -ANC 1.2; Plts 154; tbili 0.2; CrCl 48.7ml/min  Lab Results   Component Value Date    HGB 6.4 (L) 09/06/2022   Plan for 1 unit PRBC today, if possible  Magnesium 0.8mg/dL, asymptomatic  --SlowMag bid today--recheck Mag tomorrow for possible IV repletion    Hold C7D1 Gemzar/Cisplatin today    F/u in one week with cbc, cmp prior with primary oncologist for reconsideration of C7D1 Irwin/Cis          Other Visit Diagnoses       Anemia associated with chemotherapy    -  Primary    Relevant Medications    0.9%  NaCl infusion (for blood administration)    acetaminophen tablet 650 mg    diphenhydrAMINE capsule 25 mg    Other Relevant Orders    Verify informed consent for blood administration (Completed)    Vital signs Document  Pre-transfusion, 15 minutes after transfusion begins and immediately Post-transfusion for each unit transfused    Discharge instructions (Completed)    Hold Transfusion and Notify Physician if: (Completed)    If transfusion reaction confirmed by physician/mid-level: (Completed)    Type & Screen - Lab Collect (Completed)    Prepare RBC 1 Unit (Completed)    Transfuse RBC 1 Unit    Hypomagnesemia        Relevant Medications    magnesium chloride (MAG 64) 64 mg TbEC    Other Relevant Orders    Magnesium                 ALECIA JohnsonP-C  Hematology/Oncology

## 2022-09-06 NOTE — ASSESSMENT & PLAN NOTE
Cancer Staging   Adenocarcinoma of gallbladder  Staging form: Gallbladder, AJCC 8th Edition  - Clinical stage from 4/25/2022: Stage IVB (cT2a, cN1, cM1) - Signed by Bo Davies MD on 4/25/2022    NGS Guardant with NATY, PDL1 CPS 2. No NTRK mutation detected.      On palliative chemotherapy with Gemzar/Cisplatin.     CT CAP 07/12/22 found new segmental left lower lobe pulmonary embolus.  New left internal iliac vein thrombus. No significant change of the gallbladder fundus soft tissue thickening in this patient with history of gallbladder malignancy. Mixed response of multiple hypoattenuating hepatic lesions, stable heterogeneous mildly enlarged periportal lymph node, and unchanged left kidney complex cystic lesion.      Will repeat restaging CT CAP in early October 2022    Labs reviewed  -ANC 1.2; Plts 154; tbili 0.2; CrCl 48.7ml/min  Lab Results   Component Value Date    HGB 6.4 (L) 09/06/2022   Plan for 1 unit PRBC today, if possible  Magnesium 0.8mg/dL, asymptomatic  --SlowMag bid today--recheck Mag tomorrow for possible IV repletion    Hold C7D1 Gemzar/Cisplatin today    F/u in one week with cbc, cmp prior with primary oncologist for reconsideration of C7D1 Parachute/Cis

## 2022-09-06 NOTE — TELEPHONE ENCOUNTER
Spoke with pt, informed pt that Provider will call in oral magnesium for her, when she comes in tomorrow we will have them check her magnesium level, pt verbalized her understanding  Asked pt to verify pharmacy, pt [facility] [address] [place] [id number] [phone]

## 2022-09-07 ENCOUNTER — INFUSION (OUTPATIENT)
Dept: INFUSION THERAPY | Facility: HOSPITAL | Age: 72
End: 2022-09-07
Attending: ANESTHESIOLOGY
Payer: MEDICARE

## 2022-09-07 ENCOUNTER — TELEPHONE (OUTPATIENT)
Dept: HEMATOLOGY/ONCOLOGY | Facility: CLINIC | Age: 72
End: 2022-09-07
Payer: MEDICARE

## 2022-09-07 VITALS
TEMPERATURE: 98 F | HEART RATE: 88 BPM | RESPIRATION RATE: 18 BRPM | SYSTOLIC BLOOD PRESSURE: 130 MMHG | OXYGEN SATURATION: 90 % | DIASTOLIC BLOOD PRESSURE: 61 MMHG

## 2022-09-07 DIAGNOSIS — D64.81 ANEMIA ASSOCIATED WITH CHEMOTHERAPY: Primary | ICD-10-CM

## 2022-09-07 DIAGNOSIS — D64.9 ANEMIA: Primary | ICD-10-CM

## 2022-09-07 DIAGNOSIS — E83.42 HYPOMAGNESEMIA: ICD-10-CM

## 2022-09-07 DIAGNOSIS — T45.1X5A ANEMIA ASSOCIATED WITH CHEMOTHERAPY: Primary | ICD-10-CM

## 2022-09-07 LAB — MAGNESIUM SERPL-MCNC: 0.9 MG/DL (ref 1.6–2.6)

## 2022-09-07 PROCEDURE — P9016 RBC LEUKOCYTES REDUCED: HCPCS

## 2022-09-07 PROCEDURE — 96366 THER/PROPH/DIAG IV INF ADDON: CPT

## 2022-09-07 PROCEDURE — 63600175 PHARM REV CODE 636 W HCPCS

## 2022-09-07 PROCEDURE — 25000003 PHARM REV CODE 250

## 2022-09-07 PROCEDURE — 36430 TRANSFUSION BLD/BLD COMPNT: CPT

## 2022-09-07 PROCEDURE — A4216 STERILE WATER/SALINE, 10 ML: HCPCS

## 2022-09-07 PROCEDURE — 83735 ASSAY OF MAGNESIUM: CPT

## 2022-09-07 PROCEDURE — 96365 THER/PROPH/DIAG IV INF INIT: CPT

## 2022-09-07 RX ORDER — HYDROCODONE BITARTRATE AND ACETAMINOPHEN 500; 5 MG/1; MG/1
TABLET ORAL
Status: DISCONTINUED | OUTPATIENT
Start: 2022-09-07 | End: 2022-09-07 | Stop reason: HOSPADM

## 2022-09-07 RX ORDER — LORAZEPAM/0.9% SODIUM CHLORIDE 100MG/0.1L
2 PLASTIC BAG, INJECTION (ML) INTRAVENOUS
Status: CANCELLED | OUTPATIENT
Start: 2022-09-07

## 2022-09-07 RX ORDER — HEPARIN 100 UNIT/ML
500 SYRINGE INTRAVENOUS
Status: CANCELLED | OUTPATIENT
Start: 2022-09-07

## 2022-09-07 RX ORDER — HYDROCODONE BITARTRATE AND ACETAMINOPHEN 500; 5 MG/1; MG/1
TABLET ORAL ONCE
Status: DISCONTINUED | OUTPATIENT
Start: 2022-09-07 | End: 2022-10-10

## 2022-09-07 RX ORDER — HEPARIN 100 UNIT/ML
500 SYRINGE INTRAVENOUS
Status: DISCONTINUED | OUTPATIENT
Start: 2022-09-07 | End: 2022-09-07 | Stop reason: HOSPADM

## 2022-09-07 RX ORDER — SODIUM CHLORIDE 0.9 % (FLUSH) 0.9 %
10 SYRINGE (ML) INJECTION
Status: CANCELLED | OUTPATIENT
Start: 2022-09-07

## 2022-09-07 RX ORDER — SODIUM CHLORIDE 0.9 % (FLUSH) 0.9 %
10 SYRINGE (ML) INJECTION
Status: DISCONTINUED | OUTPATIENT
Start: 2022-09-07 | End: 2022-09-07 | Stop reason: HOSPADM

## 2022-09-07 RX ORDER — MAGNESIUM SULFATE 1 G/100ML
2 INJECTION INTRAVENOUS
Status: COMPLETED | OUTPATIENT
Start: 2022-09-07 | End: 2022-09-07

## 2022-09-07 RX ADMIN — HEPARIN 500 UNITS: 100 SYRINGE at 03:09

## 2022-09-07 RX ADMIN — MAGNESIUM SULFATE HEPTAHYDRATE 2 G: 1 INJECTION, SOLUTION INTRAVENOUS at 12:09

## 2022-09-07 RX ADMIN — Medication 10 ML: at 03:09

## 2022-09-07 NOTE — PLAN OF CARE
Plan of care reviewed; questions answered; verbalizes understanding.  Problem: Adult Inpatient Plan of Care  Goal: Plan of Care Review  Outcome: Ongoing, Progressing  Flowsheets (Taken 9/7/2022 1146)  Plan of Care Reviewed With: patient  Goal: Patient-Specific Goal (Individualized)  Outcome: Ongoing, Progressing  Flowsheets (Taken 9/7/2022 1100)  Anxieties, Fears or Concerns: patient complain of feeling tired, cold, short of breath  Individualized Care Needs: warm blankets x 4, legs elevated, daughter at side  Patient-Specific Goals (Include Timeframe): pt will tolerate blood transfusion well     Problem: Anemia  Goal: Anemia Symptom Improvement  Outcome: Ongoing, Progressing  Intervention: Monitor and Manage Anemia  Flowsheets (Taken 9/7/2022 1100)  Oral Nutrition Promotion: rest periods promoted  Safety Promotion/Fall Prevention:   room near unit station   instructed to call staff for mobility  Fatigue Management: frequent rest breaks encouraged

## 2022-09-12 ENCOUNTER — EXTERNAL HOME HEALTH (OUTPATIENT)
Dept: HOME HEALTH SERVICES | Facility: HOSPITAL | Age: 72
End: 2022-09-12
Payer: MEDICARE

## 2022-09-12 ENCOUNTER — OFFICE VISIT (OUTPATIENT)
Dept: HEMATOLOGY/ONCOLOGY | Facility: CLINIC | Age: 72
End: 2022-09-12
Payer: MEDICARE

## 2022-09-12 ENCOUNTER — INFUSION (OUTPATIENT)
Dept: INFUSION THERAPY | Facility: HOSPITAL | Age: 72
End: 2022-09-12
Attending: INTERNAL MEDICINE
Payer: MEDICARE

## 2022-09-12 VITALS
RESPIRATION RATE: 16 BRPM | BODY MASS INDEX: 24.94 KG/M2 | HEIGHT: 67 IN | WEIGHT: 158.94 LBS | HEART RATE: 91 BPM | SYSTOLIC BLOOD PRESSURE: 120 MMHG | TEMPERATURE: 97 F | OXYGEN SATURATION: 98 % | DIASTOLIC BLOOD PRESSURE: 79 MMHG

## 2022-09-12 VITALS
HEART RATE: 97 BPM | RESPIRATION RATE: 18 BRPM | TEMPERATURE: 98 F | OXYGEN SATURATION: 98 % | DIASTOLIC BLOOD PRESSURE: 78 MMHG | SYSTOLIC BLOOD PRESSURE: 124 MMHG

## 2022-09-12 DIAGNOSIS — C78.7 SECONDARY LIVER CANCER: ICD-10-CM

## 2022-09-12 DIAGNOSIS — T45.1X5A ANEMIA ASSOCIATED WITH CHEMOTHERAPY: ICD-10-CM

## 2022-09-12 DIAGNOSIS — C23 ADENOCARCINOMA OF GALLBLADDER: ICD-10-CM

## 2022-09-12 DIAGNOSIS — E83.42 HYPOMAGNESEMIA: Primary | ICD-10-CM

## 2022-09-12 DIAGNOSIS — D64.81 ANEMIA ASSOCIATED WITH CHEMOTHERAPY: ICD-10-CM

## 2022-09-12 DIAGNOSIS — E83.42 HYPOMAGNESEMIA: ICD-10-CM

## 2022-09-12 DIAGNOSIS — C78.6 CANCER OF PERITONEUM/RETROPERITONEUM, SECONDARY: ICD-10-CM

## 2022-09-12 DIAGNOSIS — C23 ADENOCARCINOMA OF GALLBLADDER: Primary | ICD-10-CM

## 2022-09-12 LAB
ALBUMIN SERPL BCP-MCNC: 2.8 G/DL (ref 3.5–5.2)
ALP SERPL-CCNC: 107 U/L (ref 55–135)
ALT SERPL W/O P-5'-P-CCNC: 11 U/L (ref 10–44)
ANION GAP SERPL CALC-SCNC: 10 MMOL/L (ref 8–16)
AST SERPL-CCNC: 16 U/L (ref 10–40)
BASOPHILS # BLD AUTO: 0.03 K/UL (ref 0–0.2)
BASOPHILS NFR BLD: 0.5 % (ref 0–1.9)
BILIRUB SERPL-MCNC: 0.3 MG/DL (ref 0.1–1)
BUN SERPL-MCNC: 19 MG/DL (ref 8–23)
CALCIUM SERPL-MCNC: 9.1 MG/DL (ref 8.7–10.5)
CHLORIDE SERPL-SCNC: 103 MMOL/L (ref 95–110)
CO2 SERPL-SCNC: 31 MMOL/L (ref 23–29)
CREAT SERPL-MCNC: 1 MG/DL (ref 0.5–1.4)
DIFFERENTIAL METHOD: ABNORMAL
EOSINOPHIL # BLD AUTO: 0.2 K/UL (ref 0–0.5)
EOSINOPHIL NFR BLD: 2.6 % (ref 0–8)
ERYTHROCYTE [DISTWIDTH] IN BLOOD BY AUTOMATED COUNT: 15.2 % (ref 11.5–14.5)
EST. GFR  (NO RACE VARIABLE): 59 ML/MIN/1.73 M^2
GLUCOSE SERPL-MCNC: 107 MG/DL (ref 70–110)
HCT VFR BLD AUTO: 25.9 % (ref 37–48.5)
HGB BLD-MCNC: 8.2 G/DL (ref 12–16)
IMM GRANULOCYTES # BLD AUTO: 0.14 K/UL (ref 0–0.04)
IMM GRANULOCYTES NFR BLD AUTO: 2.4 % (ref 0–0.5)
LYMPHOCYTES # BLD AUTO: 1.9 K/UL (ref 1–4.8)
LYMPHOCYTES NFR BLD: 32.3 % (ref 18–48)
MAGNESIUM SERPL-MCNC: 1.1 MG/DL (ref 1.6–2.6)
MCH RBC QN AUTO: 31.1 PG (ref 27–31)
MCHC RBC AUTO-ENTMCNC: 31.7 G/DL (ref 32–36)
MCV RBC AUTO: 98 FL (ref 82–98)
MONOCYTES # BLD AUTO: 1.2 K/UL (ref 0.3–1)
MONOCYTES NFR BLD: 20.8 % (ref 4–15)
NEUTROPHILS # BLD AUTO: 2.4 K/UL (ref 1.8–7.7)
NEUTROPHILS NFR BLD: 41.4 % (ref 38–73)
NRBC BLD-RTO: 0 /100 WBC
PLATELET # BLD AUTO: 246 K/UL (ref 150–450)
PMV BLD AUTO: 8.7 FL (ref 9.2–12.9)
POTASSIUM SERPL-SCNC: 4.1 MMOL/L (ref 3.5–5.1)
PROT SERPL-MCNC: 6.9 G/DL (ref 6–8.4)
RBC # BLD AUTO: 2.64 M/UL (ref 4–5.4)
SODIUM SERPL-SCNC: 144 MMOL/L (ref 136–145)
WBC # BLD AUTO: 5.73 K/UL (ref 3.9–12.7)

## 2022-09-12 PROCEDURE — 3074F PR MOST RECENT SYSTOLIC BLOOD PRESSURE < 130 MM HG: ICD-10-PCS | Mod: CPTII,S$GLB,, | Performed by: INTERNAL MEDICINE

## 2022-09-12 PROCEDURE — 1160F RVW MEDS BY RX/DR IN RCRD: CPT | Mod: CPTII,S$GLB,, | Performed by: INTERNAL MEDICINE

## 2022-09-12 PROCEDURE — 3008F PR BODY MASS INDEX (BMI) DOCUMENTED: ICD-10-PCS | Mod: CPTII,S$GLB,, | Performed by: INTERNAL MEDICINE

## 2022-09-12 PROCEDURE — 3078F DIAST BP <80 MM HG: CPT | Mod: CPTII,S$GLB,, | Performed by: INTERNAL MEDICINE

## 2022-09-12 PROCEDURE — A4216 STERILE WATER/SALINE, 10 ML: HCPCS | Performed by: NURSE PRACTITIONER

## 2022-09-12 PROCEDURE — 4010F ACE/ARB THERAPY RXD/TAKEN: CPT | Mod: CPTII,S$GLB,, | Performed by: INTERNAL MEDICINE

## 2022-09-12 PROCEDURE — 3074F SYST BP LT 130 MM HG: CPT | Mod: CPTII,S$GLB,, | Performed by: INTERNAL MEDICINE

## 2022-09-12 PROCEDURE — 3008F BODY MASS INDEX DOCD: CPT | Mod: CPTII,S$GLB,, | Performed by: INTERNAL MEDICINE

## 2022-09-12 PROCEDURE — 80053 COMPREHEN METABOLIC PANEL: CPT | Performed by: INTERNAL MEDICINE

## 2022-09-12 PROCEDURE — 99215 PR OFFICE/OUTPT VISIT, EST, LEVL V, 40-54 MIN: ICD-10-PCS | Mod: S$GLB,,, | Performed by: INTERNAL MEDICINE

## 2022-09-12 PROCEDURE — 99999 PR PBB SHADOW E&M-EST. PATIENT-LVL IV: ICD-10-PCS | Mod: PBBFAC,,, | Performed by: INTERNAL MEDICINE

## 2022-09-12 PROCEDURE — 99215 OFFICE O/P EST HI 40 MIN: CPT | Mod: S$GLB,,, | Performed by: INTERNAL MEDICINE

## 2022-09-12 PROCEDURE — 1101F PT FALLS ASSESS-DOCD LE1/YR: CPT | Mod: CPTII,S$GLB,, | Performed by: INTERNAL MEDICINE

## 2022-09-12 PROCEDURE — 1157F PR ADVANCE CARE PLAN OR EQUIV PRESENT IN MEDICAL RECORD: ICD-10-PCS | Mod: CPTII,S$GLB,, | Performed by: INTERNAL MEDICINE

## 2022-09-12 PROCEDURE — 63600175 PHARM REV CODE 636 W HCPCS: Performed by: NURSE PRACTITIONER

## 2022-09-12 PROCEDURE — 85025 COMPLETE CBC W/AUTO DIFF WBC: CPT

## 2022-09-12 PROCEDURE — 1159F MED LIST DOCD IN RCRD: CPT | Mod: CPTII,S$GLB,, | Performed by: INTERNAL MEDICINE

## 2022-09-12 PROCEDURE — 83735 ASSAY OF MAGNESIUM: CPT

## 2022-09-12 PROCEDURE — 4010F PR ACE/ARB THEARPY RXD/TAKEN: ICD-10-PCS | Mod: CPTII,S$GLB,, | Performed by: INTERNAL MEDICINE

## 2022-09-12 PROCEDURE — 36592 COLLECT BLOOD FROM PICC: CPT

## 2022-09-12 PROCEDURE — 3288F PR FALLS RISK ASSESSMENT DOCUMENTED: ICD-10-PCS | Mod: CPTII,S$GLB,, | Performed by: INTERNAL MEDICINE

## 2022-09-12 PROCEDURE — 1160F PR REVIEW ALL MEDS BY PRESCRIBER/CLIN PHARMACIST DOCUMENTED: ICD-10-PCS | Mod: CPTII,S$GLB,, | Performed by: INTERNAL MEDICINE

## 2022-09-12 PROCEDURE — 1159F PR MEDICATION LIST DOCUMENTED IN MEDICAL RECORD: ICD-10-PCS | Mod: CPTII,S$GLB,, | Performed by: INTERNAL MEDICINE

## 2022-09-12 PROCEDURE — 99999 PR PBB SHADOW E&M-EST. PATIENT-LVL IV: CPT | Mod: PBBFAC,,, | Performed by: INTERNAL MEDICINE

## 2022-09-12 PROCEDURE — 3078F PR MOST RECENT DIASTOLIC BLOOD PRESSURE < 80 MM HG: ICD-10-PCS | Mod: CPTII,S$GLB,, | Performed by: INTERNAL MEDICINE

## 2022-09-12 PROCEDURE — 3288F FALL RISK ASSESSMENT DOCD: CPT | Mod: CPTII,S$GLB,, | Performed by: INTERNAL MEDICINE

## 2022-09-12 PROCEDURE — 1157F ADVNC CARE PLAN IN RCRD: CPT | Mod: CPTII,S$GLB,, | Performed by: INTERNAL MEDICINE

## 2022-09-12 PROCEDURE — 25000003 PHARM REV CODE 250: Performed by: NURSE PRACTITIONER

## 2022-09-12 PROCEDURE — 1126F AMNT PAIN NOTED NONE PRSNT: CPT | Mod: CPTII,S$GLB,, | Performed by: INTERNAL MEDICINE

## 2022-09-12 PROCEDURE — 1126F PR PAIN SEVERITY QUANTIFIED, NO PAIN PRESENT: ICD-10-PCS | Mod: CPTII,S$GLB,, | Performed by: INTERNAL MEDICINE

## 2022-09-12 PROCEDURE — 1101F PR PT FALLS ASSESS DOC 0-1 FALLS W/OUT INJ PAST YR: ICD-10-PCS | Mod: CPTII,S$GLB,, | Performed by: INTERNAL MEDICINE

## 2022-09-12 RX ORDER — HEPARIN 100 UNIT/ML
500 SYRINGE INTRAVENOUS
Status: CANCELLED | OUTPATIENT
Start: 2022-09-12

## 2022-09-12 RX ORDER — HEPARIN 100 UNIT/ML
500 SYRINGE INTRAVENOUS
Status: COMPLETED | OUTPATIENT
Start: 2022-09-12 | End: 2022-09-12

## 2022-09-12 RX ORDER — LANOLIN ALCOHOL/MO/W.PET/CERES
400 CREAM (GRAM) TOPICAL DAILY
Qty: 30 TABLET | Refills: 2 | Status: SHIPPED | OUTPATIENT
Start: 2022-09-12 | End: 2022-09-26

## 2022-09-12 RX ORDER — SODIUM CHLORIDE 0.9 % (FLUSH) 0.9 %
10 SYRINGE (ML) INJECTION
Status: CANCELLED | OUTPATIENT
Start: 2022-09-12

## 2022-09-12 RX ORDER — SODIUM CHLORIDE 0.9 % (FLUSH) 0.9 %
10 SYRINGE (ML) INJECTION
Status: COMPLETED | OUTPATIENT
Start: 2022-09-12 | End: 2022-09-12

## 2022-09-12 RX ADMIN — Medication 10 ML: at 08:09

## 2022-09-12 RX ADMIN — HEPARIN 500 UNITS: 100 SYRINGE at 08:09

## 2022-09-12 NOTE — NURSING
Lab obtained from Right upper arm PICC line, 2 patient identifiers obtained, labeled and sent to lab.  Adequate blood return noted.   Right lumen(s) flushed with 10ml NS and 5ml Heparin solution.  Dressing changed via sterile technique.  Site without redness, swelling or drainage noted.

## 2022-09-12 NOTE — PROGRESS NOTES
Subjective:      DATE OF VISIT: 9/12/22     ?  Patient ID:?Madelyn eSrna is a 71 y.o. female.?? MR#: [id number]     PRIMARY ONCOLOGIST: Dr. Marcos    ? Primary Care Providers:  Oirn Henderson MD, MD (General)     CHIEF COMPLAINT: ?  Follow-up on palliative chemotherapy?   ?   ONCOLOGIC DIAGNOSIS:  Gallbladder adenocarcinoma, stage IV, pT4 NX pM1  ?   CURRENT TREATMENT:  Gemcitabine and cisplatin cycle 1 day 1, 4/25/22    PAST TREATMENT:  Laparoscopic cholecystectomy, 02/04/2022, [facility]  ?   ONCOLOGIC HISTORY    I the pleasure of meeting I had the pleasure meeting for the 1st time Ms. Serna a pleasant 71-year-old woman accompanied by her 2 daughters today for newly diagnosed gallbladder adenocarcinoma.    Medical history is notable for former tobacco use quit December 2021, COPD, anxiety, cataracts.      She notes 4 days of right lower quadrant abdominal pain acute onset for which she presented to [facility] emergency room on 02/04/2022.  One episode of vomiting per emergency room note.  Labs with leukocytosis WBC 18.7, hemoglobin 13, , renal function intact GFR over 60, mild alkaline phosphatase elevation 130, normal transaminases and bilirubin.  Albumin 4.5, calcium 10.4.  Urinalysis positive for E coli pansensitive.  Blood cultures negative.    Imaging reports a from outside radiology:  CT abdomen and pelvis with contrast distended gallbladder extensive cholelithiasis including 17 mm stone in the region of gallbladder neck.  Pericholecystic stranding and small adjacent fluid.  Lymph nodes noted to be unremarkable.  Emphysema lung bases.    She was taken to emergency surgery with laparoscopic cholecystectomy.  Surgical note mentions during this maneuver would appear to be a peritoneal nodule was discovered.  The peritoneal nodule was dissected from all surrounding structures with the LigaSure device.  The nodule is passed off the table and sent separately as a  "specimen.    Outside pathology:  "  Gallbladder cholecystectomy adenocarcinoma immunohistochemistry positive for CK7 and CDX2 and negative for CK 20, TTF1 and PAX8  Tumor size at least 2.2 cm.  The tumor invades the liver.  Lymphovascular invasion present.  Perineural invasion not identified.  Margins cannot be assessed.  Regional lymph nodes not applicable.    peritoneal nodule excisional biopsy positive for metastatic adenocarcinoma consistent with origin from gallbladder.      HPI     Feeling well at baseline today.  Using 2-3 L supplemental oxygen she notes use at home since 2021 prior to current oncologic diagnosis.  No new chest pain shortness of breath edema evidence of bleeding or other new concerns.  She is been taking magnesium supplementation prescribed by nurse practitioner 64 mg b.i.d..    Review of Systems    ?   A comprehensive 14-point review of systems was reviewed with patient and was negative other than as specified above.   ?     Objective:      Physical Exam      ?   Vitals:    09/12/22 0905   BP: 120/79   Pulse: 91   Resp: 16   Temp: 97.3 °F (36.3 °C)      ?   ECOG:?  2  General appearance: Generally well appearing, daughter accompanying her  Head, eyes, ears, nose, and throat:  moist mucous membranes.   Respiratory:  Normal work of breathing   on 2-3 L supplemental oxygen  Abdomen: nondistended.   Extremities: Warm, no appreciable edema or pain bilateral lower extremities  Neurologic: Alert and oriented.  Sitting in wheelchair  Skin: No rashes, ecchymoses or petechial lesion.   ?      ?   Laboratory:  ?   Infusion on 09/12/2022   Component Date Value Ref Range Status    Magnesium 09/12/2022 1.1 (L)  1.6 - 2.6 mg/dL Final    WBC 09/12/2022 5.73  3.90 - 12.70 K/uL Final    RBC 09/12/2022 2.64 (L)  4.00 - 5.40 M/uL Final    Hemoglobin 09/12/2022 8.2 (L)  12.0 - 16.0 g/dL Final    Hematocrit 09/12/2022 25.9 (L)  37.0 - 48.5 % Final    MCV 09/12/2022 98  82 - 98 fL Final    MCH 09/12/2022 31.1 " (H)  27.0 - 31.0 pg Final    MCHC 09/12/2022 31.7 (L)  32.0 - 36.0 g/dL Final    RDW 09/12/2022 15.2 (H)  11.5 - 14.5 % Final    Platelets 09/12/2022 246  150 - 450 K/uL Final    MPV 09/12/2022 8.7 (L)  9.2 - 12.9 fL Final    Immature Granulocytes 09/12/2022 2.4 (H)  0.0 - 0.5 % Final    Gran # (ANC) 09/12/2022 2.4  1.8 - 7.7 K/uL Final    Immature Grans (Abs) 09/12/2022 0.14 (H)  0.00 - 0.04 K/uL Final    Lymph # 09/12/2022 1.9  1.0 - 4.8 K/uL Final    Mono # 09/12/2022 1.2 (H)  0.3 - 1.0 K/uL Final    Eos # 09/12/2022 0.2  0.0 - 0.5 K/uL Final    Baso # 09/12/2022 0.03  0.00 - 0.20 K/uL Final    nRBC 09/12/2022 0  0 /100 WBC Final    Gran % 09/12/2022 41.4  38.0 - 73.0 % Final    Lymph % 09/12/2022 32.3  18.0 - 48.0 % Final    Mono % 09/12/2022 20.8 (H)  4.0 - 15.0 % Final    Eosinophil % 09/12/2022 2.6  0.0 - 8.0 % Final    Basophil % 09/12/2022 0.5  0.0 - 1.9 % Final    Differential Method 09/12/2022 Automated   Final        ?   Imaging:  ?  Outside see above  Results for orders placed or performed during the hospital encounter of 07/12/22 (from the past 2160 hour(s))   CT Chest Abdomen Pelvis With Contrast (xpd)    Impression    1.  New segmental left lower lobe pulmonary embolus.  New left internal iliac vein thrombus.    2.  No significant change of the gallbladder fundus soft tissue thickening in this patient with history of gallbladder malignancy.    3.  Mixed response of multiple hypoattenuating hepatic lesions as detailed above.    4.  Stable heterogeneous mildly enlarged periportal lymph node.    5.  Unchanged left kidney complex cystic lesion.    This report was flagged in Epic as abnormal.    The findings of this examination were discussed with Dr. Marcos by Dr. Tang via secure chat on July 12, 2022 at 1345 hours.      Electronically signed by: Jaylon Tang  Date:    07/12/2022  Time:    14:07     No results found for this or any previous visit (from the past 2160 hour(s)).  No results found for  this or any previous visit (from the past 2160 hour(s)).      Pathology:    Outside see above     ?   Assessment/Plan:   Hypomagnesemia  -     magnesium oxide (MAGOX) 400 mg (241.3 mg magnesium) tablet; Take 1 tablet (400 mg total) by mouth once daily.  Dispense: 30 tablet; Refill: 2    Anemia associated with chemotherapy    Adenocarcinoma of gallbladder  -     CBC Auto Differential; Standing  -     Comprehensive Metabolic Panel; Standing  -     Magnesium; Standing    Cancer of peritoneum/retroperitoneum, secondary    Secondary liver cancer       1. Hypomagnesemia    2. Anemia associated with chemotherapy    3. Adenocarcinoma of gallbladder    4. Cancer of peritoneum/retroperitoneum, secondary    5. Secondary liver cancer            Plan:     Problem List Items Addressed This Visit          Renal/    Hypomagnesemia - Primary       Oncology    Adenocarcinoma of gallbladder    Cancer of peritoneum/retroperitoneum, secondary    Secondary liver cancer     Other Visit Diagnoses       Anemia associated with chemotherapy              Gallbladder adenocarcinoma, stage IV, pT4 NX pM1: NGS Guardant with NATY, PDL1 CPS 2. No NTRK mutation detected.   On palliative chemotherapy with cisplatin gemcitabine cycle 1 day 1 04/25/2022.   restaging CT July 2022 with mixed response couple subcentimeter  Liver lesions others improved without  Other areas of involvement.  She is tolerating therapy well and given mixed response we discussed plan for short interval restaging scans in 2 months early October 2022.  Cycle 7 day 1 planned tomorrow.  CBC with improved anemia after transfusion.  Hypomagnesemia, supplement order to pharmacy.  CMP not drawn and will add on/redraw if necessary prior to treatment tomorrow before orders signed.      Anemia:  Likely chemotherapy induced.  No evidence of bleeding.  Improved status post transfusion.  Continue to monitor closely.     Right lower extremity acute DVT and pulmonary embolism:   Continue  indefinite anticoagulation apixaban 5 mg b.i.d..  Clinical improvement in lower extremity edema.  No current shortness of breath or chest pain.    Depressive symptoms related to malignancy/treatment; interested in psych onc consult, placed    Pain:  Follow-up with palliative Care encouraged.  Refilled oxycodone 5 mg   08/01/2022 which were also controls her pain at this point; takes b.i.d. p.r.n. severe pain.    Hypokalemia:  Continued 10 mEq daily    Follow-Up:   Cycle 7 planned pending CMP  Magnesium sent to pharmacy

## 2022-09-13 ENCOUNTER — INFUSION (OUTPATIENT)
Dept: INFUSION THERAPY | Facility: HOSPITAL | Age: 72
End: 2022-09-13
Attending: INTERNAL MEDICINE
Payer: MEDICARE

## 2022-09-13 VITALS
RESPIRATION RATE: 18 BRPM | HEART RATE: 80 BPM | HEIGHT: 67 IN | TEMPERATURE: 98 F | OXYGEN SATURATION: 95 % | SYSTOLIC BLOOD PRESSURE: 150 MMHG | BODY MASS INDEX: 24.94 KG/M2 | WEIGHT: 158.94 LBS | DIASTOLIC BLOOD PRESSURE: 75 MMHG

## 2022-09-13 DIAGNOSIS — C23 ADENOCARCINOMA OF GALLBLADDER: Primary | ICD-10-CM

## 2022-09-13 PROCEDURE — 63600175 PHARM REV CODE 636 W HCPCS: Performed by: INTERNAL MEDICINE

## 2022-09-13 PROCEDURE — 96367 TX/PROPH/DG ADDL SEQ IV INF: CPT

## 2022-09-13 PROCEDURE — 96413 CHEMO IV INFUSION 1 HR: CPT

## 2022-09-13 PROCEDURE — 96366 THER/PROPH/DIAG IV INF ADDON: CPT

## 2022-09-13 PROCEDURE — 25000003 PHARM REV CODE 250: Performed by: INTERNAL MEDICINE

## 2022-09-13 PROCEDURE — 96375 TX/PRO/DX INJ NEW DRUG ADDON: CPT

## 2022-09-13 PROCEDURE — 96417 CHEMO IV INFUS EACH ADDL SEQ: CPT

## 2022-09-13 RX ORDER — SODIUM CHLORIDE 9 MG/ML
500 INJECTION, SOLUTION INTRAVENOUS
Status: COMPLETED | OUTPATIENT
Start: 2022-09-13 | End: 2022-09-13

## 2022-09-13 RX ORDER — HEPARIN 100 UNIT/ML
500 SYRINGE INTRAVENOUS
Status: DISCONTINUED | OUTPATIENT
Start: 2022-09-13 | End: 2022-09-13 | Stop reason: HOSPADM

## 2022-09-13 RX ORDER — SODIUM CHLORIDE 0.9 % (FLUSH) 0.9 %
10 SYRINGE (ML) INJECTION
Status: DISCONTINUED | OUTPATIENT
Start: 2022-09-13 | End: 2022-09-13 | Stop reason: HOSPADM

## 2022-09-13 RX ADMIN — SODIUM CHLORIDE 47 MG: 9 INJECTION, SOLUTION INTRAVENOUS at 01:09

## 2022-09-13 RX ADMIN — DEXAMETHASONE SODIUM PHOSPHATE 0.25 MG: 4 INJECTION, SOLUTION INTRA-ARTICULAR; INTRALESIONAL; INTRAMUSCULAR; INTRAVENOUS; SOFT TISSUE at 11:09

## 2022-09-13 RX ADMIN — HEPARIN 500 UNITS: 100 SYRINGE at 03:09

## 2022-09-13 RX ADMIN — MAGNESIUM SULFATE HEPTAHYDRATE 500 ML/HR: 500 INJECTION, SOLUTION INTRAMUSCULAR; INTRAVENOUS at 09:09

## 2022-09-13 RX ADMIN — SODIUM CHLORIDE 500 ML: 0.9 INJECTION, SOLUTION INTRAVENOUS at 11:09

## 2022-09-13 RX ADMIN — APREPITANT 130 MG: 130 INJECTION, EMULSION INTRAVENOUS at 11:09

## 2022-09-13 RX ADMIN — GEMCITABINE 1800 MG: 38 INJECTION, SOLUTION INTRAVENOUS at 12:09

## 2022-09-13 NOTE — PLAN OF CARE
Discussed plan of care with pt. Addressed any and ongoing concerns. Pt denies   Problem: Adult Inpatient Plan of Care  Goal: Plan of Care Review  Outcome: Ongoing, Progressing  Goal: Patient-Specific Goal (Individualized)  Outcome: Ongoing, Progressing  Flowsheets (Taken 9/13/2022 0928)  Anxieties, Fears or Concerns: does not like to wear her supplemental oxygen  Individualized Care Needs: warm blankets x4 , legs elevated , pillow to lower back daughter and grand daughter at her side  Patient-Specific Goals (Include Timeframe): pt will tolereate chemo today  Goal: Absence of Hospital-Acquired Illness or Injury  Outcome: Ongoing, Progressing  Intervention: Identify and Manage Fall Risk  Flowsheets (Taken 9/13/2022 0928)  Safety Promotion/Fall Prevention:   in recliner, wheels locked   nonskid shoes/socks when out of bed   instructed to call staff for mobility   assistive device/personal item within reach  Intervention: Prevent Infection  Flowsheets (Taken 9/13/2022 0928)  Infection Prevention:   equipment surfaces disinfected   hand hygiene promoted   personal protective equipment utilized  Goal: Optimal Comfort and Wellbeing  Outcome: Ongoing, Progressing

## 2022-09-18 NOTE — PROGRESS NOTES
Subjective:       Patient ID: Madelyn Serna is a 71 y.o. female.    Chief Complaint: gallbladder cx and Chemotherapy    Primary Oncologist/Hematologist: Dr. Marcos    HPI: Ms. Serna is a 71 year old female who is following up for her gallbladder adenocarcinoma, stage IV, pT4 NX pM1. She is here for cycle 7 day 8 of cisplatin and gemcitabine. Restaging CT July 2022 with mixed response couple subcentimeter  Liver lesions others improved without other areas of involvement.  Cancer Hx: Gallbladder cholecystectomy adenocarcinoma immunohistochemistry positive for CK7 and CDX2 and negative for CK 20, TTF1 and PAX8. Tumor size at least 2.2 cm.  The tumor invades the liver.  Lymphovascular invasion present. Perineural invasion not identified.  Margins cannot be assessed.  Regional lymph nodes not applicable.Peritoneal nodule excisional biopsy positive for metastatic adenocarcinoma consistent with origin from gallbladder.    Today: She uses supplemental o2 at home since 2021. She takes magnesium supplement. She states she has been feeling great. She denies any new chest pain, new sob, n/v/d/c, fevers, weight loss, night sweats. She states her appetite has been good and she has been hydrated. Her daughter states she has needed transfusion on her off weeks from chemo. We will continue to monitor.    Social History     Socioeconomic History    Marital status:    Tobacco Use    Smoking status: Every Day    Smokeless tobacco: Former       Past Medical History:   Diagnosis Date    Cancer of gallbladder     Essential (primary) hypertension     Infection following a procedure, deep incisional surgical site, initial encounter 3/3/2022    Romaine pus on bandage with induration to RUQ. She has already completed a 5 day course of Cipro which completed on 2/27. Recommend prompt evaluation with ED and whether or not imaging is warranted.       No family history on file.    No past surgical history on file.    Review of Systems    Constitutional:  Negative for activity change, appetite change, chills, diaphoresis, fatigue, fever and unexpected weight change.   HENT:  Negative for congestion and nosebleeds.    Respiratory:  Positive for shortness of breath. Negative for cough.    Cardiovascular:  Negative for chest pain and leg swelling.   Gastrointestinal:  Negative for abdominal pain, anal bleeding, blood in stool, constipation, diarrhea, nausea and vomiting.   Genitourinary:  Negative for hematuria.   Musculoskeletal:  Positive for gait problem.   Skin:  Negative for color change and pallor.   Allergic/Immunologic: Positive for immunocompromised state.   Neurological:  Negative for dizziness, weakness, light-headedness and numbness.   Hematological:  Negative for adenopathy. Bruises/bleeds easily.       Medication List with Changes/Refills   Current Medications    ALBUTEROL (PROVENTIL/VENTOLIN HFA) 90 MCG/ACTUATION INHALER    INHALE TWO PUFFS FOUR TIMES DAILY AS NEEDED FOR WHEEZING    AMLODIPINE (NORVASC) 5 MG TABLET    amlodipine Take 1 time per day No date recorded tablet 1 time per day No route recorded No set duration recorded No set duration amount recorded active 5 mg    APIXABAN (ELIQUIS) 5 MG TAB    Take 1 tablet (5 mg total) by mouth 2 (two) times daily.    CLONAZEPAM (KLONOPIN) 0.5 MG TABLET    clonazepam Take 2 times per day No date recorded tablet 2 times per day No route recorded No set duration recorded No set duration amount recorded active 0.5 mg    CYPROHEPTADINE (PERIACTIN) 4 MG TABLET    Take 4 mg by mouth 2 (two) times daily.    DEXAMETHASONE (DECADRON) 4 MG TAB    Take 2 tablets (8mg total) by mouth once daily. Take as directed on days 2, 3, 4 and days 9, 10, 11 of your chemotherapy cycle.    DEXAMETHASONE (DECADRON) 4 MG TAB    Take 2 tablets (8 mg total) by mouth once daily. Take as directed on days 2, 3, 4 and days 9, 10, 11 of your chemotherapy cycle.    DICLOFENAC SODIUM (VOLTAREN) 1 % GEL    APPLY 1 GRAM TO  AFFECTED AREA FOUR TIMES DAILY AS NEEDED    IPRATROPIUM (ATROVENT) 21 MCG (0.03 %) NASAL SPRAY        LABETALOL (NORMODYNE) 300 MG TABLET    labetalol Take 2 times per day No date recorded tablet 2 times per day No route recorded No set duration recorded No set duration amount recorded suspended 300 mg    LISINOPRIL 10 MG TABLET    Take 1 tablet (10 mg total) by mouth once daily.    MAGNESIUM OXIDE (MAGOX) 400 MG (241.3 MG MAGNESIUM) TABLET    Take 1 tablet (400 mg total) by mouth once daily.    MIRTAZAPINE (REMERON) 7.5 MG TAB    Take 1 tablet (7.5 mg total) by mouth nightly.    MONTELUKAST (SINGULAIR) 10 MG TABLET    Take 10 mg by mouth once daily.    NALOXONE (NARCAN) 4 MG/ACTUATION SPRY    USE ONE SPRAY IN ONE NOSTRIL AS DIRECTED UPON SIGNS OF OPIOID OVERDOSE CALL 911    ONDANSETRON (ZOFRAN-ODT) 8 MG TBDL    Take 1 tablet (8 mg total) by mouth every 6 (six) hours as needed.    ONDANSETRON (ZOFRAN-ODT) 8 MG TBDL    Take 1 tablet (8 mg total) by mouth every 8 (eight) hours as needed (nausea/vomiting).    OXYCODONE (ROXICODONE) 5 MG IMMEDIATE RELEASE TABLET    Take 1 tablet (5 mg total) by mouth every 12 (twelve) hours as needed for Pain. q 12h prn severe pain    POTASSIUM CHLORIDE (MICRO-K) 10 MEQ CPSR    Take 1 capsule (10 mEq total) by mouth once daily.    PROCHLORPERAZINE (COMPAZINE) 5 MG TABLET    Take 1 tablet (5 mg total) by mouth every 6 (six) hours as needed for Nausea. May take every 6 hours scheduled for prevention of nausea.    VENLAFAXINE (EFFEXOR-XR) 75 MG 24 HR CAPSULE    Take 1 capsule (75 mg total) by mouth once daily.     Objective:   There were no vitals filed for this visit.    Physical Exam  Vitals reviewed.   Constitutional:       General: She is not in acute distress.     Appearance: She is not ill-appearing, toxic-appearing or diaphoretic.   HENT:      Head: Normocephalic and atraumatic.   Eyes:      Conjunctiva/sclera: Conjunctivae normal.   Cardiovascular:      Rate and Rhythm: Normal  rate.      Pulses: Normal pulses.   Pulmonary:      Effort: Pulmonary effort is normal.      Comments: O2 NC  Abdominal:      General: Bowel sounds are normal.   Musculoskeletal:      Right lower leg: No edema.      Left lower leg: No edema.   Skin:     General: Skin is warm.      Coloration: Skin is not jaundiced or pale.      Findings: No bruising, erythema, lesion or rash.   Neurological:      Mental Status: She is alert.      Motor: No weakness.      Gait: Gait abnormal (wheelchair).   Psychiatric:         Mood and Affect: Mood normal.         Behavior: Behavior normal.         Thought Content: Thought content normal.          Labs/Results:  Lab Results   Component Value Date    WBC 8.18 09/19/2022    RBC 2.56 (L) 09/19/2022    HGB 7.9 (L) 09/19/2022    HCT 25.2 (L) 09/19/2022    MCV 98 09/19/2022    MCH 30.9 09/19/2022    MCHC 31.3 (L) 09/19/2022    RDW 15.0 (H) 09/19/2022     09/19/2022    MPV 8.7 (L) 09/19/2022    GRAN 4.9 09/19/2022    GRAN 59.5 09/19/2022    LYMPH 2.9 09/19/2022    LYMPH 35.3 09/19/2022    MONO 0.3 09/19/2022    MONO 3.5 (L) 09/19/2022    EOS 0.0 09/19/2022    BASO 0.05 09/19/2022    EOSINOPHIL 0.5 09/19/2022    BASOPHIL 0.6 09/19/2022     CMP  Sodium   Date Value Ref Range Status   09/19/2022 140 136 - 145 mmol/L Final     Potassium   Date Value Ref Range Status   09/19/2022 3.9 3.5 - 5.1 mmol/L Final     Chloride   Date Value Ref Range Status   09/19/2022 97 95 - 110 mmol/L Final     CO2   Date Value Ref Range Status   09/19/2022 32 (H) 23 - 29 mmol/L Final     Glucose   Date Value Ref Range Status   09/19/2022 120 (H) 70 - 110 mg/dL Final     BUN   Date Value Ref Range Status   09/19/2022 25 (H) 8 - 23 mg/dL Final     Creatinine   Date Value Ref Range Status   09/19/2022 1.5 (H) 0.5 - 1.4 mg/dL Final     Calcium   Date Value Ref Range Status   09/19/2022 8.5 (L) 8.7 - 10.5 mg/dL Final     Total Protein   Date Value Ref Range Status   09/19/2022 6.5 6.0 - 8.4 g/dL Final     Albumin    Date Value Ref Range Status   09/19/2022 2.9 (L) 3.5 - 5.2 g/dL Final     Total Bilirubin   Date Value Ref Range Status   09/19/2022 0.3 0.1 - 1.0 mg/dL Final     Comment:     For infants and newborns, interpretation of results should be based  on gestational age, weight and in agreement with clinical  observations.    Premature Infant recommended reference ranges:  Up to 24 hours.............<8.0 mg/dL  Up to 48 hours............<12.0 mg/dL  3-5 days..................<15.0 mg/dL  6-29 days.................<15.0 mg/dL       Alkaline Phosphatase   Date Value Ref Range Status   09/19/2022 105 55 - 135 U/L Final     AST   Date Value Ref Range Status   09/19/2022 11 10 - 40 U/L Final     ALT   Date Value Ref Range Status   09/19/2022 9 (L) 10 - 44 U/L Final     Anion Gap   Date Value Ref Range Status   09/19/2022 11 8 - 16 mmol/L Final     eGFR if    Date Value Ref Range Status   07/25/2022 >60 >60 mL/min/1.73 m^2 Final     eGFR if non    Date Value Ref Range Status   07/25/2022 >60 >60 mL/min/1.73 m^2 Final     Comment:     Calculation used to obtain the estimated glomerular filtration  rate (eGFR) is the CKD-EPI equation.        Magnesium 1.6 - 2.6 mg/dL 1.0 Low      CT c/a/p 7/12/22  Impression:  1.  New segmental left lower lobe pulmonary embolus.  New left internal iliac vein thrombus.  2.  No significant change of the gallbladder fundus soft tissue thickening in this patient with history of gallbladder malignancy.  3.  Mixed response of multiple hypoattenuating hepatic lesions as detailed above.  4.  Stable heterogeneous mildly enlarged periportal lymph node.  5.  Unchanged left kidney complex cystic lesion.    Assessment:     Problem List Items Addressed This Visit          Immunology/Multi System    Immunodeficiency due to chemotherapy       Hematology    DVT, lower extremity, distal, acute, right    Acute deep vein thrombosis (DVT) of iliac vein of left lower extremity        Oncology    Adenocarcinoma of gallbladder - Primary    Cancer of peritoneum/retroperitoneum, secondary    Secondary liver cancer     Plan:     Adenocarcinoma of gallbladder  --On palliative chemotherapy with cisplatin gemcitabine cycle 1 day 1 04/25/2022.  --HOLD cycle 7 day 8 of cisplatin and gemcitabine q 3 weeks  --restaging CT July 2022 with mixed response couple subcentimeter  Liver lesions others improved without other areas of involvement  --with mixed response will restage again in early October 2022.-CT c/a/p  --continue magnesium supplement  --continue with potassium supplement  --will obtain CBC in one week to monitor anemia. -no signs of bleeding. Hgb currently 7.9g/dL-has been needing transfusions every 2 weeks or so.  --ANC:4.9, plts:154, crcl:33.7 ml/min, tbili:0.3  --kidney function declined: GFR: 37, BUN: 25, cret: 1.5. HOLD chemo due to kidney function decline. --IVF tomorrow    DVT/PE  --indefinite anticoagulation apixaban 5 mg b.i.d..      Follow-Up: HOLD CHEMO this week. IVF tomorrow .1 week with cbc cmp prior for cycle 7 day 8.     Dianne Andrea PA-C  Hematology Oncology

## 2022-09-19 ENCOUNTER — INFUSION (OUTPATIENT)
Dept: INFUSION THERAPY | Facility: HOSPITAL | Age: 72
End: 2022-09-19
Attending: INTERNAL MEDICINE
Payer: MEDICARE

## 2022-09-19 ENCOUNTER — OFFICE VISIT (OUTPATIENT)
Dept: HEMATOLOGY/ONCOLOGY | Facility: CLINIC | Age: 72
End: 2022-09-19
Payer: MEDICARE

## 2022-09-19 VITALS
HEART RATE: 104 BPM | SYSTOLIC BLOOD PRESSURE: 107 MMHG | DIASTOLIC BLOOD PRESSURE: 67 MMHG | OXYGEN SATURATION: 98 % | TEMPERATURE: 98 F | RESPIRATION RATE: 18 BRPM

## 2022-09-19 VITALS
WEIGHT: 157.19 LBS | HEIGHT: 68 IN | DIASTOLIC BLOOD PRESSURE: 67 MMHG | SYSTOLIC BLOOD PRESSURE: 106 MMHG | OXYGEN SATURATION: 98 % | BODY MASS INDEX: 23.82 KG/M2 | TEMPERATURE: 98 F | HEART RATE: 103 BPM

## 2022-09-19 DIAGNOSIS — D84.821 IMMUNODEFICIENCY DUE TO CHEMOTHERAPY: ICD-10-CM

## 2022-09-19 DIAGNOSIS — C23 ADENOCARCINOMA OF GALLBLADDER: Primary | ICD-10-CM

## 2022-09-19 DIAGNOSIS — I82.4Z1 DVT, LOWER EXTREMITY, DISTAL, ACUTE, RIGHT: ICD-10-CM

## 2022-09-19 DIAGNOSIS — I82.422 ACUTE DEEP VEIN THROMBOSIS (DVT) OF ILIAC VEIN OF LEFT LOWER EXTREMITY: ICD-10-CM

## 2022-09-19 DIAGNOSIS — Z79.899 IMMUNODEFICIENCY DUE TO CHEMOTHERAPY: ICD-10-CM

## 2022-09-19 DIAGNOSIS — T45.1X5A IMMUNODEFICIENCY DUE TO CHEMOTHERAPY: ICD-10-CM

## 2022-09-19 DIAGNOSIS — C78.7 SECONDARY LIVER CANCER: ICD-10-CM

## 2022-09-19 DIAGNOSIS — C78.6 CANCER OF PERITONEUM/RETROPERITONEUM, SECONDARY: ICD-10-CM

## 2022-09-19 LAB
ALBUMIN SERPL BCP-MCNC: 2.9 G/DL (ref 3.5–5.2)
ALP SERPL-CCNC: 105 U/L (ref 55–135)
ALT SERPL W/O P-5'-P-CCNC: 9 U/L (ref 10–44)
ANION GAP SERPL CALC-SCNC: 11 MMOL/L (ref 8–16)
AST SERPL-CCNC: 11 U/L (ref 10–40)
BASOPHILS # BLD AUTO: 0.05 K/UL (ref 0–0.2)
BASOPHILS NFR BLD: 0.6 % (ref 0–1.9)
BILIRUB SERPL-MCNC: 0.3 MG/DL (ref 0.1–1)
BUN SERPL-MCNC: 25 MG/DL (ref 8–23)
CALCIUM SERPL-MCNC: 8.5 MG/DL (ref 8.7–10.5)
CHLORIDE SERPL-SCNC: 97 MMOL/L (ref 95–110)
CO2 SERPL-SCNC: 32 MMOL/L (ref 23–29)
CREAT SERPL-MCNC: 1.5 MG/DL (ref 0.5–1.4)
DIFFERENTIAL METHOD: ABNORMAL
EOSINOPHIL # BLD AUTO: 0 K/UL (ref 0–0.5)
EOSINOPHIL NFR BLD: 0.5 % (ref 0–8)
ERYTHROCYTE [DISTWIDTH] IN BLOOD BY AUTOMATED COUNT: 15 % (ref 11.5–14.5)
EST. GFR  (NO RACE VARIABLE): 37 ML/MIN/1.73 M^2
GLUCOSE SERPL-MCNC: 120 MG/DL (ref 70–110)
HCT VFR BLD AUTO: 25.2 % (ref 37–48.5)
HGB BLD-MCNC: 7.9 G/DL (ref 12–16)
IMM GRANULOCYTES # BLD AUTO: 0.05 K/UL (ref 0–0.04)
IMM GRANULOCYTES NFR BLD AUTO: 0.6 % (ref 0–0.5)
LYMPHOCYTES # BLD AUTO: 2.9 K/UL (ref 1–4.8)
LYMPHOCYTES NFR BLD: 35.3 % (ref 18–48)
MAGNESIUM SERPL-MCNC: 1 MG/DL (ref 1.6–2.6)
MCH RBC QN AUTO: 30.9 PG (ref 27–31)
MCHC RBC AUTO-ENTMCNC: 31.3 G/DL (ref 32–36)
MCV RBC AUTO: 98 FL (ref 82–98)
MONOCYTES # BLD AUTO: 0.3 K/UL (ref 0.3–1)
MONOCYTES NFR BLD: 3.5 % (ref 4–15)
NEUTROPHILS # BLD AUTO: 4.9 K/UL (ref 1.8–7.7)
NEUTROPHILS NFR BLD: 59.5 % (ref 38–73)
NRBC BLD-RTO: 0 /100 WBC
PLATELET # BLD AUTO: 154 K/UL (ref 150–450)
PMV BLD AUTO: 8.7 FL (ref 9.2–12.9)
POTASSIUM SERPL-SCNC: 3.9 MMOL/L (ref 3.5–5.1)
PROT SERPL-MCNC: 6.5 G/DL (ref 6–8.4)
RBC # BLD AUTO: 2.56 M/UL (ref 4–5.4)
SODIUM SERPL-SCNC: 140 MMOL/L (ref 136–145)
WBC # BLD AUTO: 8.18 K/UL (ref 3.9–12.7)

## 2022-09-19 PROCEDURE — 4010F PR ACE/ARB THEARPY RXD/TAKEN: ICD-10-PCS | Mod: CPTII,S$GLB,,

## 2022-09-19 PROCEDURE — 80053 COMPREHEN METABOLIC PANEL: CPT | Performed by: INTERNAL MEDICINE

## 2022-09-19 PROCEDURE — 1125F AMNT PAIN NOTED PAIN PRSNT: CPT | Mod: CPTII,S$GLB,,

## 2022-09-19 PROCEDURE — A4216 STERILE WATER/SALINE, 10 ML: HCPCS | Performed by: NURSE PRACTITIONER

## 2022-09-19 PROCEDURE — 3288F PR FALLS RISK ASSESSMENT DOCUMENTED: ICD-10-PCS | Mod: CPTII,S$GLB,,

## 2022-09-19 PROCEDURE — 1101F PT FALLS ASSESS-DOCD LE1/YR: CPT | Mod: CPTII,S$GLB,,

## 2022-09-19 PROCEDURE — 3074F PR MOST RECENT SYSTOLIC BLOOD PRESSURE < 130 MM HG: ICD-10-PCS | Mod: CPTII,S$GLB,,

## 2022-09-19 PROCEDURE — 3008F PR BODY MASS INDEX (BMI) DOCUMENTED: ICD-10-PCS | Mod: CPTII,S$GLB,,

## 2022-09-19 PROCEDURE — 1157F ADVNC CARE PLAN IN RCRD: CPT | Mod: CPTII,S$GLB,,

## 2022-09-19 PROCEDURE — 63600175 PHARM REV CODE 636 W HCPCS: Performed by: NURSE PRACTITIONER

## 2022-09-19 PROCEDURE — 99215 OFFICE O/P EST HI 40 MIN: CPT | Mod: S$GLB,,,

## 2022-09-19 PROCEDURE — 99999 PR PBB SHADOW E&M-EST. PATIENT-LVL III: ICD-10-PCS | Mod: PBBFAC,,,

## 2022-09-19 PROCEDURE — 1125F PR PAIN SEVERITY QUANTIFIED, PAIN PRESENT: ICD-10-PCS | Mod: CPTII,S$GLB,,

## 2022-09-19 PROCEDURE — 3074F SYST BP LT 130 MM HG: CPT | Mod: CPTII,S$GLB,,

## 2022-09-19 PROCEDURE — 3288F FALL RISK ASSESSMENT DOCD: CPT | Mod: CPTII,S$GLB,,

## 2022-09-19 PROCEDURE — 36592 COLLECT BLOOD FROM PICC: CPT

## 2022-09-19 PROCEDURE — 85025 COMPLETE CBC W/AUTO DIFF WBC: CPT | Performed by: INTERNAL MEDICINE

## 2022-09-19 PROCEDURE — 1101F PR PT FALLS ASSESS DOC 0-1 FALLS W/OUT INJ PAST YR: ICD-10-PCS | Mod: CPTII,S$GLB,,

## 2022-09-19 PROCEDURE — 3008F BODY MASS INDEX DOCD: CPT | Mod: CPTII,S$GLB,,

## 2022-09-19 PROCEDURE — 25000003 PHARM REV CODE 250: Performed by: NURSE PRACTITIONER

## 2022-09-19 PROCEDURE — 3078F DIAST BP <80 MM HG: CPT | Mod: CPTII,S$GLB,,

## 2022-09-19 PROCEDURE — 99999 PR PBB SHADOW E&M-EST. PATIENT-LVL III: CPT | Mod: PBBFAC,,,

## 2022-09-19 PROCEDURE — 3078F PR MOST RECENT DIASTOLIC BLOOD PRESSURE < 80 MM HG: ICD-10-PCS | Mod: CPTII,S$GLB,,

## 2022-09-19 PROCEDURE — 83735 ASSAY OF MAGNESIUM: CPT | Performed by: INTERNAL MEDICINE

## 2022-09-19 PROCEDURE — 99215 PR OFFICE/OUTPT VISIT, EST, LEVL V, 40-54 MIN: ICD-10-PCS | Mod: S$GLB,,,

## 2022-09-19 PROCEDURE — 4010F ACE/ARB THERAPY RXD/TAKEN: CPT | Mod: CPTII,S$GLB,,

## 2022-09-19 PROCEDURE — 1157F PR ADVANCE CARE PLAN OR EQUIV PRESENT IN MEDICAL RECORD: ICD-10-PCS | Mod: CPTII,S$GLB,,

## 2022-09-19 RX ORDER — SODIUM CHLORIDE 0.9 % (FLUSH) 0.9 %
10 SYRINGE (ML) INJECTION
Status: COMPLETED | OUTPATIENT
Start: 2022-09-19 | End: 2022-09-19

## 2022-09-19 RX ORDER — HEPARIN 100 UNIT/ML
500 SYRINGE INTRAVENOUS
Status: COMPLETED | OUTPATIENT
Start: 2022-09-19 | End: 2022-09-19

## 2022-09-19 RX ORDER — HEPARIN 100 UNIT/ML
500 SYRINGE INTRAVENOUS
Status: CANCELLED | OUTPATIENT
Start: 2022-09-19

## 2022-09-19 RX ORDER — SODIUM CHLORIDE 0.9 % (FLUSH) 0.9 %
10 SYRINGE (ML) INJECTION
Status: CANCELLED | OUTPATIENT
Start: 2022-09-19

## 2022-09-19 RX ADMIN — Medication 10 ML: at 12:09

## 2022-09-19 RX ADMIN — HEPARIN 500 UNITS: 100 SYRINGE at 12:09

## 2022-09-19 NOTE — DISCHARGE INSTRUCTIONS
.[facility]  [address] [address] [facility] [address]  [address]  [phone] phone     [phone] fax  Hours of Operation: Monday- Friday 8:00am- 5:00pm  After hours phone  [phone]  Hematology / Oncology Physicians on call    Dr. Samson Villalobos      Nurse Practitioners:    Gracie Spangler, TERI Song, TERI Solomon, TERI Arellano, TERI Srinivasan, TERI Andrea, PA      Please don't hesitate to call if you have any concerns.    .FALL PREVENTION   Falls often occur due to slipping, tripping or losing your balance. Here are ways to reduce your risk of falling again.   Was there anything that caused your fall that can be fixed, removed or replaced?   Make your home safe by keeping walkways clear of objects you may trip over.   Use non-slip pads under rugs.   Do not walk in poorly lit areas.   Do not stand on chairs or wobbly ladders.   Use caution when reaching overhead or looking upward. This position can cause a loss of balance.   Be sure your shoes fit properly, have non-slip bottoms and are in good condition.   Be cautious when going up and down stairs, curbs, and when walking on uneven sidewalks.   If your balance is poor, consider using a cane or walker.   If your fall was related to alcohol use, stop or limit alcohol intake.   If your fall was related to use of sleeping medicines, talk to your doctor about this. You may need to reduce your dosage at bedtime if you awaken during the night to go to the bathroom.   To reduce the need for nighttime bathroom trips:   Avoid drinking fluids for several hours before going to bed   Empty your bladder before going to bed   Men can keep a urinal at the bedside   © 3303-5878 [employer], [address] [place] [address], [place] [address]. All rights reserved. This information is not intended as a substitute for professional medical care. Always follow your healthcare  professional's instructions.  .WAYS TO HELP PREVENT INFECTION        WASH YOUR HANDS OFTEN DURING THE DAY, ESPECIALLY BEFORE YOU EAT, AFTER USING THE BATHROOM, AND AFTER TOUCHING ANIMALS    STAY AWAY FROM PEOPLE WHO HAVE ILLNESSES YOU CAN CATCH; SUCH AS COLDS, FLU, CHICKEN POX    TRY TO AVOID CROWDS    STAY AWAY FROM CHILDREN WHO RECENTLY HAVE RECEIVED LIVE VIRUS VACCINES    MAINTAIN GOOD MOUTH CARE    DO NOT SQUEEZE OR SCRATCH PIMPLES    CLEAN CUTS & SCRAPES RIGHT AWAY AND DAILY UNTIL HEALED WITH WARM WATER, SOAP & AN ANTISEPTIC    AVOID CONTACT WITH LITTER BOXES, BIRD CAGES, & FISH TANKS    AVOID STANDING WATER, IE., BIRD BATHS, FLOWER POTS/VASES, OR HUMIDIFIERS    WEAR GLOVES WHEN GARDENING OR CLEANING UP AFTER OTHERS, ESPECIALLY BABIES & SMALL CHILDREN    DO NOT EAT RAW FISH, SEAFOOD, MEAT, OR EGGS

## 2022-09-19 NOTE — NURSING
Lab obtained from Right upper arm PICC line, 2 patient identifiers obtained, labeled and sent to lab.  Adequate blood return noted. 2 lumen(s) flushed with 10ml NS and 5ml Heparin solution.  Site without redness, swelling or drainage noted.

## 2022-09-20 ENCOUNTER — INFUSION (OUTPATIENT)
Dept: INFUSION THERAPY | Facility: HOSPITAL | Age: 72
End: 2022-09-20
Attending: INTERNAL MEDICINE
Payer: MEDICARE

## 2022-09-20 VITALS
TEMPERATURE: 98 F | OXYGEN SATURATION: 100 % | DIASTOLIC BLOOD PRESSURE: 63 MMHG | RESPIRATION RATE: 18 BRPM | SYSTOLIC BLOOD PRESSURE: 122 MMHG | HEART RATE: 90 BPM

## 2022-09-20 DIAGNOSIS — E86.0 DEHYDRATION: Primary | ICD-10-CM

## 2022-09-20 DIAGNOSIS — I26.99 PULMONARY EMBOLISM WITHOUT ACUTE COR PULMONALE, UNSPECIFIED CHRONICITY, UNSPECIFIED PULMONARY EMBOLISM TYPE: ICD-10-CM

## 2022-09-20 PROCEDURE — 96360 HYDRATION IV INFUSION INIT: CPT

## 2022-09-20 PROCEDURE — 25000003 PHARM REV CODE 250

## 2022-09-20 PROCEDURE — 96361 HYDRATE IV INFUSION ADD-ON: CPT

## 2022-09-20 PROCEDURE — A4216 STERILE WATER/SALINE, 10 ML: HCPCS

## 2022-09-20 PROCEDURE — 63600175 PHARM REV CODE 636 W HCPCS

## 2022-09-20 RX ORDER — SODIUM CHLORIDE 9 MG/ML
1000 INJECTION, SOLUTION INTRAVENOUS
Status: COMPLETED | OUTPATIENT
Start: 2022-09-20 | End: 2022-09-20

## 2022-09-20 RX ORDER — SODIUM CHLORIDE 0.9 % (FLUSH) 0.9 %
10 SYRINGE (ML) INJECTION
Status: CANCELLED | OUTPATIENT
Start: 2022-09-20

## 2022-09-20 RX ORDER — HEPARIN 100 UNIT/ML
500 SYRINGE INTRAVENOUS
Status: DISCONTINUED | OUTPATIENT
Start: 2022-09-20 | End: 2022-09-20 | Stop reason: HOSPADM

## 2022-09-20 RX ORDER — HEPARIN 100 UNIT/ML
500 SYRINGE INTRAVENOUS
Status: CANCELLED | OUTPATIENT
Start: 2022-09-20

## 2022-09-20 RX ORDER — SODIUM CHLORIDE 0.9 % (FLUSH) 0.9 %
10 SYRINGE (ML) INJECTION
Status: DISCONTINUED | OUTPATIENT
Start: 2022-09-20 | End: 2022-09-20 | Stop reason: HOSPADM

## 2022-09-20 RX ADMIN — Medication 10 ML: at 11:09

## 2022-09-20 RX ADMIN — HEPARIN 500 UNITS: 100 SYRINGE at 11:09

## 2022-09-20 RX ADMIN — SODIUM CHLORIDE 1000 ML: 0.9 INJECTION, SOLUTION INTRAVENOUS at 09:09

## 2022-09-20 NOTE — PLAN OF CARE
Problem: Adult Inpatient Plan of Care  Goal: Plan of Care Review  Outcome: Ongoing, Progressing  Flowsheets (Taken 9/20/2022 0951)  Plan of Care Reviewed With: patient  Goal: Patient-Specific Goal (Individualized)  Outcome: Ongoing, Progressing  Flowsheets (Taken 9/20/2022 0951)  Anxieties, Fears or Concerns: Was nervous about getting only fluids. Asked if her kidneys were failing. reassured her that it was an acute issue and fluids should help her function get back to normal.  Individualized Care Needs: warm blankets, feet elevated, snacks and a drink offered  Patient-Specific Goals (Include Timeframe): Pt to tolerate the IVF  Goal: Absence of Hospital-Acquired Illness or Injury  Outcome: Ongoing, Progressing  Goal: Optimal Comfort and Wellbeing  Outcome: Ongoing, Progressing     Problem: Fluid Volume Deficit  Goal: Fluid Balance  Outcome: Ongoing, Progressing

## 2022-09-20 NOTE — DISCHARGE INSTRUCTIONS
.[facility]  [address] [address]  [address] [address]  [phone] phone     [phone] fax  Hours of Operation: Monday- Friday 8:00am- 5:00pm  After hours phone  [phone]  Hematology / Oncology Physicians on call    Dr. Samson Villalobos      Nurse Practitioners:    Gracie Spangler, TERI Song, TERI Solomon, TERI Arellano, TERI Srinivasan, TERI Andrea, PA      Please don't hesitate to call if you have any concerns.    .FALL PREVENTION   Falls often occur due to slipping, tripping or losing your balance. Here are ways to reduce your risk of falling again.   Was there anything that caused your fall that can be fixed, removed or replaced?   Make your home safe by keeping walkways clear of objects you may trip over.   Use non-slip pads under rugs.   Do not walk in poorly lit areas.   Do not stand on chairs or wobbly ladders.   Use caution when reaching overhead or looking upward. This position can cause a loss of balance.   Be sure your shoes fit properly, have non-slip bottoms and are in good condition.   Be cautious when going up and down stairs, curbs, and when walking on uneven sidewalks.   If your balance is poor, consider using a cane or walker.   If your fall was related to alcohol use, stop or limit alcohol intake.   If your fall was related to use of sleeping medicines, talk to your doctor about this. You may need to reduce your dosage at bedtime if you awaken during the night to go to the bathroom.   To reduce the need for nighttime bathroom trips:   Avoid drinking fluids for several hours before going to bed   Empty your bladder before going to bed   Men can keep a urinal at the bedside   © 9010-0522 [employer], [address] [place] [address], [place] [address]. All rights reserved. This information is not intended as a substitute for professional medical care. Always follow your healthcare  professional's instructions.  .WAYS TO HELP PREVENT INFECTION        WASH YOUR HANDS OFTEN DURING THE DAY, ESPECIALLY BEFORE YOU EAT, AFTER USING THE BATHROOM, AND AFTER TOUCHING ANIMALS    STAY AWAY FROM PEOPLE WHO HAVE ILLNESSES YOU CAN CATCH; SUCH AS COLDS, FLU, CHICKEN POX    TRY TO AVOID CROWDS    STAY AWAY FROM CHILDREN WHO RECENTLY HAVE RECEIVED LIVE VIRUS VACCINES    MAINTAIN GOOD MOUTH CARE    DO NOT SQUEEZE OR SCRATCH PIMPLES    CLEAN CUTS & SCRAPES RIGHT AWAY AND DAILY UNTIL HEALED WITH WARM WATER, SOAP & AN ANTISEPTIC    AVOID CONTACT WITH LITTER BOXES, BIRD CAGES, & FISH TANKS    AVOID STANDING WATER, IE., BIRD BATHS, FLOWER POTS/VASES, OR HUMIDIFIERS    WEAR GLOVES WHEN GARDENING OR CLEANING UP AFTER OTHERS, ESPECIALLY BABIES & SMALL CHILDREN    DO NOT EAT RAW FISH, SEAFOOD, MEAT, OR EGGS

## 2022-09-20 NOTE — PLAN OF CARE
Problem: Adult Inpatient Plan of Care  Goal: Plan of Care Review  Outcome: Ongoing, Progressing  Flowsheets (Taken 9/20/2022 0918)  Plan of Care Reviewed With: patient  Goal: Patient-Specific Goal (Individualized)  Outcome: Ongoing, Progressing  Flowsheets (Taken 9/20/2022 0918)  Anxieties, Fears or Concerns: Pt has no concerns at this time  Individualized Care Needs: warm blankets/pillow, legs elevated, snacks offered  Patient-Specific Goals (Include Timeframe): Pt will tolerate IVF with no adverse reaction  Goal: Absence of Hospital-Acquired Illness or Injury  Outcome: Ongoing, Progressing  Goal: Optimal Comfort and Wellbeing  Outcome: Ongoing, Progressing     Problem: Fluid Volume Deficit  Goal: Fluid Balance  Outcome: Ongoing, Progressing

## 2022-09-26 ENCOUNTER — INFUSION (OUTPATIENT)
Dept: INFUSION THERAPY | Facility: HOSPITAL | Age: 72
End: 2022-09-26
Payer: MEDICARE

## 2022-09-26 ENCOUNTER — OFFICE VISIT (OUTPATIENT)
Dept: HEMATOLOGY/ONCOLOGY | Facility: CLINIC | Age: 72
End: 2022-09-26
Payer: MEDICARE

## 2022-09-26 VITALS
HEIGHT: 68 IN | HEART RATE: 95 BPM | TEMPERATURE: 98 F | OXYGEN SATURATION: 98 % | RESPIRATION RATE: 18 BRPM | DIASTOLIC BLOOD PRESSURE: 72 MMHG | WEIGHT: 157 LBS | SYSTOLIC BLOOD PRESSURE: 113 MMHG | BODY MASS INDEX: 23.79 KG/M2

## 2022-09-26 VITALS
TEMPERATURE: 98 F | DIASTOLIC BLOOD PRESSURE: 77 MMHG | SYSTOLIC BLOOD PRESSURE: 133 MMHG | OXYGEN SATURATION: 98 % | HEART RATE: 96 BPM | RESPIRATION RATE: 20 BRPM

## 2022-09-26 DIAGNOSIS — I26.99 PULMONARY EMBOLISM WITHOUT ACUTE COR PULMONALE, UNSPECIFIED CHRONICITY, UNSPECIFIED PULMONARY EMBOLISM TYPE: ICD-10-CM

## 2022-09-26 DIAGNOSIS — C23 ADENOCARCINOMA OF GALLBLADDER: Primary | ICD-10-CM

## 2022-09-26 DIAGNOSIS — T45.1X5A ANEMIA ASSOCIATED WITH CHEMOTHERAPY: ICD-10-CM

## 2022-09-26 DIAGNOSIS — E83.42 HYPOMAGNESEMIA: ICD-10-CM

## 2022-09-26 DIAGNOSIS — D64.81 ANEMIA ASSOCIATED WITH CHEMOTHERAPY: ICD-10-CM

## 2022-09-26 LAB
ALBUMIN SERPL BCP-MCNC: 2.8 G/DL (ref 3.5–5.2)
ALP SERPL-CCNC: 96 U/L (ref 55–135)
ALT SERPL W/O P-5'-P-CCNC: 6 U/L (ref 10–44)
ANION GAP SERPL CALC-SCNC: 7 MMOL/L (ref 8–16)
AST SERPL-CCNC: 11 U/L (ref 10–40)
BASOPHILS # BLD AUTO: 0.03 K/UL (ref 0–0.2)
BASOPHILS NFR BLD: 0.3 % (ref 0–1.9)
BILIRUB SERPL-MCNC: 0.2 MG/DL (ref 0.1–1)
BUN SERPL-MCNC: 17 MG/DL (ref 8–23)
CALCIUM SERPL-MCNC: 8.6 MG/DL (ref 8.7–10.5)
CHLORIDE SERPL-SCNC: 100 MMOL/L (ref 95–110)
CO2 SERPL-SCNC: 37 MMOL/L (ref 23–29)
CREAT SERPL-MCNC: 1.1 MG/DL (ref 0.5–1.4)
DIFFERENTIAL METHOD: ABNORMAL
EOSINOPHIL # BLD AUTO: 0 K/UL (ref 0–0.5)
EOSINOPHIL NFR BLD: 0.4 % (ref 0–8)
ERYTHROCYTE [DISTWIDTH] IN BLOOD BY AUTOMATED COUNT: 15.8 % (ref 11.5–14.5)
EST. GFR  (NO RACE VARIABLE): 53 ML/MIN/1.73 M^2
GLUCOSE SERPL-MCNC: 116 MG/DL (ref 70–110)
HCT VFR BLD AUTO: 23 % (ref 37–48.5)
HGB BLD-MCNC: 7 G/DL (ref 12–16)
IMM GRANULOCYTES # BLD AUTO: 0.04 K/UL (ref 0–0.04)
IMM GRANULOCYTES NFR BLD AUTO: 0.4 % (ref 0–0.5)
LYMPHOCYTES # BLD AUTO: 2 K/UL (ref 1–4.8)
LYMPHOCYTES NFR BLD: 21.9 % (ref 18–48)
MAGNESIUM SERPL-MCNC: 1.1 MG/DL (ref 1.6–2.6)
MCH RBC QN AUTO: 30.7 PG (ref 27–31)
MCHC RBC AUTO-ENTMCNC: 30.4 G/DL (ref 32–36)
MCV RBC AUTO: 101 FL (ref 82–98)
MONOCYTES # BLD AUTO: 0.6 K/UL (ref 0.3–1)
MONOCYTES NFR BLD: 6.7 % (ref 4–15)
NEUTROPHILS # BLD AUTO: 6.3 K/UL (ref 1.8–7.7)
NEUTROPHILS NFR BLD: 70.3 % (ref 38–73)
NRBC BLD-RTO: 0 /100 WBC
PLATELET # BLD AUTO: 136 K/UL (ref 150–450)
PMV BLD AUTO: 9.7 FL (ref 9.2–12.9)
POTASSIUM SERPL-SCNC: 4.4 MMOL/L (ref 3.5–5.1)
PROT SERPL-MCNC: 6.3 G/DL (ref 6–8.4)
RBC # BLD AUTO: 2.28 M/UL (ref 4–5.4)
SODIUM SERPL-SCNC: 144 MMOL/L (ref 136–145)
WBC # BLD AUTO: 9.04 K/UL (ref 3.9–12.7)

## 2022-09-26 PROCEDURE — 1101F PT FALLS ASSESS-DOCD LE1/YR: CPT | Mod: CPTII,S$GLB,, | Performed by: NURSE PRACTITIONER

## 2022-09-26 PROCEDURE — 83735 ASSAY OF MAGNESIUM: CPT

## 2022-09-26 PROCEDURE — 99214 OFFICE O/P EST MOD 30 MIN: CPT | Mod: S$GLB,,, | Performed by: NURSE PRACTITIONER

## 2022-09-26 PROCEDURE — 99999 PR PBB SHADOW E&M-EST. PATIENT-LVL V: ICD-10-PCS | Mod: PBBFAC,,, | Performed by: NURSE PRACTITIONER

## 2022-09-26 PROCEDURE — 1159F MED LIST DOCD IN RCRD: CPT | Mod: CPTII,S$GLB,, | Performed by: NURSE PRACTITIONER

## 2022-09-26 PROCEDURE — 1160F PR REVIEW ALL MEDS BY PRESCRIBER/CLIN PHARMACIST DOCUMENTED: ICD-10-PCS | Mod: CPTII,S$GLB,, | Performed by: NURSE PRACTITIONER

## 2022-09-26 PROCEDURE — 1159F PR MEDICATION LIST DOCUMENTED IN MEDICAL RECORD: ICD-10-PCS | Mod: CPTII,S$GLB,, | Performed by: NURSE PRACTITIONER

## 2022-09-26 PROCEDURE — 1157F ADVNC CARE PLAN IN RCRD: CPT | Mod: CPTII,S$GLB,, | Performed by: NURSE PRACTITIONER

## 2022-09-26 PROCEDURE — 3074F PR MOST RECENT SYSTOLIC BLOOD PRESSURE < 130 MM HG: ICD-10-PCS | Mod: CPTII,S$GLB,, | Performed by: NURSE PRACTITIONER

## 2022-09-26 PROCEDURE — 3008F PR BODY MASS INDEX (BMI) DOCUMENTED: ICD-10-PCS | Mod: CPTII,S$GLB,, | Performed by: NURSE PRACTITIONER

## 2022-09-26 PROCEDURE — 63600175 PHARM REV CODE 636 W HCPCS: Performed by: NURSE PRACTITIONER

## 2022-09-26 PROCEDURE — 80053 COMPREHEN METABOLIC PANEL: CPT

## 2022-09-26 PROCEDURE — 1157F PR ADVANCE CARE PLAN OR EQUIV PRESENT IN MEDICAL RECORD: ICD-10-PCS | Mod: CPTII,S$GLB,, | Performed by: NURSE PRACTITIONER

## 2022-09-26 PROCEDURE — 3288F PR FALLS RISK ASSESSMENT DOCUMENTED: ICD-10-PCS | Mod: CPTII,S$GLB,, | Performed by: NURSE PRACTITIONER

## 2022-09-26 PROCEDURE — 99214 PR OFFICE/OUTPT VISIT, EST, LEVL IV, 30-39 MIN: ICD-10-PCS | Mod: S$GLB,,, | Performed by: NURSE PRACTITIONER

## 2022-09-26 PROCEDURE — 3078F DIAST BP <80 MM HG: CPT | Mod: CPTII,S$GLB,, | Performed by: NURSE PRACTITIONER

## 2022-09-26 PROCEDURE — 3288F FALL RISK ASSESSMENT DOCD: CPT | Mod: CPTII,S$GLB,, | Performed by: NURSE PRACTITIONER

## 2022-09-26 PROCEDURE — 25000003 PHARM REV CODE 250: Performed by: NURSE PRACTITIONER

## 2022-09-26 PROCEDURE — 1160F RVW MEDS BY RX/DR IN RCRD: CPT | Mod: CPTII,S$GLB,, | Performed by: NURSE PRACTITIONER

## 2022-09-26 PROCEDURE — 85025 COMPLETE CBC W/AUTO DIFF WBC: CPT

## 2022-09-26 PROCEDURE — 3074F SYST BP LT 130 MM HG: CPT | Mod: CPTII,S$GLB,, | Performed by: NURSE PRACTITIONER

## 2022-09-26 PROCEDURE — A4216 STERILE WATER/SALINE, 10 ML: HCPCS | Performed by: NURSE PRACTITIONER

## 2022-09-26 PROCEDURE — 3008F BODY MASS INDEX DOCD: CPT | Mod: CPTII,S$GLB,, | Performed by: NURSE PRACTITIONER

## 2022-09-26 PROCEDURE — 4010F PR ACE/ARB THEARPY RXD/TAKEN: ICD-10-PCS | Mod: CPTII,S$GLB,, | Performed by: NURSE PRACTITIONER

## 2022-09-26 PROCEDURE — 99999 PR PBB SHADOW E&M-EST. PATIENT-LVL V: CPT | Mod: PBBFAC,,, | Performed by: NURSE PRACTITIONER

## 2022-09-26 PROCEDURE — 36591 DRAW BLOOD OFF VENOUS DEVICE: CPT

## 2022-09-26 PROCEDURE — 1101F PR PT FALLS ASSESS DOC 0-1 FALLS W/OUT INJ PAST YR: ICD-10-PCS | Mod: CPTII,S$GLB,, | Performed by: NURSE PRACTITIONER

## 2022-09-26 PROCEDURE — 1126F PR PAIN SEVERITY QUANTIFIED, NO PAIN PRESENT: ICD-10-PCS | Mod: CPTII,S$GLB,, | Performed by: NURSE PRACTITIONER

## 2022-09-26 PROCEDURE — 1126F AMNT PAIN NOTED NONE PRSNT: CPT | Mod: CPTII,S$GLB,, | Performed by: NURSE PRACTITIONER

## 2022-09-26 PROCEDURE — 3078F PR MOST RECENT DIASTOLIC BLOOD PRESSURE < 80 MM HG: ICD-10-PCS | Mod: CPTII,S$GLB,, | Performed by: NURSE PRACTITIONER

## 2022-09-26 PROCEDURE — 4010F ACE/ARB THERAPY RXD/TAKEN: CPT | Mod: CPTII,S$GLB,, | Performed by: NURSE PRACTITIONER

## 2022-09-26 PROCEDURE — 36592 COLLECT BLOOD FROM PICC: CPT

## 2022-09-26 RX ORDER — SODIUM CHLORIDE 0.9 % (FLUSH) 0.9 %
10 SYRINGE (ML) INJECTION
Status: CANCELLED | OUTPATIENT
Start: 2022-09-26

## 2022-09-26 RX ORDER — DIPHENHYDRAMINE HCL 25 MG
25 CAPSULE ORAL
Status: CANCELLED | OUTPATIENT
Start: 2022-09-26

## 2022-09-26 RX ORDER — ACETAMINOPHEN 325 MG/1
650 TABLET ORAL
Status: CANCELLED | OUTPATIENT
Start: 2022-09-26

## 2022-09-26 RX ORDER — HEPARIN 100 UNIT/ML
500 SYRINGE INTRAVENOUS
Status: COMPLETED | OUTPATIENT
Start: 2022-09-26 | End: 2022-09-26

## 2022-09-26 RX ORDER — HEPARIN 100 UNIT/ML
500 SYRINGE INTRAVENOUS
Status: CANCELLED | OUTPATIENT
Start: 2022-09-26

## 2022-09-26 RX ORDER — HYDROCODONE BITARTRATE AND ACETAMINOPHEN 500; 5 MG/1; MG/1
TABLET ORAL ONCE
Status: CANCELLED | OUTPATIENT
Start: 2022-09-26 | End: 2022-09-26

## 2022-09-26 RX ORDER — LANOLIN ALCOHOL/MO/W.PET/CERES
800 CREAM (GRAM) TOPICAL DAILY
Qty: 60 TABLET | Refills: 2 | Status: SHIPPED | OUTPATIENT
Start: 2022-09-26 | End: 2022-10-10

## 2022-09-26 RX ORDER — SODIUM CHLORIDE 0.9 % (FLUSH) 0.9 %
10 SYRINGE (ML) INJECTION
Status: COMPLETED | OUTPATIENT
Start: 2022-09-26 | End: 2022-09-26

## 2022-09-26 RX ADMIN — HEPARIN 500 UNITS: 100 SYRINGE at 02:09

## 2022-09-26 RX ADMIN — Medication 10 ML: at 02:09

## 2022-09-26 NOTE — PROGRESS NOTES
Subjective:       Patient ID: Madelyn Serna is a 71 y.o. female.    Chief Complaint: review labs. Consideration of delayed cycle 7 Day 8 chemo in am    Primary Oncologist: Dr. Marcos    HPI: 71 y.o female with stage IV gallbladder adenocarcinoma currently being treated with GEMZAR/CISPLATIN presenting today for lab review in consideration of delayed cycle 7 day 8 chemo. Chemo held last week due to BRYON. Today she reports feeling well. Denies N/V/D. Reports maintaining good appetite. Reports tolerating chemotherapy well overall. She denies noting abnormal bleeding or anemia symptoms.       Social History     Socioeconomic History    Marital status:    Tobacco Use    Smoking status: Every Day    Smokeless tobacco: Former       Past Medical History:   Diagnosis Date    Cancer of gallbladder     Essential (primary) hypertension     Infection following a procedure, deep incisional surgical site, initial encounter 3/3/2022    Romaine pus on bandage with induration to RUQ. She has already completed a 5 day course of Cipro which completed on 2/27. Recommend prompt evaluation with ED and whether or not imaging is warranted.       No family history on file.    No past surgical history on file.    Review of Systems   Constitutional:  Negative for appetite change, chills, fatigue, fever and unexpected weight change.   HENT:  Negative for congestion, mouth sores, nosebleeds, sore throat, trouble swallowing and voice change.    Eyes:  Negative for visual disturbance.   Respiratory:  Negative for cough, chest tightness, shortness of breath and wheezing.    Cardiovascular:  Negative for chest pain and leg swelling.   Gastrointestinal:  Negative for abdominal distention, abdominal pain, blood in stool, constipation, diarrhea, nausea and vomiting.   Genitourinary:  Negative for difficulty urinating, dysuria and hematuria.   Musculoskeletal:  Positive for gait problem. Negative for arthralgias, back pain and myalgias.   Skin:   Negative for pallor, rash and wound.   Neurological:  Negative for dizziness, syncope, weakness and headaches.   Hematological:  Negative for adenopathy. Does not bruise/bleed easily.   Psychiatric/Behavioral:  The patient is nervous/anxious.        Medication List with Changes/Refills   Current Medications    ALBUTEROL (PROVENTIL/VENTOLIN HFA) 90 MCG/ACTUATION INHALER    INHALE TWO PUFFS FOUR TIMES DAILY AS NEEDED FOR WHEEZING    AMLODIPINE (NORVASC) 5 MG TABLET    amlodipine Take 1 time per day No date recorded tablet 1 time per day No route recorded No set duration recorded No set duration amount recorded active 5 mg    APIXABAN (ELIQUIS) 5 MG TAB    Take 1 tablet (5 mg total) by mouth 2 (two) times daily.    CLONAZEPAM (KLONOPIN) 0.5 MG TABLET    clonazepam Take 2 times per day No date recorded tablet 2 times per day No route recorded No set duration recorded No set duration amount recorded active 0.5 mg    CYPROHEPTADINE (PERIACTIN) 4 MG TABLET    Take 4 mg by mouth 2 (two) times daily.    DEXAMETHASONE (DECADRON) 4 MG TAB    Take 2 tablets (8mg total) by mouth once daily. Take as directed on days 2, 3, 4 and days 9, 10, 11 of your chemotherapy cycle.    DEXAMETHASONE (DECADRON) 4 MG TAB    Take 2 tablets (8 mg total) by mouth once daily. Take as directed on days 2, 3, 4 and days 9, 10, 11 of your chemotherapy cycle.    DICLOFENAC SODIUM (VOLTAREN) 1 % GEL    APPLY 1 GRAM TO AFFECTED AREA FOUR TIMES DAILY AS NEEDED    IPRATROPIUM (ATROVENT) 21 MCG (0.03 %) NASAL SPRAY        LABETALOL (NORMODYNE) 300 MG TABLET    labetalol Take 2 times per day No date recorded tablet 2 times per day No route recorded No set duration recorded No set duration amount recorded suspended 300 mg    LISINOPRIL 10 MG TABLET    Take 1 tablet (10 mg total) by mouth once daily.    MIRTAZAPINE (REMERON) 7.5 MG TAB    Take 1 tablet (7.5 mg total) by mouth nightly.    MONTELUKAST (SINGULAIR) 10 MG TABLET    Take 10 mg by mouth once daily.     NALOXONE (NARCAN) 4 MG/ACTUATION SPRY    USE ONE SPRAY IN ONE NOSTRIL AS DIRECTED UPON SIGNS OF OPIOID OVERDOSE CALL 911    ONDANSETRON (ZOFRAN-ODT) 8 MG TBDL    Take 1 tablet (8 mg total) by mouth every 6 (six) hours as needed.    ONDANSETRON (ZOFRAN-ODT) 8 MG TBDL    Take 1 tablet (8 mg total) by mouth every 8 (eight) hours as needed (nausea/vomiting).    OXYCODONE (ROXICODONE) 5 MG IMMEDIATE RELEASE TABLET    Take 1 tablet (5 mg total) by mouth every 12 (twelve) hours as needed for Pain. q 12h prn severe pain    POTASSIUM CHLORIDE (MICRO-K) 10 MEQ CPSR    Take 1 capsule (10 mEq total) by mouth once daily.    PROCHLORPERAZINE (COMPAZINE) 5 MG TABLET    Take 1 tablet (5 mg total) by mouth every 6 (six) hours as needed for Nausea. May take every 6 hours scheduled for prevention of nausea.    VENLAFAXINE (EFFEXOR-XR) 75 MG 24 HR CAPSULE    Take 1 capsule (75 mg total) by mouth once daily.   Changed and/or Refilled Medications    Modified Medication Previous Medication    MAGNESIUM OXIDE (MAGOX) 400 MG (241.3 MG MAGNESIUM) TABLET magnesium oxide (MAGOX) 400 mg (241.3 mg magnesium) tablet       Take 2 tablets (800 mg total) by mouth once daily.    Take 1 tablet (400 mg total) by mouth once daily.     Objective:     Vitals:    09/26/22 1440   BP: 113/72   Pulse: 95   Resp: 18   Temp: 97.5 °F (36.4 °C)     Lab Results   Component Value Date    WBC 9.04 09/26/2022    HGB 7.0 (L) 09/26/2022    HCT 23.0 (L) 09/26/2022     (H) 09/26/2022     (L) 09/26/2022          Lab Results   Component Value Date    ALT 6 (L) 09/26/2022    AST 11 09/26/2022    ALKPHOS 96 09/26/2022    BILITOT 0.2 09/26/2022     Lab Results   Component Value Date    IRON 32 06/13/2022    TRANSFERRIN 159 (L) 06/13/2022    TIBC 235 (L) 06/13/2022    FESATURATED 14 (L) 06/13/2022          Physical Exam  Vitals reviewed.   Constitutional:       Appearance: She is well-developed.   HENT:      Head: Normocephalic.      Right Ear: External ear  normal.      Left Ear: External ear normal.      Nose: Nose normal.   Eyes:      General: Lids are normal. No scleral icterus.        Right eye: No discharge.         Left eye: No discharge.      Conjunctiva/sclera: Conjunctivae normal.   Neck:      Thyroid: No thyroid mass.   Cardiovascular:      Rate and Rhythm: Normal rate and regular rhythm.      Heart sounds: Normal heart sounds. No murmur heard.  Pulmonary:      Effort: Pulmonary effort is normal. No respiratory distress.   Abdominal:      General: There is no distension.   Genitourinary:     Comments: deferred  Musculoskeletal:         General: Normal range of motion.      Cervical back: Normal range of motion.   Skin:     General: Skin is warm and dry.   Neurological:      Mental Status: She is alert and oriented to person, place, and time.   Psychiatric:         Speech: Speech normal.         Behavior: Behavior normal. Behavior is cooperative.         Thought Content: Thought content normal.        Assessment:     Problem List Items Addressed This Visit          Renal/    Hypomagnesemia    Relevant Medications    magnesium oxide (MAGOX) 400 mg (241.3 mg magnesium) tablet       Hematology    Pulmonary embolism without acute cor pulmonale     Continue Eliquis             Oncology    Adenocarcinoma of gallbladder - Primary     Labs reviewed and discussed with Dr. Davies. Okay to proceed with chemotherapy tomorrow    -Hg 7.0. plan to transfuse 1 unit PRBCs this Friday per patient/family request. Possible Retacrit per Dr. Dvaies but will defer to Primary Oncologist   -Mag 1.1. Taking mag oxide 400 mg PO daily. Increase Mag oxide 800 mg PO daily  -Kidney function improved. Advised daily PO water intake 2-2.5 L  -pre/post hydration as ordered  -will do cbc, cmp, mag check on off week next week  -plan to arrange 2 week f/u with Primary Oncologist for lab review and chemotherapy   -discussed S&S to report         Relevant Orders    Iron and TIBC    Ferritin     Ambulatory referral/consult to Chemotherapy Infusion    Type & Screen - Lab Collect    Prepare RBC 1 Unit    CBC Auto Differential    Comprehensive Metabolic Panel     Other Visit Diagnoses       Anemia associated with chemotherapy        Relevant Orders    Iron and TIBC    Ferritin    Ambulatory referral/consult to Chemotherapy Infusion    Type & Screen - Lab Collect    Prepare RBC 1 Unit    CBC Auto Differential    Comprehensive Metabolic Panel              Plan:     Adenocarcinoma of gallbladder  -     Iron and TIBC; Future; Expected date: 09/26/2022  -     Ferritin; Future; Expected date: 09/26/2022  -     Verify informed consent for blood administration; Standing  -     Vital signs Document Pre-transfusion, 15 minutes after transfusion begins and immediately Post-transfusion for each unit transfused; Standing  -     Discharge instructions; Standing  -     Hold Transfusion and Notify Physician if:; Standing  -     If transfusion reaction confirmed by physician/mid-level:; Standing  -     0.9%  NaCl infusion (for blood administration)  -     Ambulatory referral/consult to Chemotherapy Infusion; Future; Expected date: 10/03/2022  -     Type & Screen - Lab Collect; Future; Expected date: 09/26/2022  -     Prepare RBC 1 Unit; Future  -     Transfuse RBC 1 Unit; Standing  -     diphenhydrAMINE capsule 25 mg  -     acetaminophen tablet 650 mg  -     CBC Auto Differential; Future; Expected date: 09/26/2022  -     Comprehensive Metabolic Panel; Future; Expected date: 09/26/2022    Anemia associated with chemotherapy  -     Iron and TIBC; Future; Expected date: 09/26/2022  -     Ferritin; Future; Expected date: 09/26/2022  -     Verify informed consent for blood administration; Standing  -     Vital signs Document Pre-transfusion, 15 minutes after transfusion begins and immediately Post-transfusion for each unit transfused; Standing  -     Discharge instructions; Standing  -     Hold Transfusion and Notify Physician if:;  Standing  -     If transfusion reaction confirmed by physician/mid-level:; Standing  -     0.9%  NaCl infusion (for blood administration)  -     Ambulatory referral/consult to Chemotherapy Infusion; Future; Expected date: 10/03/2022  -     Type & Screen - Lab Collect; Future; Expected date: 09/26/2022  -     Prepare RBC 1 Unit; Future  -     Transfuse RBC 1 Unit; Standing  -     diphenhydrAMINE capsule 25 mg  -     acetaminophen tablet 650 mg  -     CBC Auto Differential; Future; Expected date: 09/26/2022  -     Comprehensive Metabolic Panel; Future; Expected date: 09/26/2022    Hypomagnesemia  -     magnesium oxide (MAGOX) 400 mg (241.3 mg magnesium) tablet; Take 2 tablets (800 mg total) by mouth once daily.  Dispense: 60 tablet; Refill: 2    Pulmonary embolism without acute cor pulmonale, unspecified chronicity, unspecified pulmonary embolism type              MARIA LUISA Mejia-C

## 2022-09-26 NOTE — ASSESSMENT & PLAN NOTE
Labs reviewed and discussed with Dr. Davies. Okay to proceed with chemotherapy tomorrow    -Hg 7.0. plan to transfuse 1 unit PRBCs this Friday per patient/family request. Possible Retacrit per Dr. Davies but will defer to Primary Oncologist   -Mag 1.1. Taking mag oxide 400 mg PO daily. Increase Mag oxide 800 mg PO daily  -Kidney function improved. Advised daily PO water intake 2-2.5 L  -pre/post hydration as ordered  -will do cbc, cmp, mag check on off week next week  -plan to arrange 2 week f/u with Primary Oncologist for lab review and chemotherapy   -discussed S&S to report

## 2022-09-26 NOTE — NURSING
Patient to unit today for lab collect form right upper arm PICC line. Patient discharged without distress or complaints. Accompanied by daughter.

## 2022-09-27 ENCOUNTER — LAB VISIT (OUTPATIENT)
Dept: LAB | Facility: HOSPITAL | Age: 72
End: 2022-09-27
Attending: INTERNAL MEDICINE
Payer: MEDICARE

## 2022-09-27 ENCOUNTER — INFUSION (OUTPATIENT)
Dept: INFUSION THERAPY | Facility: HOSPITAL | Age: 72
End: 2022-09-27
Attending: INTERNAL MEDICINE
Payer: MEDICARE

## 2022-09-27 VITALS
RESPIRATION RATE: 18 BRPM | SYSTOLIC BLOOD PRESSURE: 138 MMHG | HEART RATE: 82 BPM | OXYGEN SATURATION: 100 % | TEMPERATURE: 98 F | DIASTOLIC BLOOD PRESSURE: 82 MMHG

## 2022-09-27 DIAGNOSIS — C23 ADENOCARCINOMA OF GALLBLADDER: ICD-10-CM

## 2022-09-27 DIAGNOSIS — T45.1X5A ANEMIA ASSOCIATED WITH CHEMOTHERAPY: ICD-10-CM

## 2022-09-27 DIAGNOSIS — C23 ADENOCARCINOMA OF GALLBLADDER: Primary | ICD-10-CM

## 2022-09-27 DIAGNOSIS — D64.81 ANEMIA ASSOCIATED WITH CHEMOTHERAPY: ICD-10-CM

## 2022-09-27 LAB
ABO + RH BLD: NORMAL
BLD GP AB SCN CELLS X3 SERPL QL: NORMAL
FERRITIN SERPL-MCNC: 669 NG/ML (ref 20–300)
IRON SERPL-MCNC: 45 UG/DL (ref 30–160)
SATURATED IRON: 18 % (ref 20–50)
TOTAL IRON BINDING CAPACITY: 252 UG/DL (ref 250–450)
TRANSFERRIN SERPL-MCNC: 170 MG/DL (ref 200–375)

## 2022-09-27 PROCEDURE — 96417 CHEMO IV INFUS EACH ADDL SEQ: CPT

## 2022-09-27 PROCEDURE — 96367 TX/PROPH/DG ADDL SEQ IV INF: CPT

## 2022-09-27 PROCEDURE — 84466 ASSAY OF TRANSFERRIN: CPT | Performed by: NURSE PRACTITIONER

## 2022-09-27 PROCEDURE — 86850 RBC ANTIBODY SCREEN: CPT | Performed by: NURSE PRACTITIONER

## 2022-09-27 PROCEDURE — 96413 CHEMO IV INFUSION 1 HR: CPT

## 2022-09-27 PROCEDURE — 63600175 PHARM REV CODE 636 W HCPCS: Mod: JG | Performed by: INTERNAL MEDICINE

## 2022-09-27 PROCEDURE — 36415 COLL VENOUS BLD VENIPUNCTURE: CPT | Performed by: NURSE PRACTITIONER

## 2022-09-27 PROCEDURE — 82728 ASSAY OF FERRITIN: CPT | Performed by: NURSE PRACTITIONER

## 2022-09-27 PROCEDURE — 25000003 PHARM REV CODE 250: Performed by: INTERNAL MEDICINE

## 2022-09-27 PROCEDURE — 96366 THER/PROPH/DIAG IV INF ADDON: CPT

## 2022-09-27 PROCEDURE — 96375 TX/PRO/DX INJ NEW DRUG ADDON: CPT

## 2022-09-27 PROCEDURE — 86920 COMPATIBILITY TEST SPIN: CPT | Performed by: NURSE PRACTITIONER

## 2022-09-27 RX ORDER — SODIUM CHLORIDE 9 MG/ML
500 INJECTION, SOLUTION INTRAVENOUS
Status: COMPLETED | OUTPATIENT
Start: 2022-09-27 | End: 2022-09-27

## 2022-09-27 RX ORDER — HEPARIN 100 UNIT/ML
500 SYRINGE INTRAVENOUS
Status: DISCONTINUED | OUTPATIENT
Start: 2022-09-27 | End: 2022-09-27 | Stop reason: HOSPADM

## 2022-09-27 RX ADMIN — SODIUM CHLORIDE 500 ML: 0.9 INJECTION, SOLUTION INTRAVENOUS at 02:09

## 2022-09-27 RX ADMIN — SODIUM CHLORIDE: 0.9 INJECTION, SOLUTION INTRAVENOUS at 11:09

## 2022-09-27 RX ADMIN — GEMCITABINE 1800 MG: 38 INJECTION, SOLUTION INTRAVENOUS at 12:09

## 2022-09-27 RX ADMIN — DEXAMETHASONE SODIUM PHOSPHATE: 4 INJECTION, SOLUTION INTRA-ARTICULAR; INTRALESIONAL; INTRAMUSCULAR; INTRAVENOUS; SOFT TISSUE at 11:09

## 2022-09-27 RX ADMIN — MAGNESIUM SULFATE HEPTAHYDRATE 500 ML/HR: 500 INJECTION, SOLUTION INTRAMUSCULAR; INTRAVENOUS at 09:09

## 2022-09-27 RX ADMIN — SODIUM CHLORIDE 47 MG: 9 INJECTION, SOLUTION INTRAVENOUS at 01:09

## 2022-09-27 RX ADMIN — APREPITANT 130 MG: 130 INJECTION, EMULSION INTRAVENOUS at 12:09

## 2022-09-27 NOTE — PLAN OF CARE
Pt states she feels ok today but tired. Asked a few questions concerning plan of care today, answered and explained the treatment plan. Daughter at the chairside.     Problem: Adult Inpatient Plan of Care  Goal: Plan of Care Review  Outcome: Ongoing, Progressing  Flowsheets (Taken 9/27/2022 1112)  Plan of Care Reviewed With:   patient   daughter  Goal: Patient-Specific Goal (Individualized)  Outcome: Ongoing, Progressing  Flowsheets (Taken 9/27/2022 1112)  Anxieties, Fears or Concerns: no concersn expressed today.  Individualized Care Needs: Warm blankets, pillow, feet elevated, and drink provided. Daughter brought in breakfast for patient.  Patient-Specific Goals (Include Timeframe): Pt to tolerate treatment today.  Goal: Absence of Hospital-Acquired Illness or Injury  Outcome: Ongoing, Progressing  Intervention: Identify and Manage Fall Risk  Flowsheets (Taken 9/27/2022 1112)  Safety Promotion/Fall Prevention:   in recliner, wheels locked   high risk medications identified   lighting adjusted   medications reviewed  Intervention: Prevent and Manage VTE (Venous Thromboembolism) Risk  Flowsheets (Taken 9/27/2022 1112)  VTE Prevention/Management:   bleeding risk assessed   bleeding precations maintained  Intervention: Prevent Infection  Flowsheets (Taken 9/27/2022 1112)  Infection Prevention:   personal protective equipment utilized   hand hygiene promoted   equipment surfaces disinfected  Goal: Optimal Comfort and Wellbeing  Outcome: Ongoing, Progressing  Intervention: Monitor Pain and Promote Comfort  Flowsheets (Taken 9/27/2022 1112)  Pain Management Interventions:   pillow support provided   quiet environment facilitated  Intervention: Provide Person-Centered Care  Flowsheets (Taken 9/27/2022 1112)  Trust Relationship/Rapport:   care explained   emotional support provided   empathic listening provided   questions answered   questions encouraged   reassurance provided   thoughts/feelings acknowledged     Problem:  Anemia (Chemotherapy Effects)  Goal: Anemia Symptom Improvement  Outcome: Ongoing, Progressing  Intervention: Monitor and Manage Anemia  Flowsheets (Taken 9/27/2022 1112)  Oral Nutrition Promotion: rest periods promoted  Safety Promotion/Fall Prevention:   in recliner, wheels locked   high risk medications identified   lighting adjusted   medications reviewed  Fatigue Management: frequent rest breaks encouraged     Problem: Neutropenia (Chemotherapy Effects)  Goal: Absence of Infection  Outcome: Ongoing, Progressing  Intervention: Prevent Infection and Maximize Resistance  Flowsheets (Taken 9/27/2022 1112)  Infection Prevention:   personal protective equipment utilized   hand hygiene promoted   equipment surfaces disinfected     Problem: Fall Injury Risk  Goal: Absence of Fall and Fall-Related Injury  Outcome: Ongoing, Progressing  Intervention: Identify and Manage Contributors  Flowsheets (Taken 9/27/2022 1112)  Self-Care Promotion:   BADL personal routines maintained   BADL personal objects within reach  Medication Review/Management:   medications reviewed   high-risk medications identified   infusion initiated  Intervention: Promote Injury-Free Environment  Flowsheets (Taken 9/27/2022 1112)  Safety Promotion/Fall Prevention:   in recliner, wheels locked   high risk medications identified   lighting adjusted   medications reviewed

## 2022-09-27 NOTE — NURSING
Spoke with TERI Bee regarding current plan for NS 500ml/2hr, ok to change to infuse over 1 hour. Also ok for nurse to draw type and screen today vs Thursday as long as infusion is scheduled in the early am on Friday to stay within the 72hr window. Patient has an appointment for transfusion on Friday at 0800. Sent blood to the hospital lab and notified lab.

## 2022-09-27 NOTE — DISCHARGE INSTRUCTIONS
.[facility]  [address] [address]  [address]  [phone] phone     [phone] fax  Hours of Operation: Monday- Friday 8:00am- 5:00pm  After hours phone  [phone]  Hematology / Oncology Physicians on call    Dr. Samson Villalobos      Nurse Practitioners:    Gracie Spangler, TERI Song, TERI Solomon, TERI Arellano, TERI Srinivasan, TERI Andrea, PA      Please don't hesitate to call if you have any concerns.    .FALL PREVENTION   Falls often occur due to slipping, tripping or losing your balance. Here are ways to reduce your risk of falling again.   Was there anything that caused your fall that can be fixed, removed or replaced?   Make your home safe by keeping walkways clear of objects you may trip over.   Use non-slip pads under rugs.   Do not walk in poorly lit areas.   Do not stand on chairs or wobbly ladders.   Use caution when reaching overhead or looking upward. This position can cause a loss of balance.   Be sure your shoes fit properly, have non-slip bottoms and are in good condition.   Be cautious when going up and down stairs, curbs, and when walking on uneven sidewalks.   If your balance is poor, consider using a cane or walker.   If your fall was related to alcohol use, stop or limit alcohol intake.   If your fall was related to use of sleeping medicines, talk to your doctor about this. You may need to reduce your dosage at bedtime if you awaken during the night to go to the bathroom.   To reduce the need for nighttime bathroom trips:   Avoid drinking fluids for several hours before going to bed   Empty your bladder before going to bed   Men can keep a urinal at the bedside   © 7147-3522 [employer], [address] [place] [address], [place] [address]. All rights reserved. This information is not intended as a substitute for professional medical care. Always follow your healthcare  professional's instructions.  .WAYS TO HELP PREVENT INFECTION        WASH YOUR HANDS OFTEN DURING THE DAY, ESPECIALLY BEFORE YOU EAT, AFTER USING THE BATHROOM, AND AFTER TOUCHING ANIMALS    STAY AWAY FROM PEOPLE WHO HAVE ILLNESSES YOU CAN CATCH; SUCH AS COLDS, FLU, CHICKEN POX    TRY TO AVOID CROWDS    STAY AWAY FROM CHILDREN WHO RECENTLY HAVE RECEIVED LIVE VIRUS VACCINES    MAINTAIN GOOD MOUTH CARE    DO NOT SQUEEZE OR SCRATCH PIMPLES    CLEAN CUTS & SCRAPES RIGHT AWAY AND DAILY UNTIL HEALED WITH WARM WATER, SOAP & AN ANTISEPTIC    AVOID CONTACT WITH LITTER BOXES, BIRD CAGES, & FISH TANKS    AVOID STANDING WATER, IE., BIRD BATHS, FLOWER POTS/VASES, OR HUMIDIFIERS    WEAR GLOVES WHEN GARDENING OR CLEANING UP AFTER OTHERS, ESPECIALLY BABIES & SMALL CHILDREN    DO NOT EAT RAW FISH, SEAFOOD, MEAT, OR EGGS

## 2022-09-30 ENCOUNTER — INFUSION (OUTPATIENT)
Dept: INFUSION THERAPY | Facility: HOSPITAL | Age: 72
End: 2022-09-30
Attending: INTERNAL MEDICINE
Payer: MEDICARE

## 2022-09-30 VITALS
RESPIRATION RATE: 18 BRPM | OXYGEN SATURATION: 98 % | TEMPERATURE: 98 F | HEART RATE: 82 BPM | SYSTOLIC BLOOD PRESSURE: 144 MMHG | DIASTOLIC BLOOD PRESSURE: 71 MMHG

## 2022-09-30 DIAGNOSIS — T45.1X5A ANEMIA ASSOCIATED WITH CHEMOTHERAPY: ICD-10-CM

## 2022-09-30 DIAGNOSIS — C23 ADENOCARCINOMA OF GALLBLADDER: Primary | ICD-10-CM

## 2022-09-30 DIAGNOSIS — D64.81 ANEMIA ASSOCIATED WITH CHEMOTHERAPY: ICD-10-CM

## 2022-09-30 LAB
BLD PROD TYP BPU: NORMAL
BLOOD UNIT EXPIRATION DATE: NORMAL
BLOOD UNIT TYPE CODE: 7300
BLOOD UNIT TYPE: NORMAL
CODING SYSTEM: NORMAL
DISPENSE STATUS: NORMAL
NUM UNITS TRANS PACKED RBC: NORMAL

## 2022-09-30 PROCEDURE — 63600175 PHARM REV CODE 636 W HCPCS: Performed by: NURSE PRACTITIONER

## 2022-09-30 PROCEDURE — 36430 TRANSFUSION BLD/BLD COMPNT: CPT

## 2022-09-30 PROCEDURE — A4216 STERILE WATER/SALINE, 10 ML: HCPCS | Performed by: NURSE PRACTITIONER

## 2022-09-30 PROCEDURE — P9016 RBC LEUKOCYTES REDUCED: HCPCS | Performed by: NURSE PRACTITIONER

## 2022-09-30 PROCEDURE — 25000003 PHARM REV CODE 250: Performed by: NURSE PRACTITIONER

## 2022-09-30 RX ORDER — HYDROCODONE BITARTRATE AND ACETAMINOPHEN 500; 5 MG/1; MG/1
TABLET ORAL ONCE
Status: COMPLETED | OUTPATIENT
Start: 2022-09-30 | End: 2022-09-30

## 2022-09-30 RX ORDER — DIPHENHYDRAMINE HCL 25 MG
25 CAPSULE ORAL
Status: COMPLETED | OUTPATIENT
Start: 2022-09-30 | End: 2022-09-30

## 2022-09-30 RX ORDER — SODIUM CHLORIDE 0.9 % (FLUSH) 0.9 %
10 SYRINGE (ML) INJECTION
Status: CANCELLED | OUTPATIENT
Start: 2022-09-30

## 2022-09-30 RX ORDER — ACETAMINOPHEN 325 MG/1
650 TABLET ORAL
Status: COMPLETED | OUTPATIENT
Start: 2022-09-30 | End: 2022-09-30

## 2022-09-30 RX ORDER — SODIUM CHLORIDE 0.9 % (FLUSH) 0.9 %
10 SYRINGE (ML) INJECTION
Status: COMPLETED | OUTPATIENT
Start: 2022-09-30 | End: 2022-09-30

## 2022-09-30 RX ORDER — HEPARIN 100 UNIT/ML
500 SYRINGE INTRAVENOUS
Status: COMPLETED | OUTPATIENT
Start: 2022-09-30 | End: 2022-09-30

## 2022-09-30 RX ORDER — HEPARIN 100 UNIT/ML
500 SYRINGE INTRAVENOUS
Status: CANCELLED | OUTPATIENT
Start: 2022-09-30

## 2022-09-30 RX ADMIN — Medication 10 ML: at 11:09

## 2022-09-30 RX ADMIN — SODIUM CHLORIDE: 0.9 INJECTION, SOLUTION INTRAVENOUS at 11:09

## 2022-09-30 RX ADMIN — DIPHENHYDRAMINE HYDROCHLORIDE 25 MG: 25 CAPSULE ORAL at 08:09

## 2022-09-30 RX ADMIN — ACETAMINOPHEN 650 MG: 325 TABLET ORAL at 08:09

## 2022-09-30 RX ADMIN — HEPARIN 500 UNITS: 100 SYRINGE at 11:09

## 2022-09-30 NOTE — PLAN OF CARE
Plan of care reviewed with patient; questions answered; verbalizes understanding  Problem: Adult Inpatient Plan of Care  Goal: Plan of Care Review  Outcome: Ongoing, Progressing  Flowsheets (Taken 9/30/2022 0830)  Plan of Care Reviewed With:   patient   daughter  Goal: Patient-Specific Goal (Individualized)  Outcome: Ongoing, Progressing  Flowsheets (Taken 9/30/2022 0830)  Anxieties, Fears or Concerns: patient questions reason for blood transfusion  Individualized Care Needs: pillow, legs elevated, warm blankets, orange juice/breakfast per daughter from [facility]  Patient-Specific Goals (Include Timeframe): pt will tolerate blood transfusion and have increased/improved activity tolerance     Problem: Anemia  Goal: Anemia Symptom Improvement  Outcome: Ongoing, Progressing  Intervention: Monitor and Manage Anemia  Flowsheets (Taken 9/30/2022 0830)  Oral Nutrition Promotion: rest periods promoted  Safety Promotion/Fall Prevention:   room near unit station   instructed to call staff for mobility

## 2022-10-05 ENCOUNTER — LAB VISIT (OUTPATIENT)
Dept: LAB | Facility: HOSPITAL | Age: 72
End: 2022-10-05
Attending: INTERNAL MEDICINE
Payer: MEDICARE

## 2022-10-05 DIAGNOSIS — D64.81 ANEMIA ASSOCIATED WITH CHEMOTHERAPY: ICD-10-CM

## 2022-10-05 DIAGNOSIS — T45.1X5A ANEMIA ASSOCIATED WITH CHEMOTHERAPY: ICD-10-CM

## 2022-10-05 DIAGNOSIS — C23 ADENOCARCINOMA OF GALLBLADDER: ICD-10-CM

## 2022-10-05 LAB
ALBUMIN SERPL BCP-MCNC: 3.1 G/DL (ref 3.5–5.2)
ALP SERPL-CCNC: 92 U/L (ref 55–135)
ALT SERPL W/O P-5'-P-CCNC: 8 U/L (ref 10–44)
ANION GAP SERPL CALC-SCNC: 11 MMOL/L (ref 8–16)
ANISOCYTOSIS BLD QL SMEAR: SLIGHT
AST SERPL-CCNC: 12 U/L (ref 10–40)
BASOPHILS # BLD AUTO: 0.03 K/UL (ref 0–0.2)
BASOPHILS NFR BLD: 0.6 % (ref 0–1.9)
BILIRUB SERPL-MCNC: 0.2 MG/DL (ref 0.1–1)
BUN SERPL-MCNC: 16 MG/DL (ref 8–23)
CALCIUM SERPL-MCNC: 9.1 MG/DL (ref 8.7–10.5)
CHLORIDE SERPL-SCNC: 99 MMOL/L (ref 95–110)
CO2 SERPL-SCNC: 31 MMOL/L (ref 23–29)
CREAT SERPL-MCNC: 1.2 MG/DL (ref 0.5–1.4)
DIFFERENTIAL METHOD: ABNORMAL
EOSINOPHIL # BLD AUTO: 0.1 K/UL (ref 0–0.5)
EOSINOPHIL NFR BLD: 1.6 % (ref 0–8)
ERYTHROCYTE [DISTWIDTH] IN BLOOD BY AUTOMATED COUNT: 14.9 % (ref 11.5–14.5)
EST. GFR  (NO RACE VARIABLE): 48 ML/MIN/1.73 M^2
GLUCOSE SERPL-MCNC: 112 MG/DL (ref 70–110)
HCT VFR BLD AUTO: 25.6 % (ref 37–48.5)
HGB BLD-MCNC: 7.8 G/DL (ref 12–16)
IMM GRANULOCYTES # BLD AUTO: 0.03 K/UL (ref 0–0.04)
IMM GRANULOCYTES NFR BLD AUTO: 0.6 % (ref 0–0.5)
LYMPHOCYTES # BLD AUTO: 1.5 K/UL (ref 1–4.8)
LYMPHOCYTES NFR BLD: 31.2 % (ref 18–48)
MAGNESIUM SERPL-MCNC: 1.1 MG/DL (ref 1.6–2.6)
MCH RBC QN AUTO: 31.7 PG (ref 27–31)
MCHC RBC AUTO-ENTMCNC: 30.5 G/DL (ref 32–36)
MCV RBC AUTO: 104 FL (ref 82–98)
MONOCYTES # BLD AUTO: 0.4 K/UL (ref 0.3–1)
MONOCYTES NFR BLD: 7.1 % (ref 4–15)
NEUTROPHILS # BLD AUTO: 2.9 K/UL (ref 1.8–7.7)
NEUTROPHILS NFR BLD: 58.9 % (ref 38–73)
NRBC BLD-RTO: 0 /100 WBC
PLATELET # BLD AUTO: 95 K/UL (ref 150–450)
PMV BLD AUTO: 9.3 FL (ref 9.2–12.9)
POTASSIUM SERPL-SCNC: 4.4 MMOL/L (ref 3.5–5.1)
PROT SERPL-MCNC: 6.5 G/DL (ref 6–8.4)
RBC # BLD AUTO: 2.46 M/UL (ref 4–5.4)
SODIUM SERPL-SCNC: 141 MMOL/L (ref 136–145)
WBC # BLD AUTO: 4.94 K/UL (ref 3.9–12.7)

## 2022-10-05 PROCEDURE — 36415 COLL VENOUS BLD VENIPUNCTURE: CPT | Mod: PO | Performed by: INTERNAL MEDICINE

## 2022-10-05 PROCEDURE — 80053 COMPREHEN METABOLIC PANEL: CPT | Performed by: NURSE PRACTITIONER

## 2022-10-05 PROCEDURE — 85025 COMPLETE CBC W/AUTO DIFF WBC: CPT | Performed by: NURSE PRACTITIONER

## 2022-10-05 PROCEDURE — 83735 ASSAY OF MAGNESIUM: CPT | Performed by: INTERNAL MEDICINE

## 2022-10-06 ENCOUNTER — TELEPHONE (OUTPATIENT)
Dept: HEMATOLOGY/ONCOLOGY | Facility: CLINIC | Age: 72
End: 2022-10-06
Payer: MEDICARE

## 2022-10-06 ENCOUNTER — PATIENT MESSAGE (OUTPATIENT)
Dept: HEMATOLOGY/ONCOLOGY | Facility: CLINIC | Age: 72
End: 2022-10-06
Payer: MEDICARE

## 2022-10-06 DIAGNOSIS — F32.9 REACTIVE DEPRESSION: ICD-10-CM

## 2022-10-06 RX ORDER — MIRTAZAPINE 7.5 MG/1
7.5 TABLET, FILM COATED ORAL NIGHTLY
Qty: 90 TABLET | Refills: 0 | Status: SHIPPED | OUTPATIENT
Start: 2022-10-06 | End: 2022-10-10

## 2022-10-06 NOTE — TELEPHONE ENCOUNTER
[facility] intern called PT regarding distress score of 7. PT stated main concern is grief and loss of her brother. PT stated is interested in counseling services, but not at the present time. [facility] intern provided PT w/ [facility] dept. #[phone]. PT stated will call when she is ready for behavioral health referral. [facility] intern will remain available.

## 2022-10-10 ENCOUNTER — OFFICE VISIT (OUTPATIENT)
Dept: HEMATOLOGY/ONCOLOGY | Facility: CLINIC | Age: 72
End: 2022-10-10
Payer: MEDICARE

## 2022-10-10 ENCOUNTER — LAB VISIT (OUTPATIENT)
Dept: LAB | Facility: HOSPITAL | Age: 72
End: 2022-10-10
Attending: INTERNAL MEDICINE
Payer: MEDICARE

## 2022-10-10 ENCOUNTER — INFUSION (OUTPATIENT)
Dept: INFUSION THERAPY | Facility: HOSPITAL | Age: 72
End: 2022-10-10
Attending: INTERNAL MEDICINE
Payer: MEDICARE

## 2022-10-10 VITALS
WEIGHT: 157 LBS | HEART RATE: 93 BPM | BODY MASS INDEX: 23.25 KG/M2 | HEIGHT: 69 IN | SYSTOLIC BLOOD PRESSURE: 122 MMHG | DIASTOLIC BLOOD PRESSURE: 72 MMHG | TEMPERATURE: 98 F | OXYGEN SATURATION: 97 %

## 2022-10-10 VITALS
RESPIRATION RATE: 18 BRPM | SYSTOLIC BLOOD PRESSURE: 117 MMHG | HEART RATE: 94 BPM | TEMPERATURE: 98 F | DIASTOLIC BLOOD PRESSURE: 66 MMHG | OXYGEN SATURATION: 99 %

## 2022-10-10 DIAGNOSIS — E83.42 HYPOMAGNESEMIA: ICD-10-CM

## 2022-10-10 DIAGNOSIS — D64.81 ANEMIA ASSOCIATED WITH CHEMOTHERAPY: ICD-10-CM

## 2022-10-10 DIAGNOSIS — C23 ADENOCARCINOMA OF GALLBLADDER: Primary | ICD-10-CM

## 2022-10-10 DIAGNOSIS — C23 ADENOCARCINOMA OF GALLBLADDER: ICD-10-CM

## 2022-10-10 DIAGNOSIS — I26.99 PULMONARY EMBOLISM WITHOUT ACUTE COR PULMONALE, UNSPECIFIED CHRONICITY, UNSPECIFIED PULMONARY EMBOLISM TYPE: ICD-10-CM

## 2022-10-10 DIAGNOSIS — T45.1X5A ANEMIA ASSOCIATED WITH CHEMOTHERAPY: ICD-10-CM

## 2022-10-10 LAB
ALBUMIN SERPL BCP-MCNC: 2.9 G/DL (ref 3.5–5.2)
ALP SERPL-CCNC: 85 U/L (ref 55–135)
ALT SERPL W/O P-5'-P-CCNC: 6 U/L (ref 10–44)
ANION GAP SERPL CALC-SCNC: 7 MMOL/L (ref 8–16)
AST SERPL-CCNC: 9 U/L (ref 10–40)
BASOPHILS # BLD AUTO: 0.02 K/UL (ref 0–0.2)
BASOPHILS NFR BLD: 0.2 % (ref 0–1.9)
BILIRUB SERPL-MCNC: 0.2 MG/DL (ref 0.1–1)
BUN SERPL-MCNC: 19 MG/DL (ref 8–23)
CALCIUM SERPL-MCNC: 9.2 MG/DL (ref 8.7–10.5)
CHLORIDE SERPL-SCNC: 99 MMOL/L (ref 95–110)
CO2 SERPL-SCNC: 35 MMOL/L (ref 23–29)
CREAT SERPL-MCNC: 1 MG/DL (ref 0.5–1.4)
DIFFERENTIAL METHOD: ABNORMAL
EOSINOPHIL # BLD AUTO: 0.1 K/UL (ref 0–0.5)
EOSINOPHIL NFR BLD: 0.4 % (ref 0–8)
ERYTHROCYTE [DISTWIDTH] IN BLOOD BY AUTOMATED COUNT: 15.3 % (ref 11.5–14.5)
EST. GFR  (NO RACE VARIABLE): 59 ML/MIN/1.73 M^2
GLUCOSE SERPL-MCNC: 119 MG/DL (ref 70–110)
HCT VFR BLD AUTO: 22.5 % (ref 37–48.5)
HGB BLD-MCNC: 6.8 G/DL (ref 12–16)
IMM GRANULOCYTES # BLD AUTO: 0.05 K/UL (ref 0–0.04)
IMM GRANULOCYTES NFR BLD AUTO: 0.4 % (ref 0–0.5)
LYMPHOCYTES # BLD AUTO: 1.7 K/UL (ref 1–4.8)
LYMPHOCYTES NFR BLD: 14.4 % (ref 18–48)
MAGNESIUM SERPL-MCNC: 1.3 MG/DL (ref 1.6–2.6)
MCH RBC QN AUTO: 30.8 PG (ref 27–31)
MCHC RBC AUTO-ENTMCNC: 30.2 G/DL (ref 32–36)
MCV RBC AUTO: 102 FL (ref 82–98)
MONOCYTES # BLD AUTO: 0.8 K/UL (ref 0.3–1)
MONOCYTES NFR BLD: 7.1 % (ref 4–15)
NEUTROPHILS # BLD AUTO: 9 K/UL (ref 1.8–7.7)
NEUTROPHILS NFR BLD: 77.5 % (ref 38–73)
NRBC BLD-RTO: 0 /100 WBC
PLATELET # BLD AUTO: 103 K/UL (ref 150–450)
PMV BLD AUTO: 10.1 FL (ref 9.2–12.9)
POTASSIUM SERPL-SCNC: 4.7 MMOL/L (ref 3.5–5.1)
PROT SERPL-MCNC: 6.4 G/DL (ref 6–8.4)
RBC # BLD AUTO: 2.21 M/UL (ref 4–5.4)
SODIUM SERPL-SCNC: 141 MMOL/L (ref 136–145)
WBC # BLD AUTO: 11.65 K/UL (ref 3.9–12.7)

## 2022-10-10 PROCEDURE — 99999 PR PBB SHADOW E&M-EST. PATIENT-LVL V: CPT | Mod: PBBFAC,,, | Performed by: INTERNAL MEDICINE

## 2022-10-10 PROCEDURE — 1157F ADVNC CARE PLAN IN RCRD: CPT | Mod: CPTII,S$GLB,, | Performed by: INTERNAL MEDICINE

## 2022-10-10 PROCEDURE — 3074F SYST BP LT 130 MM HG: CPT | Mod: CPTII,S$GLB,, | Performed by: INTERNAL MEDICINE

## 2022-10-10 PROCEDURE — 3288F PR FALLS RISK ASSESSMENT DOCUMENTED: ICD-10-PCS | Mod: CPTII,S$GLB,, | Performed by: INTERNAL MEDICINE

## 2022-10-10 PROCEDURE — 99215 PR OFFICE/OUTPT VISIT, EST, LEVL V, 40-54 MIN: ICD-10-PCS | Mod: S$GLB,,, | Performed by: INTERNAL MEDICINE

## 2022-10-10 PROCEDURE — A4216 STERILE WATER/SALINE, 10 ML: HCPCS | Performed by: NURSE PRACTITIONER

## 2022-10-10 PROCEDURE — 83735 ASSAY OF MAGNESIUM: CPT | Performed by: INTERNAL MEDICINE

## 2022-10-10 PROCEDURE — 1126F AMNT PAIN NOTED NONE PRSNT: CPT | Mod: CPTII,S$GLB,, | Performed by: INTERNAL MEDICINE

## 2022-10-10 PROCEDURE — 3078F PR MOST RECENT DIASTOLIC BLOOD PRESSURE < 80 MM HG: ICD-10-PCS | Mod: CPTII,S$GLB,, | Performed by: INTERNAL MEDICINE

## 2022-10-10 PROCEDURE — 3008F BODY MASS INDEX DOCD: CPT | Mod: CPTII,S$GLB,, | Performed by: INTERNAL MEDICINE

## 2022-10-10 PROCEDURE — 36592 COLLECT BLOOD FROM PICC: CPT

## 2022-10-10 PROCEDURE — 3008F PR BODY MASS INDEX (BMI) DOCUMENTED: ICD-10-PCS | Mod: CPTII,S$GLB,, | Performed by: INTERNAL MEDICINE

## 2022-10-10 PROCEDURE — 1159F PR MEDICATION LIST DOCUMENTED IN MEDICAL RECORD: ICD-10-PCS | Mod: CPTII,S$GLB,, | Performed by: INTERNAL MEDICINE

## 2022-10-10 PROCEDURE — 85025 COMPLETE CBC W/AUTO DIFF WBC: CPT | Performed by: INTERNAL MEDICINE

## 2022-10-10 PROCEDURE — 36415 COLL VENOUS BLD VENIPUNCTURE: CPT | Performed by: INTERNAL MEDICINE

## 2022-10-10 PROCEDURE — 1126F PR PAIN SEVERITY QUANTIFIED, NO PAIN PRESENT: ICD-10-PCS | Mod: CPTII,S$GLB,, | Performed by: INTERNAL MEDICINE

## 2022-10-10 PROCEDURE — 99999 PR PBB SHADOW E&M-EST. PATIENT-LVL V: ICD-10-PCS | Mod: PBBFAC,,, | Performed by: INTERNAL MEDICINE

## 2022-10-10 PROCEDURE — 1101F PR PT FALLS ASSESS DOC 0-1 FALLS W/OUT INJ PAST YR: ICD-10-PCS | Mod: CPTII,S$GLB,, | Performed by: INTERNAL MEDICINE

## 2022-10-10 PROCEDURE — 4010F ACE/ARB THERAPY RXD/TAKEN: CPT | Mod: CPTII,S$GLB,, | Performed by: INTERNAL MEDICINE

## 2022-10-10 PROCEDURE — 1157F PR ADVANCE CARE PLAN OR EQUIV PRESENT IN MEDICAL RECORD: ICD-10-PCS | Mod: CPTII,S$GLB,, | Performed by: INTERNAL MEDICINE

## 2022-10-10 PROCEDURE — 80053 COMPREHEN METABOLIC PANEL: CPT | Performed by: INTERNAL MEDICINE

## 2022-10-10 PROCEDURE — 99215 OFFICE O/P EST HI 40 MIN: CPT | Mod: S$GLB,,, | Performed by: INTERNAL MEDICINE

## 2022-10-10 PROCEDURE — 1159F MED LIST DOCD IN RCRD: CPT | Mod: CPTII,S$GLB,, | Performed by: INTERNAL MEDICINE

## 2022-10-10 PROCEDURE — 1101F PT FALLS ASSESS-DOCD LE1/YR: CPT | Mod: CPTII,S$GLB,, | Performed by: INTERNAL MEDICINE

## 2022-10-10 PROCEDURE — 3288F FALL RISK ASSESSMENT DOCD: CPT | Mod: CPTII,S$GLB,, | Performed by: INTERNAL MEDICINE

## 2022-10-10 PROCEDURE — 4010F PR ACE/ARB THEARPY RXD/TAKEN: ICD-10-PCS | Mod: CPTII,S$GLB,, | Performed by: INTERNAL MEDICINE

## 2022-10-10 PROCEDURE — 3078F DIAST BP <80 MM HG: CPT | Mod: CPTII,S$GLB,, | Performed by: INTERNAL MEDICINE

## 2022-10-10 PROCEDURE — 63600175 PHARM REV CODE 636 W HCPCS: Performed by: NURSE PRACTITIONER

## 2022-10-10 PROCEDURE — 25000003 PHARM REV CODE 250: Performed by: NURSE PRACTITIONER

## 2022-10-10 PROCEDURE — 3074F PR MOST RECENT SYSTOLIC BLOOD PRESSURE < 130 MM HG: ICD-10-PCS | Mod: CPTII,S$GLB,, | Performed by: INTERNAL MEDICINE

## 2022-10-10 RX ORDER — HEPARIN 100 UNIT/ML
500 SYRINGE INTRAVENOUS
Status: CANCELLED | OUTPATIENT
Start: 2022-10-12

## 2022-10-10 RX ORDER — MIRTAZAPINE 30 MG/1
TABLET, FILM COATED ORAL
COMMUNITY
Start: 2022-10-07

## 2022-10-10 RX ORDER — LANOLIN ALCOHOL/MO/W.PET/CERES
400 CREAM (GRAM) TOPICAL 3 TIMES DAILY
Qty: 60 TABLET | Refills: 2 | Status: SHIPPED | OUTPATIENT
Start: 2022-10-10 | End: 2023-10-10

## 2022-10-10 RX ORDER — SODIUM CHLORIDE 0.9 % (FLUSH) 0.9 %
10 SYRINGE (ML) INJECTION
Status: CANCELLED | OUTPATIENT
Start: 2022-10-12

## 2022-10-10 RX ORDER — SODIUM CHLORIDE 0.9 % (FLUSH) 0.9 %
10 SYRINGE (ML) INJECTION
Status: CANCELLED | OUTPATIENT
Start: 2022-10-10

## 2022-10-10 RX ORDER — SODIUM CHLORIDE 0.9 % (FLUSH) 0.9 %
10 SYRINGE (ML) INJECTION
Status: COMPLETED | OUTPATIENT
Start: 2022-10-10 | End: 2022-10-10

## 2022-10-10 RX ORDER — HEPARIN 100 UNIT/ML
500 SYRINGE INTRAVENOUS
Status: CANCELLED | OUTPATIENT
Start: 2022-10-10

## 2022-10-10 RX ORDER — HEPARIN 100 UNIT/ML
500 SYRINGE INTRAVENOUS
Status: COMPLETED | OUTPATIENT
Start: 2022-10-10 | End: 2022-10-10

## 2022-10-10 RX ORDER — HYDROCODONE BITARTRATE AND ACETAMINOPHEN 500; 5 MG/1; MG/1
TABLET ORAL ONCE
Status: CANCELLED | OUTPATIENT
Start: 2022-10-10 | End: 2022-10-10

## 2022-10-10 RX ADMIN — Medication 10 ML: at 11:10

## 2022-10-10 RX ADMIN — HEPARIN 500 UNITS: 100 SYRINGE at 12:10

## 2022-10-10 NOTE — PROGRESS NOTES
Subjective:      DATE OF VISIT: 10/10/22     ?  Patient ID:?Madelyn Serna is a 71 y.o. female.?? MR#: [id number]     PRIMARY ONCOLOGIST: Dr. Marcos    ? Primary Care Providers:  Orin Henderson MD, MD (General)     CHIEF COMPLAINT: ?  Follow-up on palliative chemotherapy?   ?   ONCOLOGIC DIAGNOSIS:  Gallbladder adenocarcinoma, stage IV, pT4 NX pM1  ?   CURRENT TREATMENT:  Gemcitabine and cisplatin cycle 1 day 1, 4/25/22    PAST TREATMENT:  Laparoscopic cholecystectomy, 02/04/2022, [facility]  ?   ONCOLOGIC HISTORY    I the pleasure of meeting I had the pleasure meeting for the 1st time Ms. Serna a pleasant 71-year-old woman accompanied by her 2 daughters today for newly diagnosed gallbladder adenocarcinoma.    Medical history is notable for former tobacco use quit December 2021, COPD, anxiety, cataracts.      She notes 4 days of right lower quadrant abdominal pain acute onset for which she presented to [facility] emergency room on 02/04/2022.  One episode of vomiting per emergency room note.  Labs with leukocytosis WBC 18.7, hemoglobin 13, , renal function intact GFR over 60, mild alkaline phosphatase elevation 130, normal transaminases and bilirubin.  Albumin 4.5, calcium 10.4.  Urinalysis positive for E coli pansensitive.  Blood cultures negative.    Imaging reports a from outside radiology:  CT abdomen and pelvis with contrast distended gallbladder extensive cholelithiasis including 17 mm stone in the region of gallbladder neck.  Pericholecystic stranding and small adjacent fluid.  Lymph nodes noted to be unremarkable.  Emphysema lung bases.    She was taken to emergency surgery with laparoscopic cholecystectomy.  Surgical note mentions during this maneuver would appear to be a peritoneal nodule was discovered.  The peritoneal nodule was dissected from all surrounding structures with the LigaSure device.  The nodule is passed off the table and sent separately as a  "specimen.    Outside pathology:  "  Gallbladder cholecystectomy adenocarcinoma immunohistochemistry positive for CK7 and CDX2 and negative for CK 20, TTF1 and PAX8  Tumor size at least 2.2 cm.  The tumor invades the liver.  Lymphovascular invasion present.  Perineural invasion not identified.  Margins cannot be assessed.  Regional lymph nodes not applicable.    peritoneal nodule excisional biopsy positive for metastatic adenocarcinoma consistent with origin from gallbladder.      HPI      Feeling well aside from baseline fatigue.  No evidence of bleeding.    Review of Systems    ?   A comprehensive 14-point review of systems was reviewed with patient and was negative other than as specified above.   ?     Objective:      Physical Exam      ?   Vitals:    10/10/22 1245   BP: 122/72   Pulse: 93   Temp: 97.5 °F (36.4 °C)      ?   ECOG:?  2  General appearance: Generally well appearing, daughter accompanying her  Head, eyes, ears, nose, and throat:  moist mucous membranes.   Respiratory:  Normal work of breathing    Abdomen: nondistended.   Extremities: Warm, no appreciable edema or pain bilateral lower extremities  Neurologic: Alert and oriented.  Sitting in wheelchair  Skin: No rashes, ecchymoses or petechial lesion.   ?      ?   Laboratory:  ?   Lab Visit on 10/10/2022   Component Date Value Ref Range Status    WBC 10/10/2022 11.65  3.90 - 12.70 K/uL Final    RBC 10/10/2022 2.21 (L)  4.00 - 5.40 M/uL Final    Hemoglobin 10/10/2022 6.8 (L)  12.0 - 16.0 g/dL Final    Hematocrit 10/10/2022 22.5 (L)  37.0 - 48.5 % Final    MCV 10/10/2022 102 (H)  82 - 98 fL Final    MCH 10/10/2022 30.8  27.0 - 31.0 pg Final    MCHC 10/10/2022 30.2 (L)  32.0 - 36.0 g/dL Final    RDW 10/10/2022 15.3 (H)  11.5 - 14.5 % Final    Platelets 10/10/2022 103 (L)  150 - 450 K/uL Final    MPV 10/10/2022 10.1  9.2 - 12.9 fL Final    Immature Granulocytes 10/10/2022 0.4  0.0 - 0.5 % Final    Gran # (ANC) 10/10/2022 9.0 (H)  1.8 - 7.7 K/uL Final    " Immature Grans (Abs) 10/10/2022 0.05 (H)  0.00 - 0.04 K/uL Final    Lymph # 10/10/2022 1.7  1.0 - 4.8 K/uL Final    Mono # 10/10/2022 0.8  0.3 - 1.0 K/uL Final    Eos # 10/10/2022 0.1  0.0 - 0.5 K/uL Final    Baso # 10/10/2022 0.02  0.00 - 0.20 K/uL Final    nRBC 10/10/2022 0  0 /100 WBC Final    Gran % 10/10/2022 77.5 (H)  38.0 - 73.0 % Final    Lymph % 10/10/2022 14.4 (L)  18.0 - 48.0 % Final    Mono % 10/10/2022 7.1  4.0 - 15.0 % Final    Eosinophil % 10/10/2022 0.4  0.0 - 8.0 % Final    Basophil % 10/10/2022 0.2  0.0 - 1.9 % Final    Differential Method 10/10/2022 Automated   Final    Sodium 10/10/2022 141  136 - 145 mmol/L Final    Potassium 10/10/2022 4.7  3.5 - 5.1 mmol/L Final    Chloride 10/10/2022 99  95 - 110 mmol/L Final    CO2 10/10/2022 35 (H)  23 - 29 mmol/L Final    Glucose 10/10/2022 119 (H)  70 - 110 mg/dL Final    BUN 10/10/2022 19  8 - 23 mg/dL Final    Creatinine 10/10/2022 1.0  0.5 - 1.4 mg/dL Final    Calcium 10/10/2022 9.2  8.7 - 10.5 mg/dL Final    Total Protein 10/10/2022 6.4  6.0 - 8.4 g/dL Final    Albumin 10/10/2022 2.9 (L)  3.5 - 5.2 g/dL Final    Total Bilirubin 10/10/2022 0.2  0.1 - 1.0 mg/dL Final    Alkaline Phosphatase 10/10/2022 85  55 - 135 U/L Final    AST 10/10/2022 9 (L)  10 - 40 U/L Final    ALT 10/10/2022 6 (L)  10 - 44 U/L Final    Anion Gap 10/10/2022 7 (L)  8 - 16 mmol/L Final    eGFR 10/10/2022 59 (A)  >60 mL/min/1.73 m^2 Final    Magnesium 10/10/2022 1.3 (L)  1.6 - 2.6 mg/dL Final        ?   Imaging:  ?  Outside see above  No results found for this or any previous visit (from the past 2160 hour(s)).    No results found for this or any previous visit (from the past 2160 hour(s)).  No results found for this or any previous visit (from the past 2160 hour(s)).      Pathology:    Outside see above     ?   Assessment/Plan:   Adenocarcinoma of gallbladder  -     CT Chest Abdomen Pelvis With Contrast (xpd); Future; Expected date: 10/11/2022    Anemia associated with  chemotherapy  -     Verify informed consent for blood administration; Standing  -     Vital signs Document Pre-transfusion, 15 minutes after transfusion begins and immediately Post-transfusion for each unit transfused; Standing  -     Discharge instructions; Standing  -     Hold Transfusion and Notify Physician if:; Standing  -     If transfusion reaction confirmed by physician/mid-level:; Standing  -     0.9%  NaCl infusion (for blood administration)  -     Type & Screen; Future; Expected date: 10/10/2022  -     Prepare RBC 1 Unit; Future  -     Transfuse RBC 1 Unit; Standing    Hypomagnesemia  -     magnesium oxide (MAGOX) 400 mg (241.3 mg magnesium) tablet; Take 1 tablet (400 mg total) by mouth 3 (three) times daily.  Dispense: 60 tablet; Refill: 2    Pulmonary embolism without acute cor pulmonale, unspecified chronicity, unspecified pulmonary embolism type    Other orders  -     sodium chloride 0.9% 1,000 mL with magnesium sulfate 1 g, potassium chloride 20 mEq infusion  -     aprepitant (CINVANTI) injection 130 mg  -     palonosetron (ALOXI) 0.25 mg with Dexamethasone (DECADRON) 12 mg in NS 50 mL IVPB  -     gemcitabine (GEMZAR) 1,505 mg in sodium chloride 0.9% 250 mL chemo infusion  -     CISplatin (PLATINOL) 25 mg/m2 = 47 mg in sodium chloride 0.9% 500 mL chemo infusion  -     sodium chloride 0.9% bolus 500 mL  -     sodium chloride 0.9% 250 mL flush bag  -     sodium chloride 0.9% flush 10 mL  -     heparin, porcine (PF) 100 unit/mL injection flush 500 Units  -     alteplase injection 2 mg       1. Adenocarcinoma of gallbladder    2. Anemia associated with chemotherapy    3. Hypomagnesemia    4. Pulmonary embolism without acute cor pulmonale, unspecified chronicity, unspecified pulmonary embolism type            Plan:     Problem List Items Addressed This Visit          Renal/    Hypomagnesemia       Hematology    Pulmonary embolism without acute cor pulmonale       Oncology    Adenocarcinoma of  gallbladder - Primary     Other Visit Diagnoses       Anemia associated with chemotherapy              Gallbladder adenocarcinoma, stage IV, pT4 NX pM1: NGS Guardant with NATY, PDL1 CPS 2. No NTRK mutation detected.   On palliative chemotherapy with cisplatin gemcitabine cycle 1 day 1 04/25/2022.   restaging CT July 2022 with mixed response couple subcentimeter  Liver lesions others improved without  Other areas of involvement.  She is tolerating therapy well and given mixed response we discussed plan for short interval restaging scans in 2 months early October 2022.    Labs with recurrent anemia.  Recommend dose reduction.  Pending restaging imaging will need to schedule prior to next visit.  Will hold chemotherapy today and get interval transfusion.      Anemia:  Likely chemotherapy induced.  No evidence of bleeding.  Improved status post transfusion.  Continue to monitor closely.  No evidence of iron deficiency.  Discussed concern for recurrent transfusion need and development of iron overload.     Right lower extremity acute DVT and pulmonary embolism:   Continue indefinite anticoagulation apixaban 5 mg b.i.d..  Clinical improvement in lower extremity edema.  No current shortness of breath or chest pain.    Depressive symptoms related to malignancy/treatment; interested in psych onc consult, placed    Pain:  Follow-up with palliative Care encouraged.  Refilled oxycodone 5 mg   08/01/2022 which were also controls her pain at this point; takes b.i.d. p.r.n. severe pain.     Hypomagnesemia: Currently on to 400 mg tablets daily will uptitrate to 3 tablets per day due to persistent hypomagnesemia.      Close follow-up on repeat labs       Follow-Up:   Cycle   Held will get 1 unit PRBC transfusion reschedule her chemo with dose reduction gemcitabine given  Magnesium sent to pharmacy

## 2022-10-11 ENCOUNTER — INFUSION (OUTPATIENT)
Dept: INFUSION THERAPY | Facility: HOSPITAL | Age: 72
End: 2022-10-11
Attending: INTERNAL MEDICINE
Payer: MEDICARE

## 2022-10-11 VITALS
RESPIRATION RATE: 18 BRPM | SYSTOLIC BLOOD PRESSURE: 137 MMHG | HEART RATE: 85 BPM | DIASTOLIC BLOOD PRESSURE: 66 MMHG | TEMPERATURE: 98 F | OXYGEN SATURATION: 99 %

## 2022-10-11 DIAGNOSIS — D64.81 ANEMIA ASSOCIATED WITH CHEMOTHERAPY: Primary | ICD-10-CM

## 2022-10-11 DIAGNOSIS — T45.1X5A ANEMIA ASSOCIATED WITH CHEMOTHERAPY: Primary | ICD-10-CM

## 2022-10-11 DIAGNOSIS — C23 ADENOCARCINOMA OF GALLBLADDER: ICD-10-CM

## 2022-10-11 PROCEDURE — 25000003 PHARM REV CODE 250: Performed by: NURSE PRACTITIONER

## 2022-10-11 PROCEDURE — 36430 TRANSFUSION BLD/BLD COMPNT: CPT

## 2022-10-11 PROCEDURE — A4216 STERILE WATER/SALINE, 10 ML: HCPCS | Performed by: NURSE PRACTITIONER

## 2022-10-11 PROCEDURE — 63600175 PHARM REV CODE 636 W HCPCS: Performed by: NURSE PRACTITIONER

## 2022-10-11 PROCEDURE — P9016 RBC LEUKOCYTES REDUCED: HCPCS | Performed by: INTERNAL MEDICINE

## 2022-10-11 RX ORDER — HYDROCODONE BITARTRATE AND ACETAMINOPHEN 500; 5 MG/1; MG/1
TABLET ORAL ONCE
Status: DISCONTINUED | OUTPATIENT
Start: 2022-10-11 | End: 2022-10-11 | Stop reason: HOSPADM

## 2022-10-11 RX ORDER — HEPARIN 100 UNIT/ML
500 SYRINGE INTRAVENOUS
Status: CANCELLED | OUTPATIENT
Start: 2022-10-11

## 2022-10-11 RX ORDER — HEPARIN 100 UNIT/ML
500 SYRINGE INTRAVENOUS
Status: COMPLETED | OUTPATIENT
Start: 2022-10-11 | End: 2022-10-11

## 2022-10-11 RX ORDER — SODIUM CHLORIDE 0.9 % (FLUSH) 0.9 %
10 SYRINGE (ML) INJECTION
Status: COMPLETED | OUTPATIENT
Start: 2022-10-11 | End: 2022-10-11

## 2022-10-11 RX ORDER — SODIUM CHLORIDE 0.9 % (FLUSH) 0.9 %
10 SYRINGE (ML) INJECTION
Status: CANCELLED | OUTPATIENT
Start: 2022-10-11

## 2022-10-11 RX ADMIN — HEPARIN 500 UNITS: 100 SYRINGE at 12:10

## 2022-10-11 RX ADMIN — Medication 10 ML: at 12:10

## 2022-10-11 NOTE — DISCHARGE INSTRUCTIONS
.[facility]  [address] [facility]  [address]  [phone] phone     [phone] fax  Hours of Operation: Monday- Friday 8:00am- 5:00pm  After hours phone  [phone]  Hematology / Oncology Physicians on call    Dr. Samson Kiser      Nurse Practitioners:    Gracie Spangler, NP  Ingrid Song, TERI Solomon, TERI Arellano, TERI Srinivasan, TERI Andrea, PA      Please don't hesitate to call if you have any concerns.    .WAYS TO HELP PREVENT INFECTION        WASH YOUR HANDS OFTEN DURING THE DAY, ESPECIALLY BEFORE YOU EAT, AFTER USING THE BATHROOM, AND AFTER TOUCHING ANIMALS    STAY AWAY FROM PEOPLE WHO HAVE ILLNESSES YOU CAN CATCH; SUCH AS COLDS, FLU, CHICKEN POX    TRY TO AVOID CROWDS    STAY AWAY FROM CHILDREN WHO RECENTLY HAVE RECEIVED LIVE VIRUS VACCINES    MAINTAIN GOOD MOUTH CARE    DO NOT SQUEEZE OR SCRATCH PIMPLES    CLEAN CUTS & SCRAPES RIGHT AWAY AND DAILY UNTIL HEALED WITH WARM WATER, SOAP & AN ANTISEPTIC    AVOID CONTACT WITH LITTER BOXES, BIRD CAGES, & FISH TANKS    AVOID STANDING WATER, IE., BIRD BATHS, FLOWER POTS/VASES, OR HUMIDIFIERS    WEAR GLOVES WHEN GARDENING OR CLEANING UP AFTER OTHERS, ESPECIALLY BABIES & SMALL CHILDREN    DO NOT EAT RAW FISH, SEAFOOD, MEAT, OR EGGS  .FALL PREVENTION   Falls often occur due to slipping, tripping or losing your balance. Here are ways to reduce your risk of falling again.   Was there anything that caused your fall that can be fixed, removed or replaced?   Make your home safe by keeping walkways clear of objects you may trip over.   Use non-slip pads under rugs.   Do not walk in poorly lit areas.   Do not stand on chairs or wobbly ladders.   Use caution when reaching overhead or looking upward. This position can cause a loss of balance.   Be sure your shoes fit properly, have non-slip bottoms and are in good condition.   Be cautious when going up  and down stairs, curbs, and when walking on uneven sidewalks.   If your balance is poor, consider using a cane or walker.   If your fall was related to alcohol use, stop or limit alcohol intake.   If your fall was related to use of sleeping medicines, talk to your doctor about this. You may need to reduce your dosage at bedtime if you awaken during the night to go to the bathroom.   To reduce the need for nighttime bathroom trips:   Avoid drinking fluids for several hours before going to bed   Empty your bladder before going to bed   Men can keep a urinal at the bedside   © 0938-1382 [facility], [address] [place] [address], [place] [address]. All rights reserved. This information is not intended as a substitute for professional medical care. Always follow your healthcare professional's instructions.

## 2022-10-11 NOTE — PLAN OF CARE
Problem: Adult Inpatient Plan of Care  Goal: Plan of Care Review  Outcome: Ongoing, Progressing  Flowsheets (Taken 10/11/2022 0936)  Plan of Care Reviewed With:   patient   son  Goal: Patient-Specific Goal (Individualized)  Outcome: Ongoing, Progressing  Flowsheets (Taken 10/11/2022 0936)  Anxieties, Fears or Concerns: Pt has no concerns at this time  Individualized Care Needs: Legs elevated, pillow/warm blanket provided. Snacks offered  Patient-Specific Goals (Include Timeframe): Pt will tolerate PRBC without adverse effects     Problem: Anemia  Goal: Anemia Symptom Improvement  Outcome: Ongoing, Progressing

## 2022-10-12 ENCOUNTER — INFUSION (OUTPATIENT)
Dept: INFUSION THERAPY | Facility: HOSPITAL | Age: 72
End: 2022-10-12
Attending: INTERNAL MEDICINE
Payer: MEDICARE

## 2022-10-12 VITALS
TEMPERATURE: 98 F | OXYGEN SATURATION: 95 % | SYSTOLIC BLOOD PRESSURE: 156 MMHG | DIASTOLIC BLOOD PRESSURE: 84 MMHG | RESPIRATION RATE: 16 BRPM | HEART RATE: 88 BPM

## 2022-10-12 DIAGNOSIS — C23 ADENOCARCINOMA OF GALLBLADDER: Primary | ICD-10-CM

## 2022-10-12 PROCEDURE — 96413 CHEMO IV INFUSION 1 HR: CPT

## 2022-10-12 PROCEDURE — 96367 TX/PROPH/DG ADDL SEQ IV INF: CPT

## 2022-10-12 PROCEDURE — 96375 TX/PRO/DX INJ NEW DRUG ADDON: CPT

## 2022-10-12 PROCEDURE — 96417 CHEMO IV INFUS EACH ADDL SEQ: CPT

## 2022-10-12 PROCEDURE — 25000003 PHARM REV CODE 250: Performed by: INTERNAL MEDICINE

## 2022-10-12 PROCEDURE — 63600175 PHARM REV CODE 636 W HCPCS: Performed by: INTERNAL MEDICINE

## 2022-10-12 PROCEDURE — 96366 THER/PROPH/DIAG IV INF ADDON: CPT

## 2022-10-12 RX ORDER — SODIUM CHLORIDE 9 MG/ML
500 INJECTION, SOLUTION INTRAVENOUS
Status: COMPLETED | OUTPATIENT
Start: 2022-10-12 | End: 2022-10-12

## 2022-10-12 RX ORDER — HEPARIN 100 UNIT/ML
500 SYRINGE INTRAVENOUS
Status: DISCONTINUED | OUTPATIENT
Start: 2022-10-12 | End: 2022-10-12 | Stop reason: HOSPADM

## 2022-10-12 RX ADMIN — GEMCITABINE 1505 MG: 38 INJECTION, SOLUTION INTRAVENOUS at 12:10

## 2022-10-12 RX ADMIN — SODIUM CHLORIDE: 9 INJECTION, SOLUTION INTRAVENOUS at 11:10

## 2022-10-12 RX ADMIN — SODIUM CHLORIDE 500 ML: 0.9 INJECTION, SOLUTION INTRAVENOUS at 01:10

## 2022-10-12 RX ADMIN — CISPLATIN 47 MG: 1 INJECTION INTRAVENOUS at 12:10

## 2022-10-12 RX ADMIN — APREPITANT 130 MG: 130 INJECTION, EMULSION INTRAVENOUS at 11:10

## 2022-10-12 RX ADMIN — MAGNESIUM SULFATE HEPTAHYDRATE 500 ML/HR: 500 INJECTION, SOLUTION INTRAMUSCULAR; INTRAVENOUS at 09:10

## 2022-10-12 RX ADMIN — HEPARIN 500 UNITS: 100 SYRINGE at 03:10

## 2022-10-12 RX ADMIN — HEPARIN 500 UNITS: 100 SYRINGE at 02:10

## 2022-10-12 RX ADMIN — DEXAMETHASONE SODIUM PHOSPHATE: 4 INJECTION, SOLUTION INTRA-ARTICULAR; INTRALESIONAL; INTRAMUSCULAR; INTRAVENOUS; SOFT TISSUE at 11:10

## 2022-10-12 NOTE — DISCHARGE INSTRUCTIONS
.[facility]  [address] [facility]  [address]  [phone] phone     [phone] fax  Hours of Operation: Monday- Friday 8:00am- 5:00pm  After hours phone  [phone]  Hematology / Oncology Physicians on call    Dr. Samson Kiser      Nurse Practitioners:    Gracie Spangler, TERI Song, TERI Solomon, TERI Arellano, TERI Srinivasan, TERI Andrea, PA      Please don't hesitate to call if you have any concerns.    .FALL PREVENTION   Falls often occur due to slipping, tripping or losing your balance. Here are ways to reduce your risk of falling again.   Was there anything that caused your fall that can be fixed, removed or replaced?   Make your home safe by keeping walkways clear of objects you may trip over.   Use non-slip pads under rugs.   Do not walk in poorly lit areas.   Do not stand on chairs or wobbly ladders.   Use caution when reaching overhead or looking upward. This position can cause a loss of balance.   Be sure your shoes fit properly, have non-slip bottoms and are in good condition.   Be cautious when going up and down stairs, curbs, and when walking on uneven sidewalks.   If your balance is poor, consider using a cane or walker.   If your fall was related to alcohol use, stop or limit alcohol intake.   If your fall was related to use of sleeping medicines, talk to your doctor about this. You may need to reduce your dosage at bedtime if you awaken during the night to go to the bathroom.   To reduce the need for nighttime bathroom trips:   Avoid drinking fluids for several hours before going to bed   Empty your bladder before going to bed   Men can keep a urinal at the bedside   © 1703-8094 [employer], [address] [place] [address], [place] [address]. All rights reserved. This information is not intended as a substitute for professional medical care. Always follow your healthcare  professional's instructions.  ..wways

## 2022-10-12 NOTE — PLAN OF CARE
Patient states she feels good today. Her son at chairside with her. Discussed POC with both and answered any  questions.  Problem: Adult Inpatient Plan of Care  Goal: Plan of Care Review  Outcome: Ongoing, Progressing  Flowsheets (Taken 10/12/2022 0901)  Plan of Care Reviewed With:   patient   son  Goal: Patient-Specific Goal (Individualized)  Outcome: Ongoing, Progressing  Flowsheets (Taken 10/12/2022 0901)  Anxieties, Fears or Concerns: Pt voiced no concerns today  Individualized Care Needs: Legs elevated in recliner. Pillow and blankets provided. Snacks offered  Patient-Specific Goals (Include Timeframe): To tolerate treatment today     Problem: Anemia (Chemotherapy Effects)  Goal: Anemia Symptom Improvement  Outcome: Ongoing, Progressing  Intervention: Monitor and Manage Anemia  Flowsheets (Taken 10/12/2022 0901)  Oral Nutrition Promotion: rest periods promoted  Safety Promotion/Fall Prevention:   in recliner, wheels locked   lighting adjusted   medications reviewed   high risk medications identified  Fatigue Management: frequent rest breaks encouraged     Problem: Neutropenia (Chemotherapy Effects)  Goal: Absence of Infection  Outcome: Ongoing, Progressing  Intervention: Prevent Infection and Maximize Resistance  Flowsheets (Taken 10/12/2022 0901)  Infection Prevention:   hand hygiene promoted   personal protective equipment utilized   equipment surfaces disinfected     Problem: Fall Injury Risk  Goal: Absence of Fall and Fall-Related Injury  Outcome: Ongoing, Progressing  Intervention: Identify and Manage Contributors  Flowsheets (Taken 10/12/2022 0901)  Self-Care Promotion: BADL personal routines maintained  Medication Review/Management:   medications reviewed   infusion initiated   high-risk medications identified  Intervention: Promote Injury-Free Environment  Flowsheets (Taken 10/12/2022 0901)  Safety Promotion/Fall Prevention:   in recliner, wheels locked   lighting adjusted   medications reviewed   high  risk medications identified

## 2022-10-17 NOTE — ASSESSMENT & PLAN NOTE
Interval decrease in size of 2 liver lesions on recent restaging CTs; one liver lesion previously seen on PET not visible on CT. Also 3 new liver lesions noted, 2 on the left lobe, 1 on the right.

## 2022-10-17 NOTE — ASSESSMENT & PLAN NOTE
NGS Guardant with NATY, PDL1 CPS 2. No NTRK mutation detected.      On palliative chemotherapy with Gemzar/Cisplatin.     Recent CT C/A/P reveal mixed response with significant decrease in size of 2 left hepatic lesions and complete resolution of another; also new development of 2 left hepatic lesions and a right liver lesion. PE & DVT also noted on CT.      Restaging CTs scheduled for 10/21/2022.

## 2022-10-17 NOTE — PROGRESS NOTES
"Subjective:       Patient ID: Madelyn Serna is a 71 y.o. female.    Chief Complaint:   1. Adenocarcinoma of gallbladder        2. Cancer of peritoneum/retroperitoneum, secondary        3. Secondary liver cancer        4. Pulmonary embolism without acute cor pulmonale, unspecified chronicity, unspecified pulmonary embolism type        5. DVT, lower extremity, distal, acute, right          Current Treatment:  OP GEMCITABINE CISPLATIN Q3W  PICC DOUBLE LUMEN LINE FLUSH     4. Eliquis 5mg po BID    Treatment History:  S/p Laparoscopic cholecystectomy, 2/4/2022, [facility]    HPI: This is a 71 year old woman with medical history significant for COPD, anxiety, cataracts, and tobacco use (quit December 2021) who is currently on treatment for Stage IV adenocarcinoma of the gallbladder. She presented to [facility] on 2/4/2022 after 4 days of right lower quadrant abdominal pain; she experienced an episode of vomiting per the ER notes. Labs revealed leukocytosis WBC 18.7, hemoglobin 13, , renal function intact GFR over 60, mild alkaline phosphatase elevation 130, normal transaminases and bilirubin. Albumin 4.5, calcium 10.4.  Urinalysis positive for E coli pansensitive.  Blood cultures negative.     CT A/P with contrast showed distended gallbladder extensive cholelithiasis including 17 mm stone in the region of gallbladder neck.  Pericholecystic stranding and small adjacent fluid.  Lymph nodes noted to be unremarkable.  Emphysema lung bases. She underwent emergency surgery with laparoscopic cholecystectomy.  Surgical note mentions during this maneuver would appear to be a peritoneal nodule was discovered.  The peritoneal nodule was dissected from all surrounding structures with the LigaSure device.  The nodule is passed off the table and sent separately as a specimen.     Pathology demonstrated "gallbladder cholecystectomy adenocarcinoma IHC positive for CK7 & CDX2 and negative for CK 20, TTF1 " and PAX8. Tumor size at least 2.2 cm. The tumor invades the liver.  Lymphovascular invasion present. Perineural invasion not identified.  Margins cannot be assessed. Regional lymph nodes not applicable. Peritoneal nodule excisional biopsy positive for metastatic adenocarcinoma consistent with origin from gallbladder.      Staging PET performed on 3/2/2022 indicated uptake at right upper quadrant along liver margin site of prior cholecystectomy with postoperative fluid and peritoneal implants eleanor hepatis region potential/likely postoperative changes, right internal mammary chain and cardiophrenic lymph nodes. Her case was discussed at Tumor Board where it was decided to offer her palliative chemotherapy. She was started on Cisplatin/Gemzar every 3 weeks on 3/24/2022.     Restaging CT C/A/P performed on 7/12/2022 revealed interval reduction in size of left hepatic lobe lesions with one measuring 3mm (previously 10mm) and another measuring 4mm (previously 8mm); a third lesion previously measuring 8mm is no longer appreciated. However, there are 2 new hepatic lesions, one measuring 3mm and another 6mm; there is also a 1.4cm right hepatic lesion not previously seen. She also had a left lower lobe PE and left internal iliac vein DVT noted and was started on Eliquis.     Her primary Hematologist/Oncologist is Dr. Marcos.    Interval History: Patient presents for follow up on Gemzar/Cisplatin; She is scheduled to receive C8D8 tomorrow. She presents with her son who helps care for her. She is sad today because her brother passed away last month; they were very close. Advised her to allow herself to grieve in a healthy manner so as not to cause detriment to her own health.     Reviewed labs with patient:   CBC:   Recent Labs   Lab 10/18/22  0837   WBC 4.64   RBC 2.87 L   Hemoglobin 8.8 L   Hematocrit 28.3 L   Platelets 157   MCV 99 H   MCH 30.7   MCHC 31.1 L     CMP:  Recent Labs   Lab 10/18/22  0837   Glucose 115 H    Calcium 9.3   Albumin 2.9 L   Total Protein 6.5   Sodium 139   Potassium 4.2   CO2 39 H   Chloride 96   BUN 25 H   Creatinine 1.7 H   Alkaline Phosphatase 104   ALT 12   AST 10   Total Bilirubin 0.2     Patient states she has been eating well and drinking plenty of water; her son attests to the fact that she drinks water a lot. However, her kidney function is decreased. Per Up-To-Date, for treatment with palliative intent, it is not recommended to treat with Cisplatin for eGFR <50. There are no dose adjustment recommendations for Gemzar in light of kidney impairment. Discussed her case with Dr. Davies. Will hold treatment tomorrow and give fluids instead; will reschedule for the following week.     Social History     Socioeconomic History    Marital status:    Tobacco Use    Smoking status: Every Day    Smokeless tobacco: Former     Past Medical History:   Diagnosis Date    Cancer of gallbladder     Essential (primary) hypertension     Infection following a procedure, deep incisional surgical site, initial encounter 3/3/2022    Romaine pus on bandage with induration to RUQ. She has already completed a 5 day course of Cipro which completed on 2/27. Recommend prompt evaluation with ED and whether or not imaging is warranted.     History reviewed. No pertinent family history.  History reviewed. No pertinent surgical history.  Review of Systems   Constitutional:  Positive for appetite change (improved). Negative for fatigue.   HENT:  Negative for mouth sores, rhinorrhea and sore throat.    Eyes: Negative.    Respiratory: Negative.     Cardiovascular: Negative.    Gastrointestinal:  Negative for constipation, diarrhea, nausea and vomiting.   Endocrine: Negative.    Genitourinary: Negative.    Musculoskeletal: Negative.    Integumentary:  Negative.   Allergic/Immunologic: Negative.    Neurological:  Positive for numbness (bilateral hands). Negative for weakness.   Hematological: Negative.    Psychiatric/Behavioral:  Negative.         Medication List with Changes/Refills   Current Medications    ALBUTEROL (PROVENTIL/VENTOLIN HFA) 90 MCG/ACTUATION INHALER    INHALE TWO PUFFS FOUR TIMES DAILY AS NEEDED FOR WHEEZING    AMLODIPINE (NORVASC) 5 MG TABLET    amlodipine Take 1 time per day No date recorded tablet 1 time per day No route recorded No set duration recorded No set duration amount recorded active 5 mg    APIXABAN (ELIQUIS) 5 MG TAB    Take 1 tablet (5 mg total) by mouth 2 (two) times daily.    CLONAZEPAM (KLONOPIN) 0.5 MG TABLET    clonazepam Take 2 times per day No date recorded tablet 2 times per day No route recorded No set duration recorded No set duration amount recorded active 0.5 mg    CYPROHEPTADINE (PERIACTIN) 4 MG TABLET    Take 4 mg by mouth 2 (two) times daily.    DEXAMETHASONE (DECADRON) 4 MG TAB    Take 2 tablets (8mg total) by mouth once daily. Take as directed on days 2, 3, 4 and days 9, 10, 11 of your chemotherapy cycle.    DEXAMETHASONE (DECADRON) 4 MG TAB    Take 2 tablets (8 mg total) by mouth once daily. Take as directed on days 2, 3, 4 and days 9, 10, 11 of your chemotherapy cycle.    DICLOFENAC SODIUM (VOLTAREN) 1 % GEL    APPLY 1 GRAM TO AFFECTED AREA FOUR TIMES DAILY AS NEEDED    IPRATROPIUM (ATROVENT) 21 MCG (0.03 %) NASAL SPRAY        LABETALOL (NORMODYNE) 300 MG TABLET    labetalol Take 2 times per day No date recorded tablet 2 times per day No route recorded No set duration recorded No set duration amount recorded suspended 300 mg    LISINOPRIL 10 MG TABLET    Take 1 tablet (10 mg total) by mouth once daily.    MAGNESIUM OXIDE (MAGOX) 400 MG (241.3 MG MAGNESIUM) TABLET    Take 1 tablet (400 mg total) by mouth 3 (three) times daily.    MIRTAZAPINE (REMERON) 30 MG TABLET    TAKE 1 & 1/2 TABLETS DAILY AT BEDTIME    MONTELUKAST (SINGULAIR) 10 MG TABLET    Take 10 mg by mouth once daily.    NALOXONE (NARCAN) 4 MG/ACTUATION SPRY    USE ONE SPRAY IN ONE NOSTRIL AS DIRECTED UPON SIGNS OF OPIOID OVERDOSE  CALL 911    ONDANSETRON (ZOFRAN-ODT) 8 MG TBDL    Take 1 tablet (8 mg total) by mouth every 6 (six) hours as needed.    ONDANSETRON (ZOFRAN-ODT) 8 MG TBDL    Take 1 tablet (8 mg total) by mouth every 8 (eight) hours as needed (nausea/vomiting).    OXYCODONE (ROXICODONE) 5 MG IMMEDIATE RELEASE TABLET    TAKE ONE TABLET EVERY 12  HOURS AS NEEDED FOR PAIN    POTASSIUM CHLORIDE (MICRO-K) 10 MEQ CPSR    Take 1 capsule (10 mEq total) by mouth once daily.    PROCHLORPERAZINE (COMPAZINE) 5 MG TABLET    Take 1 tablet (5 mg total) by mouth every 6 (six) hours as needed for Nausea. May take every 6 hours scheduled for prevention of nausea.    VENLAFAXINE (EFFEXOR-XR) 75 MG 24 HR CAPSULE    Take 1 capsule (75 mg total) by mouth once daily.     Objective:     Vitals:    10/18/22 0854   BP: 123/84   Pulse: 97   Resp: 16   Temp: 97.6 °F (36.4 °C)     Physical Exam  Vitals reviewed.   Constitutional:       Appearance: Normal appearance.   HENT:      Head: Normocephalic.      Mouth/Throat:      Comments: Wearing mask    Eyes:      Extraocular Movements: Extraocular movements intact.      Pupils: Pupils are equal, round, and reactive to light.      Comments: Glasses       Cardiovascular:      Rate and Rhythm: Normal rate and regular rhythm.      Heart sounds: Normal heart sounds.   Pulmonary:      Effort: Pulmonary effort is normal.      Breath sounds: Normal breath sounds.   Abdominal:      General: Bowel sounds are normal.      Palpations: Abdomen is soft.      Comments: rounded     Genitourinary:     Comments: deferred    Musculoskeletal:      Cervical back: Normal range of motion and neck supple.      Comments: Presents in wheelchair but is able to stand   Skin:     General: Skin is warm and dry.   Neurological:      Mental Status: She is alert and oriented to person, place, and time.   Psychiatric:         Behavior: Behavior normal.         Thought Content: Thought content normal.        (2) Ambulatory and capable of self care,  unable to carry out work activity, up and about > 50% or waking hours  Assessment:     Problem List Items Addressed This Visit          Hematology    DVT, lower extremity, distal, acute, right     Incidentally noted on restaging CTs. Started on Eliquis 5mg po BID.          Pulmonary embolism without acute cor pulmonale     Continue indefinite anticoagulation apixaban 5 mg b.i.d..  Clinical improvement in lower extremity edema.  No current shortness of breath or chest pain.            Oncology    Adenocarcinoma of gallbladder - Primary     NGS Guardant with NATY, PDL1 CPS 2. No NTRK mutation detected.      On palliative chemotherapy with Gemzar/Cisplatin.     Recent CT C/A/P reveal mixed response with significant decrease in size of 2 left hepatic lesions and complete resolution of another; also new development of 2 left hepatic lesions and a right liver lesion. PE & DVT also noted on CT.      Restaging CTs scheduled for 10/21/2022.         Cancer of peritoneum/retroperitoneum, secondary     Interval decrease in size of 2 liver lesions on recent restaging CTs; one liver lesion previously seen on PET not visible on CT. Also 3 new liver lesions noted, 2 on the left lobe, 1 on the right.               Secondary liver cancer     Plan:     Adenocarcinoma of gallbladder    Cancer of peritoneum/retroperitoneum, secondary    Secondary liver cancer    Pulmonary embolism without acute cor pulmonale, unspecified chronicity, unspecified pulmonary embolism type    DVT, lower extremity, distal, acute, right    Labs reviewed.   Continue Eliquis as directed.  HOLD C8D8 of Gemzar/Cisplatin tomorrow for decreased kidney function.  Instead of treatment, give 1L NS tomorrow.   Per Up-To-Date, for treatment with palliative intent, it is not recommended to treat with Cisplatin with eGFR <50.  Repeat CT scans scheduled for 10/21/2022.  Follow up in 1 week with Dr. Marcos/Tin with  results of scans, Mg, CBC and Comprehensive Metabolic  Panel to reattempt C8D8.      I will review assessment/plan with collaborating physician Dr. Martinez.      MARIA LUISA Brown

## 2022-10-18 ENCOUNTER — LAB VISIT (OUTPATIENT)
Dept: LAB | Facility: HOSPITAL | Age: 72
End: 2022-10-18
Attending: INTERNAL MEDICINE
Payer: MEDICARE

## 2022-10-18 ENCOUNTER — INFUSION (OUTPATIENT)
Dept: INFUSION THERAPY | Facility: HOSPITAL | Age: 72
End: 2022-10-18
Attending: INTERNAL MEDICINE
Payer: MEDICARE

## 2022-10-18 ENCOUNTER — PATIENT MESSAGE (OUTPATIENT)
Dept: HEMATOLOGY/ONCOLOGY | Facility: CLINIC | Age: 72
End: 2022-10-18

## 2022-10-18 ENCOUNTER — OFFICE VISIT (OUTPATIENT)
Dept: HEMATOLOGY/ONCOLOGY | Facility: CLINIC | Age: 72
End: 2022-10-18
Payer: MEDICARE

## 2022-10-18 VITALS
RESPIRATION RATE: 16 BRPM | HEART RATE: 100 BPM | HEIGHT: 69 IN | SYSTOLIC BLOOD PRESSURE: 133 MMHG | TEMPERATURE: 98 F | BODY MASS INDEX: 23.18 KG/M2 | DIASTOLIC BLOOD PRESSURE: 84 MMHG | DIASTOLIC BLOOD PRESSURE: 68 MMHG | HEART RATE: 97 BPM | TEMPERATURE: 98 F | RESPIRATION RATE: 16 BRPM | SYSTOLIC BLOOD PRESSURE: 123 MMHG | OXYGEN SATURATION: 95 %

## 2022-10-18 DIAGNOSIS — C23 ADENOCARCINOMA OF GALLBLADDER: Primary | ICD-10-CM

## 2022-10-18 DIAGNOSIS — C78.7 SECONDARY LIVER CANCER: ICD-10-CM

## 2022-10-18 DIAGNOSIS — C78.6 CANCER OF PERITONEUM/RETROPERITONEUM, SECONDARY: ICD-10-CM

## 2022-10-18 DIAGNOSIS — I26.99 PULMONARY EMBOLISM WITHOUT ACUTE COR PULMONALE, UNSPECIFIED CHRONICITY, UNSPECIFIED PULMONARY EMBOLISM TYPE: ICD-10-CM

## 2022-10-18 DIAGNOSIS — C23 ADENOCARCINOMA OF GALLBLADDER: ICD-10-CM

## 2022-10-18 DIAGNOSIS — I82.4Z1 DVT, LOWER EXTREMITY, DISTAL, ACUTE, RIGHT: ICD-10-CM

## 2022-10-18 LAB
ALBUMIN SERPL BCP-MCNC: 2.9 G/DL (ref 3.5–5.2)
ALP SERPL-CCNC: 104 U/L (ref 55–135)
ALT SERPL W/O P-5'-P-CCNC: 12 U/L (ref 10–44)
ANION GAP SERPL CALC-SCNC: 4 MMOL/L (ref 8–16)
AST SERPL-CCNC: 10 U/L (ref 10–40)
BASOPHILS # BLD AUTO: 0.02 K/UL (ref 0–0.2)
BASOPHILS NFR BLD: 0.4 % (ref 0–1.9)
BILIRUB SERPL-MCNC: 0.2 MG/DL (ref 0.1–1)
BUN SERPL-MCNC: 25 MG/DL (ref 8–23)
CALCIUM SERPL-MCNC: 9.3 MG/DL (ref 8.7–10.5)
CHLORIDE SERPL-SCNC: 96 MMOL/L (ref 95–110)
CO2 SERPL-SCNC: 39 MMOL/L (ref 23–29)
CREAT SERPL-MCNC: 1.7 MG/DL (ref 0.5–1.4)
DIFFERENTIAL METHOD: ABNORMAL
EOSINOPHIL # BLD AUTO: 0 K/UL (ref 0–0.5)
EOSINOPHIL NFR BLD: 0.9 % (ref 0–8)
ERYTHROCYTE [DISTWIDTH] IN BLOOD BY AUTOMATED COUNT: 14.4 % (ref 11.5–14.5)
EST. GFR  (NO RACE VARIABLE): 31 ML/MIN/1.73 M^2
GLUCOSE SERPL-MCNC: 115 MG/DL (ref 70–110)
HCT VFR BLD AUTO: 28.3 % (ref 37–48.5)
HGB BLD-MCNC: 8.8 G/DL (ref 12–16)
IMM GRANULOCYTES # BLD AUTO: 0.03 K/UL (ref 0–0.04)
IMM GRANULOCYTES NFR BLD AUTO: 0.6 % (ref 0–0.5)
LYMPHOCYTES # BLD AUTO: 2 K/UL (ref 1–4.8)
LYMPHOCYTES NFR BLD: 44 % (ref 18–48)
MAGNESIUM SERPL-MCNC: 1.5 MG/DL (ref 1.6–2.6)
MCH RBC QN AUTO: 30.7 PG (ref 27–31)
MCHC RBC AUTO-ENTMCNC: 31.1 G/DL (ref 32–36)
MCV RBC AUTO: 99 FL (ref 82–98)
MONOCYTES # BLD AUTO: 0.2 K/UL (ref 0.3–1)
MONOCYTES NFR BLD: 5 % (ref 4–15)
NEUTROPHILS # BLD AUTO: 2.3 K/UL (ref 1.8–7.7)
NEUTROPHILS NFR BLD: 49.1 % (ref 38–73)
NRBC BLD-RTO: 0 /100 WBC
PLATELET # BLD AUTO: 157 K/UL (ref 150–450)
PMV BLD AUTO: 8.5 FL (ref 9.2–12.9)
POTASSIUM SERPL-SCNC: 4.2 MMOL/L (ref 3.5–5.1)
PROT SERPL-MCNC: 6.5 G/DL (ref 6–8.4)
RBC # BLD AUTO: 2.87 M/UL (ref 4–5.4)
SODIUM SERPL-SCNC: 139 MMOL/L (ref 136–145)
WBC # BLD AUTO: 4.64 K/UL (ref 3.9–12.7)

## 2022-10-18 PROCEDURE — 1126F PR PAIN SEVERITY QUANTIFIED, NO PAIN PRESENT: ICD-10-PCS | Mod: CPTII,S$GLB,, | Performed by: NURSE PRACTITIONER

## 2022-10-18 PROCEDURE — 36415 COLL VENOUS BLD VENIPUNCTURE: CPT | Performed by: INTERNAL MEDICINE

## 2022-10-18 PROCEDURE — 1101F PR PT FALLS ASSESS DOC 0-1 FALLS W/OUT INJ PAST YR: ICD-10-PCS | Mod: CPTII,S$GLB,, | Performed by: NURSE PRACTITIONER

## 2022-10-18 PROCEDURE — 3074F PR MOST RECENT SYSTOLIC BLOOD PRESSURE < 130 MM HG: ICD-10-PCS | Mod: CPTII,S$GLB,, | Performed by: NURSE PRACTITIONER

## 2022-10-18 PROCEDURE — 1101F PT FALLS ASSESS-DOCD LE1/YR: CPT | Mod: CPTII,S$GLB,, | Performed by: NURSE PRACTITIONER

## 2022-10-18 PROCEDURE — 1160F RVW MEDS BY RX/DR IN RCRD: CPT | Mod: CPTII,S$GLB,, | Performed by: NURSE PRACTITIONER

## 2022-10-18 PROCEDURE — 85025 COMPLETE CBC W/AUTO DIFF WBC: CPT | Performed by: INTERNAL MEDICINE

## 2022-10-18 PROCEDURE — 25000003 PHARM REV CODE 250: Performed by: NURSE PRACTITIONER

## 2022-10-18 PROCEDURE — 4010F PR ACE/ARB THEARPY RXD/TAKEN: ICD-10-PCS | Mod: CPTII,S$GLB,, | Performed by: NURSE PRACTITIONER

## 2022-10-18 PROCEDURE — 3079F DIAST BP 80-89 MM HG: CPT | Mod: CPTII,S$GLB,, | Performed by: NURSE PRACTITIONER

## 2022-10-18 PROCEDURE — 99999 PR PBB SHADOW E&M-EST. PATIENT-LVL IV: ICD-10-PCS | Mod: PBBFAC,,, | Performed by: NURSE PRACTITIONER

## 2022-10-18 PROCEDURE — 36592 COLLECT BLOOD FROM PICC: CPT

## 2022-10-18 PROCEDURE — 1126F AMNT PAIN NOTED NONE PRSNT: CPT | Mod: CPTII,S$GLB,, | Performed by: NURSE PRACTITIONER

## 2022-10-18 PROCEDURE — 3079F PR MOST RECENT DIASTOLIC BLOOD PRESSURE 80-89 MM HG: ICD-10-PCS | Mod: CPTII,S$GLB,, | Performed by: NURSE PRACTITIONER

## 2022-10-18 PROCEDURE — 80053 COMPREHEN METABOLIC PANEL: CPT | Performed by: INTERNAL MEDICINE

## 2022-10-18 PROCEDURE — 3288F FALL RISK ASSESSMENT DOCD: CPT | Mod: CPTII,S$GLB,, | Performed by: NURSE PRACTITIONER

## 2022-10-18 PROCEDURE — 4010F ACE/ARB THERAPY RXD/TAKEN: CPT | Mod: CPTII,S$GLB,, | Performed by: NURSE PRACTITIONER

## 2022-10-18 PROCEDURE — 1159F MED LIST DOCD IN RCRD: CPT | Mod: CPTII,S$GLB,, | Performed by: NURSE PRACTITIONER

## 2022-10-18 PROCEDURE — 63600175 PHARM REV CODE 636 W HCPCS: Performed by: NURSE PRACTITIONER

## 2022-10-18 PROCEDURE — 3074F SYST BP LT 130 MM HG: CPT | Mod: CPTII,S$GLB,, | Performed by: NURSE PRACTITIONER

## 2022-10-18 PROCEDURE — 83735 ASSAY OF MAGNESIUM: CPT | Performed by: INTERNAL MEDICINE

## 2022-10-18 PROCEDURE — 1157F PR ADVANCE CARE PLAN OR EQUIV PRESENT IN MEDICAL RECORD: ICD-10-PCS | Mod: CPTII,S$GLB,, | Performed by: NURSE PRACTITIONER

## 2022-10-18 PROCEDURE — 99214 PR OFFICE/OUTPT VISIT, EST, LEVL IV, 30-39 MIN: ICD-10-PCS | Mod: S$GLB,,, | Performed by: NURSE PRACTITIONER

## 2022-10-18 PROCEDURE — 1159F PR MEDICATION LIST DOCUMENTED IN MEDICAL RECORD: ICD-10-PCS | Mod: CPTII,S$GLB,, | Performed by: NURSE PRACTITIONER

## 2022-10-18 PROCEDURE — 3288F PR FALLS RISK ASSESSMENT DOCUMENTED: ICD-10-PCS | Mod: CPTII,S$GLB,, | Performed by: NURSE PRACTITIONER

## 2022-10-18 PROCEDURE — A4216 STERILE WATER/SALINE, 10 ML: HCPCS | Performed by: NURSE PRACTITIONER

## 2022-10-18 PROCEDURE — 99999 PR PBB SHADOW E&M-EST. PATIENT-LVL IV: CPT | Mod: PBBFAC,,, | Performed by: NURSE PRACTITIONER

## 2022-10-18 PROCEDURE — 1157F ADVNC CARE PLAN IN RCRD: CPT | Mod: CPTII,S$GLB,, | Performed by: NURSE PRACTITIONER

## 2022-10-18 PROCEDURE — 1160F PR REVIEW ALL MEDS BY PRESCRIBER/CLIN PHARMACIST DOCUMENTED: ICD-10-PCS | Mod: CPTII,S$GLB,, | Performed by: NURSE PRACTITIONER

## 2022-10-18 PROCEDURE — 99214 OFFICE O/P EST MOD 30 MIN: CPT | Mod: S$GLB,,, | Performed by: NURSE PRACTITIONER

## 2022-10-18 RX ORDER — SODIUM CHLORIDE 9 MG/ML
1000 INJECTION, SOLUTION INTRAVENOUS
Status: CANCELLED | OUTPATIENT
Start: 2022-10-19 | End: 2022-10-19

## 2022-10-18 RX ORDER — HEPARIN 100 UNIT/ML
500 SYRINGE INTRAVENOUS
OUTPATIENT
Start: 2022-10-18

## 2022-10-18 RX ORDER — HEPARIN 100 UNIT/ML
500 SYRINGE INTRAVENOUS
Status: CANCELLED | OUTPATIENT
Start: 2022-10-19

## 2022-10-18 RX ORDER — HEPARIN 100 UNIT/ML
500 SYRINGE INTRAVENOUS
Status: COMPLETED | OUTPATIENT
Start: 2022-10-18 | End: 2022-10-18

## 2022-10-18 RX ORDER — SODIUM CHLORIDE 0.9 % (FLUSH) 0.9 %
10 SYRINGE (ML) INJECTION
Status: CANCELLED | OUTPATIENT
Start: 2022-10-19

## 2022-10-18 RX ORDER — SODIUM CHLORIDE 0.9 % (FLUSH) 0.9 %
10 SYRINGE (ML) INJECTION
Status: COMPLETED | OUTPATIENT
Start: 2022-10-18 | End: 2022-10-18

## 2022-10-18 RX ORDER — SODIUM CHLORIDE 0.9 % (FLUSH) 0.9 %
10 SYRINGE (ML) INJECTION
OUTPATIENT
Start: 2022-10-18

## 2022-10-18 RX ADMIN — HEPARIN 500 UNITS: 100 SYRINGE at 08:10

## 2022-10-18 RX ADMIN — Medication 10 ML: at 08:10

## 2022-10-18 NOTE — DISCHARGE INSTRUCTIONS
.[facility]  [id number] [address]  [address]  [phone] phone     [phone] fax  Hours of Operation: Monday- Friday 8:00am- 5:00pm  After hours phone  [phone]  Hematology / Oncology Physicians on call    Dr. Samson Kiser      Nurse Practitioners:    Gracie Spangler, TERI Song, TERI Solomon, TERI Arellano, TERI Srinivasan, TERI Andrea, PA      Please don't hesitate to call if you have any concerns.    .FALL PREVENTION   Falls often occur due to slipping, tripping or losing your balance. Here are ways to reduce your risk of falling again.   Was there anything that caused your fall that can be fixed, removed or replaced?   Make your home safe by keeping walkways clear of objects you may trip over.   Use non-slip pads under rugs.   Do not walk in poorly lit areas.   Do not stand on chairs or wobbly ladders.   Use caution when reaching overhead or looking upward. This position can cause a loss of balance.   Be sure your shoes fit properly, have non-slip bottoms and are in good condition.   Be cautious when going up and down stairs, curbs, and when walking on uneven sidewalks.   If your balance is poor, consider using a cane or walker.   If your fall was related to alcohol use, stop or limit alcohol intake.   If your fall was related to use of sleeping medicines, talk to your doctor about this. You may need to reduce your dosage at bedtime if you awaken during the night to go to the bathroom.   To reduce the need for nighttime bathroom trips:   Avoid drinking fluids for several hours before going to bed   Empty your bladder before going to bed   Men can keep a urinal at the bedside   © 2339-9305 [employer], [address] [place] [address], [place] [address]. All rights reserved. This information is not intended as a substitute for professional medical care. Always follow your healthcare  professional's instructions.  .WAYS TO HELP PREVENT INFECTION        WASH YOUR HANDS OFTEN DURING THE DAY, ESPECIALLY BEFORE YOU EAT, AFTER USING THE BATHROOM, AND AFTER TOUCHING ANIMALS    STAY AWAY FROM PEOPLE WHO HAVE ILLNESSES YOU CAN CATCH; SUCH AS COLDS, FLU, CHICKEN POX    TRY TO AVOID CROWDS    STAY AWAY FROM CHILDREN WHO RECENTLY HAVE RECEIVED LIVE VIRUS VACCINES    MAINTAIN GOOD MOUTH CARE    DO NOT SQUEEZE OR SCRATCH PIMPLES    CLEAN CUTS & SCRAPES RIGHT AWAY AND DAILY UNTIL HEALED WITH WARM WATER, SOAP & AN ANTISEPTIC    AVOID CONTACT WITH LITTER BOXES, BIRD CAGES, & FISH TANKS    AVOID STANDING WATER, IE., BIRD BATHS, FLOWER POTS/VASES, OR HUMIDIFIERS    WEAR GLOVES WHEN GARDENING OR CLEANING UP AFTER OTHERS, ESPECIALLY BABIES & SMALL CHILDREN    DO NOT EAT RAW FISH, SEAFOOD, MEAT, OR EGGS

## 2022-10-19 ENCOUNTER — INFUSION (OUTPATIENT)
Dept: INFUSION THERAPY | Facility: HOSPITAL | Age: 72
End: 2022-10-19
Attending: INTERNAL MEDICINE
Payer: MEDICARE

## 2022-10-19 VITALS
DIASTOLIC BLOOD PRESSURE: 67 MMHG | SYSTOLIC BLOOD PRESSURE: 136 MMHG | HEART RATE: 89 BPM | RESPIRATION RATE: 16 BRPM | TEMPERATURE: 98 F

## 2022-10-19 DIAGNOSIS — E86.0 DEHYDRATION: Primary | ICD-10-CM

## 2022-10-19 PROCEDURE — 96360 HYDRATION IV INFUSION INIT: CPT

## 2022-10-19 PROCEDURE — 63600175 PHARM REV CODE 636 W HCPCS: Performed by: NURSE PRACTITIONER

## 2022-10-19 PROCEDURE — 25000003 PHARM REV CODE 250: Performed by: NURSE PRACTITIONER

## 2022-10-19 PROCEDURE — A4216 STERILE WATER/SALINE, 10 ML: HCPCS | Performed by: NURSE PRACTITIONER

## 2022-10-19 PROCEDURE — 96361 HYDRATE IV INFUSION ADD-ON: CPT

## 2022-10-19 RX ORDER — HEPARIN 100 UNIT/ML
500 SYRINGE INTRAVENOUS
Status: DISCONTINUED | OUTPATIENT
Start: 2022-10-19 | End: 2022-10-19 | Stop reason: HOSPADM

## 2022-10-19 RX ORDER — SODIUM CHLORIDE 0.9 % (FLUSH) 0.9 %
10 SYRINGE (ML) INJECTION
Status: DISCONTINUED | OUTPATIENT
Start: 2022-10-19 | End: 2022-10-19 | Stop reason: HOSPADM

## 2022-10-19 RX ORDER — SODIUM CHLORIDE 9 MG/ML
1000 INJECTION, SOLUTION INTRAVENOUS
Status: COMPLETED | OUTPATIENT
Start: 2022-10-19 | End: 2022-10-19

## 2022-10-19 RX ADMIN — HEPARIN 500 UNITS: 100 SYRINGE at 11:10

## 2022-10-19 RX ADMIN — SODIUM CHLORIDE 1000 ML: 9 INJECTION, SOLUTION INTRAVENOUS at 09:10

## 2022-10-19 RX ADMIN — Medication 10 ML: at 11:10

## 2022-10-19 NOTE — PLAN OF CARE
Plan of care reviewed with patient/son; questions answered; verbalizes understanding  Problem: Adult Inpatient Plan of Care  Goal: Plan of Care Review  Outcome: Ongoing, Progressing  Flowsheets (Taken 10/19/2022 1000)  Plan of Care Reviewed With: patient  Goal: Patient-Specific Goal (Individualized)  Outcome: Ongoing, Progressing  Flowsheets (Taken 10/19/2022 1000)  Anxieties, Fears or Concerns:   pt denies any complaints   states i am feeling good today  Individualized Care Needs: 2 warm blankets, pillow, snacks, legs/feet elevated  Patient-Specific Goals (Include Timeframe): pt will tolerate treatment well     Problem: Fluid Volume Deficit  Goal: Fluid Balance  Outcome: Ongoing, Progressing  Intervention: Monitor and Manage Hypovolemia  Flowsheets (Taken 10/19/2022 1000)  Fluid/Electrolyte Management: (1 liter normal saline) intravenous fluid replacement initiated

## 2022-10-21 ENCOUNTER — HOSPITAL ENCOUNTER (OUTPATIENT)
Dept: RADIOLOGY | Facility: HOSPITAL | Age: 72
Discharge: HOME | End: 2022-10-21
Attending: INTERNAL MEDICINE
Payer: MEDICARE

## 2022-10-21 DIAGNOSIS — C23 ADENOCARCINOMA OF GALLBLADDER: ICD-10-CM

## 2022-10-21 PROCEDURE — 74177 CT CHEST ABDOMEN PELVIS WITH CONTRAST (XPD): ICD-10-PCS | Mod: 26,,, | Performed by: RADIOLOGY

## 2022-10-21 PROCEDURE — 25500020 PHARM REV CODE 255: Performed by: INTERNAL MEDICINE

## 2022-10-21 PROCEDURE — 71260 CT CHEST ABDOMEN PELVIS WITH CONTRAST (XPD): ICD-10-PCS | Mod: 26,,, | Performed by: RADIOLOGY

## 2022-10-21 PROCEDURE — 71260 CT THORAX DX C+: CPT | Mod: 26,,, | Performed by: RADIOLOGY

## 2022-10-21 PROCEDURE — 74177 CT ABD & PELVIS W/CONTRAST: CPT | Mod: TC

## 2022-10-21 PROCEDURE — 74177 CT ABD & PELVIS W/CONTRAST: CPT | Mod: 26,,, | Performed by: RADIOLOGY

## 2022-10-21 PROCEDURE — 71260 CT THORAX DX C+: CPT | Mod: TC

## 2022-10-21 RX ADMIN — IOHEXOL 75 ML: 350 INJECTION, SOLUTION INTRAVENOUS at 10:10

## 2022-10-21 RX ADMIN — IOHEXOL 30 ML: 350 INJECTION, SOLUTION INTRAVENOUS at 09:10

## 2022-10-24 ENCOUNTER — PATIENT MESSAGE (OUTPATIENT)
Dept: HEMATOLOGY/ONCOLOGY | Facility: HOSPITAL | Age: 72
End: 2022-10-24
Payer: MEDICARE

## 2022-10-24 PROCEDURE — G0179 PR HOME HEALTH MD RECERTIFICATION: ICD-10-PCS | Mod: ,,, | Performed by: INTERNAL MEDICINE

## 2022-10-24 PROCEDURE — G0179 MD RECERTIFICATION HHA PT: HCPCS | Mod: ,,, | Performed by: INTERNAL MEDICINE

## 2022-10-26 ENCOUNTER — OFFICE VISIT (OUTPATIENT)
Dept: HEMATOLOGY/ONCOLOGY | Facility: CLINIC | Age: 72
End: 2022-10-26
Payer: MEDICARE

## 2022-10-26 ENCOUNTER — LAB VISIT (OUTPATIENT)
Dept: LAB | Facility: HOSPITAL | Age: 72
End: 2022-10-26
Attending: NURSE PRACTITIONER
Payer: MEDICARE

## 2022-10-26 ENCOUNTER — DOCUMENTATION ONLY (OUTPATIENT)
Dept: HEMATOLOGY/ONCOLOGY | Facility: CLINIC | Age: 72
End: 2022-10-26

## 2022-10-26 VITALS
SYSTOLIC BLOOD PRESSURE: 115 MMHG | WEIGHT: 155.19 LBS | HEART RATE: 108 BPM | TEMPERATURE: 98 F | HEIGHT: 68 IN | RESPIRATION RATE: 20 BRPM | DIASTOLIC BLOOD PRESSURE: 78 MMHG | OXYGEN SATURATION: 98 % | BODY MASS INDEX: 23.52 KG/M2

## 2022-10-26 DIAGNOSIS — C78.6 CANCER OF PERITONEUM/RETROPERITONEUM, SECONDARY: ICD-10-CM

## 2022-10-26 DIAGNOSIS — F43.25 ADJUSTMENT DISORDER WITH MIXED DISTURBANCE OF EMOTIONS AND CONDUCT: ICD-10-CM

## 2022-10-26 DIAGNOSIS — T45.1X5A IMMUNODEFICIENCY DUE TO CHEMOTHERAPY: ICD-10-CM

## 2022-10-26 DIAGNOSIS — C23 ADENOCARCINOMA OF GALLBLADDER: ICD-10-CM

## 2022-10-26 DIAGNOSIS — N18.32 STAGE 3B CHRONIC KIDNEY DISEASE: ICD-10-CM

## 2022-10-26 DIAGNOSIS — I82.4Z1 DVT, LOWER EXTREMITY, DISTAL, ACUTE, RIGHT: ICD-10-CM

## 2022-10-26 DIAGNOSIS — C23 ADENOCARCINOMA OF GALLBLADDER: Primary | ICD-10-CM

## 2022-10-26 DIAGNOSIS — I26.99 PULMONARY EMBOLISM WITHOUT ACUTE COR PULMONALE, UNSPECIFIED CHRONICITY, UNSPECIFIED PULMONARY EMBOLISM TYPE: ICD-10-CM

## 2022-10-26 DIAGNOSIS — Z79.899 IMMUNODEFICIENCY DUE TO CHEMOTHERAPY: ICD-10-CM

## 2022-10-26 DIAGNOSIS — D84.821 IMMUNODEFICIENCY DUE TO CHEMOTHERAPY: ICD-10-CM

## 2022-10-26 DIAGNOSIS — R64 CACHEXIA: ICD-10-CM

## 2022-10-26 DIAGNOSIS — C78.7 SECONDARY LIVER CANCER: ICD-10-CM

## 2022-10-26 DIAGNOSIS — G89.3 NEOPLASM RELATED PAIN: ICD-10-CM

## 2022-10-26 LAB
ALBUMIN SERPL BCP-MCNC: 2.9 G/DL (ref 3.5–5.2)
ALP SERPL-CCNC: 102 U/L (ref 55–135)
ALT SERPL W/O P-5'-P-CCNC: 6 U/L (ref 10–44)
ANION GAP SERPL CALC-SCNC: 11 MMOL/L (ref 8–16)
AST SERPL-CCNC: 9 U/L (ref 10–40)
BILIRUB SERPL-MCNC: 0.3 MG/DL (ref 0.1–1)
BUN SERPL-MCNC: 14 MG/DL (ref 8–23)
CALCIUM SERPL-MCNC: 9.6 MG/DL (ref 8.7–10.5)
CHLORIDE SERPL-SCNC: 96 MMOL/L (ref 95–110)
CO2 SERPL-SCNC: 32 MMOL/L (ref 23–29)
CREAT SERPL-MCNC: 1.4 MG/DL (ref 0.5–1.4)
ERYTHROCYTE [DISTWIDTH] IN BLOOD BY AUTOMATED COUNT: 14.7 % (ref 11.5–14.5)
EST. GFR  (NO RACE VARIABLE): 40 ML/MIN/1.73 M^2
GLUCOSE SERPL-MCNC: 153 MG/DL (ref 70–110)
HCT VFR BLD AUTO: 25.9 % (ref 37–48.5)
HGB BLD-MCNC: 8.2 G/DL (ref 12–16)
IMM GRANULOCYTES # BLD AUTO: 0.03 K/UL (ref 0–0.04)
MAGNESIUM SERPL-MCNC: 1.4 MG/DL (ref 1.6–2.6)
MCH RBC QN AUTO: 31.4 PG (ref 27–31)
MCHC RBC AUTO-ENTMCNC: 31.7 G/DL (ref 32–36)
MCV RBC AUTO: 99 FL (ref 82–98)
NEUTROPHILS # BLD AUTO: 6.1 K/UL (ref 1.8–7.7)
PLATELET # BLD AUTO: 122 K/UL (ref 150–450)
PMV BLD AUTO: 9.4 FL (ref 9.2–12.9)
POTASSIUM SERPL-SCNC: 4.1 MMOL/L (ref 3.5–5.1)
PROT SERPL-MCNC: 6.9 G/DL (ref 6–8.4)
RBC # BLD AUTO: 2.61 M/UL (ref 4–5.4)
SODIUM SERPL-SCNC: 139 MMOL/L (ref 136–145)
WBC # BLD AUTO: 8.09 K/UL (ref 3.9–12.7)

## 2022-10-26 PROCEDURE — 1101F PT FALLS ASSESS-DOCD LE1/YR: CPT | Mod: CPTII,S$GLB,, | Performed by: INTERNAL MEDICINE

## 2022-10-26 PROCEDURE — 99215 OFFICE O/P EST HI 40 MIN: CPT | Mod: S$GLB,,, | Performed by: INTERNAL MEDICINE

## 2022-10-26 PROCEDURE — 83735 ASSAY OF MAGNESIUM: CPT | Performed by: NURSE PRACTITIONER

## 2022-10-26 PROCEDURE — 3078F PR MOST RECENT DIASTOLIC BLOOD PRESSURE < 80 MM HG: ICD-10-PCS | Mod: CPTII,S$GLB,, | Performed by: INTERNAL MEDICINE

## 2022-10-26 PROCEDURE — 1160F RVW MEDS BY RX/DR IN RCRD: CPT | Mod: CPTII,S$GLB,, | Performed by: INTERNAL MEDICINE

## 2022-10-26 PROCEDURE — 3288F FALL RISK ASSESSMENT DOCD: CPT | Mod: CPTII,S$GLB,, | Performed by: INTERNAL MEDICINE

## 2022-10-26 PROCEDURE — 1159F MED LIST DOCD IN RCRD: CPT | Mod: CPTII,S$GLB,, | Performed by: INTERNAL MEDICINE

## 2022-10-26 PROCEDURE — 1160F PR REVIEW ALL MEDS BY PRESCRIBER/CLIN PHARMACIST DOCUMENTED: ICD-10-PCS | Mod: CPTII,S$GLB,, | Performed by: INTERNAL MEDICINE

## 2022-10-26 PROCEDURE — 1157F PR ADVANCE CARE PLAN OR EQUIV PRESENT IN MEDICAL RECORD: ICD-10-PCS | Mod: CPTII,S$GLB,, | Performed by: INTERNAL MEDICINE

## 2022-10-26 PROCEDURE — 1157F ADVNC CARE PLAN IN RCRD: CPT | Mod: CPTII,S$GLB,, | Performed by: INTERNAL MEDICINE

## 2022-10-26 PROCEDURE — 3074F SYST BP LT 130 MM HG: CPT | Mod: CPTII,S$GLB,, | Performed by: INTERNAL MEDICINE

## 2022-10-26 PROCEDURE — 1126F PR PAIN SEVERITY QUANTIFIED, NO PAIN PRESENT: ICD-10-PCS | Mod: CPTII,S$GLB,, | Performed by: INTERNAL MEDICINE

## 2022-10-26 PROCEDURE — 80053 COMPREHEN METABOLIC PANEL: CPT | Performed by: NURSE PRACTITIONER

## 2022-10-26 PROCEDURE — 1126F AMNT PAIN NOTED NONE PRSNT: CPT | Mod: CPTII,S$GLB,, | Performed by: INTERNAL MEDICINE

## 2022-10-26 PROCEDURE — 3074F PR MOST RECENT SYSTOLIC BLOOD PRESSURE < 130 MM HG: ICD-10-PCS | Mod: CPTII,S$GLB,, | Performed by: INTERNAL MEDICINE

## 2022-10-26 PROCEDURE — 4010F ACE/ARB THERAPY RXD/TAKEN: CPT | Mod: CPTII,S$GLB,, | Performed by: INTERNAL MEDICINE

## 2022-10-26 PROCEDURE — 85027 COMPLETE CBC AUTOMATED: CPT | Performed by: NURSE PRACTITIONER

## 2022-10-26 PROCEDURE — 3078F DIAST BP <80 MM HG: CPT | Mod: CPTII,S$GLB,, | Performed by: INTERNAL MEDICINE

## 2022-10-26 PROCEDURE — 99999 PR PBB SHADOW E&M-EST. PATIENT-LVL V: ICD-10-PCS | Mod: PBBFAC,,, | Performed by: INTERNAL MEDICINE

## 2022-10-26 PROCEDURE — 3288F PR FALLS RISK ASSESSMENT DOCUMENTED: ICD-10-PCS | Mod: CPTII,S$GLB,, | Performed by: INTERNAL MEDICINE

## 2022-10-26 PROCEDURE — 1101F PR PT FALLS ASSESS DOC 0-1 FALLS W/OUT INJ PAST YR: ICD-10-PCS | Mod: CPTII,S$GLB,, | Performed by: INTERNAL MEDICINE

## 2022-10-26 PROCEDURE — 36415 COLL VENOUS BLD VENIPUNCTURE: CPT | Performed by: NURSE PRACTITIONER

## 2022-10-26 PROCEDURE — 1159F PR MEDICATION LIST DOCUMENTED IN MEDICAL RECORD: ICD-10-PCS | Mod: CPTII,S$GLB,, | Performed by: INTERNAL MEDICINE

## 2022-10-26 PROCEDURE — 4010F PR ACE/ARB THEARPY RXD/TAKEN: ICD-10-PCS | Mod: CPTII,S$GLB,, | Performed by: INTERNAL MEDICINE

## 2022-10-26 PROCEDURE — 99215 PR OFFICE/OUTPT VISIT, EST, LEVL V, 40-54 MIN: ICD-10-PCS | Mod: S$GLB,,, | Performed by: INTERNAL MEDICINE

## 2022-10-26 PROCEDURE — 99999 PR PBB SHADOW E&M-EST. PATIENT-LVL V: CPT | Mod: PBBFAC,,, | Performed by: INTERNAL MEDICINE

## 2022-10-26 NOTE — PROGRESS NOTES
SWER and SW Intern was consulted to speak with patient and family regarding hospice. Pt and family spoke with MD and understands hospice. SWER explained referral process. Pt's son and daughter was very supportive of her decision. SWER provided patient and family with three hospice choices. Family chose [facility] due to being closer. SWER will fax referral to [facility] and remain available.

## 2022-10-26 NOTE — PROGRESS NOTES
Subjective:       Patient ID: Madelyn Serna is a 71 y.o. female.    Chief Complaint: Results and Cancer    HPI:  71-year-old female history of metastatic gallbladder cancer returns with repeat CT chest abdomen pelvis for response to therapy.  Patient has been treated with cisplatin gemcitabine chemotherapy ECOG status 3    Past Medical History:   Diagnosis Date    Cancer of gallbladder     Essential (primary) hypertension     Infection following a procedure, deep incisional surgical site, initial encounter 3/3/2022    Romaine pus on bandage with induration to RUQ. She has already completed a 5 day course of Cipro which completed on 2/27. Recommend prompt evaluation with ED and whether or not imaging is warranted.     History reviewed. No pertinent family history.  Social History     Socioeconomic History    Marital status:    Tobacco Use    Smoking status: Every Day    Smokeless tobacco: Former     History reviewed. No pertinent surgical history.    Labs:  Lab Results   Component Value Date    WBC 8.09 10/26/2022    HGB 8.2 (L) 10/26/2022    HCT 25.9 (L) 10/26/2022    MCV 99 (H) 10/26/2022     (L) 10/26/2022     BMP  Lab Results   Component Value Date     10/26/2022    K 4.1 10/26/2022    CL 96 10/26/2022    CO2 32 (H) 10/26/2022    BUN 14 10/26/2022    CREATININE 1.4 10/26/2022    CALCIUM 9.6 10/26/2022    ANIONGAP 11 10/26/2022    ESTGFRAFRICA >60 07/25/2022    EGFRNONAA >60 07/25/2022     Lab Results   Component Value Date    ALT 6 (L) 10/26/2022    AST 9 (L) 10/26/2022    ALKPHOS 102 10/26/2022    BILITOT 0.3 10/26/2022       Lab Results   Component Value Date    IRON 45 09/27/2022    TIBC 252 09/27/2022    FERRITIN 669 (H) 09/27/2022     No results found for: [id number]  No results found for: FOLATE  No results found for: TSH      Review of Systems   Constitutional:  Positive for activity change, appetite change, fatigue and unexpected weight change. Negative for chills, diaphoresis and  fever.   HENT:  Negative for congestion, dental problem, drooling, ear discharge, ear pain, facial swelling, hearing loss, mouth sores, nosebleeds, postnasal drip, rhinorrhea, sinus pressure, sneezing, sore throat, tinnitus, trouble swallowing and voice change.    Eyes:  Negative for photophobia, pain, discharge, redness, itching and visual disturbance.   Respiratory:  Negative for cough, choking, chest tightness, shortness of breath, wheezing and stridor.    Cardiovascular:  Negative for chest pain, palpitations and leg swelling.   Gastrointestinal:  Negative for abdominal distention, abdominal pain, anal bleeding, blood in stool, constipation, diarrhea, nausea, rectal pain and vomiting.   Endocrine: Negative for cold intolerance, heat intolerance, polydipsia, polyphagia and polyuria.   Genitourinary:  Negative for decreased urine volume, difficulty urinating, dyspareunia, dysuria, enuresis, flank pain, frequency, genital sores, hematuria, menstrual problem, pelvic pain, urgency, vaginal bleeding, vaginal discharge and vaginal pain.   Musculoskeletal:  Positive for arthralgias, gait problem and myalgias. Negative for back pain, joint swelling, neck pain and neck stiffness.   Skin:  Negative for color change, pallor and rash.   Allergic/Immunologic: Negative for environmental allergies, food allergies and immunocompromised state.   Neurological:  Positive for weakness. Negative for dizziness, tremors, seizures, syncope, facial asymmetry, speech difficulty, light-headedness, numbness and headaches.   Hematological:  Negative for adenopathy. Does not bruise/bleed easily.   Psychiatric/Behavioral:  Positive for dysphoric mood. Negative for agitation, behavioral problems, confusion, decreased concentration, hallucinations, self-injury, sleep disturbance and suicidal ideas. The patient is nervous/anxious. The patient is not hyperactive.      Objective:      Physical Exam  Vitals reviewed.   Constitutional:       General:  She is not in acute distress.     Appearance: She is well-developed. She is cachectic. She is ill-appearing. She is not diaphoretic.   HENT:      Head: Normocephalic and atraumatic.      Right Ear: External ear normal.      Left Ear: External ear normal.      Nose: Nose normal.      Right Sinus: No maxillary sinus tenderness or frontal sinus tenderness.      Left Sinus: No maxillary sinus tenderness or frontal sinus tenderness.      Mouth/Throat:      Pharynx: No oropharyngeal exudate.   Eyes:      General: Lids are normal. No scleral icterus.        Right eye: No discharge.         Left eye: No discharge.      Conjunctiva/sclera: Conjunctivae normal.      Right eye: Right conjunctiva is not injected. No hemorrhage.     Left eye: Left conjunctiva is not injected. No hemorrhage.     Pupils: Pupils are equal, round, and reactive to light.   Neck:      Thyroid: No thyromegaly.      Vascular: No JVD.      Trachea: No tracheal deviation.   Cardiovascular:      Rate and Rhythm: Normal rate.   Pulmonary:      Effort: Pulmonary effort is normal. No respiratory distress.      Breath sounds: No stridor.   Chest:      Chest wall: No tenderness.   Abdominal:      General: Bowel sounds are normal. There is no distension.      Palpations: Abdomen is soft. There is no hepatomegaly, splenomegaly or mass.      Tenderness: There is no abdominal tenderness. There is no rebound.   Musculoskeletal:         General: No tenderness. Normal range of motion.      Cervical back: Normal range of motion and neck supple.   Lymphadenopathy:      Cervical: No cervical adenopathy.      Upper Body:      Right upper body: No supraclavicular adenopathy.      Left upper body: No supraclavicular adenopathy.   Skin:     General: Skin is dry.      Findings: No erythema or rash.   Neurological:      Mental Status: She is alert and oriented to person, place, and time.      Cranial Nerves: No cranial nerve deficit.      Motor: Weakness present.       Coordination: Coordination abnormal.      Gait: Gait abnormal.   Psychiatric:         Behavior: Behavior normal.         Thought Content: Thought content normal.         Judgment: Judgment normal.           Assessment:      1. Adenocarcinoma of gallbladder    2. Cancer of peritoneum/retroperitoneum, secondary    3. Neoplasm related pain    4. Immunodeficiency due to chemotherapy    5. Secondary liver cancer    6. Cachexia    7. Adjustment disorder with mixed disturbance of emotions and conduct    8. Stage 3b chronic kidney disease           Plan:     Extensive conversation with family reviewed images personally demonstrating clearly progressive disease compared to July of 2022.  Patient at this time is ECOG status 3 peripheral blood for next generation sequencing failed to reveal actionable mutation.  At this point with ECOG status 3 area further chemotherapy my opinion would be potentially detrimental in terms of shorten.  Talked about pros cons doing so toxicity profile family members present imaging discussion  patient they have decided proceed with hospice hospice philosophy to them survival less than 6 months.  Described progressive liver cancer painless def with progressive liver failure marked cachexia discussed implications questions total time 40        Bo Davies Jr, MD FACP

## 2022-11-01 ENCOUNTER — DOCUMENTATION ONLY (OUTPATIENT)
Dept: PALLIATIVE MEDICINE | Facility: CLINIC | Age: 72
End: 2022-11-01
Payer: MEDICARE

## 2022-11-01 ENCOUNTER — DOCUMENTATION ONLY (OUTPATIENT)
Dept: PALLIATIVE MEDICINE | Facility: HOSPITAL | Age: 72
End: 2022-11-01
Payer: MEDICARE

## 2022-11-01 ENCOUNTER — TELEPHONE (OUTPATIENT)
Dept: HEMATOLOGY/ONCOLOGY | Facility: CLINIC | Age: 72
End: 2022-11-01
Payer: MEDICARE

## 2022-11-01 DIAGNOSIS — C23 ADENOCARCINOMA OF GALLBLADDER: Primary | ICD-10-CM

## 2022-11-01 NOTE — TELEPHONE ENCOUNTER
Called daughter to inform her that Dr. Marcano will have home health remove picc line. No answer, left voice mail.

## 2022-11-01 NOTE — PROGRESS NOTES
"Nurse received a call from ms hamilton's dgt ms wiggins-(hcpoa) who wanted to discuss her mom chemo being stop and referred to hospice. Nurse expressed concerns, offered to help, ms wiggins wanted to schedule and apt with tatum around 11-23 when she arrive back home, (rosalie is out of town working until end of nov.) nurse offered to scheduled pt for 11-23 daughter accepted, nurse asked  ms wiggins please explained what's the most important conversation they would like to discuss at the time of the visit, ms wiggins stated, " my mom is not ready for hospice, she would like to stay on palliative for now, and see her primary care doctor until she has to be admitted to hospice". Nurse expressed understanding, explained this is possible, [facility] that bridges to hospices maybe a good services to office, I explained to ms wiggins I would discuss with tatum and follow up    "

## 2022-11-01 NOTE — TELEPHONE ENCOUNTER
----- Message from Bo Davies MD sent at 11/1/2022  9:06 AM CDT -----  Contact: [phone]  Home Health can pull the PICC line hospice is really with any  ----- Message -----  From: Sheila Zimmerman LPN  Sent: 11/1/2022   8:44 AM CDT  To: Bo Davies MD    I called patient daughter Dotty back to let her know recommendation. Daughter stated that patient has a picc line and not a port. She wants to know what pain medication will she be getting because she is not in pain. Also they have declined hospice. Patient is still requesting to have picc line removed.  ----- Message -----  From: Bo Davies MD  Sent: 11/1/2022   7:17 AM CDT  To: Lillian Serna MA, Samson PANDYA Staff    I would probably he in for right now allow for access if needed for pain medicine  ----- Message -----  From: Lillian Serna MA  Sent: 10/31/2022   5:02 PM CDT  To: Bo Davies MD    Please advise   ----- Message -----  From: Tyra Sierra  Sent: 10/31/2022   4:56 PM CDT  To: Samson PANDYA Staff    Calling regarding pt port in arm (does she still ivania it or can home health take port out), please call back at [phone] . Chula/donte

## 2022-11-01 NOTE — PROGRESS NOTES
Nurse spoke with provider, she will place a referral for [facility], ms wigigns has been notified of this, she requested the agency call her prior to going see her mother.

## 2022-11-01 NOTE — PROGRESS NOTES
Referral [facility]ed [facility].     Jayna Pickett MSW, LCSW-BACS  Palliative Medicine  [phone]

## 2022-11-04 DIAGNOSIS — G89.3 NEOPLASM RELATED PAIN: ICD-10-CM

## 2022-11-04 RX ORDER — OXYCODONE HYDROCHLORIDE 5 MG/1
TABLET ORAL
Qty: 60 TABLET | Refills: 0 | Status: SHIPPED | OUTPATIENT
Start: 2022-11-04

## 2022-11-04 RX ORDER — OXYCODONE HYDROCHLORIDE 5 MG/1
TABLET ORAL
Qty: 60 TABLET | Refills: 0 | Status: SHIPPED | OUTPATIENT
Start: 2022-11-04 | End: 2022-11-04

## 2022-11-07 ENCOUNTER — EXTERNAL HOME HEALTH (OUTPATIENT)
Dept: HOME HEALTH SERVICES | Facility: HOSPITAL | Age: 72
End: 2022-11-07
Payer: MEDICARE

## 2022-11-10 ENCOUNTER — HOSPITAL ENCOUNTER (EMERGENCY)
Facility: HOSPITAL | Age: 72
Discharge: HOME | End: 2022-11-10
Attending: EMERGENCY MEDICINE
Payer: MEDICARE

## 2022-11-10 VITALS
RESPIRATION RATE: 22 BRPM | TEMPERATURE: 99 F | DIASTOLIC BLOOD PRESSURE: 67 MMHG | SYSTOLIC BLOOD PRESSURE: 133 MMHG | HEART RATE: 83 BPM | OXYGEN SATURATION: 91 %
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
## 2022-11-10 DIAGNOSIS — M79.651 RIGHT THIGH PAIN: ICD-10-CM

## 2022-11-10 DIAGNOSIS — S70.11XA CONTUSION OF RIGHT THIGH, INITIAL ENCOUNTER: Primary | ICD-10-CM

## 2022-11-10 PROCEDURE — 93010 EKG 12-LEAD: ICD-10-PCS | Mod: ,,, | Performed by: INTERNAL MEDICINE

## 2022-11-10 PROCEDURE — 99285 EMERGENCY DEPT VISIT HI MDM: CPT | Mod: 25

## 2022-11-10 PROCEDURE — 63600175 PHARM REV CODE 636 W HCPCS: Performed by: EMERGENCY MEDICINE

## 2022-11-10 PROCEDURE — 93005 ELECTROCARDIOGRAM TRACING: CPT

## 2022-11-10 PROCEDURE — 93010 ELECTROCARDIOGRAM REPORT: CPT | Mod: ,,, | Performed by: INTERNAL MEDICINE

## 2022-11-10 PROCEDURE — 25000003 PHARM REV CODE 250: Performed by: EMERGENCY MEDICINE

## 2022-11-10 PROCEDURE — 96374 THER/PROPH/DIAG INJ IV PUSH: CPT

## 2022-11-10 PROCEDURE — 96376 TX/PRO/DX INJ SAME DRUG ADON: CPT

## 2022-11-10 RX ORDER — MORPHINE SULFATE 4 MG/ML
4 INJECTION, SOLUTION INTRAMUSCULAR; INTRAVENOUS
Status: COMPLETED | OUTPATIENT
Start: 2022-11-10 | End: 2022-11-10

## 2022-11-10 RX ORDER — ACETAMINOPHEN 500 MG
1000 TABLET ORAL
Status: COMPLETED | OUTPATIENT
Start: 2022-11-10 | End: 2022-11-10

## 2022-11-10 RX ORDER — MORPHINE SULFATE 4 MG/ML
1 INJECTION, SOLUTION INTRAMUSCULAR; INTRAVENOUS
Status: COMPLETED | OUTPATIENT
Start: 2022-11-10 | End: 2022-11-10

## 2022-11-10 RX ADMIN — ACETAMINOPHEN 1000 MG: 500 TABLET ORAL at 06:11

## 2022-11-10 RX ADMIN — MORPHINE SULFATE 4 MG: 4 INJECTION INTRAVENOUS at 04:11

## 2022-11-10 RX ADMIN — MORPHINE SULFATE 1 MG: 4 INJECTION INTRAVENOUS at 06:11

## 2022-11-10 NOTE — ED PROVIDER NOTES
SCRIBE #1 NOTE: I, Aquilino Shabazz, am scribing for, and in the presence of, Chinedu Soares MD. I have scribed the entire note.       History     Chief Complaint   Patient presents with    Shortness of Breath     Review of patient's allergies indicates:   Allergen Reactions    Aspirin      Makes her throwup    Ibuprofen      Throws up    Sulfa (sulfonamide antibiotics)      Pt can't remember reactions    Amoxicillin-pot clavulanate Itching         History of Present Illness     HPI    11/10/2022, 4:12 PM  History obtained from the patient and granddaughter and son      History of Present Illness: Madelyn Serna is a 71 y.o. female patient with a PMHx of gallbladder cancer Stage 4  and HTN who presents to the Emergency Department for evaluation of R leg pain which onset gradually today following a fall. Pt states that she tripped and fell today, due to tangling with her oxygen tank. She was able to get up, but she could not walk due to pain in her R leg. Pt has 4 blood clots and is on Eliquis. She is also on 3-4L of O2 at home.  Symptoms are constant and moderate in severity. No mitigating or exacerbating factors reported. Associated sxs include R hip pain and SOB. Patient denies any CP, cough, fever, syncope, HA, dizziness, numbness, and all other sxs at this time. Prior Tx includes 2 Tylenols. No further complaints or concerns at this time.       Arrival mode: EMS    PCP: Vera Henderson MD        Past Medical History:  Past Medical History:   Diagnosis Date    Cancer of gallbladder     Essential (primary) hypertension     Infection following a procedure, deep incisional surgical site, initial encounter 3/3/2022    Romaine pus on bandage with induration to RUQ. She has already completed a 5 day course of Cipro which completed on 2/27. Recommend prompt evaluation with ED and whether or not imaging is warranted.       Past Surgical History:  No past surgical history on file.      Family History:  No family history on  file.    Social History:  Social History     Tobacco Use    Smoking status: Every Day    Smokeless tobacco: Former   Substance and Sexual Activity    Alcohol use: Not on file    Drug use: Not on file    Sexual activity: Not on file        Review of Systems     Review of Systems   Constitutional:  Negative for fever.   HENT:  Negative for sore throat.    Respiratory:  Positive for shortness of breath. Negative for cough.    Cardiovascular:  Negative for chest pain.   Gastrointestinal:  Negative for nausea.   Genitourinary:  Negative for dysuria.   Musculoskeletal:  Positive for myalgias (R leg and R hip). Negative for back pain.   Skin:  Negative for rash.   Neurological:  Negative for dizziness, syncope, weakness, numbness and headaches.   Hematological:  Does not bruise/bleed easily.   All other systems reviewed and are negative.     Physical Exam     Initial Vitals   BP Pulse Resp Temp SpO2   11/10/22 1516 11/10/22 1516 11/10/22 1516 11/10/22 1519 11/10/22 1516   133/72 85 (!) 22 98 °F (36.7 °C) (!) 93 %      MAP       --                 Physical Exam  Nursing Notes and Vital Signs Reviewed.  Constitutional: Patient is in no acute distress. Well-developed and well-nourished.  Head: Atraumatic. Normocephalic.  Eyes: PERRL. EOM intact. Conjunctivae are not pale. No scleral icterus.  ENT: Mucous membranes are moist. Oropharynx is clear and symmetric.    Neck: Supple. Full ROM. No lymphadenopathy.  Cardiovascular: Regular rate. Regular rhythm. No murmurs, rubs, or gallops. Distal pulses are 2+ and symmetric.  Pulmonary/Chest: No respiratory distress.Coarse breath sounds bilaterally. No wheezing or rales.  Abdominal: Soft and non-distended.  There is no tenderness.  No rebound, guarding, or rigidity. Good bowel sounds.  Genitourinary: No CVA tenderness  Musculoskeletal: Moves all extremities. No obvious deformities. No edema. No calf tenderness  RLE/R hip: R leg externally rotated and shortened. Negative for swelling.  Positive for tenderness. ROM is limited due to pain. Cap refill distally is <2 seconds. DP and PT pulses are equal and 2+ bilaterally. No motor deficit. No distal sensory deficit. No pelvic instability.  Skin: Warm and dry.  Neurological:  Alert, awake, and appropriate.  Normal speech.  No acute focal neurological deficits are appreciated.  Psychiatric: Normal affect. Good eye contact. Appropriate in content.     ED Course   Procedures  ED Vital Signs:  Vitals:    11/10/22 1516 11/10/22 1519 11/10/22 1656 11/10/22 1824   BP: 133/72      Pulse: 85      Resp: (!) 22  12 18   Temp:  98 °F (36.7 °C)     SpO2: (!) 93%       11/10/22 1845   BP: 133/67   Pulse: 83   Resp: (!) 22   Temp: 98.7 °F (37.1 °C)   SpO2: (!) 91%       Abnormal Lab Results:  Labs Reviewed - No data to display     All Lab Results: none    Imaging Results:  Imaging Results              CT Head Without Contrast (Final result)  Result time 11/10/22 17:38:11      Final result by Paras Melara MD (11/10/22 17:38:11)                   Impression:      No acute abnormality.    Atrophy and chronic white matter changes.    Partial a mastoid air cell opacification noted bilaterally.    All CT scans   are performed using dose optimization techniques including the following: automated exposure control; adjustment of the mA and/or kV; use of iterative reconstruction technique.  Dose modulation was employed for ALARA by means of: Automated exposure control; adjustment of the mA and/or kV according to patient size (this includes techniques or standardized protocols for targeted exams where dose is matched to indication/reason for exam; i.e. extremities or head); and/or use of iterative reconstructive technique.      Electronically signed by: Kelton Crain  Date:    11/10/2022  Time:    17:38               Narrative:    EXAMINATION:  CT HEAD WITHOUT CONTRAST    CLINICAL HISTORY:  Head trauma, minor (Age >= 65y);    TECHNIQUE:  Low dose axial CT images obtained  throughout the head without intravenous contrast. Sagittal and coronal reconstructions were performed.    COMPARISON:  None.    FINDINGS:  Atrophy and chronic white matter changes.  No extra-axial blood or fluid collections.    No parenchymal mass, hemorrhage, edema or major vascular distribution infarct.    Skull/extracranial contents (limited evaluation): No fracture. Mastoid air cell opacification noted bilaterally.  No paranasal sinus opacification.                                       X-Ray Hip 2 or 3 views Right (with Pelvis when performed) (Final result)  Result time 11/10/22 17:14:28      Final result by Paras Melara MD (11/10/22 17:14:28)                   Impression:      As above      Electronically signed by: Kelton Crain  Date:    11/10/2022  Time:    17:14               Narrative:    EXAMINATION:  XR HIP WITH PELVIS WHEN PERFORMED, 2 OR 3  VIEWS RIGHT    CLINICAL HISTORY:  Pain fall    COMPARISON:  None    FINDINGS:  Multiple radiographic views  were obtained.    No definite evidence of acute fracture or dislocation.  Mild degenerative joint disease.  Decreased bone mineral density.  Recommend follow-up if symptoms persist                                       X-Ray Knee 1 or 2 View Right (Final result)  Result time 11/10/22 16:52:58   Procedure changed from X-Ray Knee 3 View Right     Final result by Paras Melara MD (11/10/22 16:52:58)                   Impression:      As above      Electronically signed by: Kelton Crain  Date:    11/10/2022  Time:    16:52               Narrative:    EXAMINATION:  XR KNEE 1 OR 2 VIEW RIGHT    CLINICAL HISTORY:  Fall; Unspecified fall, initial encounter    TECHNIQUE:  AP, lateral, and Merchant views of the right knee were performed.    COMPARISON:  None    FINDINGS:  Quadriceps spurring.  Small calcific densities the intercondylar region may relate to loose body or spurring.  Vascular calcification.  Decreased bone density.  Otherwise no acute fracture or  dislocation                                              The Emergency Provider reviewed the vital signs and test results, which are outlined above.     ED Discussion     6:47 PM: Reassessed pt at this time. She tells me she is at her baseline level of breathing, and does not feel any acute changes today. She denies any chest pain or complaints at this time. Denies abdominal pain, and has a benign abdominal exam. Discussed with pt all pertinent ED information and results. Discussed pt dx and plan of tx. Gave pt all f/u and return to the ED instructions. All questions and concerns were addressed at this time. Pt expresses understanding of information and instructions, and is comfortable with plan to discharge. Pt is stable for discharge.    I discussed with patient and/or family/caretaker that evaluation in the ED does not suggest any emergent or life threatening medical conditions requiring immediate intervention beyond what was provided in the ED, and I believe patient is safe for discharge.  Regardless, an unremarkable evaluation in the ED does not preclude the development or presence of a serious of life threatening condition. As such, patient was instructed to return immediately for any worsening or change in current symptoms.      ED Course as of 11/11/22 0153   Thu Nov 10, 2022   1728 SpO2(!): 93 %  Patient with chronic COPD symptoms.  [BA]      ED Course User Index  [BA] Chinedu Soares MD     Medical Decision Making:   Clinical Tests:   Radiological Study: Ordered and Reviewed         ED Medication(s):  Medications   morphine injection 4 mg (4 mg Intravenous Given 11/10/22 1656)   acetaminophen tablet 1,000 mg (1,000 mg Oral Given 11/10/22 1821)   morphine injection 1 mg (1 mg Intravenous Given 11/10/22 1824)       Discharge Medication List as of 11/10/2022  6:14 PM           Follow-up Information       Vera Henderson MD. Schedule an appointment as soon as possible for a visit in 2 days.    Specialty:  General Practice  Why: For re-evaluation and further treatment  Contact information:  [address] [facility]  [place] [address]  [phone]               [facility] - Emergency Dept.. Go today.    Specialty: Emergency Medicine  Why: If symptoms worsen, For re-evaluation and further treatment, As needed  Contact information:  [address] [facility][1] [address] [phone]                               Scribe Attestation:   Scribe #1: I performed the above scribed service and the documentation accurately describes the services I performed. I attest to the accuracy of the note.     Attending:   Physician Attestation Statement for Scribe #1: I, Chinedu Soares MD, personally performed the services described in this documentation, as scribed by Aquilino Shabazz, in my presence, and it is both accurate and complete.           Clinical Impression       ICD-10-CM ICD-9-CM   1. Contusion of right thigh, initial encounter  S70.11XA 924.00   2. Fall  W19.XXXA E888.9   3. Right thigh pain  M79.651 729.5       Disposition:   Disposition: Discharged  Condition: Stable       Chinedu Soares MD  11/11/22 0153

## 2022-11-16 ENCOUNTER — DOCUMENT SCAN (OUTPATIENT)
Dept: HOME HEALTH SERVICES | Facility: HOSPITAL | Age: 72
End: 2022-11-16
Payer: MEDICARE

## 2023-01-21 ENCOUNTER — DOCUMENT SCAN (OUTPATIENT)
Dept: HOME HEALTH SERVICES | Facility: HOSPITAL | Age: 73
End: 2023-01-21
Payer: MEDICARE